# Patient Record
Sex: FEMALE | Race: WHITE | NOT HISPANIC OR LATINO | URBAN - METROPOLITAN AREA
[De-identification: names, ages, dates, MRNs, and addresses within clinical notes are randomized per-mention and may not be internally consistent; named-entity substitution may affect disease eponyms.]

---

## 2018-09-14 ENCOUNTER — INPATIENT (INPATIENT)
Facility: HOSPITAL | Age: 83
LOS: 3 days | Discharge: NURSING, REHAB OR LONG-TERM CARE | DRG: 948 | End: 2018-09-18
Attending: INTERNAL MEDICINE | Admitting: INTERNAL MEDICINE
Payer: MEDICARE

## 2018-09-14 VITALS
TEMPERATURE: 98 F | WEIGHT: 169.98 LBS | HEIGHT: 67 IN | DIASTOLIC BLOOD PRESSURE: 81 MMHG | RESPIRATION RATE: 16 BRPM | OXYGEN SATURATION: 99 % | SYSTOLIC BLOOD PRESSURE: 174 MMHG | HEART RATE: 94 BPM

## 2018-09-14 DIAGNOSIS — R60.0 LOCALIZED EDEMA: ICD-10-CM

## 2018-09-14 DIAGNOSIS — Z29.9 ENCOUNTER FOR PROPHYLACTIC MEASURES, UNSPECIFIED: ICD-10-CM

## 2018-09-14 DIAGNOSIS — F39 UNSPECIFIED MOOD [AFFECTIVE] DISORDER: ICD-10-CM

## 2018-09-14 DIAGNOSIS — E78.5 HYPERLIPIDEMIA, UNSPECIFIED: ICD-10-CM

## 2018-09-14 DIAGNOSIS — E07.9 DISORDER OF THYROID, UNSPECIFIED: Chronic | ICD-10-CM

## 2018-09-14 PROBLEM — Z00.00 ENCOUNTER FOR PREVENTIVE HEALTH EXAMINATION: Status: ACTIVE | Noted: 2018-09-14

## 2018-09-14 LAB
ALBUMIN SERPL ELPH-MCNC: 3.5 G/DL — SIGNIFICANT CHANGE UP (ref 3.5–5)
ALP SERPL-CCNC: 73 U/L — SIGNIFICANT CHANGE UP (ref 40–120)
ALT FLD-CCNC: 30 U/L DA — SIGNIFICANT CHANGE UP (ref 10–60)
ANION GAP SERPL CALC-SCNC: 8 MMOL/L — SIGNIFICANT CHANGE UP (ref 5–17)
AST SERPL-CCNC: 18 U/L — SIGNIFICANT CHANGE UP (ref 10–40)
BASOPHILS # BLD AUTO: 0.1 K/UL — SIGNIFICANT CHANGE UP (ref 0–0.2)
BASOPHILS NFR BLD AUTO: 1.2 % — SIGNIFICANT CHANGE UP (ref 0–2)
BILIRUB SERPL-MCNC: 0.4 MG/DL — SIGNIFICANT CHANGE UP (ref 0.2–1.2)
BUN SERPL-MCNC: 13 MG/DL — SIGNIFICANT CHANGE UP (ref 7–18)
CALCIUM SERPL-MCNC: 9.9 MG/DL — SIGNIFICANT CHANGE UP (ref 8.4–10.5)
CHLORIDE SERPL-SCNC: 100 MMOL/L — SIGNIFICANT CHANGE UP (ref 96–108)
CO2 SERPL-SCNC: 26 MMOL/L — SIGNIFICANT CHANGE UP (ref 22–31)
CREAT SERPL-MCNC: 0.47 MG/DL — LOW (ref 0.5–1.3)
EOSINOPHIL # BLD AUTO: 0.2 K/UL — SIGNIFICANT CHANGE UP (ref 0–0.5)
EOSINOPHIL NFR BLD AUTO: 1.6 % — SIGNIFICANT CHANGE UP (ref 0–6)
GLUCOSE SERPL-MCNC: 106 MG/DL — HIGH (ref 70–99)
HCT VFR BLD CALC: 34 % — LOW (ref 34.5–45)
HGB BLD-MCNC: 11.4 G/DL — LOW (ref 11.5–15.5)
LYMPHOCYTES # BLD AUTO: 3.6 K/UL — HIGH (ref 1–3.3)
LYMPHOCYTES # BLD AUTO: 37.7 % — SIGNIFICANT CHANGE UP (ref 13–44)
MCHC RBC-ENTMCNC: 29.4 PG — SIGNIFICANT CHANGE UP (ref 27–34)
MCHC RBC-ENTMCNC: 33.4 GM/DL — SIGNIFICANT CHANGE UP (ref 32–36)
MCV RBC AUTO: 87.9 FL — SIGNIFICANT CHANGE UP (ref 80–100)
MONOCYTES # BLD AUTO: 0.5 K/UL — SIGNIFICANT CHANGE UP (ref 0–0.9)
MONOCYTES NFR BLD AUTO: 5.5 % — SIGNIFICANT CHANGE UP (ref 2–14)
NEUTROPHILS # BLD AUTO: 5.2 K/UL — SIGNIFICANT CHANGE UP (ref 1.8–7.4)
NEUTROPHILS NFR BLD AUTO: 54 % — SIGNIFICANT CHANGE UP (ref 43–77)
NT-PROBNP SERPL-SCNC: 225 PG/ML — SIGNIFICANT CHANGE UP (ref 0–450)
PLATELET # BLD AUTO: 232 K/UL — SIGNIFICANT CHANGE UP (ref 150–400)
POTASSIUM SERPL-MCNC: 4.4 MMOL/L — SIGNIFICANT CHANGE UP (ref 3.5–5.3)
POTASSIUM SERPL-SCNC: 4.4 MMOL/L — SIGNIFICANT CHANGE UP (ref 3.5–5.3)
PROT SERPL-MCNC: 6.8 G/DL — SIGNIFICANT CHANGE UP (ref 6–8.3)
RBC # BLD: 3.86 M/UL — SIGNIFICANT CHANGE UP (ref 3.8–5.2)
RBC # FLD: 12.8 % — SIGNIFICANT CHANGE UP (ref 10.3–14.5)
SODIUM SERPL-SCNC: 134 MMOL/L — LOW (ref 135–145)
TROPONIN I SERPL-MCNC: <0.015 NG/ML — SIGNIFICANT CHANGE UP (ref 0–0.04)
WBC # BLD: 9.6 K/UL — SIGNIFICANT CHANGE UP (ref 3.8–10.5)
WBC # FLD AUTO: 9.6 K/UL — SIGNIFICANT CHANGE UP (ref 3.8–10.5)

## 2018-09-14 PROCEDURE — 71045 X-RAY EXAM CHEST 1 VIEW: CPT | Mod: 26

## 2018-09-14 PROCEDURE — 93970 EXTREMITY STUDY: CPT | Mod: 26

## 2018-09-14 PROCEDURE — 93010 ELECTROCARDIOGRAM REPORT: CPT

## 2018-09-14 PROCEDURE — 99285 EMERGENCY DEPT VISIT HI MDM: CPT

## 2018-09-14 RX ORDER — FUROSEMIDE 40 MG
20 TABLET ORAL
Qty: 0 | Refills: 0 | Status: DISCONTINUED | OUTPATIENT
Start: 2018-09-14 | End: 2018-09-17

## 2018-09-14 RX ORDER — METOPROLOL TARTRATE 50 MG
25 TABLET ORAL
Qty: 0 | Refills: 0 | Status: DISCONTINUED | OUTPATIENT
Start: 2018-09-14 | End: 2018-09-17

## 2018-09-14 RX ORDER — ATORVASTATIN CALCIUM 80 MG/1
40 TABLET, FILM COATED ORAL AT BEDTIME
Qty: 0 | Refills: 0 | Status: DISCONTINUED | OUTPATIENT
Start: 2018-09-14 | End: 2018-09-18

## 2018-09-14 RX ORDER — SERTRALINE 25 MG/1
50 TABLET, FILM COATED ORAL DAILY
Qty: 0 | Refills: 0 | Status: DISCONTINUED | OUTPATIENT
Start: 2018-09-14 | End: 2018-09-18

## 2018-09-14 RX ORDER — RISPERIDONE 4 MG/1
0.5 TABLET ORAL DAILY
Qty: 0 | Refills: 0 | Status: DISCONTINUED | OUTPATIENT
Start: 2018-09-14 | End: 2018-09-18

## 2018-09-14 RX ORDER — ENOXAPARIN SODIUM 100 MG/ML
40 INJECTION SUBCUTANEOUS DAILY
Qty: 0 | Refills: 0 | Status: DISCONTINUED | OUTPATIENT
Start: 2018-09-14 | End: 2018-09-18

## 2018-09-14 RX ORDER — ASPIRIN/CALCIUM CARB/MAGNESIUM 324 MG
81 TABLET ORAL DAILY
Qty: 0 | Refills: 0 | Status: DISCONTINUED | OUTPATIENT
Start: 2018-09-14 | End: 2018-09-18

## 2018-09-14 RX ADMIN — ATORVASTATIN CALCIUM 40 MILLIGRAM(S): 80 TABLET, FILM COATED ORAL at 21:51

## 2018-09-14 NOTE — H&P ADULT - NSHPPHYSICALEXAM_GEN_ALL_CORE
T(C): 36.8 (14 Sep 2018 12:59), Max: 36.8 (14 Sep 2018 12:59)  T(F): 98.2 (14 Sep 2018 12:59), Max: 98.2 (14 Sep 2018 12:59)  HR: 94 (14 Sep 2018 12:59) (94 - 94)  BP: 174/81 (14 Sep 2018 12:59) (174/81 - 174/81)  RR: 16 (14 Sep 2018 12:59) (16 - 16)  SpO2: 99% (14 Sep 2018 12:59) (99% - 99%)

## 2018-09-14 NOTE — ED ADULT NURSE NOTE - OBJECTIVE STATEMENT
bilateral lower leg swelling with discoloration on right lefg and elbow, s/p fall 1 week ago, denies any LOC or dizziness when the fall happened.

## 2018-09-14 NOTE — H&P ADULT - PROBLEM SELECTOR PLAN 1
P/w new acute worsening pitting of LEs complicated w/ inability to ambualte and recent fall  - R/o new onset CHF 2/2 ischemia; C/w remote telemetry, ASA, Statin, and beta blocker  Cardiology TBD  ***F/u serial cardiac enzymes, A1c, lipid panel, TTE, LE duplex (r/o clot), and PT consult P/w new acute worsening pitting of LEs complicated w/ inability to ambulate and recent fall  - Negative LE duplex for DVT  - R/o new onset CHF 2/2 ischemia; C/w remote telemetry, ASA, Statin, and beta blocker  - C/w IV Lasix 20 IV BID  ***F/u serial cardiac enzymes, A1c, lipid panel, TTE, and PT consult P/w new acute worsening pitting of LEs complicated w/ inability to ambulate and recent fall  - Negative LE duplex for DVT  - EKG NSR w/ TWI III and flat T wave at aVF  - R/o new onset CHF 2/2 ischemia; C/w remote telemetry, ASA, Statin, and beta blocker  - C/w IV Lasix 20 IV BID  ***F/u serial cardiac enzymes, A1c, lipid panel, TTE, and PT consult

## 2018-09-14 NOTE — H&P ADULT - HISTORY OF PRESENT ILLNESS
83 year old female w/ PMH Mood Disorder, Thyroid Mass resection, and HLD p/w worsening lower extremity swelling x 7 days s/p fall of unknown origin - patient states she was walking to close her AC and simply felt herself coming down, agrees w/ weakness, denies any prodromal symptoms, LOC, mechanical trip/slip, any Hx of falls, or any other complaints. Pt Lives alone, independently ambulatory prior to the LE swelling associated w/ pain. Pt fell slowly and bruised her feet, R more than L, w/ R tricep region bruising. Pt states recent cardiac w/u w/ PCP including TTE. Pt states Hx of ?TIA - episode of poorly moving - w/u negative, told she had a TIA.

## 2018-09-14 NOTE — ED ADULT NURSE NOTE - NSIMPLEMENTINTERV_GEN_ALL_ED
Implemented All Fall Risk Interventions:  [place] to call system. Call bell, personal items and telephone within reach. Instruct patient to call for assistance. Room bathroom lighting operational. Non-slip footwear when patient is off stretcher. Physically safe environment: no spills, clutter or unnecessary equipment. Stretcher in lowest position, wheels locked, appropriate side rails in place. Provide visual cue, wrist band, yellow gown, etc. Monitor gait and stability. Monitor for mental status changes and reorient to person, place, and time. Review medications for side effects contributing to fall risk. Reinforce activity limits and safety measures with patient and family.

## 2018-09-14 NOTE — H&P ADULT - NSHPLABSRESULTS_GEN_ALL_CORE
CBC Full  -  ( 14 Sep 2018 15:15 )  WBC Count : 9.6 K/uL  Hemoglobin : 11.4 g/dL  Hematocrit : 34.0 %  Platelet Count - Automated : 232 K/uL  Mean Cell Volume : 87.9 fl  Mean Cell Hemoglobin : 29.4 pg  Mean Cell Hemoglobin Concentration : 33.4 gm/dL  Auto Neutrophil # : 5.2 K/uL  Auto Lymphocyte # : 3.6 K/uL  Auto Monocyte # : 0.5 K/uL  Auto Eosinophil # : 0.2 K/uL  Auto Basophil # : 0.1 K/uL  Auto Neutrophil % : 54.0 %  Auto Lymphocyte % : 37.7 %  Auto Monocyte % : 5.5 %  Auto Eosinophil % : 1.6 %  Auto Basophil % : 1.2 %    09-14    134<L>  |  100  |  13  ----------------------------<  106<H>  4.4   |  26  |  0.47<L>    Ca    9.9      14 Sep 2018 15:15    TPro  6.8  /  Alb  3.5  /  TBili  0.4  /  DBili  x   /  AST  18  /  ALT  30  /  AlkPhos  73  09-14    CARDIAC MARKERS ( 14 Sep 2018 15:15 )  <0.015 ng/mL / x     / x     / x     / x        Imaging  - EKG TBD, not completed. CBC Full  -  ( 14 Sep 2018 15:15 )  WBC Count : 9.6 K/uL  Hemoglobin : 11.4 g/dL  Hematocrit : 34.0 %  Platelet Count - Automated : 232 K/uL  Mean Cell Volume : 87.9 fl  Mean Cell Hemoglobin : 29.4 pg  Mean Cell Hemoglobin Concentration : 33.4 gm/dL  Auto Neutrophil # : 5.2 K/uL  Auto Lymphocyte # : 3.6 K/uL  Auto Monocyte # : 0.5 K/uL  Auto Eosinophil # : 0.2 K/uL  Auto Basophil # : 0.1 K/uL  Auto Neutrophil % : 54.0 %  Auto Lymphocyte % : 37.7 %  Auto Monocyte % : 5.5 %  Auto Eosinophil % : 1.6 %  Auto Basophil % : 1.2 %    09-14    134<L>  |  100  |  13  ----------------------------<  106<H>  4.4   |  26  |  0.47<L>    Ca    9.9      14 Sep 2018 15:15    TPro  6.8  /  Alb  3.5  /  TBili  0.4  /  DBili  x   /  AST  18  /  ALT  30  /  AlkPhos  73  09-14    CARDIAC MARKERS ( 14 Sep 2018 15:15 )  <0.015 ng/mL / x     / x     / x     / x        Imaging  - EKG NSR w/ TWI III and flat T wave at aVF

## 2018-09-14 NOTE — ED PROVIDER NOTE - MEDICAL DECISION MAKING DETAILS
Pt with BLE edema x 7 days. Will get DVT study, basic labs. Pt comes with a note from Dr. Quiroga requesting admission to Dr. Goyal.

## 2018-09-14 NOTE — H&P ADULT - ASSESSMENT
83 year old female w/ PMH Mood Disorder, Thyroid Mass resection, and HLD p/w worsening lower extremity swelling x 7 days s/p fall of unknown origin - admitting for further evaluation

## 2018-09-14 NOTE — ED PROVIDER NOTE - OBJECTIVE STATEMENT
82 y/o female with PMHx of HLD, TIA, carotid stenosis presents to the ED c/o BLE edema x 7 days. Pt notes she sustained a fall x 7 days due to an unclear cause but pt denies head strike or LOC. Pt notes ever since her fall, she has been having gradual onset of BLE edema. Sx have worsened and her legs have gotten so swollen to the point where they are too heavy for her to walk on, pt is now unable to walk. Pt denies fever, chest pain, shortness of breath, or any other complaints. Pt also denies any pleuritic Sx. NKDA.

## 2018-09-15 ENCOUNTER — TRANSCRIPTION ENCOUNTER (OUTPATIENT)
Age: 83
End: 2018-09-15

## 2018-09-15 DIAGNOSIS — J39.8 OTHER SPECIFIED DISEASES OF UPPER RESPIRATORY TRACT: ICD-10-CM

## 2018-09-15 DIAGNOSIS — I10 ESSENTIAL (PRIMARY) HYPERTENSION: ICD-10-CM

## 2018-09-15 LAB
ANION GAP SERPL CALC-SCNC: 8 MMOL/L — SIGNIFICANT CHANGE UP (ref 5–17)
BASOPHILS # BLD AUTO: 0.1 K/UL — SIGNIFICANT CHANGE UP (ref 0–0.2)
BASOPHILS NFR BLD AUTO: 0.9 % — SIGNIFICANT CHANGE UP (ref 0–2)
BUN SERPL-MCNC: 10 MG/DL — SIGNIFICANT CHANGE UP (ref 7–18)
CALCIUM SERPL-MCNC: 10.2 MG/DL — SIGNIFICANT CHANGE UP (ref 8.4–10.5)
CHLORIDE SERPL-SCNC: 101 MMOL/L — SIGNIFICANT CHANGE UP (ref 96–108)
CHOLEST SERPL-MCNC: 171 MG/DL — SIGNIFICANT CHANGE UP (ref 10–199)
CK MB BLD-MCNC: 2.1 % — SIGNIFICANT CHANGE UP (ref 0–3.5)
CK MB BLD-MCNC: 2.5 % — SIGNIFICANT CHANGE UP (ref 0–3.5)
CK MB CFR SERPL CALC: 2.7 NG/ML — SIGNIFICANT CHANGE UP (ref 0–3.6)
CK MB CFR SERPL CALC: 3 NG/ML — SIGNIFICANT CHANGE UP (ref 0–3.6)
CK SERPL-CCNC: 118 U/L — SIGNIFICANT CHANGE UP (ref 21–215)
CK SERPL-CCNC: 128 U/L — SIGNIFICANT CHANGE UP (ref 21–215)
CO2 SERPL-SCNC: 27 MMOL/L — SIGNIFICANT CHANGE UP (ref 22–31)
CREAT SERPL-MCNC: 0.54 MG/DL — SIGNIFICANT CHANGE UP (ref 0.5–1.3)
EOSINOPHIL # BLD AUTO: 0.2 K/UL — SIGNIFICANT CHANGE UP (ref 0–0.5)
EOSINOPHIL NFR BLD AUTO: 2 % — SIGNIFICANT CHANGE UP (ref 0–6)
FOLATE SERPL-MCNC: 8.6 NG/ML — SIGNIFICANT CHANGE UP
GLUCOSE SERPL-MCNC: 107 MG/DL — HIGH (ref 70–99)
HBA1C BLD-MCNC: 5.6 % — SIGNIFICANT CHANGE UP (ref 4–5.6)
HCT VFR BLD CALC: 35.1 % — SIGNIFICANT CHANGE UP (ref 34.5–45)
HDLC SERPL-MCNC: 34 MG/DL — LOW
HGB BLD-MCNC: 11.7 G/DL — SIGNIFICANT CHANGE UP (ref 11.5–15.5)
LIPID PNL WITH DIRECT LDL SERPL: 114 MG/DL — SIGNIFICANT CHANGE UP
LYMPHOCYTES # BLD AUTO: 3.7 K/UL — HIGH (ref 1–3.3)
LYMPHOCYTES # BLD AUTO: 37.5 % — SIGNIFICANT CHANGE UP (ref 13–44)
MAGNESIUM SERPL-MCNC: 2.1 MG/DL — SIGNIFICANT CHANGE UP (ref 1.6–2.6)
MCHC RBC-ENTMCNC: 29.2 PG — SIGNIFICANT CHANGE UP (ref 27–34)
MCHC RBC-ENTMCNC: 33.3 GM/DL — SIGNIFICANT CHANGE UP (ref 32–36)
MCV RBC AUTO: 87.7 FL — SIGNIFICANT CHANGE UP (ref 80–100)
MONOCYTES # BLD AUTO: 0.5 K/UL — SIGNIFICANT CHANGE UP (ref 0–0.9)
MONOCYTES NFR BLD AUTO: 5.2 % — SIGNIFICANT CHANGE UP (ref 2–14)
NEUTROPHILS # BLD AUTO: 5.4 K/UL — SIGNIFICANT CHANGE UP (ref 1.8–7.4)
NEUTROPHILS NFR BLD AUTO: 54.5 % — SIGNIFICANT CHANGE UP (ref 43–77)
PHOSPHATE SERPL-MCNC: 2.9 MG/DL — SIGNIFICANT CHANGE UP (ref 2.5–4.5)
PLATELET # BLD AUTO: 235 K/UL — SIGNIFICANT CHANGE UP (ref 150–400)
POTASSIUM SERPL-MCNC: 4 MMOL/L — SIGNIFICANT CHANGE UP (ref 3.5–5.3)
POTASSIUM SERPL-SCNC: 4 MMOL/L — SIGNIFICANT CHANGE UP (ref 3.5–5.3)
RBC # BLD: 4 M/UL — SIGNIFICANT CHANGE UP (ref 3.8–5.2)
RBC # FLD: 12.5 % — SIGNIFICANT CHANGE UP (ref 10.3–14.5)
SODIUM SERPL-SCNC: 136 MMOL/L — SIGNIFICANT CHANGE UP (ref 135–145)
TOTAL CHOLESTEROL/HDL RATIO MEASUREMENT: 5 RATIO — SIGNIFICANT CHANGE UP (ref 3.3–7.1)
TRIGL SERPL-MCNC: 113 MG/DL — SIGNIFICANT CHANGE UP (ref 10–149)
TROPONIN I SERPL-MCNC: <0.015 NG/ML — SIGNIFICANT CHANGE UP (ref 0–0.04)
TROPONIN I SERPL-MCNC: <0.015 NG/ML — SIGNIFICANT CHANGE UP (ref 0–0.04)
TSH SERPL-MCNC: 1.56 UU/ML — SIGNIFICANT CHANGE UP (ref 0.34–4.82)
VIT B12 SERPL-MCNC: 224 PG/ML — LOW (ref 232–1245)
WBC # BLD: 10 K/UL — SIGNIFICANT CHANGE UP (ref 3.8–10.5)
WBC # FLD AUTO: 10 K/UL — SIGNIFICANT CHANGE UP (ref 3.8–10.5)

## 2018-09-15 PROCEDURE — 93306 TTE W/DOPPLER COMPLETE: CPT | Mod: 26

## 2018-09-15 PROCEDURE — 99222 1ST HOSP IP/OBS MODERATE 55: CPT

## 2018-09-15 RX ORDER — PREGABALIN 225 MG/1
1000 CAPSULE ORAL DAILY
Qty: 0 | Refills: 0 | Status: DISCONTINUED | OUTPATIENT
Start: 2018-09-15 | End: 2018-09-18

## 2018-09-15 RX ORDER — SODIUM,POTASSIUM PHOSPHATES 278-250MG
1 POWDER IN PACKET (EA) ORAL ONCE
Qty: 0 | Refills: 0 | Status: COMPLETED | OUTPATIENT
Start: 2018-09-15 | End: 2018-09-15

## 2018-09-15 RX ORDER — INFLUENZA VIRUS VACCINE 15; 15; 15; 15 UG/.5ML; UG/.5ML; UG/.5ML; UG/.5ML
0.5 SUSPENSION INTRAMUSCULAR ONCE
Qty: 0 | Refills: 0 | Status: COMPLETED | OUTPATIENT
Start: 2018-09-15 | End: 2018-09-18

## 2018-09-15 RX ADMIN — ENOXAPARIN SODIUM 40 MILLIGRAM(S): 100 INJECTION SUBCUTANEOUS at 12:01

## 2018-09-15 RX ADMIN — RISPERIDONE 0.5 MILLIGRAM(S): 4 TABLET ORAL at 12:00

## 2018-09-15 RX ADMIN — Medication 25 MILLIGRAM(S): at 06:18

## 2018-09-15 RX ADMIN — ATORVASTATIN CALCIUM 40 MILLIGRAM(S): 80 TABLET, FILM COATED ORAL at 22:03

## 2018-09-15 RX ADMIN — Medication 1 TABLET(S): at 12:00

## 2018-09-15 RX ADMIN — Medication 20 MILLIGRAM(S): at 17:49

## 2018-09-15 RX ADMIN — Medication 20 MILLIGRAM(S): at 06:18

## 2018-09-15 RX ADMIN — Medication 25 MILLIGRAM(S): at 17:49

## 2018-09-15 RX ADMIN — SERTRALINE 50 MILLIGRAM(S): 25 TABLET, FILM COATED ORAL at 14:10

## 2018-09-15 NOTE — PHYSICAL THERAPY INITIAL EVALUATION ADULT - PERTINENT HX OF CURRENT PROBLEM, REHAB EVAL
Pt. admitted from home due to worsening LE swelling; pt. also states had fallen walking back to her bed from turning off her AC.

## 2018-09-15 NOTE — PHYSICAL THERAPY INITIAL EVALUATION ADULT - GENERAL OBSERVATIONS, REHAB EVAL
Pt. received sitting up in bed; IV line and monitor in place; Pt. c/o pain on both lower legs and right thigh.

## 2018-09-15 NOTE — DISCHARGE NOTE ADULT - MEDICATION SUMMARY - MEDICATIONS TO TAKE
I will START or STAY ON the medications listed below when I get home from the hospital:    sertraline 50 mg oral tablet  -- 1 tab(s) by mouth once a day  -- Indication: For Mood disorder    pravastatin 40 mg oral tablet  -- 1 tab(s) by mouth once a day  -- Indication: For HLD (hyperlipidemia)    risperiDONE 0.5 mg oral tablet  -- 1 tab(s) by mouth once a day  -- Indication: For Mood disorder

## 2018-09-15 NOTE — CONSULT NOTE ADULT - SUBJECTIVE AND OBJECTIVE BOX
CHIEF COMPLAINT: Leg swelling    HPI: 82 yo F with HLD and thyroid mass s/p resection who presented with LE edema. Patient reports     PAST MEDICAL & SURGICAL HISTORY:  As above, also Carotid stenosis, TIA (transient ischemic attack)    Allergies    No Known Allergies      MEDICATIONS  (STANDING):  aspirin  chewable 81 milliGRAM(s) Oral daily  atorvastatin 40 milliGRAM(s) Oral at bedtime  enoxaparin Injectable 40 milliGRAM(s) SubCutaneous daily  furosemide   Injectable 20 milliGRAM(s) IV Push two times a day  influenza   Vaccine 0.5 milliLiter(s) IntraMuscular once  metoprolol tartrate 25 milliGRAM(s) Oral two times a day  risperiDONE   Tablet 0.5 milliGRAM(s) Oral daily  sertraline 50 milliGRAM(s) Oral daily    MEDICATIONS  (PRN):      FAMILY HISTORY:  Family history of lung cancer (Father)    No family history of premature coronary artery disease or sudden cardiac death    SOCIAL HISTORY:  Smoking-  Alcohol-  Illicit Drug use-    REVIEW OF SYSTEMS:  Constitutional: [ ] fever, [ ]weight loss,  [ ]fatigue  Eyes: [ ] visual changes  Respiratory: [ ]shortness of breath;  [ ] cough, [ ]wheezing, [ ]chills, [ ]hemoptysis  Cardiovascular: [ ] chest pain, [ ]palpitations, [ ]dizziness,  [ ]leg swelling [ ]syncope  Gastrointestinal: [ ] abdominal pain, [ ]nausea, [ ]vomiting,  [ ]diarrhea   Genitourinary: [ ] dysuria, [ ] hematuria  Neurologic: [ ] headaches [ ] tremors  [ ] weakness [ ] lightheadedness  Skin: [ ] itching, [ ]burning, [ ] rashes  Endocrine: [ ] heat or cold intolerance  Musculoskeletal: [ ] joint pain or swelling; [ ] muscle, back, or extremity pain  Psychiatric: [ ] depression, [ ]anxiety, [ ]mood swings, or [ ]difficulty sleeping  Hematologic: [ ] easy bruising, [ ] bleeding gums       [ x] All others negative	  [ ] Unable to obtain    Vital Signs Last 24 Hrs  T(C): 37.1 (15 Sep 2018 11:20), Max: 37.2 (15 Sep 2018 00:46)  T(F): 98.7 (15 Sep 2018 11:20), Max: 98.9 (15 Sep 2018 00:46)  HR: 96 (15 Sep 2018 11:28) (78 - 99)  BP: 141/53 (15 Sep 2018 11:28) (135/45 - 159/59)  BP(mean): --  RR: 19 (15 Sep 2018 11:20) (16 - 19)  SpO2: 97% (15 Sep 2018 11:28) (96% - 100%)  I&O's Summary    14 Sep 2018 07:01  -  15 Sep 2018 07:00  --------------------------------------------------------  IN: 0 mL / OUT: 1100 mL / NET: -1100 mL        PHYSICAL EXAM:  General: No acute distress  HEENT: EOMI, PERRL  Neck: Supple, No JVD  Lungs: Clear to auscultation bilaterally; No rales or wheezing  Heart: Regular rate and rhythm; No murmurs, rubs, or gallops  Abdomen: Nontender, bowel sounds present  Extremities: No clubbing, cyanosis, or edema  Nervous system:  Alert & Oriented X3, no focal deficits  Psychiatric: Normal affect  Skin: No rashes or lesions      LABS:  09-15    136  |  101  |  10  ----------------------------<  107<H>  4.0   |  27  |  0.54    Ca    10.2      15 Sep 2018 07:12  Phos  2.9     09-15  Mg     2.1     09-15    TPro  6.8  /  Alb  3.5  /  TBili  0.4  /  DBili  x   /  AST  18  /  ALT  30  /  AlkPhos  73  09-14    Creatinine Trend: 0.54<--, 0.47<--                        11.7   10.0  )-----------( 235      ( 15 Sep 2018 07:12 )             35.1     Lipid Panel: Cholesterol, Serum 171  Direct   HDL Cholesterol, Serum 34  Triglycerides, Serum 113    Cardiac Enzymes: CARDIAC MARKERS ( 15 Sep 2018 07:12 )  <0.015 ng/mL / x     / 128 U/L / x     / 2.7 ng/mL  CARDIAC MARKERS ( 15 Sep 2018 00:19 )  <0.015 ng/mL / x     / 118 U/L / x     / 3.0 ng/mL  CARDIAC MARKERS ( 14 Sep 2018 15:15 )  <0.015 ng/mL / x     / x     / x     / x          Serum Pro-Brain Natriuretic Peptide: 225 pg/mL (09-14-18 @ 15:15)    09-15 [id number] 5.6      RADIOLOGY: < from: Xray Chest 1 View AP/PA (09.14.18 @ 14:22) >  Impression: No radiographic evidence for acute cardiopulmonary disease.    Apparent right peritracheal density with left tracheal deviation may be   due to a substernal goiter. If clinically indicated, chest CT without IV   contrast may be pursued for further evaluation. This finding is   communicated with the emergency department via the PACS communication   system.    < end of copied text >      ECG [my interpretation]:    TELEMETRY:    ECHO:    STRESS TEST:    CATHETERIZATION: CHIEF COMPLAINT: Leg swelling    HPI: 82 yo F with HLD and thyroid mass s/p resection who presented with LE edema. Patient reports     PAST MEDICAL & SURGICAL HISTORY:  As above, also Carotid stenosis, TIA (transient ischemic attack)    Allergies    No Known Allergies      MEDICATIONS  (STANDING):  aspirin  chewable 81 milliGRAM(s) Oral daily  atorvastatin 40 milliGRAM(s) Oral at bedtime  enoxaparin Injectable 40 milliGRAM(s) SubCutaneous daily  furosemide   Injectable 20 milliGRAM(s) IV Push two times a day  influenza   Vaccine 0.5 milliLiter(s) IntraMuscular once  metoprolol tartrate 25 milliGRAM(s) Oral two times a day  risperiDONE   Tablet 0.5 milliGRAM(s) Oral daily  sertraline 50 milliGRAM(s) Oral daily    MEDICATIONS  (PRN):      FAMILY HISTORY:  Family history of lung cancer (Father)    No family history of premature coronary artery disease or sudden cardiac death    SOCIAL HISTORY:  Smoking-  Alcohol-  Illicit Drug use-    REVIEW OF SYSTEMS:  Constitutional: [ ] fever, [ ]weight loss,  [ ]fatigue  Eyes: [ ] visual changes  Respiratory: [ ]shortness of breath;  [ ] cough, [ ]wheezing, [ ]chills, [ ]hemoptysis  Cardiovascular: [ ] chest pain, [ ]palpitations, [ ]dizziness,  [ ]leg swelling [ ]syncope  Gastrointestinal: [ ] abdominal pain, [ ]nausea, [ ]vomiting,  [ ]diarrhea   Genitourinary: [ ] dysuria, [ ] hematuria  Neurologic: [ ] headaches [ ] tremors  [ ] weakness [ ] lightheadedness  Skin: [ ] itching, [ ]burning, [ ] rashes  Endocrine: [ ] heat or cold intolerance  Musculoskeletal: [ ] joint pain or swelling; [ ] muscle, back, or extremity pain  Psychiatric: [ ] depression, [ ]anxiety, [ ]mood swings, or [ ]difficulty sleeping  Hematologic: [ ] easy bruising, [ ] bleeding gums       [ x] All others negative	  [ ] Unable to obtain    Vital Signs Last 24 Hrs  T(C): 37.1 (15 Sep 2018 11:20), Max: 37.2 (15 Sep 2018 00:46)  T(F): 98.7 (15 Sep 2018 11:20), Max: 98.9 (15 Sep 2018 00:46)  HR: 96 (15 Sep 2018 11:28) (78 - 99)  BP: 141/53 (15 Sep 2018 11:28) (135/45 - 159/59)  BP(mean): --  RR: 19 (15 Sep 2018 11:20) (16 - 19)  SpO2: 97% (15 Sep 2018 11:28) (96% - 100%)  I&O's Summary    14 Sep 2018 07:01  -  15 Sep 2018 07:00  --------------------------------------------------------  IN: 0 mL / OUT: 1100 mL / NET: -1100 mL        PHYSICAL EXAM:  General: No acute distress  HEENT: EOMI, PERRL  Neck: Supple, No JVD  Lungs: Clear to auscultation bilaterally; No rales or wheezing  Heart: Regular rate and rhythm; No murmurs, rubs, or gallops  Abdomen: Nontender, bowel sounds present  Extremities: No clubbing, cyanosis, or edema  Nervous system:  Alert & Oriented X3, no focal deficits  Psychiatric: Normal affect  Skin: No rashes or lesions      LABS:  09-15    136  |  101  |  10  ----------------------------<  107<H>  4.0   |  27  |  0.54    Ca    10.2      15 Sep 2018 07:12  Phos  2.9     09-15  Mg     2.1     09-15    TPro  6.8  /  Alb  3.5  /  TBili  0.4  /  DBili  x   /  AST  18  /  ALT  30  /  AlkPhos  73  09-14    Creatinine Trend: 0.54<--, 0.47<--                        11.7   10.0  )-----------( 235      ( 15 Sep 2018 07:12 )             35.1     Lipid Panel: Cholesterol, Serum 171  Direct   HDL Cholesterol, Serum 34  Triglycerides, Serum 113    Cardiac Enzymes: CARDIAC MARKERS ( 15 Sep 2018 07:12 )  <0.015 ng/mL / x     / 128 U/L / x     / 2.7 ng/mL  CARDIAC MARKERS ( 15 Sep 2018 00:19 )  <0.015 ng/mL / x     / 118 U/L / x     / 3.0 ng/mL  CARDIAC MARKERS ( 14 Sep 2018 15:15 )  <0.015 ng/mL / x     / x     / x     / x          Serum Pro-Brain Natriuretic Peptide: 225 pg/mL (09-14-18 @ 15:15)    09-15 [id number] 5.6    RADIOLOGY: < from: Xray Chest 1 View AP/PA (09.14.18 @ 14:22) >  Impression: No radiographic evidence for acute cardiopulmonary disease.    Apparent right peritracheal density with left tracheal deviation may be   due to a substernal goiter. If clinically indicated, chest CT without IV   contrast may be pursued for further evaluation. This finding is   communicated with the emergency department via the PACS communication   system.    < end of copied text >    < from: US Duplex Venous Lower Ext Complete, Bilateral (09.14.18 @ 14:20) >  IMPRESSION:  No evidence of DVT.     Right Baker's cyst    < end of copied text >    ECG [my interpretation]:    TELEMETRY:     ECHO: Pending CHIEF COMPLAINT: Leg swelling    HPI: 84 yo F with HLD and thyroid mass s/p resection who presented with LE edema. Patient reports symptoms were noticeable about 5 days ago; denied any associated chest pain, palpitations, LH, syncope, orthopnea, or PND. Patient also had a fall 1 week ago; she reports she slipped and fel on the floorl. She rememberers falling, denies any syncope. No preceding or following symptoms such as chest pain, dyspnea, palpitations, or LH. Currently feels at baseline. Had only 1 prior fall, also mechanical in nature, about 1 year ago.     PAST MEDICAL & SURGICAL HISTORY:  As above, also Carotid stenosis, TIA (transient ischemic attack)    Allergies    No Known Allergies      MEDICATIONS  (STANDING):  aspirin  chewable 81 milliGRAM(s) Oral daily  atorvastatin 40 milliGRAM(s) Oral at bedtime  enoxaparin Injectable 40 milliGRAM(s) SubCutaneous daily  furosemide   Injectable 20 milliGRAM(s) IV Push two times a day  influenza   Vaccine 0.5 milliLiter(s) IntraMuscular once  metoprolol tartrate 25 milliGRAM(s) Oral two times a day  risperiDONE   Tablet 0.5 milliGRAM(s) Oral daily  sertraline 50 milliGRAM(s) Oral daily    MEDICATIONS  (PRN):      FAMILY HISTORY:  Family history of lung cancer (Father)    No family history of premature coronary artery disease or sudden cardiac death    SOCIAL HISTORY:  Smoking-Denies  Alcohol-Rare  Illicit Drug use-Denies    REVIEW OF SYSTEMS:  Constitutional: [ ] fever, [ ]weight loss,  [ ]fatigue  Eyes: [ ] visual changes  Respiratory: [ ]shortness of breath;  [ ] cough, [ ]wheezing, [ ]chills, [ ]hemoptysis  Cardiovascular: [ ] chest pain, [ ]palpitations, [ ]dizziness,  [x ]leg swelling [ ]syncope  Gastrointestinal: [ ] abdominal pain, [ ]nausea, [ ]vomiting,  [ ]diarrhea   Genitourinary: [ ] dysuria, [ ] hematuria  Neurologic: [ ] headaches [ ] tremors  [ ] weakness [ ] lightheadedness  Skin: [ ] itching, [ ]burning, [ ] rashes  Endocrine: [ ] heat or cold intolerance  Musculoskeletal: [ ] joint pain or swelling; [ ] muscle, back, or extremity pain  Psychiatric: [ ] depression, [ ]anxiety, [ ]mood swings, or [ ]difficulty sleeping  Hematologic: [ ] easy bruising, [ ] bleeding gums       [ x] All others negative	  [ ] Unable to obtain    Vital Signs Last 24 Hrs  T(C): 37.1 (15 Sep 2018 11:20), Max: 37.2 (15 Sep 2018 00:46)  T(F): 98.7 (15 Sep 2018 11:20), Max: 98.9 (15 Sep 2018 00:46)  HR: 96 (15 Sep 2018 11:28) (78 - 99)  BP: 141/53 (15 Sep 2018 11:28) (135/45 - 159/59)  BP(mean): --  RR: 19 (15 Sep 2018 11:20) (16 - 19)  SpO2: 97% (15 Sep 2018 11:28) (96% - 100%)  I&O's Summary    14 Sep 2018 07:01  -  15 Sep 2018 07:00  --------------------------------------------------------  IN: 0 mL / OUT: 1100 mL / NET: -1100 mL    PHYSICAL EXAM:  General: No acute distress  HEENT: EOMI, PERRL  Neck: Supple, No JVD  Lungs: Clear to auscultation bilaterally; No rales or wheezing  Heart: Regular rate and rhythm; No murmurs, rubs, or gallops  Abdomen: Nontender, bowel sounds present  Extremities: No clubbing, cyanosis; trace LE pitting edema up to ankles  Nervous system:  Alert & Oriented X3, no focal deficits  Psychiatric: Normal affect  Skin: No rashes or lesions      LABS:  09-15    136  |  101  |  10  ----------------------------<  107<H>  4.0   |  27  |  0.54    Ca    10.2      15 Sep 2018 07:12  Phos  2.9     09-15  Mg     2.1     09-15    TPro  6.8  /  Alb  3.5  /  TBili  0.4  /  DBili  x   /  AST  18  /  ALT  30  /  AlkPhos  73  09-14    Creatinine Trend: 0.54<--, 0.47<--                        11.7   10.0  )-----------( 235      ( 15 Sep 2018 07:12 )             35.1     Lipid Panel: Cholesterol, Serum 171  Direct   HDL Cholesterol, Serum 34  Triglycerides, Serum 113    Cardiac Enzymes: CARDIAC MARKERS ( 15 Sep 2018 07:12 )  <0.015 ng/mL / x     / 128 U/L / x     / 2.7 ng/mL  CARDIAC MARKERS ( 15 Sep 2018 00:19 )  <0.015 ng/mL / x     / 118 U/L / x     / 3.0 ng/mL  CARDIAC MARKERS ( 14 Sep 2018 15:15 )  <0.015 ng/mL / x     / x     / x     / x          Serum Pro-Brain Natriuretic Peptide: 225 pg/mL (09-14-18 @ 15:15)    09-15 [id number] 5.6    RADIOLOGY: < from: Xray Chest 1 View AP/PA (09.14.18 @ 14:22) >  Impression: No radiographic evidence for acute cardiopulmonary disease.    Apparent right peritracheal density with left tracheal deviation may be   due to a substernal goiter. If clinically indicated, chest CT without IV   contrast may be pursued for further evaluation. This finding is   communicated with the emergency department via the PACS communication   system.    < end of copied text >    < from: US Duplex Venous Lower Ext Complete, Bilateral (09.14.18 @ 14:20) >  IMPRESSION:  No evidence of DVT.     Right Baker's cyst    < end of copied text >    ECG [my interpretation]: 9/14/2018 @ 13:29: sinus rhythm nonspecific T-wave abnormality    TELEMETRY: Sinus rhythm occasional PVCs    ECHO: Pending

## 2018-09-15 NOTE — DISCHARGE NOTE ADULT - PLAN OF CARE
P/w new acute worsening pitting of LEs complicated w/ inability to ambulate and recent fall  - Negative LE duplex for DVT  - EKG NSR w/ TWI III and flat T wave at aVF  - R/o new onset CHF 2/2 ischemia; C/w remote telemetry, ASA, Statin, and beta blocker  - C/w IV Lasix 20 IV BID  ***F/u serial cardiac enzymes, A1c, lipid panel, TTE, and PT consult. Resumed SSRI and Risperidone PO. C/w moderate intensity Statin; resolution - You presented with new acute worsening pitting of lower extremities complicated w/ inability to ambulate and recent fall  - Negative Lower extremeties duplex for Deep vein thrombosis  - You need to continue on ASA, Statin, and beta blocker  - Recommended Leg elevation and compression stocking  - - ECHO showed Ejection Fraction 64 % , moderate Mitral Regurge ,   - You need to continue on Lasix 40 mg orally daily and Vit B12. - You have a history of mood disorder   - You need to continue on SSRI and Risperidone PO. - You need to continue on moderate intensity Statin Blood Pressure Control , Please continue current medication regimen, and follow up with your PCP - You have a history of Hypertension.   - Your Blood Pressure was adequately controlled with amlodipine and lasix  - You should continue on the current antihypertensive regimen regularly.  - You blood pressure should be within 140-120/80-90.  - You should follow-up with your PCP within 1 week of your discharge.   - You should maintain healthy lifestyle by eating healthy low salt diet, avoid fatty food, weight loss, exercise regularly as tolerated 30 mins X 3 time per week. 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

## 2018-09-15 NOTE — DISCHARGE NOTE ADULT - CARE PROVIDER_API CALL
Juan Miguel Herman), Otolaryngology  [address] [place] [address]  Phone: [phone]  Fax: [phone]

## 2018-09-15 NOTE — CONSULT NOTE ADULT - ASSESSMENT
82 yo F with noted PMH including HTN and s/p thyroid mass resection who presented with LE swelling, possibly in setting of diastolic dysfunction.     1. HFpEF: Echocardiogram is pending  -pro-BNP is within normal limit when taking into account patient's age; CXR also is not suggestive of fluid overload  -Patient is on furosemide 20mg IV Q12H    2. HTN:  Patient is on metoprolol 25mg BID; this was started here in the hospital.   -If EF is WNL, would discontinue metoprolol and change to lisinopril 10mg po daily; if needed additional BP control after uptitrating the ACEi would next initiate amlodipine    3. HLD: On atorvastatin     ***Note that this is a preliminary note and any recommendations should NOT be carried out until this note is finalized. *** 84 yo F with noted PMH including HTN and s/p thyroid mass resection who presented with LE swelling, possibly in setting of diastolic dysfunction and also venous insufficiency.     1. HFpEF: Echocardiogram is pending  -pro-BNP is within normal limit when taking into account patient's age; CXR also is not suggestive of fluid overload; she is euvolemic on exam  -Patient is on furosemide 20mg IV Q12H; would convert to 40mg PO daily as maintenance for edema; doubt HF is a significant contributor. Recommend elevating feet at night, consider compressive stockings    2. HTN:  Patient is on metoprolol 25mg BID; this was started here in the hospital.   -EF is reportedly preserved, would discontinue metoprolol and change to lisinopril 10mg PO daily; if needed additional BP control after uptitrating the ACEi would next initiate amlodipine    3. HLD: On atorvastatin     4. MR: Reportedly moderate on echo; no specific treatment is indicated (though afterload reduction with ACEi as per above may be useful)  -Patient should have regular follow-up with cardiologist; if symptoms worsen in the future, may need repeat echo to check for MR progression

## 2018-09-15 NOTE — PROGRESS NOTE ADULT - PROBLEM SELECTOR PLAN 1
P/w new acute worsening pitting of LEs complicated w/ inability to ambulate and recent fall  - Negative LE duplex for DVT  - EKG NSR w/ TWI III and flat T wave at aVF  - R/o new onset CHF 2/2 ischemia; C/w remote telemetry, ASA, Statin, and beta blocker  - C/w IV Lasix 20 IV BID  ***F/u serial cardiac enzymes, A1c, lipid panel, TTE, and PT consult P/w new acute worsening pitting of LEs complicated w/ inability to ambulate and recent fall  - Negative LE duplex for DVT  - EKG NSR w/ TWI III and flat T wave at aVF  - R/o new onset CHF 2/2 ischemia;   - remote telemetry  - c/w ASA, Statin, and beta blocker  - C/w IV Lasix 20 IV BID  - cardiac enzymes negative   - f/u B12  - TTE Pending   - PT consult

## 2018-09-15 NOTE — PROGRESS NOTE ADULT - PROBLEM SELECTOR PLAN 3
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

## 2018-09-15 NOTE — DISCHARGE NOTE ADULT - CARE PLAN
Principal Discharge DX:	Leg edema  Assessment and plan of treatment:	P/w new acute worsening pitting of LEs complicated w/ inability to ambulate and recent fall  - Negative LE duplex for DVT  - EKG NSR w/ TWI III and flat T wave at aVF  - R/o new onset CHF 2/2 ischemia; C/w remote telemetry, ASA, Statin, and beta blocker  - C/w IV Lasix 20 IV BID  ***F/u serial cardiac enzymes, A1c, lipid panel, TTE, and PT consult.  Secondary Diagnosis:	Mood disorder  Assessment and plan of treatment:	Resumed SSRI and Risperidone PO.  Secondary Diagnosis:	HLD (hyperlipidemia)  Assessment and plan of treatment:	C/w moderate intensity Statin; Principal Discharge DX:	Leg edema  Goal:	resolution  Assessment and plan of treatment:	- You presented with new acute worsening pitting of lower extremities complicated w/ inability to ambulate and recent fall  - Negative Lower extremeties duplex for Deep vein thrombosis  - You need to continue on ASA, Statin, and beta blocker  - Recommended Leg elevation and compression stocking  - - ECHO showed Ejection Fraction 64 % , moderate Mitral Regurge ,   - You need to continue on Lasix 40 mg orally daily and Vit B12.  Secondary Diagnosis:	Mood disorder  Assessment and plan of treatment:	- You have a history of mood disorder   - You need to continue on SSRI and Risperidone PO.  Secondary Diagnosis:	HLD (hyperlipidemia)  Assessment and plan of treatment:	- You need to continue on moderate intensity Statin  Secondary Diagnosis:	HTN (hypertension)  Goal:	Blood Pressure Control , Please continue current medication regimen, and follow up with your PCP  Assessment and plan of treatment:	- You have a history of Hypertension.   - Your Blood Pressure was adequately controlled with amlodipine and lasix  - You should continue on the current antihypertensive regimen regularly.  - You blood pressure should be within 140-120/80-90.  - You should follow-up with your PCP within 1 week of your discharge.   - You should maintain healthy lifestyle by eating healthy low salt diet, avoid fatty food, weight loss, exercise regularly as tolerated 30 mins X 3 time per week.  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  Secondary Diagnosis:	Mood disorder  Assessment and plan of treatment:	- You have a history of mood disorder   - You need to continue on SSRI and Risperidone PO.  Secondary Diagnosis:	HLD (hyperlipidemia)  Assessment and plan of treatment:	- You need to continue on moderate intensity Statin  Secondary Diagnosis:	HTN (hypertension)  Goal:	Blood Pressure Control , Please continue current medication regimen, and follow up with your PCP  Assessment and plan of treatment:	- You have a history of Hypertension.   - Your Blood Pressure was adequately controlled with amlodipine and lasix  - You should continue on the current antihypertensive regimen regularly.  - You blood pressure should be within 140-120/80-90.  - You should follow-up with your PCP within 1 week of your discharge.   - You should maintain healthy lifestyle by eating healthy low salt diet, avoid fatty food, weight loss, exercise regularly as tolerated 30 mins X 3 time per week.  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

## 2018-09-15 NOTE — PROGRESS NOTE ADULT - PROBLEM SELECTOR PLAN 4
IMPROVE VTE Individual Risk Assessment    RISK                                                          Points  [] Previous VTE                                           3  [] Thrombophilia                                        2  [] Lower limb paralysis                              2   [] Current Cancer                                       2   [x] Immobilization > 24 hrs                        1  [] ICU/CCU stay > 24 hours                       1  [x] Age > 60                                                   1    IMPROVE VTE Score: 2, DVT PPx w/ Lovenox - c/w SSRI and Risperidone PO

## 2018-09-15 NOTE — DISCHARGE NOTE ADULT - HOSPITAL COURSE
83 year old female w/ PMH Mood Disorder, Thyroid Mass resection, and HLD p/w worsening lower extremity swelling x 7 days s/p fall of unknown origin - patient states she was walking to close her AC and simply felt herself coming down, agrees w/ weakness, denies any prodromal symptoms, LOC, mechanical trip/slip, any Hx of falls, or any other complaints. Pt Lives alone, independently ambulatory prior to the LE swelling associated w/ pain. Pt fell slowly and bruised her feet, R more than L, w/ R tricep region bruising. Pt states recent cardiac w/u w/ PCP including TTE. Pt states Hx of ?TIA - episode of poorly moving - w/u negative, told she had a TIA.   83 year old female w/ PMH Mood Disorder, Thyroid Mass resection, and HLD p/w worsening lower extremity swelling x 7 days s/p fall of unknown origin - admitting for further evaluation       ·  Problem: Leg edema.  Plan: P/w new acute worsening pitting of LEs complicated w/ inability to ambulate and recent fall  - Negative LE duplex for DVT  - EKG NSR w/ TWI III and flat T wave at aVF  - R/o new onset CHF 2/2 ischemia; C/w remote telemetry, ASA, Statin, and beta blocker  - C/w IV Lasix 20 IV BID  ***F/u serial cardiac enzymes, A1c, lipid panel, TTE, and PT consult.    Given patient's improved clinical status and current hemodynamic stability, decision was made to discharge the patient.  Patient is stable for discharge per attending and is advised to follow up with PCP as outpatient  Please refer to patient's complete medical chart with documents for a full hospital course, for this is only a brief summary.

## 2018-09-15 NOTE — DISCHARGE NOTE ADULT - PATIENT PORTAL LINK FT
You can access the [facility] Patient Portal, offered by [facility], by registering with the following website: [email or web address]

## 2018-09-15 NOTE — PROGRESS NOTE ADULT - SUBJECTIVE AND OBJECTIVE BOX
PGY1 Note discussed with Supervising Resident and Primary Attending.    Patient is a 83y old  Female who presents with a chief complaint of lower extremity swelling (14 Sep 2018 18:06)      INTERVAL HPI/OVERNIGHT EVENTS :    MEDICATIONS  (STANDING):  aspirin  chewable 81 milliGRAM(s) Oral daily  atorvastatin 40 milliGRAM(s) Oral at bedtime  enoxaparin Injectable 40 milliGRAM(s) SubCutaneous daily  furosemide   Injectable 20 milliGRAM(s) IV Push two times a day  metoprolol tartrate 25 milliGRAM(s) Oral two times a day  risperiDONE   Tablet 0.5 milliGRAM(s) Oral daily  sertraline 50 milliGRAM(s) Oral daily    MEDICATIONS  (PRN):      Allergies    No Known Allergies    Intolerances        REVIEW OF SYSTEMS :  * CONSTITUTIONAL      : No Fever, Weight loss, or Fatigue  * EYES                             : No eye pain , Visual disturbances or Discharge  * RESPIRATORY             : No Cough, Wheezing, Chills or Hemoptysis; No shortness of breath  * CARDIOVASCULAR     : No Chest pain, Palpitations, Dizziness, or Leg swelling  * GASTROINTESTINAL  : No Abdominal or Epigastric pain. No Nausea, Vomiting or Hematemesis; No Diarrhea or Constipation. No Melena or Hematochezia.  * GENITOURINARY        : No Dysuria , Frequency , Haematuria   * NEUROLOGICAL          : No Headaches, Memory loss, Loss of trength, Numbness, or Tremors  * MUSCULOSKELETAL   : No Joint pain  * PSYCHIATRY                 : No Depression or Anxiety   * HEME/LYMPH              : No Easy Bruising or Bleeding gums  * SKIN                               : No Itching, Burning, Rashes, or Lesions     Vital Signs Last 24 Hrs  T(C): 36.9 (15 Sep 2018 04:33), Max: 37.2 (15 Sep 2018 00:46)  T(F): 98.5 (15 Sep 2018 04:33), Max: 98.9 (15 Sep 2018 00:46)  HR: 91 (15 Sep 2018 04:33) (91 - 99)  BP: 139/48 (15 Sep 2018 04:33) (139/48 - 174/81)  BP(mean): --  RR: 16 (15 Sep 2018 04:33) (16 - 17)  SpO2: 97% (15 Sep 2018 04:33) (96% - 100%)    PHYSICAL EXAM :  * GENERAL                 : NAD, Well-groomed, Well-developed  * HEAD                       :  Atraumatic, Normocephalic  * EYES                         : EOMI, PERRLA, Conjunctiva and Sclera clear  * ENT                           : Moist Mucous Membranes  * NECK                         : Supple, No JVD, Normal Thyroid  * CHEST/LUNG           : Clear to Auscultation bilaterally; No Rales, Rhonchi, Wheezing or Rubs  * HEART                       : Regular Rate and Rhythm; No murmurs, Rubs or gallops  * ABDOMEN                : Soft, Non-tender, Non-distended; Bowel Sounds present  * NERVOUS SYSTEM  :  Alert & Oriented X3, Good Concentration; Motor Strength 5/5 B/L UL LL ; DTRs 2+ Intact and Symmetric  * EXTREMITIES            :  2+ Peripheral Pulses, No clubbing, cyanosis, or edema  * SKIN                           : No Rashes or Lesions    LABS:                          11.4   9.6   )-----------( 232      ( 14 Sep 2018 15:15 )             34.0     09-14    134<L>  |  100  |  13  ----------------------------<  106<H>  4.4   |  26  |  0.47<L>    Ca    9.9      14 Sep 2018 15:15    TPro  6.8  /  Alb  3.5  /  TBili  0.4  /  DBili  x   /  AST  18  /  ALT  30  /  AlkPhos  73  09-14        CAPILLARY BLOOD GLUCOSE      POCT Blood Glucose.: 115 mg/dL (14 Sep 2018 13:08)      RADIOLOGY & ADDITIONAL TESTS:   No radiological imaging was required    Imaging Personally Reviewed   :  [ ] YES  [ ] NO    Consultant(s) Notes Reviewed :  [ ] YES  [ ] NO PGY1 Note discussed with Supervising Resident and Primary Attending.    Patient is a 83y old  Female who presents with a chief complaint of lower extremity swelling (14 Sep 2018 18:06)      INTERVAL HPI/OVERNIGHT EVENTS : No acute events reported overnight.    Pt is seen at the bedside. Pt is found resting comfortably in bed in no acute distress, reports feeling well and denies any new complaints today. Patient denies nausea, vomiting, diarrhea, chest pain, SOB, headache, dizziness. Legs swelling resolved    MEDICATIONS  (STANDING):  aspirin  chewable 81 milliGRAM(s) Oral daily  atorvastatin 40 milliGRAM(s) Oral at bedtime  enoxaparin Injectable 40 milliGRAM(s) SubCutaneous daily  furosemide   Injectable 20 milliGRAM(s) IV Push two times a day  metoprolol tartrate 25 milliGRAM(s) Oral two times a day  risperiDONE   Tablet 0.5 milliGRAM(s) Oral daily  sertraline 50 milliGRAM(s) Oral daily    MEDICATIONS  (PRN):      Allergies    No Known Allergies    Intolerances        REVIEW OF SYSTEMS :  * CONSTITUTIONAL      : No Fever, Weight loss, or Fatigue  * EYES                             : No eye pain , Visual disturbances or Discharge  * RESPIRATORY             : No Cough, Wheezing, Chills or Hemoptysis; No shortness of breath  * CARDIOVASCULAR     : No Chest pain, Palpitations, Dizziness, or Leg swelling  * GASTROINTESTINAL  : No Abdominal or Epigastric pain. No Nausea, Vomiting or Hematemesis; No Diarrhea or Constipation. No Melena or Hematochezia.  * GENITOURINARY        : No Dysuria , Frequency , Haematuria   * NEUROLOGICAL          : No Headaches, Memory loss, Loss of trength, Numbness, or Tremors  * MUSCULOSKELETAL   : No Joint pain  * PSYCHIATRY                 : No Depression or Anxiety   * HEME/LYMPH              : No Easy Bruising or Bleeding gums  * SKIN                               : No Itching, Burning, Rashes, or Lesions     Vital Signs Last 24 Hrs  T(C): 36.9 (15 Sep 2018 04:33), Max: 37.2 (15 Sep 2018 00:46)  T(F): 98.5 (15 Sep 2018 04:33), Max: 98.9 (15 Sep 2018 00:46)  HR: 91 (15 Sep 2018 04:33) (91 - 99)  BP: 139/48 (15 Sep 2018 04:33) (139/48 - 174/81)  BP(mean): --  RR: 16 (15 Sep 2018 04:33) (16 - 17)  SpO2: 97% (15 Sep 2018 04:33) (96% - 100%)    PHYSICAL EXAM :  * GENERAL                 : NAD, Well-groomed, Well-developed  * HEAD                       :  Atraumatic, Normocephalic  * EYES                         : EOMI, PERRLA, Conjunctiva and Sclera clear  * ENT                           : Moist Mucous Membranes  * NECK                         : Supple, No JVD, Normal Thyroid  * CHEST/LUNG           : Clear to Auscultation bilaterally; No Rales, Rhonchi, Wheezing or Rubs  * HEART                       : Regular Rate and Rhythm; No murmurs, Rubs or gallops  * ABDOMEN                : Soft, Non-tender, Non-distended; Bowel Sounds present  * NERVOUS SYSTEM  :  Alert & Oriented X3, Good Concentration; Motor Strength 5/5 B/L UL LL ; DTRs 2+ Intact and Symmetric  * EXTREMITIES            :  2+ Peripheral Pulses, No clubbing, cyanosis, or edema  * SKIN                           : No Rashes or Lesions    LABS:                          11.4   9.6   )-----------( 232      ( 14 Sep 2018 15:15 )             34.0     09-14    134<L>  |  100  |  13  ----------------------------<  106<H>  4.4   |  26  |  0.47<L>    Ca    9.9      14 Sep 2018 15:15    TPro  6.8  /  Alb  3.5  /  TBili  0.4  /  DBili  x   /  AST  18  /  ALT  30  /  AlkPhos  73  09-14        CAPILLARY BLOOD GLUCOSE      POCT Blood Glucose.: 115 mg/dL (14 Sep 2018 13:08)      RADIOLOGY & ADDITIONAL TESTS:   No radiological imaging was required    Imaging Personally Reviewed   :  [ ] YES  [ ] NO    Consultant(s) Notes Reviewed :  [ ] YES  [ ] NO

## 2018-09-16 LAB
ANION GAP SERPL CALC-SCNC: 8 MMOL/L — SIGNIFICANT CHANGE UP (ref 5–17)
BASOPHILS # BLD AUTO: 0.1 K/UL — SIGNIFICANT CHANGE UP (ref 0–0.2)
BASOPHILS NFR BLD AUTO: 1 % — SIGNIFICANT CHANGE UP (ref 0–2)
BUN SERPL-MCNC: 14 MG/DL — SIGNIFICANT CHANGE UP (ref 7–18)
CALCIUM SERPL-MCNC: 9.7 MG/DL — SIGNIFICANT CHANGE UP (ref 8.4–10.5)
CHLORIDE SERPL-SCNC: 99 MMOL/L — SIGNIFICANT CHANGE UP (ref 96–108)
CO2 SERPL-SCNC: 27 MMOL/L — SIGNIFICANT CHANGE UP (ref 22–31)
CREAT SERPL-MCNC: 0.61 MG/DL — SIGNIFICANT CHANGE UP (ref 0.5–1.3)
EOSINOPHIL # BLD AUTO: 0.2 K/UL — SIGNIFICANT CHANGE UP (ref 0–0.5)
EOSINOPHIL NFR BLD AUTO: 2.3 % — SIGNIFICANT CHANGE UP (ref 0–6)
GLUCOSE SERPL-MCNC: 103 MG/DL — HIGH (ref 70–99)
HCT VFR BLD CALC: 36.6 % — SIGNIFICANT CHANGE UP (ref 34.5–45)
HGB BLD-MCNC: 12 G/DL — SIGNIFICANT CHANGE UP (ref 11.5–15.5)
LYMPHOCYTES # BLD AUTO: 3.7 K/UL — HIGH (ref 1–3.3)
LYMPHOCYTES # BLD AUTO: 35.6 % — SIGNIFICANT CHANGE UP (ref 13–44)
MAGNESIUM SERPL-MCNC: 2.1 MG/DL — SIGNIFICANT CHANGE UP (ref 1.6–2.6)
MCHC RBC-ENTMCNC: 28.7 PG — SIGNIFICANT CHANGE UP (ref 27–34)
MCHC RBC-ENTMCNC: 32.7 GM/DL — SIGNIFICANT CHANGE UP (ref 32–36)
MCV RBC AUTO: 87.9 FL — SIGNIFICANT CHANGE UP (ref 80–100)
MONOCYTES # BLD AUTO: 0.6 K/UL — SIGNIFICANT CHANGE UP (ref 0–0.9)
MONOCYTES NFR BLD AUTO: 5.5 % — SIGNIFICANT CHANGE UP (ref 2–14)
NEUTROPHILS # BLD AUTO: 5.7 K/UL — SIGNIFICANT CHANGE UP (ref 1.8–7.4)
NEUTROPHILS NFR BLD AUTO: 55.6 % — SIGNIFICANT CHANGE UP (ref 43–77)
PHOSPHATE SERPL-MCNC: 3.3 MG/DL — SIGNIFICANT CHANGE UP (ref 2.5–4.5)
PLATELET # BLD AUTO: 283 K/UL — SIGNIFICANT CHANGE UP (ref 150–400)
POTASSIUM SERPL-MCNC: 4 MMOL/L — SIGNIFICANT CHANGE UP (ref 3.5–5.3)
POTASSIUM SERPL-SCNC: 4 MMOL/L — SIGNIFICANT CHANGE UP (ref 3.5–5.3)
RBC # BLD: 4.16 M/UL — SIGNIFICANT CHANGE UP (ref 3.8–5.2)
RBC # FLD: 12.8 % — SIGNIFICANT CHANGE UP (ref 10.3–14.5)
SODIUM SERPL-SCNC: 134 MMOL/L — LOW (ref 135–145)
VIT B12 SERPL-MCNC: 211 PG/ML — LOW (ref 232–1245)
WBC # BLD: 10.3 K/UL — SIGNIFICANT CHANGE UP (ref 3.8–10.5)
WBC # FLD AUTO: 10.3 K/UL — SIGNIFICANT CHANGE UP (ref 3.8–10.5)

## 2018-09-16 RX ADMIN — ATORVASTATIN CALCIUM 40 MILLIGRAM(S): 80 TABLET, FILM COATED ORAL at 21:43

## 2018-09-16 RX ADMIN — Medication 20 MILLIGRAM(S): at 17:40

## 2018-09-16 RX ADMIN — RISPERIDONE 0.5 MILLIGRAM(S): 4 TABLET ORAL at 11:59

## 2018-09-16 RX ADMIN — Medication 25 MILLIGRAM(S): at 06:09

## 2018-09-16 RX ADMIN — Medication 25 MILLIGRAM(S): at 17:40

## 2018-09-16 RX ADMIN — Medication 81 MILLIGRAM(S): at 12:00

## 2018-09-16 RX ADMIN — ENOXAPARIN SODIUM 40 MILLIGRAM(S): 100 INJECTION SUBCUTANEOUS at 12:00

## 2018-09-16 RX ADMIN — SERTRALINE 50 MILLIGRAM(S): 25 TABLET, FILM COATED ORAL at 12:00

## 2018-09-16 RX ADMIN — Medication 20 MILLIGRAM(S): at 06:04

## 2018-09-16 RX ADMIN — PREGABALIN 1000 MICROGRAM(S): 225 CAPSULE ORAL at 12:00

## 2018-09-16 NOTE — PROGRESS NOTE ADULT - SUBJECTIVE AND OBJECTIVE BOX
Primary Attending follow up.    Patient is a 83y old  Female who presents with a chief complaint of lower extremity swelling (14 Sep 2018 18:06)      INTERVAL HPI/OVERNIGHT EVENTS : No acute events reported overnight, no more leg edema  Pt is seen at the bedside. Pt is found resting comfortably in bed in no acute distress, reports feeling well and denies any new complaints today. Patient denies nausea, vomiting, diarrhea, chest pain, SOB, headache, dizziness. Legs swelling resolved    MEDICATIONS  (STANDING):  aspirin  chewable 81 milliGRAM(s) Oral daily  atorvastatin 40 milliGRAM(s) Oral at bedtime  enoxaparin Injectable 40 milliGRAM(s) SubCutaneous daily  furosemide   Injectable 20 milliGRAM(s) IV Push two times a day  metoprolol tartrate 25 milliGRAM(s) Oral two times a day  risperiDONE   Tablet 0.5 milliGRAM(s) Oral daily  sertraline 50 milliGRAM(s) Oral daily    MEDICATIONS  (PRN):      Allergies    No Known Allergies    Intolerances        REVIEW OF SYSTEMS :  * CONSTITUTIONAL      : No Fever, Weight loss, or Fatigue  * EYES                             : No eye pain , Visual disturbances or Discharge  * RESPIRATORY             : No Cough, Wheezing, Chills or Hemoptysis; No shortness of breath  * CARDIOVASCULAR     : No Chest pain, Palpitations, Dizziness, or Leg swelling  * GASTROINTESTINAL  : No Abdominal or Epigastric pain. No Nausea, Vomiting or Hematemesis; No Diarrhea or Constipation. No Melena or Hematochezia.  * GENITOURINARY        : No Dysuria , Frequency , Haematuria   * NEUROLOGICAL          : No Headaches, Memory loss, Loss of trength, Numbness, or Tremors  * MUSCULOSKELETAL   : No Joint pain  * PSYCHIATRY                 : No Depression or Anxiety   * HEME/LYMPH              : No Easy Bruising or Bleeding gums  * SKIN                               : No Itching, Burning, Rashes, or Lesions     Vital Signs Last 24 Hrs  T(C): 36.1 (16 Sep 2018 08:43), Max: 37.2 (15 Sep 2018 16:12)  T(F): 97 (16 Sep 2018 08:43), Max: 99 (15 Sep 2018 16:12)  HR: 85 (16 Sep 2018 08:43) (85 - 100)  BP: 122/64 (16 Sep 2018 08:43) (122/64 - 147/67)  BP(mean): --  RR: 16 (16 Sep 2018 08:43) (16 - 19)  SpO2: 97% (16 Sep 2018 08:43) (95% - 100%)    PHYSICAL EXAM :  * GENERAL                 : NAD, Well-groomed, Well-developed  * HEAD                       :  Atraumatic, Normocephalic  * EYES                         : EOMI, PERRLA, Conjunctiva and Sclera clear  * ENT                           : Moist Mucous Membranes  * NECK                         : Supple, No JVD, Normal Thyroid  * CHEST/LUNG           : Clear to Auscultation bilaterally; No Rales, Rhonchi, Wheezing or Rubs  * HEART                       : Regular Rate and Rhythm; No murmurs, Rubs or gallops  * ABDOMEN                : Soft, Non-tender, Non-distended; Bowel Sounds present  * NERVOUS SYSTEM  :  Alert & Oriented X3, Good Concentration; Motor Strength 5/5 B/L UL LL ; DTRs 2+ Intact and Symmetric  * EXTREMITIES            :  2+ Peripheral Pulses, No clubbing, cyanosis, or edema  * SKIN                           : No Rashes or Lesions    LABS:                                     12.0   10.3  )-----------( 283      ( 16 Sep 2018 08:09 )             36.6   09-16    134<L>  |  99  |  14  ----------------------------<  103<H>  4.0   |  27  |  0.61    Ca    9.7      16 Sep 2018 08:09  Phos  3.3     09-16  Mg     2.1     09-16    TPro  6.8  /  Alb  3.5  /  TBili  0.4  /  DBili  x   /  AST  18  /  ALT  30  /  AlkPhos  73  09-14          CAPILLARY BLOOD GLUCOSE      POCT Blood Glucose.: 115 mg/dL (14 Sep 2018 13:08)      RADIOLOGY & ADDITIONAL TESTS:   No radiological imaging was required    Imaging Personally Reviewed   :  [ ] YES  [ ] NO    Consultant(s) Notes Reviewed :  [ ] YES  [ ] NO

## 2018-09-16 NOTE — PROGRESS NOTE ADULT - PROBLEM SELECTOR PLAN 1
P/w new acute worsening pitting of LEs complicated w/ inability to ambulate and recent fall  - Negative LE duplex for DVT  - EKG NSR w/ TWI III and flat T wave at aVF  - R/o new onset CHF 2/2 ischemia;   - remote telemetry  - c/w ASA, Statin, and beta blocker  - C/w IV Lasix 20 IV BID  - cardiac enzymes negative   - f/u B12  - TTE Pending   - PT consult

## 2018-09-17 LAB
ANION GAP SERPL CALC-SCNC: 8 MMOL/L — SIGNIFICANT CHANGE UP (ref 5–17)
BASOPHILS # BLD AUTO: 0.1 K/UL — SIGNIFICANT CHANGE UP (ref 0–0.2)
BASOPHILS NFR BLD AUTO: 1.3 % — SIGNIFICANT CHANGE UP (ref 0–2)
BUN SERPL-MCNC: 17 MG/DL — SIGNIFICANT CHANGE UP (ref 7–18)
CALCIUM SERPL-MCNC: 9.7 MG/DL — SIGNIFICANT CHANGE UP (ref 8.4–10.5)
CHLORIDE SERPL-SCNC: 100 MMOL/L — SIGNIFICANT CHANGE UP (ref 96–108)
CO2 SERPL-SCNC: 27 MMOL/L — SIGNIFICANT CHANGE UP (ref 22–31)
CREAT SERPL-MCNC: 0.59 MG/DL — SIGNIFICANT CHANGE UP (ref 0.5–1.3)
EOSINOPHIL # BLD AUTO: 0.2 K/UL — SIGNIFICANT CHANGE UP (ref 0–0.5)
EOSINOPHIL NFR BLD AUTO: 2.3 % — SIGNIFICANT CHANGE UP (ref 0–6)
GLUCOSE SERPL-MCNC: 120 MG/DL — HIGH (ref 70–99)
HCT VFR BLD CALC: 35.3 % — SIGNIFICANT CHANGE UP (ref 34.5–45)
HGB BLD-MCNC: 11.7 G/DL — SIGNIFICANT CHANGE UP (ref 11.5–15.5)
LYMPHOCYTES # BLD AUTO: 3.7 K/UL — HIGH (ref 1–3.3)
LYMPHOCYTES # BLD AUTO: 37.4 % — SIGNIFICANT CHANGE UP (ref 13–44)
MAGNESIUM SERPL-MCNC: 2.1 MG/DL — SIGNIFICANT CHANGE UP (ref 1.6–2.6)
MCHC RBC-ENTMCNC: 29.2 PG — SIGNIFICANT CHANGE UP (ref 27–34)
MCHC RBC-ENTMCNC: 33.2 GM/DL — SIGNIFICANT CHANGE UP (ref 32–36)
MCV RBC AUTO: 87.9 FL — SIGNIFICANT CHANGE UP (ref 80–100)
MONOCYTES # BLD AUTO: 0.6 K/UL — SIGNIFICANT CHANGE UP (ref 0–0.9)
MONOCYTES NFR BLD AUTO: 6.4 % — SIGNIFICANT CHANGE UP (ref 2–14)
NEUTROPHILS # BLD AUTO: 5.2 K/UL — SIGNIFICANT CHANGE UP (ref 1.8–7.4)
NEUTROPHILS NFR BLD AUTO: 52.6 % — SIGNIFICANT CHANGE UP (ref 43–77)
PHOSPHATE SERPL-MCNC: 3.2 MG/DL — SIGNIFICANT CHANGE UP (ref 2.5–4.5)
PLATELET # BLD AUTO: 243 K/UL — SIGNIFICANT CHANGE UP (ref 150–400)
POTASSIUM SERPL-MCNC: 3.7 MMOL/L — SIGNIFICANT CHANGE UP (ref 3.5–5.3)
POTASSIUM SERPL-SCNC: 3.7 MMOL/L — SIGNIFICANT CHANGE UP (ref 3.5–5.3)
RBC # BLD: 4.01 M/UL — SIGNIFICANT CHANGE UP (ref 3.8–5.2)
RBC # FLD: 12.6 % — SIGNIFICANT CHANGE UP (ref 10.3–14.5)
SODIUM SERPL-SCNC: 135 MMOL/L — SIGNIFICANT CHANGE UP (ref 135–145)
VIT B12 SERPL-MCNC: 276 PG/ML — SIGNIFICANT CHANGE UP (ref 232–1245)
WBC # BLD: 9.8 K/UL — SIGNIFICANT CHANGE UP (ref 3.8–10.5)
WBC # FLD AUTO: 9.8 K/UL — SIGNIFICANT CHANGE UP (ref 3.8–10.5)

## 2018-09-17 PROCEDURE — 70490 CT SOFT TISSUE NECK W/O DYE: CPT | Mod: 26

## 2018-09-17 PROCEDURE — 71250 CT THORAX DX C-: CPT | Mod: 26

## 2018-09-17 RX ORDER — LISINOPRIL 2.5 MG/1
5 TABLET ORAL DAILY
Qty: 0 | Refills: 0 | Status: DISCONTINUED | OUTPATIENT
Start: 2018-09-17 | End: 2018-09-18

## 2018-09-17 RX ORDER — FUROSEMIDE 40 MG
40 TABLET ORAL DAILY
Qty: 0 | Refills: 0 | Status: DISCONTINUED | OUTPATIENT
Start: 2018-09-17 | End: 2018-09-18

## 2018-09-17 RX ORDER — ASPIRIN/CALCIUM CARB/MAGNESIUM 324 MG
1 TABLET ORAL
Qty: 30 | Refills: 0
Start: 2018-09-17 | End: 2018-10-16

## 2018-09-17 RX ORDER — SODIUM,POTASSIUM PHOSPHATES 278-250MG
1 POWDER IN PACKET (EA) ORAL ONCE
Qty: 0 | Refills: 0 | Status: COMPLETED | OUTPATIENT
Start: 2018-09-17 | End: 2018-09-17

## 2018-09-17 RX ADMIN — ATORVASTATIN CALCIUM 40 MILLIGRAM(S): 80 TABLET, FILM COATED ORAL at 21:10

## 2018-09-17 RX ADMIN — SERTRALINE 50 MILLIGRAM(S): 25 TABLET, FILM COATED ORAL at 12:32

## 2018-09-17 RX ADMIN — ENOXAPARIN SODIUM 40 MILLIGRAM(S): 100 INJECTION SUBCUTANEOUS at 12:32

## 2018-09-17 RX ADMIN — Medication 20 MILLIGRAM(S): at 05:46

## 2018-09-17 RX ADMIN — RISPERIDONE 0.5 MILLIGRAM(S): 4 TABLET ORAL at 12:32

## 2018-09-17 RX ADMIN — PREGABALIN 1000 MICROGRAM(S): 225 CAPSULE ORAL at 12:33

## 2018-09-17 RX ADMIN — Medication 25 MILLIGRAM(S): at 05:46

## 2018-09-17 RX ADMIN — Medication 1 TABLET(S): at 10:58

## 2018-09-17 RX ADMIN — LISINOPRIL 5 MILLIGRAM(S): 2.5 TABLET ORAL at 10:57

## 2018-09-17 NOTE — PROGRESS NOTE ADULT - NSHPATTENDINGPLANDISCUSS_GEN_ALL_CORE
detail d/w patient and community MD, for rehab, possibly [facility] for continuity of care.
detail d/w patient
PGY 1

## 2018-09-17 NOTE — PROGRESS NOTE ADULT - SUBJECTIVE AND OBJECTIVE BOX
PGY1 Note discussed with Supervising Resident and Primary Attending.    Patient is a 83y old  Female who presents with a chief complaint of lower extremity swelling (16 Sep 2018 11:39)      INTERVAL HPI/OVERNIGHT EVENTS :    MEDICATIONS  (STANDING):  aspirin  chewable 81 milliGRAM(s) Oral daily  atorvastatin 40 milliGRAM(s) Oral at bedtime  cyanocobalamin 1000 MICROGram(s) Oral daily  enoxaparin Injectable 40 milliGRAM(s) SubCutaneous daily  furosemide   Injectable 20 milliGRAM(s) IV Push two times a day  influenza   Vaccine 0.5 milliLiter(s) IntraMuscular once  metoprolol tartrate 25 milliGRAM(s) Oral two times a day  risperiDONE   Tablet 0.5 milliGRAM(s) Oral daily  sertraline 50 milliGRAM(s) Oral daily    MEDICATIONS  (PRN):      Allergies    No Known Allergies    Intolerances        REVIEW OF SYSTEMS :  * CONSTITUTIONAL      : No Fever, Weight loss, or Fatigue  * EYES                             : No eye pain , Visual disturbances or Discharge  * RESPIRATORY             : No Cough, Wheezing, Chills or Hemoptysis; No shortness of breath  * CARDIOVASCULAR     : No Chest pain, Palpitations, Dizziness, or Leg swelling  * GASTROINTESTINAL  : No Abdominal or Epigastric pain. No Nausea, Vomiting or Hematemesis; No Diarrhea or Constipation. No Melena or Hematochezia.  * GENITOURINARY        : No Dysuria , Frequency , Haematuria   * NEUROLOGICAL          : No Headaches, Memory loss, Loss of trength, Numbness, or Tremors  * MUSCULOSKELETAL   : No Joint pain  * PSYCHIATRY                 : No Depression or Anxiety   * HEME/LYMPH              : No Easy Bruising or Bleeding gums  * SKIN                               : No Itching, Burning, Rashes, or Lesions     Vital Signs Last 24 Hrs  T(C): 37.2 (16 Sep 2018 23:36), Max: 37.2 (16 Sep 2018 23:36)  T(F): 99 (16 Sep 2018 23:36), Max: 99 (16 Sep 2018 23:36)  HR: 99 (16 Sep 2018 23:36) (81 - 99)  BP: 152/55 (16 Sep 2018 23:36) (122/64 - 152/55)  BP(mean): --  RR: 18 (16 Sep 2018 23:36) (16 - 18)  SpO2: 98% (16 Sep 2018 23:36) (97% - 98%)    PHYSICAL EXAM :  * GENERAL                 : NAD, Well-groomed, Well-developed  * HEAD                       :  Atraumatic, Normocephalic  * EYES                         : EOMI, PERRLA, Conjunctiva and Sclera clear  * ENT                           : Moist Mucous Membranes  * NECK                         : Supple, No JVD, Normal Thyroid  * CHEST/LUNG           : Clear to Auscultation bilaterally; No Rales, Rhonchi, Wheezing or Rubs  * HEART                       : Regular Rate and Rhythm; No murmurs, Rubs or gallops  * ABDOMEN                : Soft, Non-tender, Non-distended; Bowel Sounds present  * NERVOUS SYSTEM  :  Alert & Oriented X3, Good Concentration; Motor Strength 5/5 B/L UL LL ; DTRs 2+ Intact and Symmetric  * EXTREMITIES            :  2+ Peripheral Pulses, No clubbing, cyanosis, or edema  * SKIN                           : No Rashes or Lesions    LABS:                          12.0   10.3  )-----------( 283      ( 16 Sep 2018 08:09 )             36.6     09-16    134<L>  |  99  |  14  ----------------------------<  103<H>  4.0   |  27  |  0.61    Ca    9.7      16 Sep 2018 08:09  Phos  3.3     09-16  Mg     2.1     09-16          CAPILLARY BLOOD GLUCOSE          RADIOLOGY & ADDITIONAL TESTS:   No radiological imaging was required    Imaging Personally Reviewed   :  [ ] YES  [ ] NO    Consultant(s) Notes Reviewed :  [ ] YES  [ ] NO PGY1 Note discussed with Supervising Resident and Primary Attending.    Patient is a 83y old  Female who presents with a chief complaint of lower extremity swelling (16 Sep 2018 11:39)      INTERVAL HPI/OVERNIGHT EVENTS : No acute events reported overnight.    Pt is seen at the bedside. Pt is found resting comfortably in bed in no acute distress, reports feeling well and denies any new complaints today. Patient denies nausea, vomiting, diarrhea, chest pain, SOB, headache, dizziness.     MEDICATIONS  (STANDING):  aspirin  chewable 81 milliGRAM(s) Oral daily  atorvastatin 40 milliGRAM(s) Oral at bedtime  cyanocobalamin 1000 MICROGram(s) Oral daily  enoxaparin Injectable 40 milliGRAM(s) SubCutaneous daily  furosemide   Injectable 20 milliGRAM(s) IV Push two times a day  influenza   Vaccine 0.5 milliLiter(s) IntraMuscular once  metoprolol tartrate 25 milliGRAM(s) Oral two times a day  risperiDONE   Tablet 0.5 milliGRAM(s) Oral daily  sertraline 50 milliGRAM(s) Oral daily    MEDICATIONS  (PRN):      Allergies    No Known Allergies    Intolerances        REVIEW OF SYSTEMS :  * CONSTITUTIONAL      : No Fever, Weight loss, or Fatigue  * EYES                             : No eye pain , Visual disturbances or Discharge  * RESPIRATORY             : No Cough, Wheezing, Chills or Hemoptysis; No shortness of breath  * CARDIOVASCULAR     : No Chest pain, Palpitations, Dizziness, or Leg swelling  * GASTROINTESTINAL  : No Abdominal or Epigastric pain. No Nausea, Vomiting or Hematemesis; No Diarrhea or Constipation. No Melena or Hematochezia.  * GENITOURINARY        : No Dysuria , Frequency , Haematuria   * NEUROLOGICAL          : No Headaches, Memory loss, Loss of trength, Numbness, or Tremors  * MUSCULOSKELETAL   : No Joint pain  * PSYCHIATRY                 : No Depression or Anxiety   * HEME/LYMPH              : No Easy Bruising or Bleeding gums  * SKIN                               : No Itching, Burning, Rashes, or Lesions     Vital Signs Last 24 Hrs  T(C): 37.2 (16 Sep 2018 23:36), Max: 37.2 (16 Sep 2018 23:36)  T(F): 99 (16 Sep 2018 23:36), Max: 99 (16 Sep 2018 23:36)  HR: 99 (16 Sep 2018 23:36) (81 - 99)  BP: 152/55 (16 Sep 2018 23:36) (122/64 - 152/55)  BP(mean): --  RR: 18 (16 Sep 2018 23:36) (16 - 18)  SpO2: 98% (16 Sep 2018 23:36) (97% - 98%)    PHYSICAL EXAM :  * GENERAL                 : NAD, Well-groomed, Well-developed  * HEAD                       :  Atraumatic, Normocephalic  * EYES                         : EOMI, PERRLA, Conjunctiva and Sclera clear  * ENT                           : Moist Mucous Membranes  * NECK                         : Supple, No JVD, Normal Thyroid  * CHEST/LUNG           : Clear to Auscultation bilaterally; No Rales, Rhonchi, Wheezing or Rubs  * HEART                       : Regular Rate and Rhythm; No murmurs, Rubs or gallops  * ABDOMEN                : Soft, Non-tender, Non-distended; Bowel Sounds present  * NERVOUS SYSTEM  :  Alert & Oriented X3, Good Concentration; Motor Strength 5/5 B/L UL LL ; DTRs 2+ Intact and Symmetric  * EXTREMITIES            :  2+ Peripheral Pulses, No clubbing, cyanosis, or edema  * SKIN                           : No Rashes or Lesions    LABS:                          12.0   10.3  )-----------( 283      ( 16 Sep 2018 08:09 )             36.6     09-16    134<L>  |  99  |  14  ----------------------------<  103<H>  4.0   |  27  |  0.61    Ca    9.7      16 Sep 2018 08:09  Phos  3.3     09-16  Mg     2.1     09-16          CAPILLARY BLOOD GLUCOSE          RADIOLOGY & ADDITIONAL TESTS:   No radiological imaging was required    Imaging Personally Reviewed   :  [ ] YES  [ ] NO    Consultant(s) Notes Reviewed :  [ ] YES  [ ] NO

## 2018-09-17 NOTE — PROGRESS NOTE ADULT - PROBLEM SELECTOR PLAN 1
P/w new acute worsening pitting of LEs complicated w/ inability to ambulate and recent fall  - Negative LE duplex for DVT  - EKG NSR w/ TWI III and flat T wave at aVF  - R/o new onset CHF 2/2 ischemia;   - remote telemetry  - c/w ASA, Statin, and beta blocker  - C/w IV Lasix 20 IV BID  - cardiac enzymes negative   - f/u B12  - TTE Pending   - PT consult P/w new acute worsening pitting of LEs complicated w/ inability to ambulate and recent fall  - Negative LE duplex for DVT  - EKG NSR w/ TWI III and flat T wave at aVF  - R/o new onset CHF 2/2 ischemia;   - remote telemetry  - c/w ASA, Statin, and beta blocker  - cardiac enzymes negative   - f/u B12  - ECHO showed Ejection Fraction 64 % , moderate Mitral Regurge   - PT [facility] [place]  - C/w Lasix 40 mg PO qd P/w new acute worsening pitting of LEs complicated w/ inability to ambulate and recent fall  - Negative LE duplex for DVT  - EKG NSR w/ TWI III and flat T wave at aVF  - R/o new onset CHF 2/2 ischemia;   - remote telemetry  - c/w ASA, Statin, and beta blocker  - cardiac enzymes negative   - f/u B12  - CXR : fluid overload  - Recommended Leg elevation and compression stocking  - ECHO showed Ejection Fraction 64 % , moderate Mitral Regurge   - PT [facility] [place]  - C/w Lasix 40 mg PO qd P/w new acute worsening pitting of LEs complicated w/ inability to ambulate and recent fall  - Negative LE duplex for DVT  - c/w ASA, Statin, and beta blocker  - Recommended Leg elevation and compression stocking  - EF 64 % , moderate MR   - PT [facility] [place]  - C/w Lasix 40 mg PO qd and Vit B12

## 2018-09-18 VITALS
TEMPERATURE: 97 F | OXYGEN SATURATION: 100 % | DIASTOLIC BLOOD PRESSURE: 88 MMHG | RESPIRATION RATE: 17 BRPM | SYSTOLIC BLOOD PRESSURE: 131 MMHG | HEART RATE: 101 BPM

## 2018-09-18 LAB
ANION GAP SERPL CALC-SCNC: 8 MMOL/L — SIGNIFICANT CHANGE UP (ref 5–17)
BASOPHILS # BLD AUTO: 0.1 K/UL — SIGNIFICANT CHANGE UP (ref 0–0.2)
BASOPHILS NFR BLD AUTO: 0.9 % — SIGNIFICANT CHANGE UP (ref 0–2)
BUN SERPL-MCNC: 19 MG/DL — HIGH (ref 7–18)
CALCIUM SERPL-MCNC: 9.6 MG/DL — SIGNIFICANT CHANGE UP (ref 8.4–10.5)
CHLORIDE SERPL-SCNC: 100 MMOL/L — SIGNIFICANT CHANGE UP (ref 96–108)
CO2 SERPL-SCNC: 28 MMOL/L — SIGNIFICANT CHANGE UP (ref 22–31)
CREAT SERPL-MCNC: 0.59 MG/DL — SIGNIFICANT CHANGE UP (ref 0.5–1.3)
EOSINOPHIL # BLD AUTO: 0.2 K/UL — SIGNIFICANT CHANGE UP (ref 0–0.5)
EOSINOPHIL NFR BLD AUTO: 2.2 % — SIGNIFICANT CHANGE UP (ref 0–6)
GLUCOSE SERPL-MCNC: 112 MG/DL — HIGH (ref 70–99)
HCT VFR BLD CALC: 35.3 % — SIGNIFICANT CHANGE UP (ref 34.5–45)
HGB BLD-MCNC: 11.5 G/DL — SIGNIFICANT CHANGE UP (ref 11.5–15.5)
LYMPHOCYTES # BLD AUTO: 3.7 K/UL — HIGH (ref 1–3.3)
LYMPHOCYTES # BLD AUTO: 39.8 % — SIGNIFICANT CHANGE UP (ref 13–44)
MAGNESIUM SERPL-MCNC: 2.1 MG/DL — SIGNIFICANT CHANGE UP (ref 1.6–2.6)
MCHC RBC-ENTMCNC: 28.9 PG — SIGNIFICANT CHANGE UP (ref 27–34)
MCHC RBC-ENTMCNC: 32.7 GM/DL — SIGNIFICANT CHANGE UP (ref 32–36)
MCV RBC AUTO: 88.4 FL — SIGNIFICANT CHANGE UP (ref 80–100)
MONOCYTES # BLD AUTO: 0.6 K/UL — SIGNIFICANT CHANGE UP (ref 0–0.9)
MONOCYTES NFR BLD AUTO: 6.2 % — SIGNIFICANT CHANGE UP (ref 2–14)
NEUTROPHILS # BLD AUTO: 4.7 K/UL — SIGNIFICANT CHANGE UP (ref 1.8–7.4)
NEUTROPHILS NFR BLD AUTO: 50.8 % — SIGNIFICANT CHANGE UP (ref 43–77)
PHOSPHATE SERPL-MCNC: 3.2 MG/DL — SIGNIFICANT CHANGE UP (ref 2.5–4.5)
PLATELET # BLD AUTO: 248 K/UL — SIGNIFICANT CHANGE UP (ref 150–400)
POTASSIUM SERPL-MCNC: 3.8 MMOL/L — SIGNIFICANT CHANGE UP (ref 3.5–5.3)
POTASSIUM SERPL-SCNC: 3.8 MMOL/L — SIGNIFICANT CHANGE UP (ref 3.5–5.3)
RBC # BLD: 3.99 M/UL — SIGNIFICANT CHANGE UP (ref 3.8–5.2)
RBC # FLD: 12.7 % — SIGNIFICANT CHANGE UP (ref 10.3–14.5)
SODIUM SERPL-SCNC: 136 MMOL/L — SIGNIFICANT CHANGE UP (ref 135–145)
VIT B12 SERPL-MCNC: 340 PG/ML — SIGNIFICANT CHANGE UP (ref 232–1245)
WBC # BLD: 9.3 K/UL — SIGNIFICANT CHANGE UP (ref 3.8–10.5)
WBC # FLD AUTO: 9.3 K/UL — SIGNIFICANT CHANGE UP (ref 3.8–10.5)

## 2018-09-18 PROCEDURE — 97530 THERAPEUTIC ACTIVITIES: CPT

## 2018-09-18 PROCEDURE — 82962 GLUCOSE BLOOD TEST: CPT

## 2018-09-18 PROCEDURE — 93970 EXTREMITY STUDY: CPT

## 2018-09-18 PROCEDURE — 93306 TTE W/DOPPLER COMPLETE: CPT

## 2018-09-18 PROCEDURE — 71250 CT THORAX DX C-: CPT

## 2018-09-18 PROCEDURE — 99285 EMERGENCY DEPT VISIT HI MDM: CPT | Mod: 25

## 2018-09-18 PROCEDURE — 80048 BASIC METABOLIC PNL TOTAL CA: CPT

## 2018-09-18 PROCEDURE — 97163 PT EVAL HIGH COMPLEX 45 MIN: CPT

## 2018-09-18 PROCEDURE — 83880 ASSAY OF NATRIURETIC PEPTIDE: CPT

## 2018-09-18 PROCEDURE — 97116 GAIT TRAINING THERAPY: CPT

## 2018-09-18 PROCEDURE — 90686 IIV4 VACC NO PRSV 0.5 ML IM: CPT

## 2018-09-18 PROCEDURE — 84443 ASSAY THYROID STIM HORMONE: CPT

## 2018-09-18 PROCEDURE — 80053 COMPREHEN METABOLIC PANEL: CPT

## 2018-09-18 PROCEDURE — 82746 ASSAY OF FOLIC ACID SERUM: CPT

## 2018-09-18 PROCEDURE — 71045 X-RAY EXAM CHEST 1 VIEW: CPT

## 2018-09-18 PROCEDURE — 82607 VITAMIN B-12: CPT

## 2018-09-18 PROCEDURE — 85027 COMPLETE CBC AUTOMATED: CPT

## 2018-09-18 PROCEDURE — 84484 ASSAY OF TROPONIN QUANT: CPT

## 2018-09-18 PROCEDURE — 82550 ASSAY OF CK (CPK): CPT

## 2018-09-18 PROCEDURE — 93005 ELECTROCARDIOGRAM TRACING: CPT

## 2018-09-18 PROCEDURE — 83036 HEMOGLOBIN GLYCOSYLATED A1C: CPT

## 2018-09-18 PROCEDURE — 97110 THERAPEUTIC EXERCISES: CPT

## 2018-09-18 PROCEDURE — 84100 ASSAY OF PHOSPHORUS: CPT

## 2018-09-18 PROCEDURE — 80061 LIPID PANEL: CPT

## 2018-09-18 PROCEDURE — 70490 CT SOFT TISSUE NECK W/O DYE: CPT

## 2018-09-18 PROCEDURE — 83735 ASSAY OF MAGNESIUM: CPT

## 2018-09-18 PROCEDURE — 82553 CREATINE MB FRACTION: CPT

## 2018-09-18 RX ORDER — PREGABALIN 225 MG/1
1 CAPSULE ORAL
Qty: 3 | Refills: 0
Start: 2018-09-18 | End: 2018-09-20

## 2018-09-18 RX ORDER — LISINOPRIL 2.5 MG/1
1 TABLET ORAL
Qty: 30 | Refills: 0
Start: 2018-09-18 | End: 2018-10-17

## 2018-09-18 RX ORDER — FUROSEMIDE 40 MG
1 TABLET ORAL
Qty: 30 | Refills: 0
Start: 2018-09-18 | End: 2018-10-17

## 2018-09-18 RX ADMIN — RISPERIDONE 0.5 MILLIGRAM(S): 4 TABLET ORAL at 12:39

## 2018-09-18 RX ADMIN — PREGABALIN 1000 MICROGRAM(S): 225 CAPSULE ORAL at 12:39

## 2018-09-18 RX ADMIN — LISINOPRIL 5 MILLIGRAM(S): 2.5 TABLET ORAL at 06:11

## 2018-09-18 RX ADMIN — Medication 40 MILLIGRAM(S): at 06:10

## 2018-09-18 RX ADMIN — SERTRALINE 50 MILLIGRAM(S): 25 TABLET, FILM COATED ORAL at 12:39

## 2018-09-18 RX ADMIN — ENOXAPARIN SODIUM 40 MILLIGRAM(S): 100 INJECTION SUBCUTANEOUS at 12:39

## 2018-09-18 RX ADMIN — INFLUENZA VIRUS VACCINE 0.5 MILLILITER(S): 15; 15; 15; 15 SUSPENSION INTRAMUSCULAR at 16:23

## 2018-09-18 NOTE — PROGRESS NOTE ADULT - PROBLEM SELECTOR PLAN 5
C/w moderate intensity Statin; f/u lipid panel

## 2018-09-18 NOTE — PROGRESS NOTE ADULT - PROBLEM SELECTOR PLAN 1
P/w new acute worsening pitting of LEs complicated w/ inability to ambulate and recent fall  - Negative LE duplex for DVT  - c/w ASA, Statin, and beta blocker  - Recommended Leg elevation and compression stocking  - EF 64 % , moderate MR   - PT [facility] [place]  - C/w Lasix 40 mg PO qd and Vit B12 - c/w ASA, Statin, and beta blocker  - Recommended Leg elevation and compression stocking  - PT [facility] [place]  - C/w Lasix 40 mg PO qd and Vit B12

## 2018-09-18 NOTE — CONSULT NOTE ADULT - SUBJECTIVE AND OBJECTIVE BOX
HPI:  83 year old female w/ PMH Mood Disorder, Thyroid Mass resection, and HLD p/w worsening lower extremity swelling x 7 days s/p fall of unknown origin - patient states she was walking to close her AC and simply felt herself coming down, agrees w/ weakness, denies any prodromal symptoms, LOC, mechanical trip/slip, any Hx of falls, or any other complaints. Pt Lives alone, independently ambulatory prior to the LE swelling associated w/ pain. Pt fell slowly and bruised her feet, R more than L, w/ R tricep region bruising. Pt states recent cardiac w/u w/ PCP including TTE. Pt states Hx of ?TIA - episode of poorly moving - w/u negative, told she had a TIA. (14 Sep 2018 18:06)  h/o mng  s/p left thy lobectomy  s/p fna rt thy lobe nodule    PAST MEDICAL & SURGICAL HISTORY:  Carotid stenosis  TIA (transient ischemic attack)  HLD (hyperlipidemia)  Thyroid mass      No Known Allergies      aspirin  chewable 81 milliGRAM(s) Oral daily  atorvastatin 40 milliGRAM(s) Oral at bedtime  cyanocobalamin 1000 MICROGram(s) Oral daily  enoxaparin Injectable 40 milliGRAM(s) SubCutaneous daily  furosemide    Tablet 40 milliGRAM(s) Oral daily  influenza   Vaccine 0.5 milliLiter(s) IntraMuscular once  lisinopril 5 milliGRAM(s) Oral daily  risperiDONE   Tablet 0.5 milliGRAM(s) Oral daily  sertraline 50 milliGRAM(s) Oral daily      Social Hx:    FAMILY HISTORY:  Family history of lung cancer (Father)      REVIEW OF SYSTEMS:  denies neck pain  no cough  no difficulty swallowing  no cp  CONSTITUTIONAL: No weakness, fevers or chills  EYES/ENT: No visual changes;  No vertigo or throat pain   NECK: No pain or stiffness  RESPIRATORY: No cough, wheezing, hemoptysis; No shortness of breath  CARDIOVASCULAR: No chest pain or palpitations  GASTROINTESTINAL: No abdominal or epigastric pain. No nausea, vomiting, or hematemesis; No diarrhea or constipation. No melena or hematochezia.  GENITOURINARY: No dysuria, frequency or hematuria  NEUROLOGICAL: No numbness or weakness  SKIN: No itching, burning, rashes, or lesions   All other review of systems is negative unless indicated above.  PHYSICAL EXAM:    Vital Signs Last 24 Hrs  T(C): 36.3 (18 Sep 2018 15:12), Max: 36.9 (17 Sep 2018 19:48)  T(F): 97.4 (18 Sep 2018 15:12), Max: 98.5 (17 Sep 2018 19:48)  HR: 101 (18 Sep 2018 15:12) (88 - 101)  BP: 131/88 (18 Sep 2018 15:12) (123/50 - 149/69)  BP(mean): --  RR: 17 (18 Sep 2018 15:12) (17 - 19)  SpO2: 100% (18 Sep 2018 15:12) (95% - 100%)    Constitutional:    HEENT:    Neck:  [  ] Supple  [  ]Lymphadenopathy  [  ] JVD   [  ]No JVD  [  ] Masses   [  ] WNL    CHEST/Respiratory:  [  ] Rales      [  ] Rhonchi      [  ] Wheezing       [  ] Chest Tenderness  [  ]Clear to auscultation    Cardiovascular:  [  ]S1 S2  [  ] Reg  [  ] Irreg   [  ] Murmurs  [  ]Systolic [  ]Diastolic  [  ]No Murmur    Abdomen:  [  ]  Bowel Sounds   [  ] Soft           [  ] ABD Distention  [  ] Tenderness  [  ] Organomegaly                        [  ] Guarding Rigidity  [  ] No Guarding Rigidity  [  ] Rebound Tenderness [  ] No Rebound Tenderness    Extremities: [  ] Edema  [  ] No edema  [  ] Clubbing   [  ] Cyanosis  [  ] Palpable peripheral pulses                        [  ] No Tender Calf muscles  [  ] Tender Calf muscles    Neurological:  [  ] Alert  [  ] Awake  [  ] Oriented  x                              [  ] Confused    Skin:  [  ] Thrombophlebitis  [  ] Rashes  [  ] Dry  [  ] Ulcers    Ortho:  [  ] Joint Swelling  [  ] Joint erythema [  ] DJD [  ] Increased temp. to touch      LABS/DIAGNOSTIC TESTS                          11.5   9.3   )-----------( 248      ( 18 Sep 2018 08:14 )             35.3     WBC Count: 9.3 K/uL (09-18 @ 08:14)  WBC Count: 9.8 K/uL (09-17 @ 07:37)  WBC Count: 10.3 K/uL (09-16 @ 08:09)      09-18    136  |  100  |  19<H>  ----------------------------<  112<H>  3.8   |  28  |  0.59    Ca    9.6      18 Sep 2018 08:14  Phos  3.2     09-18  Mg     2.1     09-18                    LACTATE:      CULTURES:   aspirin  chewable 81 milliGRAM(s) Oral daily  atorvastatin 40 milliGRAM(s) Oral at bedtime  cyanocobalamin 1000 MICROGram(s) Oral daily  enoxaparin Injectable 40 milliGRAM(s) SubCutaneous daily  furosemide    Tablet 40 milliGRAM(s) Oral daily  influenza   Vaccine 0.5 milliLiter(s) IntraMuscular once  lisinopril 5 milliGRAM(s) Oral daily  risperiDONE   Tablet 0.5 milliGRAM(s) Oral daily  sertraline 50 milliGRAM(s) Oral daily      RADIOLOGY    CXR:

## 2018-09-18 NOTE — PROGRESS NOTE ADULT - PROBLEM SELECTOR PLAN 3
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

## 2018-09-18 NOTE — PROGRESS NOTE ADULT - PROBLEM SELECTOR PLAN 6
IMPROVE VTE Individual Risk Assessment    RISK                                                          Points  [] Previous VTE                                           3  [] Thrombophilia                                        2  [] Lower limb paralysis                              2   [] Current Cancer                                       2   [x] Immobilization > 24 hrs                        1  [] ICU/CCU stay > 24 hours                       1  [x] Age > 60                                                   1    IMPROVE VTE Score: 2, DVT PPx w/ Lovenox

## 2018-09-18 NOTE — CONSULT NOTE ADULT - ASSESSMENT
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

## 2018-09-18 NOTE — PROGRESS NOTE ADULT - SUBJECTIVE AND OBJECTIVE BOX
PGY1 Note discussed with Supervising Resident and Primary Attending.    Patient is a 83y old  Female who presents with a chief complaint of lower extremity swelling (17 Sep 2018 05:17)      INTERVAL HPI/OVERNIGHT EVENTS :    MEDICATIONS  (STANDING):  aspirin  chewable 81 milliGRAM(s) Oral daily  atorvastatin 40 milliGRAM(s) Oral at bedtime  cyanocobalamin 1000 MICROGram(s) Oral daily  enoxaparin Injectable 40 milliGRAM(s) SubCutaneous daily  furosemide    Tablet 40 milliGRAM(s) Oral daily  influenza   Vaccine 0.5 milliLiter(s) IntraMuscular once  lisinopril 5 milliGRAM(s) Oral daily  risperiDONE   Tablet 0.5 milliGRAM(s) Oral daily  sertraline 50 milliGRAM(s) Oral daily    MEDICATIONS  (PRN):      Allergies    No Known Allergies    Intolerances        REVIEW OF SYSTEMS :  * CONSTITUTIONAL      : No Fever, Weight loss, or Fatigue  * EYES                             : No eye pain , Visual disturbances or Discharge  * RESPIRATORY             : No Cough, Wheezing, Chills or Hemoptysis; No shortness of breath  * CARDIOVASCULAR     : No Chest pain, Palpitations, Dizziness, or Leg swelling  * GASTROINTESTINAL  : No Abdominal or Epigastric pain. No Nausea, Vomiting or Hematemesis; No Diarrhea or Constipation. No Melena or Hematochezia.  * GENITOURINARY        : No Dysuria , Frequency , Haematuria   * NEUROLOGICAL          : No Headaches, Memory loss, Loss of trength, Numbness, or Tremors  * MUSCULOSKELETAL   : No Joint pain  * PSYCHIATRY                 : No Depression or Anxiety   * HEME/LYMPH              : No Easy Bruising or Bleeding gums  * SKIN                               : No Itching, Burning, Rashes, or Lesions     Vital Signs Last 24 Hrs  T(C): 36.9 (18 Sep 2018 00:53), Max: 36.9 (17 Sep 2018 08:11)  T(F): 98.4 (18 Sep 2018 00:53), Max: 98.5 (17 Sep 2018 19:48)  HR: 98 (18 Sep 2018 00:53) (83 - 98)  BP: 147/70 (18 Sep 2018 00:53) (96/45 - 168/62)  BP(mean): --  RR: 18 (18 Sep 2018 00:53) (17 - 18)  SpO2: 97% (18 Sep 2018 00:53) (95% - 98%)    PHYSICAL EXAM :  * GENERAL                 : NAD, Well-groomed, Well-developed  * HEAD                       :  Atraumatic, Normocephalic  * EYES                         : EOMI, PERRLA, Conjunctiva and Sclera clear  * ENT                           : Moist Mucous Membranes  * NECK                         : Supple, No JVD, Normal Thyroid  * CHEST/LUNG           : Clear to Auscultation bilaterally; No Rales, Rhonchi, Wheezing or Rubs  * HEART                       : Regular Rate and Rhythm; No murmurs, Rubs or gallops  * ABDOMEN                : Soft, Non-tender, Non-distended; Bowel Sounds present  * NERVOUS SYSTEM  :  Alert & Oriented X3, Good Concentration; Motor Strength 5/5 B/L UL LL ; DTRs 2+ Intact and Symmetric  * EXTREMITIES            :  2+ Peripheral Pulses, No clubbing, cyanosis, or edema  * SKIN                           : No Rashes or Lesions    LABS:                          11.7   9.8   )-----------( 243      ( 17 Sep 2018 07:37 )             35.3     09-17    135  |  100  |  17  ----------------------------<  120<H>  3.7   |  27  |  0.59    Ca    9.7      17 Sep 2018 07:37  Phos  3.2     09-17  Mg     2.1     09-17          CAPILLARY BLOOD GLUCOSE          RADIOLOGY & ADDITIONAL TESTS:   No radiological imaging was required    Imaging Personally Reviewed   :  [ ] YES  [ ] NO    Consultant(s) Notes Reviewed :  [ ] YES  [ ] NO PGY1 Note discussed with Supervising Resident and Primary Attending.    Patient is a 83y old  Female who presents with a chief complaint of lower extremity swelling (17 Sep 2018 05:17)      INTERVAL HPI/OVERNIGHT EVENTS : No acute events reported overnight.    Pt is seen at the bedside. Pt is found resting comfortably in bed in no acute distress, reports feeling well and denies any new complaints today. Patient denies nausea, vomiting, diarrhea, chest pain, SOB, headache, dizziness.     MEDICATIONS  (STANDING):  aspirin  chewable 81 milliGRAM(s) Oral daily  atorvastatin 40 milliGRAM(s) Oral at bedtime  cyanocobalamin 1000 MICROGram(s) Oral daily  enoxaparin Injectable 40 milliGRAM(s) SubCutaneous daily  furosemide    Tablet 40 milliGRAM(s) Oral daily  influenza   Vaccine 0.5 milliLiter(s) IntraMuscular once  lisinopril 5 milliGRAM(s) Oral daily  risperiDONE   Tablet 0.5 milliGRAM(s) Oral daily  sertraline 50 milliGRAM(s) Oral daily    MEDICATIONS  (PRN):      Allergies    No Known Allergies    Intolerances        REVIEW OF SYSTEMS :  * CONSTITUTIONAL      : No Fever, Weight loss, or Fatigue  * EYES                             : No eye pain , Visual disturbances or Discharge  * RESPIRATORY             : No Cough, Wheezing, Chills or Hemoptysis; No shortness of breath  * CARDIOVASCULAR     : No Chest pain, Palpitations, Dizziness, or Leg swelling  * GASTROINTESTINAL  : No Abdominal or Epigastric pain. No Nausea, Vomiting or Hematemesis; No Diarrhea or Constipation. No Melena or Hematochezia.  * GENITOURINARY        : No Dysuria , Frequency , Haematuria   * NEUROLOGICAL          : No Headaches, Memory loss, Loss of trength, Numbness, or Tremors  * MUSCULOSKELETAL   : No Joint pain  * PSYCHIATRY                 : No Depression or Anxiety   * HEME/LYMPH              : No Easy Bruising or Bleeding gums  * SKIN                               : No Itching, Burning, Rashes, or Lesions     Vital Signs Last 24 Hrs  T(C): 36.9 (18 Sep 2018 00:53), Max: 36.9 (17 Sep 2018 08:11)  T(F): 98.4 (18 Sep 2018 00:53), Max: 98.5 (17 Sep 2018 19:48)  HR: 98 (18 Sep 2018 00:53) (83 - 98)  BP: 147/70 (18 Sep 2018 00:53) (96/45 - 168/62)  BP(mean): --  RR: 18 (18 Sep 2018 00:53) (17 - 18)  SpO2: 97% (18 Sep 2018 00:53) (95% - 98%)    PHYSICAL EXAM :  * GENERAL                 : NAD, Well-groomed, Well-developed  * HEAD                       :  Atraumatic, Normocephalic  * EYES                         : EOMI, PERRLA, Conjunctiva and Sclera clear  * ENT                           : Moist Mucous Membranes  * NECK                         : Supple, No JVD, Normal Thyroid  * CHEST/LUNG           : Clear to Auscultation bilaterally; No Rales, Rhonchi, Wheezing or Rubs  * HEART                       : Regular Rate and Rhythm; No murmurs, Rubs or gallops  * ABDOMEN                : Soft, Non-tender, Non-distended; Bowel Sounds present  * NERVOUS SYSTEM  :  Alert & Oriented X3, Good Concentration; Motor Strength 5/5 B/L UL LL ; DTRs 2+ Intact and Symmetric  * EXTREMITIES            :  2+ Peripheral Pulses, No clubbing, cyanosis, or edema  * SKIN                           : No Rashes or Lesions    LABS:                          11.7   9.8   )-----------( 243      ( 17 Sep 2018 07:37 )             35.3     09-17    135  |  100  |  17  ----------------------------<  120<H>  3.7   |  27  |  0.59    Ca    9.7      17 Sep 2018 07:37  Phos  3.2     09-17  Mg     2.1     09-17          CAPILLARY BLOOD GLUCOSE          RADIOLOGY & ADDITIONAL TESTS:   No radiological imaging was required    Imaging Personally Reviewed   :  [ ] YES  [ ] NO    Consultant(s) Notes Reviewed :  [ ] YES  [ ] NO

## 2018-09-28 NOTE — ED ADULT NURSE NOTE - CCCP TRG CHIEF CMPLNT
Called to give report to haydee and the tech answered and let haydee melton know and haydee told her to tell me that she will have to call me back to get report.   swelling of lower extremities

## 2019-01-21 NOTE — DISCHARGE NOTE ADULT - NS MD DC FALL RISK RISK
Pt would like to speak with nurse regarding irregular bleeding. For information on Fall & Injury Prevention, visit [email or web address]

## 2019-12-26 NOTE — ED ADULT NURSE NOTE - NS ED NURSE REPORT GIVEN DT
Group Topic: BH Recovery Skills    Date: 12/26/2019  Start Time: 1345  End Time: 1430  Facilitators: Hanna Godwin LPC; Andria Eng LPC    Focus: codependency  Number in attendance: 7 plus 7 residents    Group members were educated on the definition of codependency and discussed codependent behaviors. Group members were encouraged to engage in a group discussion on the group topic.    Method: Group  Attendance: Present  Participation: Moderate  Patient Response: Attentive and Good eye contact  Mood: Irritable and Negative  Affect: Tense  Behavior/Socialization: Appropriate to group and Negativistic  Thought Process: Tracking  Task Performance: Follows directions     Hanna Godwin LPC-IT, SAC-IT      14-Sep-2018 17:39

## 2020-06-24 NOTE — ED ADULT NURSE NOTE - CAS EDN DISCHARGE ASSESSMENT
Encounter Summary
  Created on: 2020
 
 SantosVeda
 External Reference #: [id number]
 : 44
 Sex: Female
 
 Demographics
 
 
+-----------------------+----------------------+
| Address               | [address]     |
|                       | [place]  [address] |
+-----------------------+----------------------+
| Home Phone            | [phone]      |
+-----------------------+----------------------+
| Preferred Language    | Unknown              |
+-----------------------+----------------------+
| Marital Status        |               |
+-----------------------+----------------------+
| Mandaeism Affiliation | [id number]                 |
+-----------------------+----------------------+
| Race                  | Unknown              |
+-----------------------+----------------------+
| Ethnic Group          | Unknown              |
+-----------------------+----------------------+
 
 
 Author
 
 
+--------------+--------------------------------------------+
| Author       | [facility]  |
|              | and [facility]                                |
+--------------+--------------------------------------------+
| Organization | [facility]  |
|              | and [facility]                                |
+--------------+--------------------------------------------+
| Address      | Unknown                                    |
+--------------+--------------------------------------------+
| Phone        | Unavailable                                |
+--------------+--------------------------------------------+
 
 
 
 Support
 
 
+----------------+--------------+-----------------+-----------------+
| Name           | Relationship | Address         | Phone           |
+----------------+--------------+-----------------+-----------------+
| Johan Santos | ECON         | [address] | [phone] |
|                |              | [place]  [address] |                 |
+----------------+--------------+-----------------+-----------------+
| Ariel Santos   | ECON         | Unknown         | [phone] |
+----------------+--------------+-----------------+-----------------+
| Luis Santos   | ECON         | Unknown         | [phone] |
 
+----------------+--------------+-----------------+-----------------+
 
 
 
 Care Team Providers
 
 
+--------------------------+------+-----------------+
| Care Team Member Name    | Role | Phone           |
+--------------------------+------+-----------------+
| William Ferguson MD | PCP  | [phone] |
+--------------------------+------+-----------------+
 
 
 
 Encounter Details
 
 
+--------+----------+---------------------+----------------------+-------------+
| Date   | Type     | Department          | Care Team            | Description |
+--------+----------+---------------------+----------------------+-------------+
| / | Abstract |   [facility]         |   [place]       |             |
| 2017   |          | NEPHROLOGY  [address]   | [address] |             |
|        |          | [address]   |  [address] [place]    |             |
|        |          | [place], [place]     | [address]  [place], [place] |             |
|        |          | [address]          |  [address]  [phone] |             |
|        |          | [phone]        |   [phone] (Fax) |             |
+--------+----------+---------------------+----------------------+-------------+
 
 
 
 Social History
 
 
+---------------+------------+-----------+--------+------------------+
| Tobacco Use   | Types      | Packs/Day | Years  | Date             |
|               |            |           | Used   |                  |
+---------------+------------+-----------+--------+------------------+
| Former Smoker | Cigarettes | 0.25      | 5      | Quit: 1968 |
+---------------+------------+-----------+--------+------------------+
 
 
 
+---------------------+---+---+---+
| Smokeless Tobacco:  |   |   |   |
| Never Used          |   |   |   |
+---------------------+---+---+---+
 
 
 
+-------------+-------------+---------+--------------+
| Alcohol Use | Drinks/Week | oz/Week | Comments     |
+-------------+-------------+---------+--------------+
| Yes         |             |         | Twice a year |
+-------------+-------------+---------+--------------+
 
 
 
+------------------+---------------+
| Sex Assigned at  | Date Recorded |
 
| Birth            |               |
+------------------+---------------+
| Not on file      |               |
+------------------+---------------+
 documented as of this encounter
 
 Plan of Treatment
 
 
+--------+---------+------------+----------------------+-------------+
| Date   | Type    | Specialty  | Care Team            | Description |
+--------+---------+------------+----------------------+-------------+
| / | Office  | Nephrology |   Dante Escudero MD  |             |
| 2020   | Visit   |            |  [address]  |             |
|        |         |            | [address]  [facility] [place]   |             |
|        |         |            | [address]  [phone]  |             |
|        |         |            |  [phone] (Fax)  |             |
+--------+---------+------------+----------------------+-------------+
 documented as of this encounter
 
 Visit Diagnoses
 Not on filedocumented in this encounter Alert and oriented to person, place and time

## 2021-09-22 NOTE — H&P ADULT - BACK
ORDER PLACED:    Date: 09/16/21  Description: MRI LEFT SHOULDER W/O CONTRAST    Order Number: [id number]  Ordering Provider: LUCIAN RIVERA   Performing Provider: LUCIAN Acosta   CPT Codes: W7979102  ICD10 Codes: D81.751J, U010866 Full range of motion of upper and lower extremities, no joint tenderness/swelling. detailed exam

## 2022-07-18 ENCOUNTER — INPATIENT (INPATIENT)
Facility: HOSPITAL | Age: 87
LOS: 7 days | Discharge: NURSING, REHAB OR LONG-TERM CARE | DRG: 597 | End: 2022-07-26
Attending: INTERNAL MEDICINE | Admitting: INTERNAL MEDICINE
Payer: MEDICARE

## 2022-07-18 VITALS
OXYGEN SATURATION: 98 % | SYSTOLIC BLOOD PRESSURE: 121 MMHG | DIASTOLIC BLOOD PRESSURE: 50 MMHG | HEART RATE: 97 BPM | WEIGHT: 115.08 LBS | TEMPERATURE: 99 F | HEIGHT: 67 IN | RESPIRATION RATE: 16 BRPM

## 2022-07-18 DIAGNOSIS — F41.1 GENERALIZED ANXIETY DISORDER: ICD-10-CM

## 2022-07-18 DIAGNOSIS — Z29.9 ENCOUNTER FOR PROPHYLACTIC MEASURES, UNSPECIFIED: ICD-10-CM

## 2022-07-18 DIAGNOSIS — D64.9 ANEMIA, UNSPECIFIED: ICD-10-CM

## 2022-07-18 DIAGNOSIS — N64.89 OTHER SPECIFIED DISORDERS OF BREAST: ICD-10-CM

## 2022-07-18 DIAGNOSIS — E07.9 DISORDER OF THYROID, UNSPECIFIED: Chronic | ICD-10-CM

## 2022-07-18 DIAGNOSIS — N61.0 MASTITIS WITHOUT ABSCESS: ICD-10-CM

## 2022-07-18 DIAGNOSIS — E78.5 HYPERLIPIDEMIA, UNSPECIFIED: ICD-10-CM

## 2022-07-18 PROBLEM — I65.29 OCCLUSION AND STENOSIS OF UNSPECIFIED CAROTID ARTERY: Chronic | Status: ACTIVE | Noted: 2018-09-14

## 2022-07-18 PROBLEM — G45.9 TRANSIENT CEREBRAL ISCHEMIC ATTACK, UNSPECIFIED: Chronic | Status: ACTIVE | Noted: 2018-09-14

## 2022-07-18 LAB
ALBUMIN SERPL ELPH-MCNC: 2.8 G/DL — LOW (ref 3.5–5)
ALP SERPL-CCNC: 54 U/L — SIGNIFICANT CHANGE UP (ref 40–120)
ALT FLD-CCNC: 9 U/L DA — LOW (ref 10–60)
ANION GAP SERPL CALC-SCNC: 8 MMOL/L — SIGNIFICANT CHANGE UP (ref 5–17)
ANISOCYTOSIS BLD QL: SLIGHT — SIGNIFICANT CHANGE UP
APTT BLD: 31 SEC — SIGNIFICANT CHANGE UP (ref 27.5–35.5)
AST SERPL-CCNC: 6 U/L — LOW (ref 10–40)
BASOPHILS # BLD AUTO: 0.01 K/UL — SIGNIFICANT CHANGE UP (ref 0–0.2)
BASOPHILS NFR BLD AUTO: 0.1 % — SIGNIFICANT CHANGE UP (ref 0–2)
BILIRUB SERPL-MCNC: 0.4 MG/DL — SIGNIFICANT CHANGE UP (ref 0.2–1.2)
BLD GP AB SCN SERPL QL: SIGNIFICANT CHANGE UP
BUN SERPL-MCNC: 20 MG/DL — HIGH (ref 7–18)
CALCIUM SERPL-MCNC: 9.2 MG/DL — SIGNIFICANT CHANGE UP (ref 8.4–10.5)
CHLORIDE SERPL-SCNC: 109 MMOL/L — HIGH (ref 96–108)
CO2 SERPL-SCNC: 23 MMOL/L — SIGNIFICANT CHANGE UP (ref 22–31)
CREAT SERPL-MCNC: 0.46 MG/DL — LOW (ref 0.5–1.3)
DACRYOCYTES BLD QL SMEAR: SLIGHT — SIGNIFICANT CHANGE UP
EGFR: 93 ML/MIN/1.73M2 — SIGNIFICANT CHANGE UP
ELLIPTOCYTES BLD QL SMEAR: SLIGHT — SIGNIFICANT CHANGE UP
EOSINOPHIL # BLD AUTO: 0.01 K/UL — SIGNIFICANT CHANGE UP (ref 0–0.5)
EOSINOPHIL NFR BLD AUTO: 0.1 % — SIGNIFICANT CHANGE UP (ref 0–6)
GLUCOSE SERPL-MCNC: 109 MG/DL — HIGH (ref 70–99)
HCT VFR BLD CALC: 11.7 % — CRITICAL LOW (ref 34.5–45)
HGB BLD-MCNC: 3.2 G/DL — CRITICAL LOW (ref 11.5–15.5)
HYPOCHROMIA BLD QL: SIGNIFICANT CHANGE UP
IMM GRANULOCYTES NFR BLD AUTO: 0.5 % — SIGNIFICANT CHANGE UP (ref 0–1.5)
INR BLD: 1.12 RATIO — SIGNIFICANT CHANGE UP (ref 0.88–1.16)
LYMPHOCYTES # BLD AUTO: 1.88 K/UL — SIGNIFICANT CHANGE UP (ref 1–3.3)
LYMPHOCYTES # BLD AUTO: 23.2 % — SIGNIFICANT CHANGE UP (ref 13–44)
MAGNESIUM SERPL-MCNC: 2.3 MG/DL — SIGNIFICANT CHANGE UP (ref 1.6–2.6)
MANUAL SMEAR VERIFICATION: SIGNIFICANT CHANGE UP
MCHC RBC-ENTMCNC: 19.2 PG — LOW (ref 27–34)
MCHC RBC-ENTMCNC: 27.4 GM/DL — LOW (ref 32–36)
MCV RBC AUTO: 70.1 FL — LOW (ref 80–100)
MICROCYTES BLD QL: SLIGHT — SIGNIFICANT CHANGE UP
MONOCYTES # BLD AUTO: 0.31 K/UL — SIGNIFICANT CHANGE UP (ref 0–0.9)
MONOCYTES NFR BLD AUTO: 3.8 % — SIGNIFICANT CHANGE UP (ref 2–14)
NEUTROPHILS # BLD AUTO: 5.84 K/UL — SIGNIFICANT CHANGE UP (ref 1.8–7.4)
NEUTROPHILS NFR BLD AUTO: 72.3 % — SIGNIFICANT CHANGE UP (ref 43–77)
NRBC # BLD: 0 /100 WBCS — SIGNIFICANT CHANGE UP (ref 0–0)
PLAT MORPH BLD: NORMAL — SIGNIFICANT CHANGE UP
PLATELET # BLD AUTO: 185 K/UL — SIGNIFICANT CHANGE UP (ref 150–400)
POIKILOCYTOSIS BLD QL AUTO: SLIGHT — SIGNIFICANT CHANGE UP
POLYCHROMASIA BLD QL SMEAR: SLIGHT — SIGNIFICANT CHANGE UP
POTASSIUM SERPL-MCNC: 3.7 MMOL/L — SIGNIFICANT CHANGE UP (ref 3.5–5.3)
POTASSIUM SERPL-SCNC: 3.7 MMOL/L — SIGNIFICANT CHANGE UP (ref 3.5–5.3)
PROT SERPL-MCNC: 5.6 G/DL — LOW (ref 6–8.3)
PROTHROM AB SERPL-ACNC: 13.3 SEC — SIGNIFICANT CHANGE UP (ref 10.5–13.4)
RAPID RVP RESULT: SIGNIFICANT CHANGE UP
RBC # BLD: 1.67 M/UL — LOW (ref 3.8–5.2)
RBC # FLD: 17.6 % — HIGH (ref 10.3–14.5)
RBC BLD AUTO: ABNORMAL
SARS-COV-2 RNA SPEC QL NAA+PROBE: SIGNIFICANT CHANGE UP
SCHISTOCYTES BLD QL AUTO: SLIGHT — SIGNIFICANT CHANGE UP
SODIUM SERPL-SCNC: 140 MMOL/L — SIGNIFICANT CHANGE UP (ref 135–145)
WBC # BLD: 8.09 K/UL — SIGNIFICANT CHANGE UP (ref 3.8–10.5)
WBC # FLD AUTO: 8.09 K/UL — SIGNIFICANT CHANGE UP (ref 3.8–10.5)

## 2022-07-18 PROCEDURE — 71260 CT THORAX DX C+: CPT | Mod: 26

## 2022-07-18 PROCEDURE — 99285 EMERGENCY DEPT VISIT HI MDM: CPT

## 2022-07-18 RX ORDER — AMPICILLIN SODIUM AND SULBACTAM SODIUM 250; 125 MG/ML; MG/ML
INJECTION, POWDER, FOR SUSPENSION INTRAMUSCULAR; INTRAVENOUS
Refills: 0 | Status: DISCONTINUED | OUTPATIENT
Start: 2022-07-18 | End: 2022-07-25

## 2022-07-18 RX ORDER — AMPICILLIN SODIUM AND SULBACTAM SODIUM 250; 125 MG/ML; MG/ML
1.5 INJECTION, POWDER, FOR SUSPENSION INTRAMUSCULAR; INTRAVENOUS EVERY 6 HOURS
Refills: 0 | Status: DISCONTINUED | OUTPATIENT
Start: 2022-07-19 | End: 2022-07-25

## 2022-07-18 RX ORDER — HEPARIN SODIUM 5000 [USP'U]/ML
5000 INJECTION INTRAVENOUS; SUBCUTANEOUS EVERY 8 HOURS
Refills: 0 | Status: DISCONTINUED | OUTPATIENT
Start: 2022-07-18 | End: 2022-07-20

## 2022-07-18 RX ORDER — SERTRALINE 25 MG/1
1 TABLET, FILM COATED ORAL
Qty: 0 | Refills: 0 | DISCHARGE

## 2022-07-18 RX ORDER — RISPERIDONE 4 MG/1
0.5 TABLET ORAL DAILY
Refills: 0 | Status: DISCONTINUED | OUTPATIENT
Start: 2022-07-18 | End: 2022-07-26

## 2022-07-18 RX ORDER — SERTRALINE 25 MG/1
100 TABLET, FILM COATED ORAL DAILY
Refills: 0 | Status: DISCONTINUED | OUTPATIENT
Start: 2022-07-18 | End: 2022-07-26

## 2022-07-18 RX ORDER — AMPICILLIN SODIUM AND SULBACTAM SODIUM 250; 125 MG/ML; MG/ML
1.5 INJECTION, POWDER, FOR SUSPENSION INTRAMUSCULAR; INTRAVENOUS ONCE
Refills: 0 | Status: COMPLETED | OUTPATIENT
Start: 2022-07-18 | End: 2022-07-18

## 2022-07-18 RX ADMIN — AMPICILLIN SODIUM AND SULBACTAM SODIUM 100 GRAM(S): 250; 125 INJECTION, POWDER, FOR SUSPENSION INTRAMUSCULAR; INTRAVENOUS at 19:05

## 2022-07-18 NOTE — ED PROVIDER NOTE - TEMPLATE, MLM
Receive electronic refill request from [facility] for warfarin prescription. Patient is current with INR testing. Refills per standing orders Dr GABE Liriano.    General

## 2022-07-18 NOTE — ED PROVIDER NOTE - NSICDXPASTMEDICALHX_GEN_ALL_CORE_FT
PAST MEDICAL HISTORY:  Carotid stenosis     HLD (hyperlipidemia)     TIA (transient ischemic attack)

## 2022-07-18 NOTE — H&P ADULT - PROBLEM SELECTOR PLAN 5
patient is at high risk of clots  will give heparin SC for dvt prophy hx of carotid stenosis   no bruit heard  now has weakness  check lipid profile  ASCVD <7.5 % however age of 87  with generalized weakness  f/u CK levels resume home statin if norm;a

## 2022-07-18 NOTE — ED PROVIDER NOTE - NS ED MD DISPO ADMITTING SERVICE
Patient needs to be oxygen recertified.  Patient was tested at rest without oxygen - SP02% 87. She was then tested w/o oxygen with exertion - SPO2% 80 . Patient was placed on 2 LPM of oxygen and sats were 96% 2 minutes of rest.    MEDICINE

## 2022-07-18 NOTE — H&P ADULT - TIME BILLING
- Review of records, telemetry, vital signs and daily labs.   - General and cardiovascular physical examination.  - Generation of cardiovascular treatment plan.  - Coordination of care.      Patient was seen and examined by me on 7/18/22,interim events noted,labs and radiology studies reviewed.  Cayetano Landin MD,FACC.  [address] [place], [place] [id number].  [phone]

## 2022-07-18 NOTE — ED PROVIDER NOTE - OBJECTIVE STATEMENT
[Doing Well] : is doing well 87F, [facility] of anemia presenting with right breast infection and reported low hemoglobin. patient reports she has had a breast infection for the past several weeks to months. no reported history of breast cancer. no chest pain, shortness of breath, abdominal pain, nausea or vomiting.

## 2022-07-18 NOTE — ED ADULT NURSE NOTE - OBJECTIVE STATEMENT
pt is here sating " I have a lot of leakage from the right breast ", pt has a wound with purulent discharge

## 2022-07-18 NOTE — CONSULT NOTE ADULT - ASSESSMENT
fungating right chest wall mass r/o breast ca with mets  anemia    pt currently being transfused for anemia  plan for tissue bx after pt stable from anemia/medical pov  oncology consult  will follow

## 2022-07-18 NOTE — ED PROVIDER NOTE - PHYSICAL EXAMINATION
General: well appearing female, no acute distress   HEENT: normocephalic, atraumatic   Respiratory: normal work of breathing  Breast: right breast wall abscess pus drainage    Abdomen: soft, non-tender, no guarding or rebound   MSK: no swelling or tenderness of lower extremities, moving all extremities spontaneously   Skin: warm, dry   Neuro: A&Ox3  Psych: appropriate affect

## 2022-07-18 NOTE — H&P ADULT - PROBLEM SELECTOR PLAN 3
-Right breast has lesion over the last several months  -patient has been dressing with paper towels only  - now showing large area with gland exposure as well as possible rib exposure  -patient has no fever, or chills or white count  -nevertheless the lesion is draining purulent discharge  -unaysn for now 7 days  -wound culttures   -Surgery have been consulted for debridement: prelim recs to stabilize hgb  -monitor vitals -Right breast has lesion over the last several months  -patient has been dressing with paper towels only  - now showing large area with gland exposure as well as possible rib exposure  -patient has no fever, or chills or white count  -nevertheless the lesion is draining purulent discharge  -unaysn for now 7 days  -wound culttures   -Surgery have been consulted for debridement: prelim recs to stabilize hgb  -monitor vitals  -f/u blood and wound cultures

## 2022-07-18 NOTE — H&P ADULT - ASSESSMENT
Patient is a 86 yo F, who lives at home alone, ambulatory at baseline, PMH of anemia, insomnia, long standing Hx of generalized weakness who comes in today, referred by her PCP during routine home visit, due to low hgb and for examination of breast infection, that she reports has been recently bleeding, found to be  a-febrile, HD stable     Exam: remarkable for a 6cm lymph node/ swelling in the right arm, as well as a 2cm deep, 4cm wide purulent induration on right breast area, with no nipple visible, protruding glandular tissue, with surrounding erythema and shallower induration that extends to the pectoral region and is associated with some possible rib exposure.    Labs: remarkable for hgb 3, no white count    now admitted for anemia and work up of breast lesion Patient is a 88 yo F, who lives at home alone, ambulatory at baseline, PMH of anemia, insomnia, long standing Hx of generalized weakness, VEENA, who comes in today, referred by her PCP during routine home visit, due to low hgb and for examination of breast infection, that she reports has been recently bleeding, found to be  a-febrile, HD stable     Exam: remarkable for a 6cm lymph node/ swelling in the right arm, as well as a 2cm deep, 4cm wide purulent induration on right breast area, with no nipple visible, protruding glandular tissue, with surrounding erythema and shallower induration that extends to the pectoral region and is associated with some possible rib exposure.    Labs: remarkable for hgb 3, no white count    now admitted for anemia and work up of breast lesion

## 2022-07-18 NOTE — H&P ADULT - NSHPSOCIALHISTORY_GEN_ALL_CORE
Lives at home, makes her own decisions  does not smoke, denies ever smoking  does not drink ETOH quit a year ago  and used marijuana 4 years ago

## 2022-07-18 NOTE — H&P ADULT - NSHPREVIEWOFSYSTEMS_GEN_ALL_CORE
REVIEW OF SYSTEMS:    CONSTITUTIONAL: + weakness, fevers or chills  EYES/ENT: No visual changes;  No vertigo or throat pain   NECK: No pain or stiffness  RESPIRATORY: No cough, wheezing, hemoptysis; No shortness of breath  CARDIOVASCULAR: + chest pain,  No  palpitations  GASTROINTESTINAL: No abdominal or epigastric pain. No nausea, vomiting, or hematemesis; No diarrhea or constipation. No melena or hematochezia.  GENITOURINARY: No dysuria, frequency or hematuria  NEUROLOGICAL: tingling in arm and armpit on Right   SKIN: No itching, rashes

## 2022-07-18 NOTE — ED ADULT NURSE NOTE - NSIMPLEMENTINTERV_GEN_ALL_ED
Implemented All Fall with Harm Risk Interventions:  [employer] to call system. Call bell, personal items and telephone within reach. Instruct patient to call for assistance. Room bathroom lighting operational. Non-slip footwear when patient is off stretcher. Physically safe environment: no spills, clutter or unnecessary equipment. Stretcher in lowest position, wheels locked, appropriate side rails in place. Provide visual cue, wrist band, yellow gown, etc. Monitor gait and stability. Monitor for mental status changes and reorient to person, place, and time. Review medications for side effects contributing to fall risk. Reinforce activity limits and safety measures with patient and family. Provide visual clues: red socks.

## 2022-07-18 NOTE — H&P ADULT - PROBLEM SELECTOR PLAN 1
Patient has long standing hx of anemia on iron supplements- stopped last week,  also  with generalized weakness  now with hgb of 3   Denies major bleeding from stool or urine  reports slight bleeding from the right breast  Exam shows no bleeding    will check iron studies  This might be REJI due to malignancy  will monitor for signs of bleeding  s/p 2 units PRBC  repeat CBC in the PM at 8:00PM  type and cross- transfuse to maintaine hgb >7

## 2022-07-18 NOTE — H&P ADULT - NSHPPHYSICALEXAM_GEN_ALL_CORE
T(C): 37.2 (07-18-22 @ 13:49), Max: 37.2 (07-18-22 @ 13:49)  T(F): 98.9 (07-18-22 @ 13:49), Max: 98.9 (07-18-22 @ 13:49)  HR: 97 (07-18-22 @ 13:49) (97 - 97)  BP: 117/61 (07-18-22 @ 13:49) (117/61 - 121/50)  RR: 18 (07-18-22 @ 13:49) (16 - 18)  SpO2: 98% (07-18-22 @ 13:49) (98% - 98%)    GENERAL: NAD, lying in bed comfortably  HEAD:  Atraumatic, Normocephalic  EYES: EOMI, PERRLA, conjunctiva and sclera clear  ENT: Moist mucous membranes  NECK: Supple, No JVD    CHEST/LUNG:   CTAB, no wheezes, rales    HEART: Regular rate and rhythm; No murmurs, rubs, or gallops  ABDOMEN: Bowel sounds present; Soft, Nontender, Nondistended. No hepatomegally  EXTREMITIES:  2+ Peripheral Pulses, brisk capillary refill. No clubbing, cyanosis, 6cm lymph, firm non tender lymphnode with edema in the right upper arm  NERVOUS SYSTEM:  Alert & Oriented X3, speech clear. No deficits   MSK: FROM all 4 extremities, full and equal strength    SKIN: 2cm deep, 4cm wide purulent induration on right breast area, with no nipple visible, protruding glandular tissue, with surrounding erythema and shallower induration that extends to the pectoral region and is associated with some possible rib exposure.

## 2022-07-18 NOTE — H&P ADULT - HISTORY OF PRESENT ILLNESS
Patient is a 88 yo F, who lives at home alone, ambulatory at baseline, PMH of anemia, insomnia, long standing Hx of generalized weakness complicated by a fall 4 years ago, treated with rehab, and >year long hx of insidious onset, nontender  right breast "infection" who comes in today, referred by her PCP during routine home visit, due to low hgb and for examination of breast infection, that she reports has been recently bleeding.    Patient herself endorses, weakness that is chronic, as well as right sided arm paresthesias and occasional stinging sensation on the side of the right chest, but denies any  chest pain, fevers, chills, shortness of breath, abdominal pain, nausea or vomiting.    In the ED: patient was  a-febrile, HD stable and on room air  Exam: remarkable for a 6cm lymph node/ swelling in the right arm, as well as a 2cm deep, 4cm wide purulent induration on right breast area, with no nipple visible, protruding glandular tissue, with surrounding erythema and shallower induration that extends to the pectoral region and is associated with some possible rib exposure.    Labs: remarkable for hgb 3, no white count    Patient received 2 units of blood and was admitted for symptomatic anemia and right breast malignancy concern   Patient is a 88 yo F, who lives at home alone, ambulatory at baseline, PMH of anemia, insomnia, VEENA, long standing Hx of generalized weakness complicated by a fall 4 years ago, treated with rehab, and >year long hx of insidious onset, nontender  right breast "infection" who comes in today, referred by her PCP during routine home visit, due to low hgb and for examination of breast infection, that she reports has been recently bleeding.    Patient herself endorses, weakness that is chronic, as well as right sided arm paresthesias and occasional stinging sensation on the side of the right chest, but denies any  chest pain, fevers, chills, shortness of breath, abdominal pain, nausea or vomiting.    In the ED: patient was  a-febrile, HD stable and on room air  Exam: remarkable for a 6cm lymph node/ swelling in the right arm, as well as a 2cm deep, 4cm wide purulent induration on right breast area, with no nipple visible, protruding glandular tissue, with surrounding erythema and shallower induration that extends to the pectoral region and is associated with some possible rib exposure.    Labs: remarkable for hgb 3, no white count    Patient received 2 units of blood and was admitted for symptomatic anemia and right breast malignancy concern

## 2022-07-18 NOTE — H&P ADULT - PROBLEM SELECTOR PLAN 4
hx of carotid stenosis   no bruit heard  now has weakness  check lipid profile  ASCVD <7.5 % can hold of on statin for now continue home meds

## 2022-07-18 NOTE — H&P ADULT - NSHPLABSRESULTS_GEN_ALL_CORE
3.2    8.09  )-----------( 185      ( 18 Jul 2022 13:58 )             11.7     07-18    140  |  109<H>  |  20<H>  ----------------------------<  109<H>  3.7   |  23  |  0.46<L>    Ca    9.2      18 Jul 2022 12:00  Mg     2.3     07-18    TPro  5.6<L>  /  Alb  2.8<L>  /  TBili  0.4  /  DBili  x   /  AST  6<L>  /  ALT  9<L>  /  AlkPhos  54  07-18        LIVER FUNCTIONS - ( 18 Jul 2022 12:00 )  Alb: 2.8 g/dL / Pro: 5.6 g/dL / ALK PHOS: 54 U/L / ALT: 9 U/L DA / AST: 6 U/L / GGT: x           PT/INR - ( 18 Jul 2022 12:00 )   PT: 13.3 sec;   INR: 1.12 ratio         PTT - ( 18 Jul 2022 12:00 )  PTT:31.0 sec            EKG:     RADIOLOGY STUDIES:

## 2022-07-18 NOTE — H&P ADULT - PROBLEM SELECTOR PLAN 2
Patient with generalized weakness and anemia   now with suspicious mass on the right breast over the last several months  reports with infection and drainage, that is still non tender  however now with tingling in the right axilla and right upper arm    Exam: remarkable for a 6cm lymph node/ swelling in the right arm, as well as a 2cm deep, 4cm wide purulent induration on right breast area, with no nipple visible, protruding glandular tissue, with surrounding erythema and shallower induration that extends to the pectoral region and is associated with some possible rib exposure.    -Wound care consulted  -surgery consulted for debridement and bx, either breast or LN  -Patient would like to know what the lesion is before deciding GOC Patient with generalized weakness and anemia   now with suspicious mass on the right breast over the last several months  reports with infection and drainage, that is still non tender  however now with tingling in the right axilla and right upper arm    Exam: remarkable for a 6cm lymph node/ swelling in the right arm, as well as a 2cm deep, 4cm wide purulent induration on right breast area, with no nipple visible, protruding glandular tissue, with surrounding erythema and shallower induration that extends to the pectoral region and is associated with some possible rib exposure.    -Wound care consulted  -surgery consulted for debridement and bx, either breast or LN prelim recs are to stabilize hgb  -Patient would like to know what the lesion is before deciding GOC  -CT chest with IV contrast

## 2022-07-18 NOTE — CONSULT NOTE ADULT - SUBJECTIVE AND OBJECTIVE BOX
Pt admitted with severe anemia, receiving prbc in ER. CAlled for pt with fungating right breast mass. Pt offers hs of 1year long hx of insidious onset, nontender  right breast "infection" who comes in today, referred by her PCP during routine home visit, due to low hgb and for examination of breast infection, that she reports has been recently bleeding.   Pt states she never sought medical attention for the mass.  Patient herself endorses, weakness that is chronic, as well as right sided arm paresthesias and occasional stinging sensation on the side of the right chest, but denies any  chest pain, fevers, chills, shortness of breath, abdominal pain, nausea or vomiting.    In the ED: patient was  a-febrile, HD stable and on room air  ICU Vital Signs Last 24 Hrs  T(C): 37.2 (18 Jul 2022 16:07), Max: 37.2 (18 Jul 2022 13:49)  T(F): 98.9 (18 Jul 2022 16:07), Max: 98.9 (18 Jul 2022 13:49)  HR: 87 (18 Jul 2022 16:07) (87 - 97)  BP: 115/56 (18 Jul 2022 16:07) (115/56 - 121/50)  BP(mean): --  ABP: --  ABP(mean): --  RR: 18 (18 Jul 2022 16:07) (16 - 18)  SpO2: 100% (18 Jul 2022 16:07) (98% - 100%)    O2 Parameters below as of 18 Jul 2022 11:06  Patient On (Oxygen Delivery Method): room air     6cm lymph node/ swelling in the right arm,   Right chest wall, 2cm deep, 4cm wide fungating right breast mass, with surrounding erythema and shallower induration that extends to the lateral chest wall anterior axillary line.  no active bleed, no fluctuant areas      Patient received 2 units of blood and was admitted for symptomatic anemia and right breast malignancy concern                          3.2    8.09  )-----------( 185      ( 18 Jul 2022 13:58 )             11.7   07-18    140  |  109<H>  |  20<H>  ----------------------------<  109<H>  3.7   |  23  |  0.46<L>    Ca    9.2      18 Jul 2022 12:00  Mg     2.3     07-18    TPro  5.6<L>  /  Alb  2.8<L>  /  TBili  0.4  /  DBili  x   /  AST  6<L>  /  ALT  9<L>  /  AlkPhos  54  07-18

## 2022-07-19 LAB
ALBUMIN SERPL ELPH-MCNC: 2.7 G/DL — LOW (ref 3.5–5)
ALP SERPL-CCNC: 64 U/L — SIGNIFICANT CHANGE UP (ref 40–120)
ALT FLD-CCNC: 9 U/L DA — LOW (ref 10–60)
ANION GAP SERPL CALC-SCNC: 7 MMOL/L — SIGNIFICANT CHANGE UP (ref 5–17)
AST SERPL-CCNC: 10 U/L — SIGNIFICANT CHANGE UP (ref 10–40)
BASOPHILS # BLD AUTO: 0.07 K/UL — SIGNIFICANT CHANGE UP (ref 0–0.2)
BASOPHILS NFR BLD AUTO: 0.7 % — SIGNIFICANT CHANGE UP (ref 0–2)
BILIRUB SERPL-MCNC: 0.8 MG/DL — SIGNIFICANT CHANGE UP (ref 0.2–1.2)
BUN SERPL-MCNC: 17 MG/DL — SIGNIFICANT CHANGE UP (ref 7–18)
CALCIUM SERPL-MCNC: 9.2 MG/DL — SIGNIFICANT CHANGE UP (ref 8.4–10.5)
CHLORIDE SERPL-SCNC: 110 MMOL/L — HIGH (ref 96–108)
CHOLEST SERPL-MCNC: 124 MG/DL — SIGNIFICANT CHANGE UP
CK SERPL-CCNC: 43 U/L — SIGNIFICANT CHANGE UP (ref 21–215)
CO2 SERPL-SCNC: 22 MMOL/L — SIGNIFICANT CHANGE UP (ref 22–31)
CREAT SERPL-MCNC: 0.51 MG/DL — SIGNIFICANT CHANGE UP (ref 0.5–1.3)
EGFR: 91 ML/MIN/1.73M2 — SIGNIFICANT CHANGE UP
EOSINOPHIL # BLD AUTO: 0.1 K/UL — SIGNIFICANT CHANGE UP (ref 0–0.5)
EOSINOPHIL NFR BLD AUTO: 0.9 % — SIGNIFICANT CHANGE UP (ref 0–6)
GLUCOSE SERPL-MCNC: 106 MG/DL — HIGH (ref 70–99)
HCT VFR BLD CALC: 22.4 % — LOW (ref 34.5–45)
HCT VFR BLD CALC: 23.3 % — LOW (ref 34.5–45)
HDLC SERPL-MCNC: 25 MG/DL — LOW
HGB BLD-MCNC: 7.1 G/DL — LOW (ref 11.5–15.5)
HGB BLD-MCNC: 7.5 G/DL — LOW (ref 11.5–15.5)
IMM GRANULOCYTES NFR BLD AUTO: 0.4 % — SIGNIFICANT CHANGE UP (ref 0–1.5)
LIPID PNL WITH DIRECT LDL SERPL: 77 MG/DL — SIGNIFICANT CHANGE UP
LYMPHOCYTES # BLD AUTO: 3.95 K/UL — HIGH (ref 1–3.3)
LYMPHOCYTES # BLD AUTO: 37.5 % — SIGNIFICANT CHANGE UP (ref 13–44)
MCHC RBC-ENTMCNC: 24.4 PG — LOW (ref 27–34)
MCHC RBC-ENTMCNC: 25.3 PG — LOW (ref 27–34)
MCHC RBC-ENTMCNC: 31.7 GM/DL — LOW (ref 32–36)
MCHC RBC-ENTMCNC: 32.2 GM/DL — SIGNIFICANT CHANGE UP (ref 32–36)
MCV RBC AUTO: 77 FL — LOW (ref 80–100)
MCV RBC AUTO: 78.7 FL — LOW (ref 80–100)
MONOCYTES # BLD AUTO: 0.57 K/UL — SIGNIFICANT CHANGE UP (ref 0–0.9)
MONOCYTES NFR BLD AUTO: 5.4 % — SIGNIFICANT CHANGE UP (ref 2–14)
NEUTROPHILS # BLD AUTO: 5.8 K/UL — SIGNIFICANT CHANGE UP (ref 1.8–7.4)
NEUTROPHILS NFR BLD AUTO: 55.1 % — SIGNIFICANT CHANGE UP (ref 43–77)
NON HDL CHOLESTEROL: 99 MG/DL — SIGNIFICANT CHANGE UP
NRBC # BLD: 0 /100 WBCS — SIGNIFICANT CHANGE UP (ref 0–0)
NRBC # BLD: 0 /100 WBCS — SIGNIFICANT CHANGE UP (ref 0–0)
PLATELET # BLD AUTO: 186 K/UL — SIGNIFICANT CHANGE UP (ref 150–400)
PLATELET # BLD AUTO: 186 K/UL — SIGNIFICANT CHANGE UP (ref 150–400)
POTASSIUM SERPL-MCNC: 3.4 MMOL/L — LOW (ref 3.5–5.3)
POTASSIUM SERPL-SCNC: 3.4 MMOL/L — LOW (ref 3.5–5.3)
PROT SERPL-MCNC: 5.1 G/DL — LOW (ref 6–8.3)
RBC # BLD: 2.91 M/UL — LOW (ref 3.8–5.2)
RBC # BLD: 2.96 M/UL — LOW (ref 3.8–5.2)
RBC # FLD: 18 % — HIGH (ref 10.3–14.5)
RBC # FLD: 18.5 % — HIGH (ref 10.3–14.5)
SODIUM SERPL-SCNC: 139 MMOL/L — SIGNIFICANT CHANGE UP (ref 135–145)
TRIGL SERPL-MCNC: 112 MG/DL — SIGNIFICANT CHANGE UP
WBC # BLD: 10.53 K/UL — HIGH (ref 3.8–10.5)
WBC # BLD: 9.67 K/UL — SIGNIFICANT CHANGE UP (ref 3.8–10.5)
WBC # FLD AUTO: 10.53 K/UL — HIGH (ref 3.8–10.5)
WBC # FLD AUTO: 9.67 K/UL — SIGNIFICANT CHANGE UP (ref 3.8–10.5)

## 2022-07-19 PROCEDURE — 99222 1ST HOSP IP/OBS MODERATE 55: CPT | Mod: FS,57

## 2022-07-19 RX ADMIN — AMPICILLIN SODIUM AND SULBACTAM SODIUM 100 GRAM(S): 250; 125 INJECTION, POWDER, FOR SUSPENSION INTRAMUSCULAR; INTRAVENOUS at 01:33

## 2022-07-19 RX ADMIN — SERTRALINE 100 MILLIGRAM(S): 25 TABLET, FILM COATED ORAL at 12:21

## 2022-07-19 RX ADMIN — HEPARIN SODIUM 5000 UNIT(S): 5000 INJECTION INTRAVENOUS; SUBCUTANEOUS at 17:24

## 2022-07-19 RX ADMIN — AMPICILLIN SODIUM AND SULBACTAM SODIUM 100 GRAM(S): 250; 125 INJECTION, POWDER, FOR SUSPENSION INTRAMUSCULAR; INTRAVENOUS at 06:37

## 2022-07-19 RX ADMIN — HEPARIN SODIUM 5000 UNIT(S): 5000 INJECTION INTRAVENOUS; SUBCUTANEOUS at 06:36

## 2022-07-19 RX ADMIN — HEPARIN SODIUM 5000 UNIT(S): 5000 INJECTION INTRAVENOUS; SUBCUTANEOUS at 22:09

## 2022-07-19 RX ADMIN — AMPICILLIN SODIUM AND SULBACTAM SODIUM 100 GRAM(S): 250; 125 INJECTION, POWDER, FOR SUSPENSION INTRAMUSCULAR; INTRAVENOUS at 12:21

## 2022-07-19 RX ADMIN — HEPARIN SODIUM 5000 UNIT(S): 5000 INJECTION INTRAVENOUS; SUBCUTANEOUS at 01:33

## 2022-07-19 RX ADMIN — RISPERIDONE 0.5 MILLIGRAM(S): 4 TABLET ORAL at 12:21

## 2022-07-19 RX ADMIN — AMPICILLIN SODIUM AND SULBACTAM SODIUM 100 GRAM(S): 250; 125 INJECTION, POWDER, FOR SUSPENSION INTRAMUSCULAR; INTRAVENOUS at 17:24

## 2022-07-19 NOTE — ADVANCED PRACTICE NURSE CONSULT - ASSESSMENT
This is a 87yr old female patient admitted for Anemia, presenting with the following:  -There is a R. Fungating Breast Mass wound to which the patient is being followed by Surgery for evaluation and treatment of this wound (potential debridement).  This is a 87yr old female patient admitted for Anemia, presenting with the following:  -There is a R. Fungating Breast Mass wound with granulation tissue, slough, drainage, and strong odor; to which the patient is being followed by Surgery for evaluation and treatment of this wound (potential debridement).   -There is a Stage 1 Pressure Injury to the Bilateral Heels, as evident by non-blanchable erythema  -There is evidence of Incontinence Associated Dermatitis to the Perianal and Gluteal Fold areas

## 2022-07-19 NOTE — PATIENT PROFILE ADULT - FALL HARM RISK - HARM RISK INTERVENTIONS
Assistance with ambulation/Assistance OOB with selected safe patient handling equipment/Communicate Risk of Fall with Harm to all staff/Reinforce activity limits and safety measures with patient and family/Tailored Fall Risk Interventions/Visual Cue: Yellow wristband and red socks/Bed in lowest position, wheels locked, appropriate side rails in place/Call bell, personal items and telephone in reach/Instruct patient to call for assistance before getting out of bed or chair/Non-slip footwear when patient is out of bed/Normal to call system/Physically safe environment - no spills, clutter or unnecessary equipment/Purposeful Proactive Rounding/Room/bathroom lighting operational, light cord in reach Assistance with ambulation/Assistance OOB with selected safe patient handling equipment/Communicate Risk of Fall with Harm to all staff/Discuss with provider need for PT consult/Monitor gait and stability/Provide patient with walking aids - walker, cane, crutches/Reinforce activity limits and safety measures with patient and family/Tailored Fall Risk Interventions/Visual Cue: Yellow wristband and red socks/Bed in lowest position, wheels locked, appropriate side rails in place/Call bell, personal items and telephone in reach/Instruct patient to call for assistance before getting out of bed or chair/Non-slip footwear when patient is out of bed/[place] to call system/Physically safe environment - no spills, clutter or unnecessary equipment/Purposeful Proactive Rounding/Room/bathroom lighting operational, light cord in reach

## 2022-07-19 NOTE — PROGRESS NOTE ADULT - PROBLEM SELECTOR PLAN 3
-Right breast has lesion over the last several months with purulent drainage  - c/w unaysn for now 7 days  - F/u wound cultures   -f/u Surgery for debridement  -f/u blood and wound cultures  - wound care per wound  nurse consult  - consult ID

## 2022-07-19 NOTE — PROGRESS NOTE ADULT - PROBLEM SELECTOR PLAN 2
Right breast has lesion over the last several months with purulent drainage  - c/w Unasyn for now 7 days  - F/u wound cultures   -f/u Surgery for debridement  -f/u blood and wound cultures  - wound care per wound  nurse consulted  - ID.  - f/u reccomendations

## 2022-07-19 NOTE — ADVANCED PRACTICE NURSE CONSULT - RECOMMEDATIONS
-Until Surgical evaluation, clean the R. Breat wound with normal saline and apply skin prep to the surrounding skin  -Order 500mg tab of Flagyl  -Crush the 500mg tab of Flagyl into fine dust  -Apply the Flagyl dust to the R. Breast Mass wound bed  -Then apply Xeroform gauze to the wound bed  -Cover with an ABD pad and secure with tape Daily PRN  -Elevate/float the patients heels using heel protectors and reposition the patient Q 2hrs using wedges or pillows -Clean all affected areas with normal saline and apply skin prep to the surrounding skin  -Until Surgical evaluation, order 500mg tab of Flagyl and crush into fine dust  -Apply the Flagyl dust to the R. Breast Mass wound bed, then apply Xeroform gauze to the wound bed, cover with an ABD pad and secure with tape Daily PRN  -Apply Zinc Oxide Moisture Barrier Cream to the Perianal and Gluteal Fold areas b.i.d. PRN  -Frequent toileting  -Elevate/float the patients heels using pillows and reposition the patient Q 2hrs using wedges or pillows

## 2022-07-19 NOTE — PROGRESS NOTE ADULT - ASSESSMENT
87F with right fungating breast mass  likely primary breast cancer with METs    -plan for bedside breast biopsy 7/20  -hold heparin in AM for procedure  -oncology consultation  -remainder of care per primary team

## 2022-07-19 NOTE — PROGRESS NOTE ADULT - PROBLEM SELECTOR PLAN 3
Patient has long standing hx of anemia on iron supplements- stopped last week,  p/w with hgb of 3   -f/u iron studies  -This might be REJI due to malignancy  -s/p 2 units PRBC  -cbc  -keep hg above 7.  - Cardio dr. Landin consulted

## 2022-07-19 NOTE — PROGRESS NOTE ADULT - SUBJECTIVE AND OBJECTIVE BOX
INTERVAL HPI/OVERNIGHT EVENTS:  87F presenting with fungating right breast mass. Pt states she has a mammogram was 5 years ago and she state the results were normal at the time. She admits to>20 lbs of weight loss over the last year. Patient offers no acute complaints. Denied fevers, chills or night sweats.    MEDICATIONS  (STANDING):  ampicillin/sulbactam  IVPB      ampicillin/sulbactam  IVPB 1.5 Gram(s) IV Intermittent every 6 hours  heparin   Injectable 5000 Unit(s) SubCutaneous every 8 hours  risperiDONE   Tablet 0.5 milliGRAM(s) Oral daily  sertraline 100 milliGRAM(s) Oral daily    MEDICATIONS  (PRN):      Vital Signs Last 24 Hrs  T(C): 37.1 (19 Jul 2022 15:08), Max: 37.3 (18 Jul 2022 22:35)  T(F): 98.7 (19 Jul 2022 15:08), Max: 99.2 (18 Jul 2022 22:35)  HR: 68 (19 Jul 2022 15:08) (68 - 89)  BP: 128/61 (19 Jul 2022 15:08) (128/61 - 145/65)  RR: 17 (19 Jul 2022 15:08) (16 - 20)  SpO2: 99% (19 Jul 2022 15:08) (98% - 100%)    Parameters below as of 19 Jul 2022 07:30  Patient On (Oxygen Delivery Method): room air        Physical:  General: A&Ox3. NAD.  Abdomen: Soft nondistended, nontender.  Right breast: Right chest wall, wide fungating mass, with surrounding erythema and shallower induration that extends to the lateral chest wall anterior axillary line. Foul smelling, overlying slough.   no active bleed, no fluctuant areas. nipple unable to be identified    LABS:                        7.5    10.53 )-----------( 186      ( 19 Jul 2022 01:58 )             23.3             07-18    140  |  109<H>  |  20<H>  ----------------------------<  109<H>  3.7   |  23  |  0.46<L>    Ca    9.2      18 Jul 2022 12:00  Mg     2.3     07-18    TPro  5.6<L>  /  Alb  2.8<L>  /  TBili  0.4  /  DBili  x   /  AST  6<L>  /  ALT  9<L>  /  AlkPhos  54  07-18

## 2022-07-19 NOTE — PROGRESS NOTE ADULT - SUBJECTIVE AND OBJECTIVE BOX
PATIENT SEEN AND EXAMINED ON :- 7/19/22  DATE OF SERVICE:      7/19/22       Interim events noted,Labs ,Radiological studies and Cardiology tests reviewed       HOSPITAL COURSE: HPI:  Patient is a 88 yo F, who lives at home alone, ambulatory at baseline, PMH of anemia, insomnia, VEENA, long standing Hx of generalized weakness complicated by a fall 4 years ago, treated with rehab, and >year long hx of insidious onset, nontender  right breast "infection" who comes in today, referred by her PCP during routine home visit, due to low hgb and for examination of breast infection, that she reports has been recently bleeding.    Patient herself endorses, weakness that is chronic, as well as right sided arm paresthesias and occasional stinging sensation on the side of the right chest, but denies any  chest pain, fevers, chills, shortness of breath, abdominal pain, nausea or vomiting.    In the ED: patient was  a-febrile, HD stable and on room air  Exam: remarkable for a 6cm lymph node/ swelling in the right arm, as well as a 2cm deep, 4cm wide purulent induration on right breast area, with no nipple visible, protruding glandular tissue, with surrounding erythema and shallower induration that extends to the pectoral region and is associated with some possible rib exposure.    Labs: remarkable for hgb 3, no white count    Patient received 2 units of blood and was admitted for symptomatic anemia and right breast malignancy concern   (18 Jul 2022 15:02)      INTERIM EVENTS:Patient seen at bedside ,interim events noted.      PMH -reviewed admission note, no change since admission  HEART FAILURE: Acute[ ]Chronic[ ] Systolic[ ] Diastolic[ ] Combined Systolic and Diastolic[ ]  CAD[ ] CABG[ ] PCI[ ]  DEVICES[ ] PPM[ ] ICD[ ] ILR[ ]  ATRIAL FIBRILLATION[ ] Paroxysmal[ ] Permanent[ ] CHADS2-[  ]  KAMRAN[ ] CKD1[ ] CKD2[ ] CKD3[ ] CKD4[ ] ESRD[ ]  COPD[ ] HTN[ ]   DM[ ] Type1[ ] Type 2[ ]   CVA[ ] Paresis[ ]    AMBULATION: Assisted[ ] Cane/walker[ ] Independent[ ]    MEDICATIONS  (STANDING):  ampicillin/sulbactam  IVPB      ampicillin/sulbactam  IVPB 1.5 Gram(s) IV Intermittent every 6 hours  heparin   Injectable 5000 Unit(s) SubCutaneous every 8 hours  risperiDONE   Tablet 0.5 milliGRAM(s) Oral daily  sertraline 100 milliGRAM(s) Oral daily    MEDICATIONS  (PRN):            REVIEW OF SYSTEMS:  Constitutional: [ ] fever, [ ]weight loss,  [ ]fatigue [ ]weight gain  Eyes: [ ] visual changes  Respiratory: [ ]shortness of breath;  [ ] cough, [ ]wheezing, [ ]chills, [ ]hemoptysis  Cardiovascular: [ ] chest pain, [ ]palpitations, [ ]dizziness,  [ ]leg swelling[ ]orthopnea[ ]PND  Gastrointestinal: [ ] abdominal pain, [ ]nausea, [ ]vomiting,  [ ]diarrhea [ ]Constipation [ ]Melena  Genitourinary: [ ] dysuria, [ ] hematuria [ ][place]  Neurologic: [ ] headaches [ ] tremors[ ]weakness [ ]Paralysis Right[ ] Left[ ]  Skin: [ ] itching, [ ]burning, [ ] rashes  Endocrine: [ ] heat or cold intolerance  Musculoskeletal: [ ] joint pain or swelling; [ ] muscle, back, or extremity pain  Psychiatric: [ ] depression, [ ]anxiety, [ ]mood swings, or [ ]difficulty sleeping  Hematologic: [ ] easy bruising, [ ] bleeding gums    [ ] All remaining systems negative except as per above.   [ ]Unable to obtain.  [x] No change in ROS since admission      Vital Signs Last 24 Hrs  T(C): 37.1 (19 Jul 2022 04:55), Max: 37.3 (18 Jul 2022 22:35)  T(F): 98.7 (19 Jul 2022 04:55), Max: 99.2 (18 Jul 2022 22:35)  HR: 70 (19 Jul 2022 04:55) (70 - 97)  BP: 138/66 (19 Jul 2022 04:55) (115/56 - 145/65)  BP(mean): --  RR: 16 (19 Jul 2022 04:55) (16 - 20)  SpO2: 100% (19 Jul 2022 04:55) (98% - 100%)    Parameters below as of 19 Jul 2022 04:55  Patient On (Oxygen Delivery Method): room air      I&O's Summary      PHYSICAL EXAM:  General: No acute distress, R breast mass  HEENT: EOMI, PERRL  Neck: Supple, [ ] JVD  Lungs: Equal air entry bilaterally; [ ] rales [ ] wheezing [ ] rhonchi  Heart: Regular rate and rhythm; [x ] murmur   2/6 [ x] systolic [ ] diastolic [ ] radiation[ ] rubs [ ]  gallops  Abdomen: Nontender, bowel sounds present  Extremities: No clubbing, cyanosis, [ ] edema [ ]Pulses  equal and intact  Nervous system:  Alert & Oriented X3, no focal deficits  Psychiatric: Normal affect  Skin: No rashes or lesions    LABS:  07-18    140  |  109<H>  |  20<H>  ----------------------------<  109<H>  3.7   |  23  |  0.46<L>    Ca    9.2      18 Jul 2022 12:00  Mg     2.3     07-18    TPro  5.6<L>  /  Alb  2.8<L>  /  TBili  0.4  /  DBili  x   /  AST  6<L>  /  ALT  9<L>  /  AlkPhos  54  07-18    Creatinine Trend: 0.46<--                        7.5    10.53 )-----------( 186      ( 19 Jul 2022 01:58 )             23.3     PT/INR - ( 18 Jul 2022 12:00 )   PT: 13.3 sec;   INR: 1.12 ratio         PTT - ( 18 Jul 2022 12:00 )  PTT:31.0 sec

## 2022-07-19 NOTE — PROGRESS NOTE ADULT - PROBLEM SELECTOR PLAN 2
CT scan:  Large mass is noted in the right breast as described above.   Primary consideration is breast carcinoma.  -surgery f/u - tissue bx/ debridement  - consult heme/onc

## 2022-07-19 NOTE — PROGRESS NOTE ADULT - ASSESSMENT
Patient is a 86 yo F, who lives at home alone, ambulatory at baseline, PMH of anemia, insomnia, long standing Hx of generalized weakness, who was , referred by her PCP during routine home visit, due to low hgb and for breast infection. Admitted for anemia and work up of breast lesion.

## 2022-07-19 NOTE — PROGRESS NOTE ADULT - SUBJECTIVE AND OBJECTIVE BOX
NP Note discussed with primary Attending.    Patient is a 87y old  Female who presents with a chief complaint of symptomatic anemia and right breast infection (19 Jul 2022 07:58)      INTERVAL HPI/OVERNIGHT EVENTS: no new complaints    MEDICATIONS  (STANDING):  ampicillin/sulbactam  IVPB      ampicillin/sulbactam  IVPB 1.5 Gram(s) IV Intermittent every 6 hours  heparin   Injectable 5000 Unit(s) SubCutaneous every 8 hours  risperiDONE   Tablet 0.5 milliGRAM(s) Oral daily  sertraline 100 milliGRAM(s) Oral daily    MEDICATIONS  (PRN):      __________________________________________________  REVIEW OF SYSTEMS:    CONSTITUTIONAL: No fever,   EYES: no acute visual disturbances  NECK: No pain or stiffness  RESPIRATORY: No cough; No shortness of breath  CARDIOVASCULAR: No chest pain, no palpitations  GASTROINTESTINAL: No pain. No nausea or vomiting; No diarrhea   NEUROLOGICAL: No headache or numbness, no tremors  MUSCULOSKELETAL: Generalized weakness, weight loss, No joint pain, no muscle pain  GENITOURINARY: no dysuria, no frequency, no hesitancy  PSYCHIATRY: no depression , no anxiety  ALL OTHER  ROS negative        Vital Signs Last 24 Hrs  T(C): 37.1 (19 Jul 2022 15:08), Max: 37.3 (18 Jul 2022 22:35)  T(F): 98.7 (19 Jul 2022 15:08), Max: 99.2 (18 Jul 2022 22:35)  HR: 68 (19 Jul 2022 15:08) (68 - 89)  BP: 128/61 (19 Jul 2022 15:08) (128/61 - 145/65)  BP(mean): --  RR: 17 (19 Jul 2022 15:08) (16 - 20)  SpO2: 99% (19 Jul 2022 15:08) (98% - 100%)    Parameters below as of 19 Jul 2022 07:30  Patient On (Oxygen Delivery Method): room air        ________________________________________________  PHYSICAL EXAM:  GENERAL: NAD  HEENT: Normocephalic;  conjunctivae and sclerae clear; moist mucous membranes;   NECK : supple  CHEST/LUNG: Clear to auscultation bilaterally with good air entry   HEART: S1 S2  regular; no murmurs, gallops or rubs  ABDOMEN: Soft, Nontender, Nondistended; Bowel sounds present  EXTREMITIES: no cyanosis; no edema; no calf tenderness  SKIN: Rt breast complex sclerotic lesion, warm and dry; no rash  NERVOUS SYSTEM:  Awake and alert; Oriented  to place, person and time ; no new deficits    _________________________________________________  LABS:                        7.5    10.53 )-----------( 186      ( 19 Jul 2022 01:58 )             23.3     07-18    140  |  109<H>  |  20<H>  ----------------------------<  109<H>  3.7   |  23  |  0.46<L>    Ca    9.2      18 Jul 2022 12:00  Mg     2.3     07-18    TPro  5.6<L>  /  Alb  2.8<L>  /  TBili  0.4  /  DBili  x   /  AST  6<L>  /  ALT  9<L>  /  AlkPhos  54  07-18    PT/INR - ( 18 Jul 2022 12:00 )   PT: 13.3 sec;   INR: 1.12 ratio         PTT - ( 18 Jul 2022 12:00 )  PTT:31.0 sec    CAPILLARY BLOOD GLUCOSE            RADIOLOGY & ADDITIONAL TESTS:    ACC: [id number] EXAM:  CT CHEST IC                          PROCEDURE DATE:  07/18/2022          INTERPRETATION:  Clinical information: Evaluate for malignancy. Exam is   compared to previous study of 9/17/2018.    CT scan of the chest was obtained following administration of intravenous   contrast. Approximately 40 cc of Omnipaque 350 was administered and 60 cc   was discarded.    Low-attenuation lesions are noted within the right lobe of the thyroid   gland.    Large heterogeneous mass containing droplets of air is noted within the   right breast. The mass is extending and involving the skin of the   anterior chest wall as well as the anterior right fourth and fifth ribs   with resultant destruction.    Few small lymph nodes are present in the pretracheal space.    Heart is enlarged in size. Calcification of the aortic valve and the   coronary arteries is noted. No pericardial effusion is noted.    No endobronchial lesions are noted. Several nodules are noted within both   lungs. Largest nodule is noted in the left lower lobe and measures 1 cm.   Small right pleural effusion is noted.    Below the diaphragm, visualized portions of the abdomen demonstrate   partially visualized thick walled gallbladder.    Degenerative changes of thespine are noted. Partial destruction of the   anterior right fourth and fifth ribs from extension of right breast   lesion into the ribs.    IMPRESSION: Large mass is noted in the right breast as described above.   Primary consideration is breast carcinoma.    Findings suggestive of bilateral pulmonary metastasis.    Small right pleural effusion.    --- End of Report ---            DAO BRANDT MD; Attending Radiologist  This document has been electronically signed. Jul 18 2022  7:51PM      Imaging  Reviewed:  YES/NO    Consultant(s) Notes Reviewed:   YES/ No      Plan of care was discussed with patient and /or primary care giver; all questions and concerns were addressed

## 2022-07-19 NOTE — PROGRESS NOTE ADULT - ASSESSMENT
Patient is a 88 yo F, who lives at home alone, ambulatory at baseline, PMH of anemia, insomnia, VEENA, long standing Hx of generalized weakness complicated by a fall 4 years ago, treated with rehab, and >year long hx of insidious onset, nontender  right breast "infection" who comes in today, referred by her PCP during routine home visit, due to low hgb and for examination of breast infection, that she reports has been recently bleeding. Patient admitted to medicine for anemia and breast infection. Patient found to have hemoglobulin of 3.2 and was transfused 2U PRBC. CT chest show large mass is noted in the right breast with primary consideration is breast carcinoma.  Hem/onc and surgery consulted, plan for tissue biopsy after stable from anemia. Wound specialist consulted f/u recommendations. Cardiology consulted for symptomatic anemia, recommended Iron study and to transfuse if hemoglobulin drops below 7.

## 2022-07-20 ENCOUNTER — RESULT REVIEW (OUTPATIENT)
Age: 87
End: 2022-07-20

## 2022-07-20 DIAGNOSIS — Z02.9 ENCOUNTER FOR ADMINISTRATIVE EXAMINATIONS, UNSPECIFIED: ICD-10-CM

## 2022-07-20 LAB
ALBUMIN SERPL ELPH-MCNC: 2.4 G/DL — LOW (ref 3.5–5)
ALP SERPL-CCNC: 50 U/L — SIGNIFICANT CHANGE UP (ref 40–120)
ALT FLD-CCNC: 10 U/L DA — SIGNIFICANT CHANGE UP (ref 10–60)
ANION GAP SERPL CALC-SCNC: 10 MMOL/L — SIGNIFICANT CHANGE UP (ref 5–17)
AST SERPL-CCNC: 8 U/L — LOW (ref 10–40)
BILIRUB SERPL-MCNC: 0.7 MG/DL — SIGNIFICANT CHANGE UP (ref 0.2–1.2)
BUN SERPL-MCNC: 14 MG/DL — SIGNIFICANT CHANGE UP (ref 7–18)
CALCIUM SERPL-MCNC: 8.9 MG/DL — SIGNIFICANT CHANGE UP (ref 8.4–10.5)
CHLORIDE SERPL-SCNC: 111 MMOL/L — HIGH (ref 96–108)
CO2 SERPL-SCNC: 21 MMOL/L — LOW (ref 22–31)
CREAT SERPL-MCNC: 0.35 MG/DL — LOW (ref 0.5–1.3)
EGFR: 99 ML/MIN/1.73M2 — SIGNIFICANT CHANGE UP
GLUCOSE SERPL-MCNC: 93 MG/DL — SIGNIFICANT CHANGE UP (ref 70–99)
HCT VFR BLD CALC: 22 % — LOW (ref 34.5–45)
HGB BLD-MCNC: 6.7 G/DL — CRITICAL LOW (ref 11.5–15.5)
MCHC RBC-ENTMCNC: 23.8 PG — LOW (ref 27–34)
MCHC RBC-ENTMCNC: 30.5 GM/DL — LOW (ref 32–36)
MCV RBC AUTO: 78 FL — LOW (ref 80–100)
NRBC # BLD: 0 /100 WBCS — SIGNIFICANT CHANGE UP (ref 0–0)
PLATELET # BLD AUTO: 169 K/UL — SIGNIFICANT CHANGE UP (ref 150–400)
POTASSIUM SERPL-MCNC: 3.3 MMOL/L — LOW (ref 3.5–5.3)
POTASSIUM SERPL-SCNC: 3.3 MMOL/L — LOW (ref 3.5–5.3)
PROT SERPL-MCNC: 4.8 G/DL — LOW (ref 6–8.3)
RBC # BLD: 2.82 M/UL — LOW (ref 3.8–5.2)
RBC # FLD: 18.6 % — HIGH (ref 10.3–14.5)
SODIUM SERPL-SCNC: 142 MMOL/L — SIGNIFICANT CHANGE UP (ref 135–145)
WBC # BLD: 7.51 K/UL — SIGNIFICANT CHANGE UP (ref 3.8–10.5)
WBC # FLD AUTO: 7.51 K/UL — SIGNIFICANT CHANGE UP (ref 3.8–10.5)

## 2022-07-20 PROCEDURE — 19101 BIOPSY OF BREAST OPEN: CPT

## 2022-07-20 PROCEDURE — 88305 TISSUE EXAM BY PATHOLOGIST: CPT | Mod: 26

## 2022-07-20 RX ORDER — LIDOCAINE HYDROCHLORIDE AND EPINEPHRINE 10; 10 MG/ML; UG/ML
30 INJECTION, SOLUTION INFILTRATION; PERINEURAL ONCE
Refills: 0 | Status: DISCONTINUED | OUTPATIENT
Start: 2022-07-20 | End: 2022-07-22

## 2022-07-20 RX ORDER — LIDOCAINE HYDROCHLORIDE AND EPINEPHRINE 10; 10 MG/ML; UG/ML
30 INJECTION, SOLUTION INFILTRATION; PERINEURAL ONCE
Refills: 0 | Status: DISCONTINUED | OUTPATIENT
Start: 2022-07-20 | End: 2022-07-20

## 2022-07-20 RX ORDER — ATORVASTATIN CALCIUM 80 MG/1
10 TABLET, FILM COATED ORAL AT BEDTIME
Refills: 0 | Status: DISCONTINUED | OUTPATIENT
Start: 2022-07-20 | End: 2022-07-26

## 2022-07-20 RX ORDER — POTASSIUM CHLORIDE 20 MEQ
40 PACKET (EA) ORAL EVERY 4 HOURS
Refills: 0 | Status: COMPLETED | OUTPATIENT
Start: 2022-07-20 | End: 2022-07-20

## 2022-07-20 RX ADMIN — SERTRALINE 100 MILLIGRAM(S): 25 TABLET, FILM COATED ORAL at 13:12

## 2022-07-20 RX ADMIN — HEPARIN SODIUM 5000 UNIT(S): 5000 INJECTION INTRAVENOUS; SUBCUTANEOUS at 05:21

## 2022-07-20 RX ADMIN — Medication 1 APPLICATION(S): at 19:21

## 2022-07-20 RX ADMIN — AMPICILLIN SODIUM AND SULBACTAM SODIUM 100 GRAM(S): 250; 125 INJECTION, POWDER, FOR SUSPENSION INTRAMUSCULAR; INTRAVENOUS at 00:38

## 2022-07-20 RX ADMIN — Medication 40 MILLIEQUIVALENT(S): at 11:23

## 2022-07-20 RX ADMIN — AMPICILLIN SODIUM AND SULBACTAM SODIUM 100 GRAM(S): 250; 125 INJECTION, POWDER, FOR SUSPENSION INTRAMUSCULAR; INTRAVENOUS at 16:40

## 2022-07-20 RX ADMIN — ATORVASTATIN CALCIUM 10 MILLIGRAM(S): 80 TABLET, FILM COATED ORAL at 21:21

## 2022-07-20 RX ADMIN — Medication 40 MILLIEQUIVALENT(S): at 13:12

## 2022-07-20 RX ADMIN — RISPERIDONE 0.5 MILLIGRAM(S): 4 TABLET ORAL at 13:13

## 2022-07-20 RX ADMIN — AMPICILLIN SODIUM AND SULBACTAM SODIUM 100 GRAM(S): 250; 125 INJECTION, POWDER, FOR SUSPENSION INTRAMUSCULAR; INTRAVENOUS at 05:21

## 2022-07-20 NOTE — PROGRESS NOTE ADULT - PROBLEM SELECTOR PLAN 3
Patient has long standing hx of anemia on iron supplements- stopped last week, p/w with hgb of 3   -This might be REJI due to malignancy  -transfuse hgb <7  -f/u iron studies  -s/p 2 units PRBC in ED   -1 unit PRBC today, hgb 6.7  -f/u CBC and iron panel   - Cardio dr. Landin consulted

## 2022-07-20 NOTE — CONSULT NOTE ADULT - SUBJECTIVE AND OBJECTIVE BOX
CHIEF COMPLAINT  Anemia    HISTORY OF PRESENT ILLNESS  SHAVONNE MONTALVO is a 87y Female who presents with a chief complaint of anemia.    Patient was admitted on July 18th after presenting to the Emergency Department with severe anemia as well as suspected bleeding breast infection. She also noted occasional weakness. In the Emergency Department she was noted to have a large purulent induration of the right breast with surrounding erythema. She was admitted for further evaluation and management.     PAST MEDICAL AND SURGICAL HISTORY  Hyperlipidemia  Transient Ischemic Attack    FAMILY HISTORY  Lung Cancer    SOCIAL HISTORY  Denies tobacco use    REVIEW OF SYSTEMS  A complete review of systems was performed; negative except per HPI    PHYSICAL EXAM  T(C): 36.7 (07-20-22 @ 05:29), Max: 37.3 (07-19-22 @ 19:00)  HR: 79 (07-20-22 @ 05:29) (68 - 79)  BP: 148/69 (07-20-22 @ 05:29) (125/67 - 148/69)  RR: 17 (07-20-22 @ 05:29) (17 - 18)  SpO2: 100% (07-20-22 @ 05:29) (99% - 100%)  Constitutional: alert, awake, in no acute distress  Eyes: PERRL, EOMI  HEENT: normocephalic, atraumatic  Neck: supple, non-tender  Cardiovascular: normal perfusion, no peripheral edema  Respiratory: normal respiratory efforts; no increased use of accessory muscles  Gastrointestinal: soft, non-tender  Musculoskeletal: normal range of motion, no deformities noted  Neurological: alert, CN II to XI grossly intact  Skin: warm, dry    LABORATORY DATA                        6.7    7.51  )-----------( 169      ( 20 Jul 2022 06:44 )             22.0     07-20    142  |  111<H>  |  14  ----------------------------<  93  3.3<L>   |  21<L>  |  0.35<L>    Ca    8.9      20 Jul 2022 06:44  Mg     2.3     07-18    TPro  4.8<L>  /  Alb  2.4<L>  /  TBili  0.7  /  DBili  x   /  AST  8<L>  /  ALT  10  /  AlkPhos  50  07-20    RADIOLOGY REVIEW  IMPRESSION: Large mass is noted in the right breast as described above.   Primary consideration is breast carcinoma.    Findings suggestive of bilateral pulmonary metastasis.    Small right pleural effusion.     CHIEF COMPLAINT  Anemia    HISTORY OF PRESENT ILLNESS  SHAVONNE MONTALVO is a 87y Female who presents with a chief complaint of anemia.    Patient was admitted on July 18th after presenting to the Emergency Department with severe anemia as well as suspected bleeding breast infection. She also noted occasional weakness. In the Emergency Department she was noted to have a large purulent induration of the right breast with surrounding erythema. She was admitted for further evaluation and management.     Patient states that her breast lesion has been present for over a year, and that it has been bleeding more. Also endorsed weight loss of about 20 pounds.     PAST MEDICAL AND SURGICAL HISTORY  Hyperlipidemia  Transient Ischemic Attack    FAMILY HISTORY  Lung Cancer    SOCIAL HISTORY  Denies tobacco use    REVIEW OF SYSTEMS  A complete review of systems was performed; negative except per HPI    PHYSICAL EXAM  T(C): 36.7 (07-20-22 @ 05:29), Max: 37.3 (07-19-22 @ 19:00)  HR: 79 (07-20-22 @ 05:29) (68 - 79)  BP: 148/69 (07-20-22 @ 05:29) (125/67 - 148/69)  RR: 17 (07-20-22 @ 05:29) (17 - 18)  SpO2: 100% (07-20-22 @ 05:29) (99% - 100%)  Constitutional: alert, awake, in no acute distress  Eyes: PERRL, EOMI  HEENT: normocephalic, atraumatic  Neck: supple, non-tender  Cardiovascular: normal perfusion, no peripheral edema  Respiratory: normal respiratory efforts; no increased use of accessory muscles  Gastrointestinal: soft, non-tender  Musculoskeletal: normal range of motion, no deformities noted  Neurological: alert, CN II to XI grossly intact  Skin: warm, dry    LABORATORY DATA                        6.7    7.51  )-----------( 169      ( 20 Jul 2022 06:44 )             22.0     07-20    142  |  111<H>  |  14  ----------------------------<  93  3.3<L>   |  21<L>  |  0.35<L>    Ca    8.9      20 Jul 2022 06:44  Mg     2.3     07-18    TPro  4.8<L>  /  Alb  2.4<L>  /  TBili  0.7  /  DBili  x   /  AST  8<L>  /  ALT  10  /  AlkPhos  50  07-20    RADIOLOGY REVIEW  IMPRESSION: Large mass is noted in the right breast as described above.   Primary consideration is breast carcinoma.    Findings suggestive of bilateral pulmonary metastasis.    Small right pleural effusion.

## 2022-07-20 NOTE — CONSULT NOTE ADULT - ASSESSMENT
SHAVONNE MONTALVO is a 87y Female who presents with a chief complaint of anemia.    Anemia  - Patient presents with critical anemia with hemoglobin 3.2; now after several units of blood transfusion.  - Will obtain workup.  - Monitor CBC and transfuse to maintain hemoglobin above 7.    Breast Lesion  - Suspect malignancy given appearance as well as CT chest finding.  - Plan on biopsy with surgery today. Will follow-up on biopsy results.   - Wound care has been consulted.  - Recommend CT abdomen/pelvis with contrast if able.     Will continue to follow.    Rao Ward MD  Hematology/Oncology  C: [phone]  O: [phone] SHAVONNE MONTALVO is a 87y Female who presents with a chief complaint of anemia.    Anemia  - Patient presents with critical anemia with hemoglobin 3.2; now after several units of blood transfusion.  - Will obtain workup.  - Monitor CBC and transfuse to maintain hemoglobin above 7.    Breast Lesion  - Suspect malignancy given appearance as well as CT chest finding.  - Plan on biopsy with surgery today. Will follow-up on biopsy results.   - Wound care has been consulted.  - Recommend CT abdomen/pelvis with contrast if able.     Will continue to follow.    Rao Ward MD  Hematology/Oncology  O: [phone]

## 2022-07-20 NOTE — PROCEDURE NOTE - ADDITIONAL PROCEDURE DETAILS
Area closed with 1 suture, covered with xeroform, abdominal pad.   F/u pathology   Dr. Diaz discussed case with primary provider.   Discussed and agreed with Dr. Diaz Area closed with 1 suture, covered with xeroform, abdominal pad.   F/u pathology   Dr. Diaz discussed case with primary provider.   Discussed and agreed with Dr. Diaz    ADDENDUM    informed consent was obtained  incisional bx done from the right chest wall  a stitch was placed. No bleeding from the wound  But has a c/c blood oozing chest wall with cancer

## 2022-07-20 NOTE — PROGRESS NOTE ADULT - PROBLEM SELECTOR PLAN 2
Right breast lesion x several months with purulent drainage  -afrebile, no leukocytosis   - c/w Unasyn x7 days  - F/u wound cultures   -f/u Surgery for debridement  - wound care per wound nurse consulted

## 2022-07-20 NOTE — PROGRESS NOTE ADULT - ASSESSMENT
Patient is a 88 yo F, who lives at home alone, ambulatory at baseline, PMH of anemia, insomnia, long standing Hx of generalized weakness, who was , referred by her PCP during routine home visit, due to low hgb and for breast infection. Admitted for anemia and work up of breast lesion.

## 2022-07-20 NOTE — PROGRESS NOTE ADULT - PROBLEM SELECTOR PLAN 2
CT scan:  Large mass is noted in the right breast as described above.   Primary consideration is breast carcinoma.  -surgery f/u - tissue bx plan  - heme/onc Following

## 2022-07-20 NOTE — PROGRESS NOTE ADULT - SUBJECTIVE AND OBJECTIVE BOX
PATIENT SEEN AND EXAMINED ON :- 7/20/22  DATE OF SERVICE: 7/20/22            Interim events noted,Labs ,Radiological studies and Cardiology tests reviewed .     HOSPITAL COURSE: HPI:  Patient is a 86 yo F, who lives at home alone, ambulatory at baseline, PMH of anemia, insomnia, VEENA, long standing Hx of generalized weakness complicated by a fall 4 years ago, treated with rehab, and >year long hx of insidious onset, nontender  right breast "infection" who comes in today, referred by her PCP during routine home visit, due to low hgb and for examination of breast infection, that she reports has been recently bleeding.    Patient herself endorses, weakness that is chronic, as well as right sided arm paresthesias and occasional stinging sensation on the side of the right chest, but denies any  chest pain, fevers, chills, shortness of breath, abdominal pain, nausea or vomiting.    In the ED: patient was  a-febrile, HD stable and on room air  Exam: remarkable for a 6cm lymph node/ swelling in the right arm, as well as a 2cm deep, 4cm wide purulent induration on right breast area, with no nipple visible, protruding glandular tissue, with surrounding erythema and shallower induration that extends to the pectoral region and is associated with some possible rib exposure.    Labs: remarkable for hgb 3, no white count    Patient received 2 units of blood and was admitted for symptomatic anemia and right breast malignancy concern   (18 Jul 2022 15:02)      INTERIM EVENTS:Patient seen at bedside ,interim events noted.      PMH -reviewed admission note, no change since admission  HEART FAILURE: Acute[ ]Chronic[ ] Systolic[ ] Diastolic[ ] Combined Systolic and Diastolic[ ]  CAD[ ] CABG[ ] PCI[ ]  DEVICES[ ] PPM[ ] ICD[ ] ILR[ ]  ATRIAL FIBRILLATION[ ] Paroxysmal[ ] Permanent[ ] CHADS2-[  ]  KAMRAN[ ] CKD1[ ] CKD2[ ] CKD3[ ] CKD4[ ] ESRD[ ]  COPD[ ] HTN[ ]   DM[ ] Type1[ ] Type 2[ ]   CVA[ ] Paresis[ ]    AMBULATION: Assisted[ ] Cane/walker[ ] Independent[ ]    MEDICATIONS  (STANDING):  ampicillin/sulbactam  IVPB      ampicillin/sulbactam  IVPB 1.5 Gram(s) IV Intermittent every 6 hours  atorvastatin 10 milliGRAM(s) Oral at bedtime  lidocaine 1%/epinephrine 1:100,000 Inj 30 milliLiter(s) Local Injection once  risperiDONE   Tablet 0.5 milliGRAM(s) Oral daily  sertraline 100 milliGRAM(s) Oral daily    MEDICATIONS  (PRN):            REVIEW OF SYSTEMS:  Constitutional: [ ] fever, [ ]weight loss,  [ ]fatigue [ ]weight gain  Eyes: [ ] visual changes  Respiratory: [ ]shortness of breath;  [ ] cough, [ ]wheezing, [ ]chills, [ ]hemoptysis  Cardiovascular: [ ] chest pain, [ ]palpitations, [ ]dizziness,  [ ]leg swelling[ ]orthopnea[ ]PND  Gastrointestinal: [ ] abdominal pain, [ ]nausea, [ ]vomiting,  [ ]diarrhea [ ]Constipation [ ]Melena  Genitourinary: [ ] dysuria, [ ] hematuria [ ]Powell  Neurologic: [ ] headaches [ ] tremors[ ]weakness [ ]Paralysis Right[ ] Left[ ]  Skin: [ ] itching, [ ]burning, [ ] rashes  Endocrine: [ ] heat or cold intolerance  Musculoskeletal: [ ] joint pain or swelling; [ ] muscle, back, or extremity pain  Psychiatric: [ ] depression, [ ]anxiety, [ ]mood swings, or [ ]difficulty sleeping  Hematologic: [ ] easy bruising, [ ] bleeding gums    [ ] All remaining systems negative except as per above.   [ ]Unable to obtain.  [x] No change in ROS since admission      Vital Signs Last 24 Hrs  T(C): 37.7 (20 Jul 2022 15:30), Max: 37.7 (20 Jul 2022 15:30)  T(F): 99.9 (20 Jul 2022 15:30), Max: 99.9 (20 Jul 2022 15:30)  HR: 78 (20 Jul 2022 15:30) (78 - 79)  BP: 125/85 (20 Jul 2022 15:30) (125/85 - 148/69)  BP(mean): --  RR: 17 (20 Jul 2022 15:30) (17 - 17)  SpO2: 99% (20 Jul 2022 15:30) (99% - 100%)    Parameters below as of 20 Jul 2022 15:30  Patient On (Oxygen Delivery Method): room air      I&O's Summary      PHYSICAL EXAM:  General: No acute distress BMI-  HEENT: EOMI, PERRL  Neck: Supple, [ ] JVD  Lungs: Equal air entry bilaterally; [ ] rales [ ] wheezing [ ] rhonchi  Heart: Regular rate and rhythm; [x ] murmur   2/6 [ x] systolic [ ] diastolic [ ] radiation[ ] rubs [ ]  gallops  Abdomen: Nontender, bowel sounds present  Extremities: No clubbing, cyanosis, [ ] edema [ ]Pulses  equal and intact  Nervous system:  Alert & Oriented X3, no focal deficits  Psychiatric: Normal affect  Skin: No rashes or lesions    LABS:  07-20    142  |  111<H>  |  14  ----------------------------<  93  3.3<L>   |  21<L>  |  0.35<L>    Ca    8.9      20 Jul 2022 06:44    TPro  4.8<L>  /  Alb  2.4<L>  /  TBili  0.7  /  DBili  x   /  AST  8<L>  /  ALT  10  /  AlkPhos  50  07-20    Creatinine Trend: 0.35<--, 0.51<--, 0.46<--                        6.7    7.51  )-----------( 169      ( 20 Jul 2022 06:44 )             22.0

## 2022-07-20 NOTE — PROGRESS NOTE ADULT - ASSESSMENT
Patient is a 88 yo F, who lives at home alone, ambulatory at baseline, PMH of anemia, insomnia, General anxiety disorder, and >year long hx of insidious onset, nontender  right breast "infection" who comes in today, referred by her PCP during routine home visit, due to low hgb and for examination of breast infection, that she reports has been recently bleeding. Found to have a hemoglobin 3.2, transfused 2units PRBC  and admitted to medicine for anemia and breast infection.  CT chest show large mass in right breast with primary consideration breast carcinoma.  Hem/onc and surgery consulted, abd/pelvis ct pendng. surgery consulted, plan for tissue biopsy after stable from anemia. Wound specialist consulted f/u recommendations. Cardiology consulted for symptomatic anemia, recommended Iron study and to transfuse if hemoglobulin drops below 7. repeat hgb 6.7, 1 unit prbc ordered

## 2022-07-20 NOTE — PROGRESS NOTE ADULT - SUBJECTIVE AND OBJECTIVE BOX
NP Note discussed with  Primary Attending    Patient is a 87y old  Female who presents with a chief complaint of symptomatic anemia and right breast infection (20 Jul 2022 08:38)      INTERVAL HPI/OVERNIGHT EVENTS: no new complaints    MEDICATIONS  (STANDING):  ampicillin/sulbactam  IVPB      ampicillin/sulbactam  IVPB 1.5 Gram(s) IV Intermittent every 6 hours  risperiDONE   Tablet 0.5 milliGRAM(s) Oral daily  sertraline 100 milliGRAM(s) Oral daily    MEDICATIONS  (PRN):      __________________________________________________  REVIEW OF SYSTEMS:    CONSTITUTIONAL: No fever,   EYES: no acute visual disturbances  NECK: No pain or stiffness  RESPIRATORY: No cough; No shortness of breath  CARDIOVASCULAR: No chest pain, no palpitations  GASTROINTESTINAL: No pain. No nausea or vomiting; No diarrhea   NEUROLOGICAL: No headache or numbness, no tremors  MUSCULOSKELETAL: No joint pain, no muscle pain  GENITOURINARY: no dysuria, no frequency, no hesitancy  PSYCHIATRY: no depression , no anxiety  ALL OTHER  ROS negative        Vital Signs Last 24 Hrs  T(C): 36.7 (20 Jul 2022 05:29), Max: 37.3 (19 Jul 2022 19:00)  T(F): 98.1 (20 Jul 2022 05:29), Max: 99.1 (19 Jul 2022 19:00)  HR: 79 (20 Jul 2022 05:29) (68 - 79)  BP: 148/69 (20 Jul 2022 05:29) (125/67 - 148/69)  BP(mean): --  RR: 17 (20 Jul 2022 05:29) (17 - 18)  SpO2: 100% (20 Jul 2022 05:29) (99% - 100%)    Parameters below as of 20 Jul 2022 05:29  Patient On (Oxygen Delivery Method): room air        ________________________________________________  PHYSICAL EXAM:  GENERAL: NAD  HEENT: Normocephalic;  conjunctivae and sclerae clear; moist mucous membranes;   NECK : supple  CHEST/LUNG: Clear to auscultation bilaterally with good air entry, diminished at bases; bleeding to right breast noted, sanginous drainage to dressing at site.   HEART: S1 S2  regular; no murmurs, gallops or rubs  ABDOMEN: Soft, Nontender, Nondistended; Bowel sounds present  EXTREMITIES: no cyanosis; no edema; no calf tenderness  SKIN: warm and dry; no rash.   NERVOUS SYSTEM:  Awake and alert; Oriented  to place, person and time ; no new deficits    _________________________________________________  LABS:                        6.7    7.51  )-----------( 169      ( 20 Jul 2022 06:44 )             22.0     07-20    142  |  111<H>  |  14  ----------------------------<  93  3.3<L>   |  21<L>  |  0.35<L>    Ca    8.9      20 Jul 2022 06:44    TPro  4.8<L>  /  Alb  2.4<L>  /  TBili  0.7  /  DBili  x   /  AST  8<L>  /  ALT  10  /  AlkPhos  50  07-20        CAPILLARY BLOOD GLUCOSE      RADIOLOGY & ADDITIONAL TESTS:  < from: CT Chest w/ IV Cont (07.18.22 @ 18:03) >    ACC: [id number] EXAM:  CT CHEST IC                          PROCEDURE DATE:  07/18/2022          INTERPRETATION:  Clinical information: Evaluate for malignancy. Exam is   compared to previous study of 9/17/2018.    CT scan of the chest was obtained following administration of intravenous   contrast. Approximately 40 cc of Omnipaque 350 was administered and 60 cc   was discarded.    Low-attenuation lesions are noted within the right lobe of the thyroid   gland.    Large heterogeneous mass containing droplets of air is noted within the   right breast. The mass is extending and involving the skin of the   anterior chest wall as well as the anterior right fourth and fifth ribs   with resultant destruction.    Few small lymph nodes are present in the pretracheal space.    Heart is enlarged in size. Calcification of the aortic valve and the   coronary arteries is noted. No pericardial effusion is noted.    No endobronchial lesions are noted. Several nodules are noted within both   lungs. Largest nodule is noted in the left lower lobe and measures 1 cm.   Small right pleural effusion is noted.    Below the diaphragm, visualized portions of the abdomen demonstrate   partially visualized thick walled gallbladder.    Degenerative changes of thespine are noted. Partial destruction of the   anterior right fourth and fifth ribs from extension of right breast   lesion into the ribs.    IMPRESSION: Large mass is noted in the right breast as described above.   Primary consideration is breast carcinoma.    Findings suggestive of bilateral pulmonary metastasis.    Small right pleural effusion.    --- End of Report ---      DAO BRANDT MD; Attending Radiologist  This document has been electronically signed. Jul 18 2022  7:51PM    < end of copied text >    Imaging  Reviewed:  YES     Consultant(s) Notes Reviewed:   YES       Plan of care was discussed with patient; all questions and concerns were addressed

## 2022-07-21 LAB
ALBUMIN SERPL ELPH-MCNC: 2.5 G/DL — LOW (ref 3.5–5)
ALP SERPL-CCNC: 55 U/L — SIGNIFICANT CHANGE UP (ref 40–120)
ALT FLD-CCNC: 9 U/L DA — LOW (ref 10–60)
ANION GAP SERPL CALC-SCNC: 8 MMOL/L — SIGNIFICANT CHANGE UP (ref 5–17)
AST SERPL-CCNC: 8 U/L — LOW (ref 10–40)
BILIRUB SERPL-MCNC: 0.8 MG/DL — SIGNIFICANT CHANGE UP (ref 0.2–1.2)
BUN SERPL-MCNC: 14 MG/DL — SIGNIFICANT CHANGE UP (ref 7–18)
CALCIUM SERPL-MCNC: 9.5 MG/DL — SIGNIFICANT CHANGE UP (ref 8.4–10.5)
CHLORIDE SERPL-SCNC: 112 MMOL/L — HIGH (ref 96–108)
CO2 SERPL-SCNC: 20 MMOL/L — LOW (ref 22–31)
CREAT SERPL-MCNC: 0.39 MG/DL — LOW (ref 0.5–1.3)
EGFR: 97 ML/MIN/1.73M2 — SIGNIFICANT CHANGE UP
FERRITIN SERPL-MCNC: 37 NG/ML — SIGNIFICANT CHANGE UP (ref 15–150)
FOLATE SERPL-MCNC: 4.6 NG/ML — LOW
GLUCOSE SERPL-MCNC: 89 MG/DL — SIGNIFICANT CHANGE UP (ref 70–99)
HAPTOGLOB SERPL-MCNC: 51 MG/DL — SIGNIFICANT CHANGE UP (ref 34–200)
HCT VFR BLD CALC: 27.5 % — LOW (ref 34.5–45)
HGB BLD-MCNC: 8.5 G/DL — LOW (ref 11.5–15.5)
IGA FLD-MCNC: 85 MG/DL — SIGNIFICANT CHANGE UP (ref 84–499)
IGG FLD-MCNC: 371 MG/DL — LOW (ref 610–1660)
IGM SERPL-MCNC: 31 MG/DL — LOW (ref 35–242)
IRON SATN MFR SERPL: 21 UG/DL — LOW (ref 40–150)
IRON SATN MFR SERPL: 6 % — LOW (ref 15–50)
KAPPA LC SER QL IFE: 1.41 MG/DL — SIGNIFICANT CHANGE UP (ref 0.33–1.94)
KAPPA/LAMBDA FREE LIGHT CHAIN RATIO, SERUM: 1.76 RATIO — HIGH (ref 0.26–1.65)
LAMBDA LC SER QL IFE: 0.8 MG/DL — SIGNIFICANT CHANGE UP (ref 0.57–2.63)
LDH SERPL L TO P-CCNC: 185 U/L — SIGNIFICANT CHANGE UP (ref 120–225)
MCHC RBC-ENTMCNC: 24.9 PG — LOW (ref 27–34)
MCHC RBC-ENTMCNC: 30.9 GM/DL — LOW (ref 32–36)
MCV RBC AUTO: 80.4 FL — SIGNIFICANT CHANGE UP (ref 80–100)
NRBC # BLD: 0 /100 WBCS — SIGNIFICANT CHANGE UP (ref 0–0)
PLATELET # BLD AUTO: 166 K/UL — SIGNIFICANT CHANGE UP (ref 150–400)
POTASSIUM SERPL-MCNC: 4.5 MMOL/L — SIGNIFICANT CHANGE UP (ref 3.5–5.3)
POTASSIUM SERPL-SCNC: 4.5 MMOL/L — SIGNIFICANT CHANGE UP (ref 3.5–5.3)
PROT SERPL-MCNC: 5.1 G/DL — LOW (ref 6–8.3)
RBC # BLD: 3.42 M/UL — LOW (ref 3.8–5.2)
RBC # FLD: 19.1 % — HIGH (ref 10.3–14.5)
SODIUM SERPL-SCNC: 140 MMOL/L — SIGNIFICANT CHANGE UP (ref 135–145)
TIBC SERPL-MCNC: 343 UG/DL — SIGNIFICANT CHANGE UP (ref 250–450)
UIBC SERPL-MCNC: 322 UG/DL — SIGNIFICANT CHANGE UP (ref 110–370)
VIT B12 SERPL-MCNC: 780 PG/ML — SIGNIFICANT CHANGE UP (ref 232–1245)
WBC # BLD: 9.06 K/UL — SIGNIFICANT CHANGE UP (ref 3.8–10.5)
WBC # FLD AUTO: 9.06 K/UL — SIGNIFICANT CHANGE UP (ref 3.8–10.5)

## 2022-07-21 PROCEDURE — 74177 CT ABD & PELVIS W/CONTRAST: CPT | Mod: 26

## 2022-07-21 PROCEDURE — 99497 ADVNCD CARE PLAN 30 MIN: CPT

## 2022-07-21 RX ORDER — MULTIVIT-MIN/FERROUS GLUCONATE 9 MG/15 ML
1 LIQUID (ML) ORAL DAILY
Refills: 0 | Status: DISCONTINUED | OUTPATIENT
Start: 2022-07-21 | End: 2022-07-26

## 2022-07-21 RX ORDER — ASCORBIC ACID 60 MG
500 TABLET,CHEWABLE ORAL
Refills: 0 | Status: DISCONTINUED | OUTPATIENT
Start: 2022-07-21 | End: 2022-07-26

## 2022-07-21 RX ORDER — FOLIC ACID 0.8 MG
0.4 TABLET ORAL DAILY
Refills: 0 | Status: DISCONTINUED | OUTPATIENT
Start: 2022-07-21 | End: 2022-07-26

## 2022-07-21 RX ORDER — ACETAMINOPHEN 500 MG
650 TABLET ORAL EVERY 6 HOURS
Refills: 0 | Status: DISCONTINUED | OUTPATIENT
Start: 2022-07-21 | End: 2022-07-26

## 2022-07-21 RX ADMIN — ATORVASTATIN CALCIUM 10 MILLIGRAM(S): 80 TABLET, FILM COATED ORAL at 21:10

## 2022-07-21 RX ADMIN — Medication 650 MILLIGRAM(S): at 21:45

## 2022-07-21 RX ADMIN — AMPICILLIN SODIUM AND SULBACTAM SODIUM 100 GRAM(S): 250; 125 INJECTION, POWDER, FOR SUSPENSION INTRAMUSCULAR; INTRAVENOUS at 16:36

## 2022-07-21 RX ADMIN — AMPICILLIN SODIUM AND SULBACTAM SODIUM 100 GRAM(S): 250; 125 INJECTION, POWDER, FOR SUSPENSION INTRAMUSCULAR; INTRAVENOUS at 21:09

## 2022-07-21 RX ADMIN — Medication 1 TABLET(S): at 21:10

## 2022-07-21 RX ADMIN — SERTRALINE 100 MILLIGRAM(S): 25 TABLET, FILM COATED ORAL at 12:17

## 2022-07-21 RX ADMIN — Medication 650 MILLIGRAM(S): at 12:16

## 2022-07-21 RX ADMIN — AMPICILLIN SODIUM AND SULBACTAM SODIUM 100 GRAM(S): 250; 125 INJECTION, POWDER, FOR SUSPENSION INTRAMUSCULAR; INTRAVENOUS at 02:49

## 2022-07-21 RX ADMIN — RISPERIDONE 0.5 MILLIGRAM(S): 4 TABLET ORAL at 12:17

## 2022-07-21 RX ADMIN — Medication 650 MILLIGRAM(S): at 21:08

## 2022-07-21 RX ADMIN — Medication 650 MILLIGRAM(S): at 13:37

## 2022-07-21 RX ADMIN — AMPICILLIN SODIUM AND SULBACTAM SODIUM 100 GRAM(S): 250; 125 INJECTION, POWDER, FOR SUSPENSION INTRAMUSCULAR; INTRAVENOUS at 09:56

## 2022-07-21 NOTE — PROGRESS NOTE ADULT - ASSESSMENT
86 y/o Female s/p bedside biopsy of right breast mass 7/20    -F/u pathology   -Daily dressing changes  -Care as per medical team

## 2022-07-21 NOTE — PROGRESS NOTE ADULT - PROBLEM SELECTOR PLAN 6
-pt lives alone in private home, has nephew who lives nearby   -d/c pending clinical course  -PT reccs CHRIS upon discharge  -CM following

## 2022-07-21 NOTE — DIETITIAN NUTRITION RISK NOTIFICATION - TREATMENT: THE FOLLOWING DIET HAS BEEN RECOMMENDED
Diet, Regular (07-18-22 @ 15:54) [Active]    declined oral supplements , add multivitamin/minerals 1 tab/d and vitamin C 500mg bid to assist with healing

## 2022-07-21 NOTE — PROGRESS NOTE ADULT - SUBJECTIVE AND OBJECTIVE BOX
PATIENT SEEN AND EXAMINED ON :- 7/21/2022  DATE OF SERVICE:  7/21/2022           Interim events noted,Labs ,Radiological studies and Cardiology tests reviewed .       HOSPITAL COURSE: HPI:  Patient is a 86 yo F, who lives at home alone, ambulatory at baseline, PMH of anemia, insomnia, VEENA, long standing Hx of generalized weakness complicated by a fall 4 years ago, treated with rehab, and >year long hx of insidious onset, nontender  right breast "infection" who comes in today, referred by her PCP during routine home visit, due to low hgb and for examination of breast infection, that she reports has been recently bleeding.    Patient herself endorses, weakness that is chronic, as well as right sided arm paresthesias and occasional stinging sensation on the side of the right chest, but denies any  chest pain, fevers, chills, shortness of breath, abdominal pain, nausea or vomiting.    In the ED: patient was  a-febrile, HD stable and on room air  Exam: remarkable for a 6cm lymph node/ swelling in the right arm, as well as a 2cm deep, 4cm wide purulent induration on right breast area, with no nipple visible, protruding glandular tissue, with surrounding erythema and shallower induration that extends to the pectoral region and is associated with some possible rib exposure.    Labs: remarkable for hgb 3, no white count    Patient received 2 units of blood and was admitted for symptomatic anemia and right breast malignancy concern   (18 Jul 2022 15:02)      INTERIM EVENTS:Patient seen at bedside ,interim events noted.      PMH -reviewed admission note, no change since admission  HEART FAILURE: Acute[ ]Chronic[ ] Systolic[ ] Diastolic[ ] Combined Systolic and Diastolic[ ]  CAD[ ] CABG[ ] PCI[ ]  DEVICES[ ] PPM[ ] ICD[ ] ILR[ ]  ATRIAL FIBRILLATION[ ] Paroxysmal[ ] Permanent[ ] CHADS2-[  ]  KAMRAN[ ] CKD1[ ] CKD2[ ] CKD3[ ] CKD4[ ] ESRD[ ]  COPD[ ] HTN[ ]   DM[ ] Type1[ ] Type 2[ ]   CVA[ ] Paresis[ ]    AMBULATION: Assisted[ ] Cane/walker[ ] Independent[ ]    MEDICATIONS  (STANDING):  ampicillin/sulbactam  IVPB      ampicillin/sulbactam  IVPB 1.5 Gram(s) IV Intermittent every 6 hours  ascorbic acid 500 milliGRAM(s) Oral two times a day  atorvastatin 10 milliGRAM(s) Oral at bedtime  folic acid Injectable 0.4 milliGRAM(s) IV Push daily  lidocaine 1%/epinephrine 1:100,000 Inj 30 milliLiter(s) Local Injection once  multivitamin/minerals 1 Tablet(s) Oral daily  risperiDONE   Tablet 0.5 milliGRAM(s) Oral daily  sertraline 100 milliGRAM(s) Oral daily    MEDICATIONS  (PRN):  acetaminophen     Tablet .. 650 milliGRAM(s) Oral every 6 hours PRN Temp greater or equal to 38C (100.4F), Mild Pain (1 - 3), Moderate Pain (4 - 6)            REVIEW OF SYSTEMS:  Constitutional: [ ] fever, [ ]weight loss,  [ ]fatigue [ ]weight gain  Eyes: [ ] visual changes  Respiratory: [ ]shortness of breath;  [ ] cough, [ ]wheezing, [ ]chills, [ ]hemoptysis  Cardiovascular: [ ] chest pain, [ ]palpitations, [ ]dizziness,  [ ]leg swelling[ ]orthopnea[ ]PND  Gastrointestinal: [ ] abdominal pain, [ ]nausea, [ ]vomiting,  [ ]diarrhea [ ]Constipation [ ]Melena  Genitourinary: [ ] dysuria, [ ] hematuria [ ]Powell  Neurologic: [ ] headaches [ ] tremors[ ]weakness [ ]Paralysis Right[ ] Left[ ]  Skin: [ ] itching, [ ]burning, [ ] rashes  Endocrine: [ ] heat or cold intolerance  Musculoskeletal: [ ] joint pain or swelling; [ ] muscle, back, or extremity pain  Psychiatric: [ ] depression, [ ]anxiety, [ ]mood swings, or [ ]difficulty sleeping  Hematologic: [ ] easy bruising, [ ] bleeding gums    [ ] All remaining systems negative except as per above.   [ ]Unable to obtain.  [x] No change in ROS since admission      Vital Signs Last 24 Hrs  T(C): 37.5 (21 Jul 2022 14:10), Max: 37.5 (21 Jul 2022 14:10)  T(F): 99.5 (21 Jul 2022 14:10), Max: 99.5 (21 Jul 2022 14:10)  HR: 75 (21 Jul 2022 14:10) (75 - 98)  BP: 134/68 (21 Jul 2022 14:10) (91/55 - 150/70)  BP(mean): --  RR: 18 (21 Jul 2022 14:10) (17 - 18)  SpO2: 98% (21 Jul 2022 14:10) (97% - 100%)    Parameters below as of 21 Jul 2022 14:10  Patient On (Oxygen Delivery Method): room air      I&O's Summary      PHYSICAL EXAM:  General: No acute distress BMI-  HEENT: EOMI, PERRL  Neck: Supple, [ ] JVD  Lungs: Equal air entry bilaterally; [ ] rales [ ] wheezing [ ] rhonchi  Heart: Regular rate and rhythm; [x ] murmur   2/6 [ x] systolic [ ] diastolic [ ] radiation[ ] rubs [ ]  gallops  Abdomen: Nontender, bowel sounds present  Extremities: No clubbing, cyanosis, [ ] edema [ ]Pulses  equal and intact  Nervous system:  Alert & Oriented X3, no focal deficits  Psychiatric: Normal affect  Skin: No rashes or lesions    LABS:  07-21    140  |  112<H>  |  14  ----------------------------<  89  4.5   |  20<L>  |  0.39<L>    Ca    9.5      21 Jul 2022 06:50    TPro  5.1<L>  /  Alb  2.5<L>  /  TBili  0.8  /  DBili  x   /  AST  8<L>  /  ALT  9<L>  /  AlkPhos  55  07-21    Creatinine Trend: 0.39<--, 0.35<--, 0.51<--, 0.46<--                        8.5    9.06  )-----------( 166      ( 21 Jul 2022 06:50 )             27.5                Oriented - self; Oriented - place; Oriented - time

## 2022-07-21 NOTE — DIETITIAN INITIAL EVALUATION ADULT - NSFNSPHYEXAMSKINFT_GEN_A_CORE
Pressure Injury 1: Bilateral:,heel, Stage I  Pressure Injury 2: none, none  Pressure Injury 3: none, none  Pressure Injury 4: none, none  Pressure Injury 5: none, none  Pressure Injury 6: none, none  Pressure Injury 7: none, none  Pressure Injury 8: none, none  Pressure Injury 9: none, none  Pressure Injury 10: none, none  Pressure Injury 11: none, none

## 2022-07-21 NOTE — DIETITIAN INITIAL EVALUATION ADULT - PERTINENT MEDS FT
MEDICATIONS  (STANDING):  ampicillin/sulbactam  IVPB      ampicillin/sulbactam  IVPB 1.5 Gram(s) IV Intermittent every 6 hours  atorvastatin 10 milliGRAM(s) Oral at bedtime  lidocaine 1%/epinephrine 1:100,000 Inj 30 milliLiter(s) Local Injection once  risperiDONE   Tablet 0.5 milliGRAM(s) Oral daily  sertraline 100 milliGRAM(s) Oral daily    MEDICATIONS  (PRN):  acetaminophen     Tablet .. 650 milliGRAM(s) Oral every 6 hours PRN Temp greater or equal to 38C (100.4F), Mild Pain (1 - 3), Moderate Pain (4 - 6)

## 2022-07-21 NOTE — DIETITIAN INITIAL EVALUATION ADULT - PERTINENT LABORATORY DATA
07-21    140  |  112<H>  |  14  ----------------------------<  89  4.5   |  20<L>  |  0.39<L>    Ca    9.5      21 Jul 2022 06:50    TPro  5.1<L>  /  Alb  2.5<L>  /  TBili  0.8  /  DBili  x   /  AST  8<L>  /  ALT  9<L>  /  AlkPhos  55  07-21

## 2022-07-21 NOTE — DIETITIAN INITIAL EVALUATION ADULT - ADD RECOMMEND
provide Regular diet, declined oral supplements . patient will have No further wt loss , add multivitamin/minerals 1 tab/d and vitamin C 500mg bid to assist with healing

## 2022-07-21 NOTE — PROGRESS NOTE ADULT - ASSESSMENT
Patient is a 86 yo F, who lives at home alone, ambulatory at baseline, PMH of anemia, insomnia, General anxiety disorder, and >year long hx of insidious onset, nontender  right breast "infection" who comes in today, referred by her PCP during routine home visit, due to low hgb and for examination of breast infection, that she reports has been recently bleeding. Found to have a hemoglobin 3.2, transfused 2units PRBC  and admitted to medicine for anemia and breast infection.  CT chest show large mass in right breast with primary consideration breast carcinoma.  s/p bedside biopsy on 7/20 by surgery team, Hem/onc and surgery consulted, Wound specialist consulted f/u recommendations. Cardiology consulted for symptomatic anemia, recommended Iron study and to transfuse if hemoglobulin drops below 7. s/p 1 unit prbc tranfused on 7/20.  abd/pelvis ct pendng

## 2022-07-21 NOTE — PROGRESS NOTE ADULT - SUBJECTIVE AND OBJECTIVE BOX
INTERVAL HPI/OVERNIGHT EVENTS:    Pt seen and examined at bedside. Offers no acute complaints at this time.     Vital Signs Last 24 Hrs  T(C): 36.8 (21 Jul 2022 05:01), Max: 37.7 (20 Jul 2022 15:30)  T(F): 98.3 (21 Jul 2022 05:01), Max: 99.9 (20 Jul 2022 15:30)  HR: 98 (21 Jul 2022 09:33) (75 - 98)  BP: 91/55 (21 Jul 2022 09:33) (91/55 - 150/70)  BP(mean): --  RR: 17 (21 Jul 2022 05:01) (17 - 18)  SpO2: 100% (21 Jul 2022 09:33) (97% - 100%)    Parameters below as of 21 Jul 2022 09:33  Patient On (Oxygen Delivery Method): room air      I&O's Detail    ampicillin/sulbactam  IVPB      ampicillin/sulbactam  IVPB 1.5 Gram(s) IV Intermittent every 6 hours  lidocaine 1%/epinephrine 1:100,000 Inj 30 milliLiter(s) Local Injection once      Physical Exam  General: AAOx3, No acute distress  Right breast: Right chest wall, wide fungating mass, erythema, induration extending to the lateral chest wall anterior axillary line. Foul smelling, overlying slough, no active bleed, dressing changed     Labs:                        8.5    9.06  )-----------( 166      ( 21 Jul 2022 06:50 )             27.5     07-21    140  |  112<H>  |  14  ----------------------------<  89  4.5   |  20<L>  |  0.39<L>    Ca    9.5      21 Jul 2022 06:50    TPro  5.1<L>  /  Alb  2.5<L>  /  TBili  0.8  /  DBili  x   /  AST  8<L>  /  ALT  9<L>  /  AlkPhos  55  07-21

## 2022-07-21 NOTE — PROGRESS NOTE ADULT - PROBLEM SELECTOR PLAN 2
Right breast lesion x several months with purulent drainage  -afrebile, no leukocytosis   - c/w Unasyn x7 days (D3/7)  - F/u wound cultures   -f/u Surgery for debridement  - wound care per wound nurse Right breast lesion x several months with purulent drainage  -afrebile, no leukocytosis   - c/w Unasyn x7 days (D3/7)  -f/u Surgery for possible debridement  - wound care per wound nurse

## 2022-07-21 NOTE — PROGRESS NOTE ADULT - PROBLEM SELECTOR PLAN 3
Patient with hx of chronic anemia on iron supplements- stopped last week, p/w with hgb of 3   -likely REJI due to malignancy  -transfuse hgb <7  -iron studies noted  -s/p 2 units PRBC in ED   -s/p 1 unit PRBC 7/20, hgb 6.7  -f/u CBC and iron panel   -

## 2022-07-21 NOTE — PROGRESS NOTE ADULT - ASSESSMENT
SHAVONNE MONTALVO is a 87y Female who presents with a chief complaint of anemia.    Anemia  - Patient presents with critical anemia with hemoglobin 3.2;   -hgb better 8.5 today post transfusion  - Monitor CBC and transfuse to maintain hemoglobin above 7.  -would add folic acid 400 mcg daily    Breast Lesion  - Suspect malignancy given appearance as well as CT chest finding.  -f/u biopsy done yest  - Recommend CT abdomen/pelvis with contrast if able.     Will continue to follow.    León Landin MD  [facility]  Cell: [phone]

## 2022-07-21 NOTE — PROGRESS NOTE ADULT - SUBJECTIVE AND OBJECTIVE BOX
Pt seen, eating breakfast, comfortable    MEDICATIONS  (STANDING):  ampicillin/sulbactam  IVPB      ampicillin/sulbactam  IVPB 1.5 Gram(s) IV Intermittent every 6 hours  atorvastatin 10 milliGRAM(s) Oral at bedtime  lidocaine 1%/epinephrine 1:100,000 Inj 30 milliLiter(s) Local Injection once  risperiDONE   Tablet 0.5 milliGRAM(s) Oral daily  sertraline 100 milliGRAM(s) Oral daily    MEDICATIONS  (PRN):  acetaminophen     Tablet .. 650 milliGRAM(s) Oral every 6 hours PRN Temp greater or equal to 38C (100.4F), Mild Pain (1 - 3), Moderate Pain (4 - 6)      ROS  No fever, sweats, chills  No epistaxis, HA, sore throat  No CP, SOB, cough, sputum     Vital Signs Last 24 Hrs  T(C): 36.8 (21 Jul 2022 05:01), Max: 37.7 (20 Jul 2022 15:30)  T(F): 98.3 (21 Jul 2022 05:01), Max: 99.9 (20 Jul 2022 15:30)  HR: 98 (21 Jul 2022 09:33) (75 - 98)  BP: 91/55 (21 Jul 2022 09:33) (91/55 - 150/70)  BP(mean): --  RR: 17 (21 Jul 2022 05:01) (17 - 18)  SpO2: 100% (21 Jul 2022 09:33) (97% - 100%)    Parameters below as of 21 Jul 2022 09:33  Patient On (Oxygen Delivery Method): room air        PE  NAD     Abd soft, NT, ND  No c/c/e  No rash grossly  FROM                          8.5    9.06  )-----------( 166      ( 21 Jul 2022 06:50 )             27.5       07-21    140  |  112<H>  |  14  ----------------------------<  89  4.5   |  20<L>  |  0.39<L>    Ca    9.5      21 Jul 2022 06:50    TPro  5.1<L>  /  Alb  2.5<L>  /  TBili  0.8  /  DBili  x   /  AST  8<L>  /  ALT  9<L>  /  AlkPhos  55  07-21

## 2022-07-21 NOTE — PROGRESS NOTE ADULT - PROBLEM SELECTOR PLAN 6
-pt lives alone in private home  -d/c pending clinical course -pt lives alone in private home, has nephew who lives nearby   -d/c pending clinical course  -PT reccs CHRIS upon discharge  -CM following

## 2022-07-21 NOTE — DIETITIAN INITIAL EVALUATION ADULT - ETIOLOGY
inadequate protein-energy intake with increased protein- energy needs for large mass in R breast with consideration of breast carcinoma

## 2022-07-21 NOTE — PROGRESS NOTE ADULT - PROBLEM SELECTOR PLAN 3
Patient has long standing hx of anemia on iron supplements- stopped last week, p/w with hgb of 3   -This might be RJEI due to malignancy  -transfuse hgb <7  -f/u iron studies  -s/p 2 units PRBC in ED   -1 unit PRBC today, hgb 6.7  -f/u CBC and iron panel   - Cardio dr. Landin consulted Patient with hx of chronic anemia on iron supplements- stopped last week, p/w with hgb of 3   -likely REJI due to malignancy  -transfuse hgb <7  -iron studies noted  -s/p 2 units PRBC in ED   -s/p 1 unit PRBC 7/20, hgb 6.7  -f/u CBC and iron panel   - Cardio dr. Landin consulted

## 2022-07-21 NOTE — PROGRESS NOTE ADULT - PROBLEM SELECTOR PLAN 2
Right breast lesion x several months with purulent drainage  -afrebile, no leukocytosis   - c/w Unasyn x7 days (D3/7)  -f/u Surgery for possible debridement  - wound care per wound nurse

## 2022-07-21 NOTE — PROGRESS NOTE ADULT - SUBJECTIVE AND OBJECTIVE BOX
NP Note discussed with  Primary Attending    Patient is a 87y old  Female who presents with a chief complaint of symptomatic anemia and right breast infection (20 Jul 2022 19:43)      INTERVAL HPI/OVERNIGHT EVENTS: no new complaints    MEDICATIONS  (STANDING):  ampicillin/sulbactam  IVPB      ampicillin/sulbactam  IVPB 1.5 Gram(s) IV Intermittent every 6 hours  atorvastatin 10 milliGRAM(s) Oral at bedtime  lidocaine 1%/epinephrine 1:100,000 Inj 30 milliLiter(s) Local Injection once  risperiDONE   Tablet 0.5 milliGRAM(s) Oral daily  sertraline 100 milliGRAM(s) Oral daily    MEDICATIONS  (PRN):      __________________________________________________  REVIEW OF SYSTEMS:    CONSTITUTIONAL: No fever,   EYES: no acute visual disturbances  NECK: No pain or stiffness  RESPIRATORY: No cough; No shortness of breath  CARDIOVASCULAR: No chest pain, no palpitations  GASTROINTESTINAL: No pain. No nausea or vomiting; No diarrhea   NEUROLOGICAL: No headache or numbness, no tremors  MUSCULOSKELETAL: No joint pain, no muscle pain  GENITOURINARY: no dysuria, no frequency, no hesitancy  PSYCHIATRY: no depression , no anxiety  ALL OTHER  ROS negative        Vital Signs Last 24 Hrs  T(C): 36.8 (21 Jul 2022 05:01), Max: 37.7 (20 Jul 2022 15:30)  T(F): 98.3 (21 Jul 2022 05:01), Max: 99.9 (20 Jul 2022 15:30)  HR: 98 (21 Jul 2022 09:33) (75 - 98)  BP: 91/55 (21 Jul 2022 09:33) (91/55 - 150/70)  BP(mean): --  RR: 17 (21 Jul 2022 05:01) (17 - 18)  SpO2: 100% (21 Jul 2022 09:33) (97% - 100%)    Parameters below as of 21 Jul 2022 09:33  Patient On (Oxygen Delivery Method): room air        ________________________________________________  PHYSICAL EXAM:  GENERAL: NAD  HEENT: Normocephalic;  conjunctivae and sclerae clear; moist mucous membranes;   NECK : supple  CHEST/LUNG: Clear to auscultation bilaterally with good air entry   HEART: S1 S2  regular; no murmurs, gallops or rubs  ABDOMEN: Soft, Nontender, Nondistended; Bowel sounds present  EXTREMITIES: no cyanosis; no edema; no calf tenderness  SKIN: warm and dry; no rash  NERVOUS SYSTEM:  Awake and alert; Oriented  to place, person and time ; no new deficits    _________________________________________________  LABS:                        8.5    9.06  )-----------( 166      ( 21 Jul 2022 06:50 )             27.5     07-21    140  |  112<H>  |  14  ----------------------------<  89  4.5   |  20<L>  |  0.39<L>    Ca    9.5      21 Jul 2022 06:50    TPro  5.1<L>  /  Alb  2.5<L>  /  TBili  0.8  /  DBili  x   /  AST  8<L>  /  ALT  9<L>  /  AlkPhos  55  07-21        CAPILLARY BLOOD GLUCOSE            RADIOLOGY & ADDITIONAL TESTS:    Imaging  Reviewed:  YES/NO    Consultant(s) Notes Reviewed:   YES/ No      Plan of care was discussed with patient and /or primary care giver; all questions and concerns were addressed  NP Note discussed with  Primary Attending    Patient is a 87y old  Female who presents with a chief complaint of symptomatic anemia and right breast infection (20 Jul 2022 19:43)    INTERVAL HPI/OVERNIGHT EVENTS: no new complaints    MEDICATIONS  (STANDING):  ampicillin/sulbactam  IVPB      ampicillin/sulbactam  IVPB 1.5 Gram(s) IV Intermittent every 6 hours  atorvastatin 10 milliGRAM(s) Oral at bedtime  lidocaine 1%/epinephrine 1:100,000 Inj 30 milliLiter(s) Local Injection once  risperiDONE   Tablet 0.5 milliGRAM(s) Oral daily  sertraline 100 milliGRAM(s) Oral daily    MEDICATIONS  (PRN):    __________________________________________________  REVIEW OF SYSTEMS:    CONSTITUTIONAL: No fever,   EYES: no acute visual disturbances  NECK: No pain or stiffness  RESPIRATORY: No cough; No shortness of breath  CARDIOVASCULAR: No chest pain, no palpitations  GASTROINTESTINAL: No pain. No nausea or vomiting; No diarrhea   NEUROLOGICAL: No headache or numbness, no tremors  MUSCULOSKELETAL: No joint pain, no muscle pain  GENITOURINARY: no dysuria, no frequency, no hesitancy  PSYCHIATRY: no depression , no anxiety  ALL OTHER  ROS negative        Vital Signs Last 24 Hrs  T(C): 36.8 (21 Jul 2022 05:01), Max: 37.7 (20 Jul 2022 15:30)  T(F): 98.3 (21 Jul 2022 05:01), Max: 99.9 (20 Jul 2022 15:30)  HR: 98 (21 Jul 2022 09:33) (75 - 98)  BP: 91/55 (21 Jul 2022 09:33) (91/55 - 150/70)  BP(mean): --  RR: 17 (21 Jul 2022 05:01) (17 - 18)  SpO2: 100% (21 Jul 2022 09:33) (97% - 100%)    Parameters below as of 21 Jul 2022 09:33  Patient On (Oxygen Delivery Method): room air        ________________________________________________  PHYSICAL EXAM:  GENERAL: NAD  HEENT: Normocephalic;  conjunctivae and sclerae clear; moist mucous membranes;   NECK : supple  CHEST/LUNG: Clear to auscultation bilaterally with good air entry   HEART: S1 S2  regular; no murmurs, gallops or rubs  ABDOMEN: Soft, Nontender, Nondistended; Bowel sounds present  EXTREMITIES: no cyanosis; no edema; no calf tenderness  SKIN: warm and dry; no rash  NERVOUS SYSTEM:  Awake and alert; Oriented  to place, person and time ; no new deficits    _________________________________________________  LABS:                        8.5    9.06  )-----------( 166      ( 21 Jul 2022 06:50 )             27.5     07-21    140  |  112<H>  |  14  ----------------------------<  89  4.5   |  20<L>  |  0.39<L>    Ca    9.5      21 Jul 2022 06:50    TPro  5.1<L>  /  Alb  2.5<L>  /  TBili  0.8  /  DBili  x   /  AST  8<L>  /  ALT  9<L>  /  AlkPhos  55  07-21        CAPILLARY BLOOD GLUCOSE            RADIOLOGY & ADDITIONAL TESTS:    Imaging  Reviewed:  YES/NO    Consultant(s) Notes Reviewed:   YES/ No      Plan of care was discussed with patient and /or primary care giver; all questions and concerns were addressed  NP Note discussed with  Primary Attending    Patient is a 87y old  Female who presents with a chief complaint of symptomatic anemia and right breast infection (20 Jul 2022 19:43)    INTERVAL HPI/OVERNIGHT EVENTS: no new complaints    MEDICATIONS  (STANDING):  ampicillin/sulbactam  IVPB      ampicillin/sulbactam  IVPB 1.5 Gram(s) IV Intermittent every 6 hours  atorvastatin 10 milliGRAM(s) Oral at bedtime  lidocaine 1%/epinephrine 1:100,000 Inj 30 milliLiter(s) Local Injection once  risperiDONE   Tablet 0.5 milliGRAM(s) Oral daily  sertraline 100 milliGRAM(s) Oral daily    MEDICATIONS  (PRN):    __________________________________________________  REVIEW OF SYSTEMS:    CONSTITUTIONAL: No fever,   EYES: no acute visual disturbances  NECK: No pain or stiffness  RESPIRATORY: No cough; No shortness of breath  CARDIOVASCULAR: No chest pain, no palpitations  GASTROINTESTINAL: No pain. No nausea or vomiting; No diarrhea   NEUROLOGICAL: No headache or numbness, no tremors  MUSCULOSKELETAL: No joint pain, no muscle pain  GENITOURINARY: no dysuria, no frequency, no hesitancy  PSYCHIATRY: no depression , no anxiety  ALL OTHER  ROS negative        Vital Signs Last 24 Hrs  T(C): 36.8 (21 Jul 2022 05:01), Max: 37.7 (20 Jul 2022 15:30)  T(F): 98.3 (21 Jul 2022 05:01), Max: 99.9 (20 Jul 2022 15:30)  HR: 98 (21 Jul 2022 09:33) (75 - 98)  BP: 91/55 (21 Jul 2022 09:33) (91/55 - 150/70)  BP(mean): --  RR: 17 (21 Jul 2022 05:01) (17 - 18)  SpO2: 100% (21 Jul 2022 09:33) (97% - 100%)    Parameters below as of 21 Jul 2022 09:33  Patient On (Oxygen Delivery Method): room air        ________________________________________________  PHYSICAL EXAM:  GENERAL: NAD  HEENT: Normocephalic;  conjunctivae and sclerae clear; moist mucous membranes;   NECK : supple  CHEST/LUNG: Clear to auscultation bilaterally with good air entry, diminished at bases; oozing to right breast noted, serosanginous drainage to dressing at site.   HEART: S1 S2  regular; no murmurs, gallops or rubs  ABDOMEN: Soft, Nontender, Nondistended; Bowel sounds present  EXTREMITIES: no cyanosis; no edema; no calf tenderness  SKIN: warm and dry; no rash  NERVOUS SYSTEM:  Awake and alert; Oriented  to place, person and time ; no new deficits    _________________________________________________  LABS:                        8.5    9.06  )-----------( 166      ( 21 Jul 2022 06:50 )             27.5     07-21    140  |  112<H>  |  14  ----------------------------<  89  4.5   |  20<L>  |  0.39<L>    Ca    9.5      21 Jul 2022 06:50    TPro  5.1<L>  /  Alb  2.5<L>  /  TBili  0.8  /  DBili  x   /  AST  8<L>  /  ALT  9<L>  /  AlkPhos  55  07-21        CAPILLARY BLOOD GLUCOSE      RADIOLOGY & ADDITIONAL TESTS:  < from: CT Chest w/ IV Cont (07.18.22 @ 18:03) >    ACC: [id number] EXAM:  CT CHEST IC                          PROCEDURE DATE:  07/18/2022      INTERPRETATION:  Clinical information: Evaluate for malignancy. Exam is   compared to previous study of 9/17/2018.    CT scan of the chest was obtained following administration of intravenous   contrast. Approximately 40 cc of Omnipaque 350 was administered and 60 cc   was discarded.    Low-attenuation lesions are noted within the right lobe of the thyroid   gland.    Large heterogeneous mass containing droplets of air is noted within the   right breast. The mass is extending and involving the skin of the   anterior chest wall as well as the anterior right fourth and fifth ribs   with resultant destruction.    Few small lymph nodes are present in the pretracheal space.    Heart is enlarged in size. Calcification of the aortic valve and the   coronary arteries is noted. No pericardial effusion is noted.    No endobronchial lesions are noted. Several nodules are noted within both   lungs. Largest nodule is noted in the left lower lobe and measures 1 cm.   Small right pleural effusion is noted.    Below the diaphragm, visualized portions of the abdomen demonstrate   partially visualized thick walled gallbladder.    Degenerative changes of thespine are noted. Partial destruction of the   anterior right fourth and fifth ribs from extension of right breast   lesion into the ribs.    IMPRESSION: Large mass is noted in the right breast as described above.   Primary consideration is breast carcinoma.    Findings suggestive of bilateral pulmonary metastasis.    Small right pleural effusion.    --- End of Report ---      DAO BRANDT MD; Attending Radiologist  This document has been electronically signed. Jul 18 2022  7:51PM    < end of copied text >    Imaging  Reviewed:  YES    Consultant(s) Notes Reviewed:   YES    Plan of care was discussed with patient; all questions and concerns were addressed  NP Note discussed with  Primary Attending    Patient is a 87y old  Female who presents with a chief complaint of symptomatic anemia and right breast infection (20 Jul 2022 19:43)    INTERVAL HPI/OVERNIGHT EVENTS: no new complaints.     MEDICATIONS  (STANDING):  ampicillin/sulbactam  IVPB      ampicillin/sulbactam  IVPB 1.5 Gram(s) IV Intermittent every 6 hours  atorvastatin 10 milliGRAM(s) Oral at bedtime  lidocaine 1%/epinephrine 1:100,000 Inj 30 milliLiter(s) Local Injection once  risperiDONE   Tablet 0.5 milliGRAM(s) Oral daily  sertraline 100 milliGRAM(s) Oral daily    MEDICATIONS  (PRN):    __________________________________________________  REVIEW OF SYSTEMS:    CONSTITUTIONAL: No fever,   EYES: no acute visual disturbances  NECK: No pain or stiffness  RESPIRATORY: No cough; No shortness of breath  CARDIOVASCULAR: No chest pain, no palpitations  GASTROINTESTINAL: No pain. No nausea or vomiting; No diarrhea   NEUROLOGICAL: No headache or numbness, no tremors  MUSCULOSKELETAL: No joint pain, no muscle pain  GENITOURINARY: no dysuria, no frequency, no hesitancy  PSYCHIATRY: no depression , no anxiety  ALL OTHER  ROS negative        Vital Signs Last 24 Hrs  T(C): 36.8 (21 Jul 2022 05:01), Max: 37.7 (20 Jul 2022 15:30)  T(F): 98.3 (21 Jul 2022 05:01), Max: 99.9 (20 Jul 2022 15:30)  HR: 98 (21 Jul 2022 09:33) (75 - 98)  BP: 91/55 (21 Jul 2022 09:33) (91/55 - 150/70)  BP(mean): --  RR: 17 (21 Jul 2022 05:01) (17 - 18)  SpO2: 100% (21 Jul 2022 09:33) (97% - 100%)    Parameters below as of 21 Jul 2022 09:33  Patient On (Oxygen Delivery Method): room air        ________________________________________________  PHYSICAL EXAM:  GENERAL: NAD  HEENT: Normocephalic;  conjunctivae and sclerae clear; moist mucous membranes;   NECK : supple  CHEST/LUNG: Clear to auscultation bilaterally with good air entry, diminished at bases; oozing to right breast noted, serosanginous drainage to dressing at site.   HEART: S1 S2  regular; no murmurs, gallops or rubs  ABDOMEN: Soft, Nontender, Nondistended; Bowel sounds present  EXTREMITIES: no cyanosis; no edema; no calf tenderness  SKIN: warm and dry; no rash  NERVOUS SYSTEM:  Awake and alert; Oriented  to place, person and time ; no new deficits    _________________________________________________  LABS:                        8.5    9.06  )-----------( 166      ( 21 Jul 2022 06:50 )             27.5     07-21    140  |  112<H>  |  14  ----------------------------<  89  4.5   |  20<L>  |  0.39<L>    Ca    9.5      21 Jul 2022 06:50    TPro  5.1<L>  /  Alb  2.5<L>  /  TBili  0.8  /  DBili  x   /  AST  8<L>  /  ALT  9<L>  /  AlkPhos  55  07-21        CAPILLARY BLOOD GLUCOSE      RADIOLOGY & ADDITIONAL TESTS:  < from: CT Chest w/ IV Cont (07.18.22 @ 18:03) >    ACC: [id number] EXAM:  CT CHEST IC                          PROCEDURE DATE:  07/18/2022      INTERPRETATION:  Clinical information: Evaluate for malignancy. Exam is   compared to previous study of 9/17/2018.    CT scan of the chest was obtained following administration of intravenous   contrast. Approximately 40 cc of Omnipaque 350 was administered and 60 cc   was discarded.    Low-attenuation lesions are noted within the right lobe of the thyroid   gland.    Large heterogeneous mass containing droplets of air is noted within the   right breast. The mass is extending and involving the skin of the   anterior chest wall as well as the anterior right fourth and fifth ribs   with resultant destruction.    Few small lymph nodes are present in the pretracheal space.    Heart is enlarged in size. Calcification of the aortic valve and the   coronary arteries is noted. No pericardial effusion is noted.    No endobronchial lesions are noted. Several nodules are noted within both   lungs. Largest nodule is noted in the left lower lobe and measures 1 cm.   Small right pleural effusion is noted.    Below the diaphragm, visualized portions of the abdomen demonstrate   partially visualized thick walled gallbladder.    Degenerative changes of thespine are noted. Partial destruction of the   anterior right fourth and fifth ribs from extension of right breast   lesion into the ribs.    IMPRESSION: Large mass is noted in the right breast as described above.   Primary consideration is breast carcinoma.    Findings suggestive of bilateral pulmonary metastasis.    Small right pleural effusion.    --- End of Report ---      DAO BRANDT MD; Attending Radiologist  This document has been electronically signed. Jul 18 2022  7:51PM    < end of copied text >    Imaging  Reviewed:  YES    Consultant(s) Notes Reviewed:   YES    Plan of care was discussed with patient; all questions and concerns were addressed

## 2022-07-21 NOTE — GOALS OF CARE CONVERSATION - ADVANCED CARE PLANNING - CONVERSATION DETAILS
spoke with patient regarding plan of care. patient states she would like to continue to be a FULL CODE and If patient is unable to make her own decisions, she would like her nephew Bill Mejia and her niece, Teresa to be in charge of her care.

## 2022-07-21 NOTE — DIETITIAN INITIAL EVALUATION ADULT - OTHER INFO
reports losing weight involuntarily of 33% from usual in > 1 year (78 To 52 kg) .  admitted for anemia and breast infection. Has history of TIA , HLD, anxiety. ordered for a Regular diet. declined oral supplements . will diagnose patient as  *severe protein calorie malnutrition in chronic illness in view of likely low po intake PTA and > 20% wt loss in 1 year . Has No food allergies

## 2022-07-22 ENCOUNTER — TRANSCRIPTION ENCOUNTER (OUTPATIENT)
Age: 87
End: 2022-07-22

## 2022-07-22 DIAGNOSIS — D63.8 ANEMIA IN OTHER CHRONIC DISEASES CLASSIFIED ELSEWHERE: ICD-10-CM

## 2022-07-22 DIAGNOSIS — J90 PLEURAL EFFUSION, NOT ELSEWHERE CLASSIFIED: ICD-10-CM

## 2022-07-22 DIAGNOSIS — C50.919 MALIGNANT NEOPLASM OF UNSPECIFIED SITE OF UNSPECIFIED FEMALE BREAST: ICD-10-CM

## 2022-07-22 DIAGNOSIS — K62.9 DISEASE OF ANUS AND RECTUM, UNSPECIFIED: ICD-10-CM

## 2022-07-22 LAB
ALBUMIN SERPL ELPH-MCNC: 2.5 G/DL — LOW (ref 3.5–5)
ALP SERPL-CCNC: 58 U/L — SIGNIFICANT CHANGE UP (ref 40–120)
ALT FLD-CCNC: 10 U/L DA — SIGNIFICANT CHANGE UP (ref 10–60)
ANION GAP SERPL CALC-SCNC: 7 MMOL/L — SIGNIFICANT CHANGE UP (ref 5–17)
AST SERPL-CCNC: 7 U/L — LOW (ref 10–40)
BILIRUB SERPL-MCNC: 0.6 MG/DL — SIGNIFICANT CHANGE UP (ref 0.2–1.2)
BUN SERPL-MCNC: 15 MG/DL — SIGNIFICANT CHANGE UP (ref 7–18)
CALCIUM SERPL-MCNC: 9.3 MG/DL — SIGNIFICANT CHANGE UP (ref 8.4–10.5)
CEA SERPL-MCNC: 4.3 NG/ML — HIGH (ref 0–3.8)
CHLORIDE SERPL-SCNC: 109 MMOL/L — HIGH (ref 96–108)
CO2 SERPL-SCNC: 22 MMOL/L — SIGNIFICANT CHANGE UP (ref 22–31)
CREAT SERPL-MCNC: 0.39 MG/DL — LOW (ref 0.5–1.3)
EGFR: 97 ML/MIN/1.73M2 — SIGNIFICANT CHANGE UP
GLUCOSE SERPL-MCNC: 94 MG/DL — SIGNIFICANT CHANGE UP (ref 70–99)
HCT VFR BLD CALC: 27.3 % — LOW (ref 34.5–45)
HGB BLD-MCNC: 8.3 G/DL — LOW (ref 11.5–15.5)
MCHC RBC-ENTMCNC: 25.2 PG — LOW (ref 27–34)
MCHC RBC-ENTMCNC: 30.4 GM/DL — LOW (ref 32–36)
MCV RBC AUTO: 82.7 FL — SIGNIFICANT CHANGE UP (ref 80–100)
NRBC # BLD: 0 /100 WBCS — SIGNIFICANT CHANGE UP (ref 0–0)
PLATELET # BLD AUTO: 166 K/UL — SIGNIFICANT CHANGE UP (ref 150–400)
POTASSIUM SERPL-MCNC: 4.1 MMOL/L — SIGNIFICANT CHANGE UP (ref 3.5–5.3)
POTASSIUM SERPL-SCNC: 4.1 MMOL/L — SIGNIFICANT CHANGE UP (ref 3.5–5.3)
PROT SERPL-MCNC: 4.9 G/DL — LOW (ref 6–8.3)
RBC # BLD: 3.3 M/UL — LOW (ref 3.8–5.2)
RBC # FLD: 21 % — HIGH (ref 10.3–14.5)
SARS-COV-2 RNA SPEC QL NAA+PROBE: SIGNIFICANT CHANGE UP
SODIUM SERPL-SCNC: 138 MMOL/L — SIGNIFICANT CHANGE UP (ref 135–145)
WBC # BLD: 7.67 K/UL — SIGNIFICANT CHANGE UP (ref 3.8–10.5)
WBC # FLD AUTO: 7.67 K/UL — SIGNIFICANT CHANGE UP (ref 3.8–10.5)

## 2022-07-22 RX ORDER — IRON SUCROSE 20 MG/ML
200 INJECTION, SOLUTION INTRAVENOUS EVERY 24 HOURS
Refills: 0 | Status: COMPLETED | OUTPATIENT
Start: 2022-07-22 | End: 2022-07-24

## 2022-07-22 RX ADMIN — AMPICILLIN SODIUM AND SULBACTAM SODIUM 100 GRAM(S): 250; 125 INJECTION, POWDER, FOR SUSPENSION INTRAMUSCULAR; INTRAVENOUS at 00:01

## 2022-07-22 RX ADMIN — Medication 650 MILLIGRAM(S): at 05:41

## 2022-07-22 RX ADMIN — ATORVASTATIN CALCIUM 10 MILLIGRAM(S): 80 TABLET, FILM COATED ORAL at 21:37

## 2022-07-22 RX ADMIN — Medication 500 MILLIGRAM(S): at 18:00

## 2022-07-22 RX ADMIN — Medication 0.4 MILLIGRAM(S): at 13:58

## 2022-07-22 RX ADMIN — Medication 650 MILLIGRAM(S): at 21:26

## 2022-07-22 RX ADMIN — Medication 650 MILLIGRAM(S): at 07:33

## 2022-07-22 RX ADMIN — SERTRALINE 100 MILLIGRAM(S): 25 TABLET, FILM COATED ORAL at 12:52

## 2022-07-22 RX ADMIN — AMPICILLIN SODIUM AND SULBACTAM SODIUM 100 GRAM(S): 250; 125 INJECTION, POWDER, FOR SUSPENSION INTRAMUSCULAR; INTRAVENOUS at 05:40

## 2022-07-22 RX ADMIN — AMPICILLIN SODIUM AND SULBACTAM SODIUM 100 GRAM(S): 250; 125 INJECTION, POWDER, FOR SUSPENSION INTRAMUSCULAR; INTRAVENOUS at 18:00

## 2022-07-22 RX ADMIN — Medication 1 TABLET(S): at 12:51

## 2022-07-22 RX ADMIN — AMPICILLIN SODIUM AND SULBACTAM SODIUM 100 GRAM(S): 250; 125 INJECTION, POWDER, FOR SUSPENSION INTRAMUSCULAR; INTRAVENOUS at 12:51

## 2022-07-22 RX ADMIN — RISPERIDONE 0.5 MILLIGRAM(S): 4 TABLET ORAL at 12:52

## 2022-07-22 RX ADMIN — Medication 500 MILLIGRAM(S): at 05:42

## 2022-07-22 RX ADMIN — IRON SUCROSE 110 MILLIGRAM(S): 20 INJECTION, SOLUTION INTRAVENOUS at 16:38

## 2022-07-22 RX ADMIN — Medication 650 MILLIGRAM(S): at 22:00

## 2022-07-22 NOTE — PROGRESS NOTE ADULT - SUBJECTIVE AND OBJECTIVE BOX
PATIENT SEEN AND EXAMINED ON :- 7/22/22  DATE OF SERVICE:  7/22/22           Interim events noted,Labs ,Radiological studies and Cardiology tests reviewed.       HOSPITAL COURSE: HPI:  Patient is a 88 yo F, who lives at home alone, ambulatory at baseline, PMH of anemia, insomnia, VEENA, long standing Hx of generalized weakness complicated by a fall 4 years ago, treated with rehab, and >year long hx of insidious onset, nontender  right breast "infection" who comes in today, referred by her PCP during routine home visit, due to low hgb and for examination of breast infection, that she reports has been recently bleeding.    Patient herself endorses, weakness that is chronic, as well as right sided arm paresthesias and occasional stinging sensation on the side of the right chest, but denies any  chest pain, fevers, chills, shortness of breath, abdominal pain, nausea or vomiting.    In the ED: patient was  a-febrile, HD stable and on room air  Exam: remarkable for a 6cm lymph node/ swelling in the right arm, as well as a 2cm deep, 4cm wide purulent induration on right breast area, with no nipple visible, protruding glandular tissue, with surrounding erythema and shallower induration that extends to the pectoral region and is associated with some possible rib exposure.    Labs: remarkable for hgb 3, no white count    Patient received 2 units of blood and was admitted for symptomatic anemia and right breast malignancy concern   (18 Jul 2022 15:02)      INTERIM EVENTS:Patient seen at bedside ,interim events noted.      PMH -reviewed admission note, no change since admission  HEART FAILURE: Acute[ ]Chronic[ ] Systolic[ ] Diastolic[ ] Combined Systolic and Diastolic[ ]  CAD[ ] CABG[ ] PCI[ ]  DEVICES[ ] PPM[ ] ICD[ ] ILR[ ]  ATRIAL FIBRILLATION[ ] Paroxysmal[ ] Permanent[ ] CHADS2-[  ]  KAMRAN[ ] CKD1[ ] CKD2[ ] CKD3[ ] CKD4[ ] ESRD[ ]  COPD[ ] HTN[ ]   DM[ ] Type1[ ] Type 2[ ]   CVA[ ] Paresis[ ]    AMBULATION: Assisted[ ] Cane/walker[ ] Independent[ ]    MEDICATIONS  (STANDING):  ampicillin/sulbactam  IVPB      ampicillin/sulbactam  IVPB 1.5 Gram(s) IV Intermittent every 6 hours  ascorbic acid 500 milliGRAM(s) Oral two times a day  atorvastatin 10 milliGRAM(s) Oral at bedtime  folic acid Injectable 0.4 milliGRAM(s) IV Push daily  iron sucrose IVPB 200 milliGRAM(s) IV Intermittent every 24 hours  multivitamin/minerals 1 Tablet(s) Oral daily  risperiDONE   Tablet 0.5 milliGRAM(s) Oral daily  sertraline 100 milliGRAM(s) Oral daily    MEDICATIONS  (PRN):  acetaminophen     Tablet .. 650 milliGRAM(s) Oral every 6 hours PRN Temp greater or equal to 38C (100.4F), Mild Pain (1 - 3), Moderate Pain (4 - 6)      REVIEW OF SYSTEMS:  Constitutional: [ ] fever, [ ]weight loss,  [ ]fatigue [ ]weight gain  Eyes: [ ] visual changes  Respiratory: [ ]shortness of breath;  [ ] cough, [ ]wheezing, [ ]chills, [ ]hemoptysis  Cardiovascular: [ ] chest pain, [ ]palpitations, [ ]dizziness,  [ ]leg swelling[ ]orthopnea[ ]PND  Gastrointestinal: [ ] abdominal pain, [ ]nausea, [ ]vomiting,  [ ]diarrhea [ ]Constipation [ ]Melena  Genitourinary: [ ] dysuria, [ ] hematuria [ ]Powell  Neurologic: [ ] headaches [ ] tremors[ ]weakness [ ]Paralysis Right[ ] Left[ ]  Skin: [ ] itching, [ ]burning, [ ] rashes  Endocrine: [ ] heat or cold intolerance  Musculoskeletal: [ ] joint pain or swelling; [ ] muscle, back, or extremity pain  Psychiatric: [ ] depression, [ ]anxiety, [ ]mood swings, or [ ]difficulty sleeping  Hematologic: [ ] easy bruising, [ ] bleeding gums    [ ] All remaining systems negative except as per above.   [ ]Unable to obtain.  [x] No change in ROS since admission      Vital Signs Last 24 Hrs  T(C): 36.7 (22 Jul 2022 05:26), Max: 37.1 (21 Jul 2022 21:07)  T(F): 98.1 (22 Jul 2022 05:26), Max: 98.8 (21 Jul 2022 21:07)  HR: 68 (22 Jul 2022 05:26) (68 - 68)  BP: 147/66 (22 Jul 2022 05:26) (131/73 - 147/66)  BP(mean): --  RR: 17 (22 Jul 2022 05:26) (17 - 17)  SpO2: 98% (22 Jul 2022 05:26) (98% - 99%)    Parameters below as of 22 Jul 2022 05:26  Patient On (Oxygen Delivery Method): room air      I&O's Summary      PHYSICAL EXAM:  General: No acute distress BMI-  HEENT: EOMI, PERRL  Neck: Supple, [ ] JVD  Lungs: Equal air entry bilaterally; [ ] rales [ ] wheezing [ ] rhonchi  Heart: Regular rate and rhythm; [x ] murmur   2/6 [ x] systolic [ ] diastolic [ ] radiation[ ] rubs [ ]  gallops  Abdomen: Nontender, bowel sounds present  Extremities: No clubbing, cyanosis, [ ] edema [ ]Pulses  equal and intact  Nervous system:  Alert & Oriented X3, no focal deficits  Psychiatric: Normal affect  Skin: No rashes or lesions    LABS:  07-22    138  |  109<H>  |  15  ----------------------------<  94  4.1   |  22  |  0.39<L>    Ca    9.3      22 Jul 2022 07:03    TPro  4.9<L>  /  Alb  2.5<L>  /  TBili  0.6  /  DBili  x   /  AST  7<L>  /  ALT  10  /  AlkPhos  58  07-22    Creatinine Trend: 0.39<--, 0.39<--, 0.35<--, 0.51<--, 0.46<--                        8.3    7.67  )-----------( 166      ( 22 Jul 2022 07:03 )             27.3

## 2022-07-22 NOTE — DISCHARGE NOTE PROVIDER - DETAILS OF MALNUTRITION DIAGNOSIS/DIAGNOSES
This patient has been assessed with a concern for Malnutrition and was treated during this hospitalization for the following Nutrition diagnosis/diagnoses:     -  07/21/2022: Severe protein-calorie malnutrition   -  07/21/2022: Underweight (BMI < 19)

## 2022-07-22 NOTE — DISCHARGE NOTE PROVIDER - NSDCMRMEDTOKEN_GEN_ALL_CORE_FT
risperiDONE 0.5 mg oral tablet: 1 tab(s) orally once a day  rosuvastatin 10 mg oral tablet: 1 tab(s) orally once a day  sertraline 100 mg oral tablet: 1 tab(s) orally once a day   Daily Multiple Vitamins oral tablet: 1 tab(s) orally once a day   folic acid 0.4 mg oral tablet: 1 tab(s) orally once a day   risperiDONE 0.5 mg oral tablet: 1 tab(s) orally once a day  rosuvastatin 10 mg oral tablet: 1 tab(s) orally once a day  sertraline 100 mg oral tablet: 1 tab(s) orally once a day  Vitamin C 500 mg oral tablet: 1 tab(s) orally 2 times a day

## 2022-07-22 NOTE — DISCHARGE NOTE PROVIDER - CARE PROVIDER_API CALL
Patrice Holt  INTERNAL MEDICINE  [address] [place] [address]  Phone: [phone]  Fax: [phone]  Follow Up Time: Routine   Patrice Holt  INTERNAL MEDICINE  [address]  [place] [id number]  Phone: [phone]  Fax: [phone]  Established Patient  Follow Up Time: 1 week    Nuno Quiroga  [address] [place] [id number]  Tel: [phone]  Phone: (   )    -  Fax: (   )    -  Follow Up Time: 1 week

## 2022-07-22 NOTE — PROGRESS NOTE ADULT - SUBJECTIVE AND OBJECTIVE BOX
HPI:  Patient is a 88 yo F, who lives at home alone, ambulatory at baseline, PMH of anemia, insomnia, VEENA, long standing Hx of generalized weakness complicated by a fall 4 years ago, treated with rehab, and >year long hx of insidious onset, nontender  right breast "infection" who comes in today, referred by her PCP during routine home visit, due to low hgb and for examination of breast infection, that she reports has been recently bleeding.    Patient herself endorses, weakness that is chronic, as well as right sided arm paresthesias and occasional stinging sensation on the side of the right chest, but denies any  chest pain, fevers, chills, shortness of breath, abdominal pain, nausea or vomiting.    In the ED: patient was  a-febrile, HD stable and on room air  Exam: remarkable for a 6cm lymph node/ swelling in the right arm, as well as a 2cm deep, 4cm wide purulent induration on right breast area, with no nipple visible, protruding glandular tissue, with surrounding erythema and shallower induration that extends to the pectoral region and is associated with some possible rib exposure.    Labs: remarkable for hgb 3, no white count    Patient received 2 units of blood and was admitted for symptomatic anemia and right breast malignancy concern   (2022 15:02)     Pt is seen and examined  pt is awake and lying in bed/out of bed to chair  pt seems comfortable and denies any complaints at this time    ROS:  Negative except for:    MEDICATIONS  (STANDING):  ampicillin/sulbactam  IVPB      ampicillin/sulbactam  IVPB 1.5 Gram(s) IV Intermittent every 6 hours  ascorbic acid 500 milliGRAM(s) Oral two times a day  atorvastatin 10 milliGRAM(s) Oral at bedtime  folic acid Injectable 0.4 milliGRAM(s) IV Push daily  iron sucrose IVPB 200 milliGRAM(s) IV Intermittent every 24 hours  lidocaine 1%/epinephrine 1:100,000 Inj 30 milliLiter(s) Local Injection once  multivitamin/minerals 1 Tablet(s) Oral daily  risperiDONE   Tablet 0.5 milliGRAM(s) Oral daily  sertraline 100 milliGRAM(s) Oral daily    MEDICATIONS  (PRN):  acetaminophen     Tablet .. 650 milliGRAM(s) Oral every 6 hours PRN Temp greater or equal to 38C (100.4F), Mild Pain (1 - 3), Moderate Pain (4 - 6)      Allergies    No Known Allergies    Intolerances        Vital Signs Last 24 Hrs  T(C): 36.7 (2022 05:26), Max: 37.5 (2022 14:10)  T(F): 98.1 (2022 05:26), Max: 99.5 (2022 14:10)  HR: 68 (2022 05:26) (68 - 98)  BP: 147/66 (2022 05:26) (91/55 - 147/66)  BP(mean): --  RR: 17 (2022 05:26) (17 - 18)  SpO2: 98% (2022 05:26) (98% - 100%)    Parameters below as of 2022 05:26  Patient On (Oxygen Delivery Method): room air        PHYSICAL EXAM  General: adult in NAD  HEENT: clear oropharynx, anicteric sclera, pink conjunctiva  Neck: supple  CV: normal S1/S2 with no murmur rubs or gallops  Lungs: positive air movement b/l ant lungs,clear to auscultation, no wheezes, no rales  Abdomen: soft non-tender non-distended, no hepatosplenomegaly  Ext: no clubbing cyanosis or edema  Skin: no rashes and no petechiae  Neuro: alert and oriented X 4, no focal deficits  LABS:                          8.3    7.67  )-----------( 166      ( 2022 07:03 )             27.3         Mean Cell Volume : 82.7 fl  Mean Cell Hemoglobin : 25.2 pg  Mean Cell Hemoglobin Concentration : 30.4 gm/dL  Auto Neutrophil # : x  Auto Lymphocyte # : x  Auto Monocyte # : x  Auto Eosinophil # : x  Auto Basophil # : x  Auto Neutrophil % : x  Auto Lymphocyte % : x  Auto Monocyte % : x  Auto Eosinophil % : x  Auto Basophil % : x    Serial CBC  Hematocrit 27.3  Hemoglobin 8.3  Plat 166  RBC 3.30  WBC 7.67  Serial CBC  Hematocrit 27.5  Hemoglobin 8.5  Plat 166  RBC 3.42  WBC 9.06  Serial CBC  Hematocrit 22.0  Hemoglobin 6.7  Plat 169  RBC 2.82  WBC 7.51  Serial CBC  Hematocrit 22.4  Hemoglobin 7.1  Plat 186  RBC 2.91  WBC 9.67  Serial CBC  Hematocrit 23.3  Hemoglobin 7.5  Plat 186  RBC 2.96  WBC 10.53  Serial CBC  Hematocrit 11.7  Hemoglobin 3.2  Plat 185  RBC 1.67  WBC 8.09        138  |  109<H>  |  15  ----------------------------<  94  4.1   |  22  |  0.39<L>    Ca    9.3      2022 07:03    TPro  4.9<L>  /  Alb  2.5<L>  /  TBili  0.6  /  DBili  x   /  AST  7<L>  /  ALT  10  /  AlkPhos  58            Ferritin, Serum: 37 ng/mL ( @ 06:50)  Iron - Total Binding Capacity.: 343 ug/dL ( @ 06:50)  Vitamin B12, Serum: 780 pg/mL ( @ 06:50)  Folate, Serum: 4.6 ng/mL ( @ 06:50)      Quantitative Ig mg/dL ( @ 06:50)  Quantitative IgA: 85 mg/dL ( @ 06:50)  Quantitative IgM: 31 mg/dL ( @ 06:50)  SPENCER Kappa: 1.41 mg/dL ( @ 06:50)  SPENCER Lambda: 0.80 mg/dL ( @ 06:50)        BLOOD SMEAR INTERPRETATION:       RADIOLOGY & ADDITIONAL STUDIES:

## 2022-07-22 NOTE — PROGRESS NOTE ADULT - ASSESSMENT
88 y/o F, who lives at home alone, ambulatory at baseline, PMH of anemia, insomnia, General anxiety disorder, and >year long hx of insidious onset, nontender  right breast "infection" who comes in today, referred by her PCP during routine home visit, due to low hgb and for examination of breast infection, that she reports has been recently bleeding. Found to have a hemoglobin 3.2, transfused 2units PRBC  and admitted to medicine for anemia and breast infection.  CT chest show concern for right breast carcinoma s/p bedside biopsy on 7/20 by surgery team, Hem/onc and surgery following, Wound care also following. Cardiology consulted for symptomatic anemia, recommended Iron study and to transfuse if hemoglobulin drops below 7. s/p 1 unit prbc tranfused on 7/20. CT abd/pelv suspicious for metastatic disease due to neoplasm in LLL, rectal and bone lesions. No additional intervention at this time, pt is to follow up outpt GI and outpt Heme. Pt also found to have bilateral pleural effusions on CT, currently asymptomatic on room air. Pt is medically optimized for discharge, pending placement for [facility].

## 2022-07-22 NOTE — PROGRESS NOTE ADULT - SUBJECTIVE AND OBJECTIVE BOX
Patient is a 87y old  Female who presents with a chief complaint of symptomatic anemia and right breast infection (22 Jul 2022 08:38)    OVERNIGHT EVENTS: no acute changes    REVIEW OF SYSTEMS:  CONSTITUTIONAL: No fever, chills  ENMT:  No difficulty hearing, no change in vision  NECK: No pain or stiffness  RESPIRATORY: No cough, SOB  CARDIOVASCULAR: No chest pain, palpitations  GASTROINTESTINAL: No abdominal pain. No nausea, vomiting, or diarrhea  GENITOURINARY: No dysuria  NEUROLOGICAL: No HA  SKIN: No itching, burning, rashes, or lesions   LYMPH NODES: No enlarged glands  ENDOCRINE: No heat or cold intolerance; No hair loss  MUSCULOSKELETAL: No joint pain or swelling; No muscle, back, or extremity pain  PSYCHIATRIC: No depression, anxiety  HEME/LYMPH: No easy bruising, or bleeding gums    T(C): 36.7 (07-22-22 @ 05:26), Max: 37.1 (07-21-22 @ 21:07)  HR: 68 (07-22-22 @ 05:26) (68 - 68)  BP: 147/66 (07-22-22 @ 05:26) (131/73 - 147/66)  RR: 17 (07-22-22 @ 05:26) (17 - 17)  SpO2: 98% (07-22-22 @ 05:26) (98% - 99%)  Wt(kg): --Vital Signs Last 24 Hrs  T(C): 36.7 (22 Jul 2022 05:26), Max: 37.1 (21 Jul 2022 21:07)  T(F): 98.1 (22 Jul 2022 05:26), Max: 98.8 (21 Jul 2022 21:07)  HR: 68 (22 Jul 2022 05:26) (68 - 68)  BP: 147/66 (22 Jul 2022 05:26) (131/73 - 147/66)  BP(mean): --  RR: 17 (22 Jul 2022 05:26) (17 - 17)  SpO2: 98% (22 Jul 2022 05:26) (98% - 99%)    Parameters below as of 22 Jul 2022 05:26  Patient On (Oxygen Delivery Method): room air        MEDICATIONS  (STANDING):  ampicillin/sulbactam  IVPB      ampicillin/sulbactam  IVPB 1.5 Gram(s) IV Intermittent every 6 hours  ascorbic acid 500 milliGRAM(s) Oral two times a day  atorvastatin 10 milliGRAM(s) Oral at bedtime  folic acid Injectable 0.4 milliGRAM(s) IV Push daily  iron sucrose IVPB 200 milliGRAM(s) IV Intermittent every 24 hours  lidocaine 1%/epinephrine 1:100,000 Inj 30 milliLiter(s) Local Injection once  multivitamin/minerals 1 Tablet(s) Oral daily  risperiDONE   Tablet 0.5 milliGRAM(s) Oral daily  sertraline 100 milliGRAM(s) Oral daily    MEDICATIONS  (PRN):  acetaminophen     Tablet .. 650 milliGRAM(s) Oral every 6 hours PRN Temp greater or equal to 38C (100.4F), Mild Pain (1 - 3), Moderate Pain (4 - 6)      PHYSICAL EXAM:  GENERAL: thin appearing, NAD  EYES: clear conjunctiva  ENMT: Moist mucous membranes  NECK: Supple, No JVD, Normal thyroid  CHEST/LUNG: +right chest wound, dressing clean, dry, intact. Clear to auscultation bilaterally; No rales, rhonchi, wheezing, or rubs  HEART: S1, S2, Regular rate and rhythm  ABDOMEN: Soft, Nontender, Nondistended; Bowel sounds present  NEURO: Alert & Oriented X3  EXTREMITIES: No LE edema, no calf tenderness  LYMPH: No lymphadenopathy noted  SKIN: No rashes or lesions    Consultant(s) Notes Reviewed:  [x ] YES  [ ] NO  Care Discussed with Consultants/Other Providers [ x] YES  [ ] NO    LABS:                        8.3    7.67  )-----------( 166      ( 22 Jul 2022 07:03 )             27.3     07-22    138  |  109<H>  |  15  ----------------------------<  94  4.1   |  22  |  0.39<L>    Ca    9.3      22 Jul 2022 07:03    TPro  4.9<L>  /  Alb  2.5<L>  /  TBili  0.6  /  DBili  x   /  AST  7<L>  /  ALT  10  /  AlkPhos  58  07-22      CAPILLARY BLOOD GLUCOSE                RADIOLOGY & ADDITIONAL TESTS:  < from: CT Abdomen and Pelvis w/ IV Cont (07.21.22 @ 18:55) >    ACC: [id number] EXAM:  CT ABDOMEN AND PELVIS IC                          *** ADDENDUM***    Addendum:    Please add to the conclusion as stated in the body of the report,   splenomegaly. Please correlate clinically.    --- End of Report ---    *** END OF ADDENDUM***      PROCEDURE DATE:  07/21/2022          INTERPRETATION:  CLINICAL INFORMATION: Breast cancer. Evaluate for   metastases.    COMPARISON: None.    CONTRAST/COMPLICATIONS:  IV Contrast: Omnipaque 350  90 cc administered   10 cc discarded  Oral Contrast: NONE  Complications: None reported at time of study completion    PROCEDURE:  CT of the Abdomen and Pelvis was performed.  Sagittal and coronal reformats were performed.    FINDINGS:  LOWER CHEST: 1.3 x 1.3 cm slightly irregular nodule in the left lower   lobe measured on series 2 image 11. While this may represent a   metastasis, a primary lung neoplasm is also within the differential   diagnosis. Small bilateral pleural effusions, right greater than left,   with adjacent mild atelectasis. Postsurgical changes of the right breast   with skin thickening and partially imaged fluid collection which may be   within a prosthesis versus a collection within soft tissue.    LIVER: Within normal limits.  BILE DUCTS: Normal caliber.  GALLBLADDER: 3.1 cm gallstone without gross inflammation.  SPLEEN: Splenomegaly measuring up to 14.1 cm.  PANCREAS: Within normal limits.  ADRENALS: Within normal limits.  KIDNEYS/URETERS: Within normal limits.    BLADDER: Within normal limits.  REPRODUCTIVE ORGANS: Limited in evaluation. 2.4 cm right adnexal cyst.    BOWEL: No bowel obstruction. Large amount of stool in the colon. Appendix   is not visualized with certainty. No gross evidence for appendicitis.   Multiple colonic diverticuli predominantly in the sigmoid without gross   inflammation.1.6 x 1.6 cm density in the anterior rectum on series 2   image 122. This may represent very dense stool although is very dense and   in the nondependent wall and a large polyp or neoplasm cannot be excluded.  PERITONEUM: No ascites.  VESSELS: Vascular calcifications.  RETROPERITONEUM/LYMPH NODES: No lymphadenopathy.  ABDOMINAL WALL: Within normal limits.  BONES: Degenerative changes of bone. Very dense small sclerotic lesion in   the right iliac bone, S1 vertebral body, and the right seventh rib   laterally.. These are more dense than typically seen with metastases and   benign lesions are also within the differential diagnosis.    IMPRESSION:  1.3 x 1.3 cm slightly irregular pulmonary nodule in the left lower lobe   which may represent a metastatic lesion versus primary neoplasm.   Recommend CT scan of the chest was discussed for additional nodules.    1.6 x 1.6 cm lesion in the anterior rectum raising concern for a large   polyp or neoplasm. Dense stool would be less likely since it within the   nondependent portion of the rectum. Please correlate clinically    Few very dense small sclerotic bone lesions. This is more dense than   typically seen with metastatic disease and while these may represent   metastases, benign lesions are also possible. Nuclear medicine bone scan   may be of value although this is limited due to the small size.    Partially imaged right breast skin thickening and collection versus   implant.        --- End of Report ---    ***Please see the addendum at the top of this report. It may contain   additional important information or changes.****        PAT GAITAN MD; Attending Radiologist  This document has been electronically signed. Jul 22 2022 11:51AM  Addend:PAT GAITAN MD; Attending Radiologist  This addendum was electronically signed on: Jul 22 2022  3:50PM.    < end of copied text >      Imaging Personally Reviewed:  [ ] YES  [ ] NO

## 2022-07-22 NOTE — PROGRESS NOTE ADULT - PROBLEM SELECTOR PLAN 2
CT A/P shows 1.6 x 1.6 cm lesion in the anterior rectum concern for large polyp vs neoplasm  - no further intervention at this time  - follow up outpt GI for colonoscopy

## 2022-07-22 NOTE — PROGRESS NOTE ADULT - ASSESSMENT
· Assessment	  SHAVONNE MONTALVO is a 87y Female who presents with a chief complaint of anemia.    Anemia  - Patient presents with critical anemia with hemoglobin 3.2;   -hgb better 8.5 today post transfusion  - Monitor CBC and transfuse to maintain hemoglobin above 7.  -would add folic acid 400 mcg daily  iron def, will give venofer  she denies any abdominal pain or GIB  but she said blood gushed out from breast recently    Breast Lesion  - Suspect malignancy given appearance as well as CT chest finding.  -f/u biopsy done yest  - Recommend CT abdomen/pelvis with contrast if able.   needs f/u as outpt    Will continue to follow.

## 2022-07-22 NOTE — DISCHARGE NOTE PROVIDER - NSDCCPCAREPLAN_GEN_ALL_CORE_FT
PRINCIPAL DISCHARGE DIAGNOSIS  Diagnosis: Anemia of chronic disease  Assessment and Plan of Treatment: you were given 3 units of packed red blood cells during your hospitalization  you were given folic acid and iron infusions  upon discharge your hemoglobin was around 8  Anemia is a low number of red blood cells or a low amount of hemoglobin in your red blood cells. Hemoglobin is a protein that helps carry oxygen throughout your body. Red blood cells use iron to create hemoglobin. Anemia may develop if your body does not have enough iron. It may also develop if your body does not make enough red blood cells or they die faster than your body can make them.  How is anemia treated? Treatment depends on the type of anemia you have. You may need any of the following:  Iron or folic acid supplements help increase your red blood cell and hemoglobin levels.  Vitamin B12 injections may help boost your red blood cell count and decrease your symptoms.  A blood transfusion may be needed if your body cannot replace the blood you have lost.  Surgery may be needed to stop bleeding, or if your anemia is severe.  Eat healthy foods rich in iron and vitamin C. Nuts, meat, dark leafy green vegetables, and beans are high in iron and protein. Vitamin C helps your body absorb iron. Foods rich in vitamin C include oranges and other citrus fruits. Ask your healthcare provider for a list of other foods that are high in iron or vitamin C. Ask if you need to be on a special diet.  Call your local emergency number (911 in the [place]), or have someone call if:  You lose consciousness.  You have severe chest pain.  When should I seek immediate care?  You have dark or bloody bowel movements.        SECONDARY DISCHARGE DIAGNOSES  Diagnosis: Metastatic breast cancer  Assessment and Plan of Treatment: you had a biopsy of your right breast on 7/20 and was given a course of antibiotics.  continue daily dressing changes of your right breast wound  -Clean all affected areas with normal saline and apply skin prep to the surrounding skin  -continue, order 500mg tab of Flagyl and crush into fine dust  -Apply the Flagyl dust to the R. Breast Mass wound bed, then apply Xeroform gauze to the wound bed, cover with an ABD pad and secure with tape Daily PRN  -Apply Zinc Oxide Moisture Barrier Cream to the Perianal and Gluteal Fold areas b.i.d. PRN  -Frequent toileting  -Elevate/float the patients heels using pillows and reposition the patient Q 2hrs using wedges or pillows  follow up with oncologist Dr. Holt for biopsy results    Diagnosis: Rectal lesion  Assessment and Plan of Treatment: on your CT scan of your abdomen and pelvis, it showed that you have a 1.6 cm rectal lesion, and it is concerning for possible malignancy or a large polyp.  follow up with the gastroenterology clinic to have a colonoscopy scheduled.    Diagnosis: Bilateral pleural effusion  Assessment and Plan of Treatment: on your CT scan of your abdomen and pelvis, it showed that you have bilateral pleural effusions, however you are asymptomatic.   follow up to have a repeat chest xray in 1-2 weeks to re-evaluate for improvement of the bilateral pleural effusions.    Diagnosis: HLD (hyperlipidemia)  Assessment and Plan of Treatment: continue taking your home medication rosuvastatin 10 mg daily     PRINCIPAL DISCHARGE DIAGNOSIS  Diagnosis: Anemia of chronic disease  Assessment and Plan of Treatment: you were given 3 units of packed red blood cells during your hospitalization  you were given folic acid and iron infusions  upon discharge your hemoglobin was around 8  Anemia is a low number of red blood cells or a low amount of hemoglobin in your red blood cells. Hemoglobin is a protein that helps carry oxygen throughout your body. Red blood cells use iron to create hemoglobin. Anemia may develop if your body does not have enough iron. It may also develop if your body does not make enough red blood cells or they die faster than your body can make them.  How is anemia treated? Treatment depends on the type of anemia you have. You may need any of the following:  Iron or folic acid supplements help increase your red blood cell and hemoglobin levels.  Vitamin B12 injections may help boost your red blood cell count and decrease your symptoms.  A blood transfusion may be needed if your body cannot replace the blood you have lost.  Surgery may be needed to stop bleeding, or if your anemia is severe.  Eat healthy foods rich in iron and vitamin C. Nuts, meat, dark leafy green vegetables, and beans are high in iron and protein. Vitamin C helps your body absorb iron. Foods rich in vitamin C include oranges and other citrus fruits. Ask your healthcare provider for a list of other foods that are high in iron or vitamin C. Ask if you need to be on a special diet.  Call your local emergency number (911 in the [place]), or have someone call if:  You lose consciousness.  You have severe chest pain.  When should I seek immediate care?  You have dark or bloody bowel movements.        SECONDARY DISCHARGE DIAGNOSES  Diagnosis: Metastatic breast cancer  Assessment and Plan of Treatment: you had a biopsy of your right breast on 7/20 and was given a course of antibiotics.  continue daily dressing changes of your right breast wound  -Clean all affected areas with normal saline and apply skin prep to the surrounding skin  -continue, order 500mg tab of Flagyl and crush into fine dust  -Apply the Flagyl dust to the R. Breast Mass wound bed, then apply Xeroform gauze to the wound bed, cover with an ABD pad and secure with tape Daily PRN  -Apply Zinc Oxide Moisture Barrier Cream to the Perianal and Gluteal Fold areas b.i.d. PRN  -Frequent toileting  -Elevate/float the patients heels using pillows and reposition the patient Q 2hrs using wedges or pillows  No more intervention needed at this time, however  Follow up with oncologist Dr. Holt at out patient  for biopsy results    Diagnosis: Rectal lesion  Assessment and Plan of Treatment: on your CT scan of your abdomen and pelvis, it showed that you have a 1.6 cm rectal lesion, and it is concerning for possible malignancy or a large polyp.  follow up with the gastroenterology clinic to have a colonoscopy scheduled.    Diagnosis: Bilateral pleural effusion  Assessment and Plan of Treatment: on your CT scan of your abdomen and pelvis, it showed that you have bilateral pleural effusions, however you are asymptomatic.   follow up to have a repeat chest xray in 1-2 weeks to re-evaluate for improvement of the bilateral pleural effusions.    Diagnosis: HLD (hyperlipidemia)  Assessment and Plan of Treatment: continue taking your home medication rosuvastatin 10 mg daily     PRINCIPAL DISCHARGE DIAGNOSIS  Diagnosis: Anemia of chronic disease  Assessment and Plan of Treatment: you were given 3 units of packed red blood cells during your hospitalization  you were given folic acid and iron infusions  upon discharge your hemoglobin was around 8  Anemia is a low number of red blood cells or a low amount of hemoglobin in your red blood cells. Hemoglobin is a protein that helps carry oxygen throughout your body. Red blood cells use iron to create hemoglobin. Anemia may develop if your body does not have enough iron. It may also develop if your body does not make enough red blood cells or they die faster than your body can make them.  How is anemia treated? Treatment depends on the type of anemia you have. You may need any of the following:  Iron or folic acid supplements help increase your red blood cell and hemoglobin levels.  Vitamin B12 injections may help boost your red blood cell count and decrease your symptoms.  A blood transfusion may be needed if your body cannot replace the blood you have lost.  Surgery may be needed to stop bleeding, or if your anemia is severe.  Eat healthy foods rich in iron and vitamin C. Nuts, meat, dark leafy green vegetables, and beans are high in iron and protein. Vitamin C helps your body absorb iron. Foods rich in vitamin C include oranges and other citrus fruits. Ask your healthcare provider for a list of other foods that are high in iron or vitamin C. Ask if you need to be on a special diet.  Call your local emergency number (911 in the [place]), or have someone call if:  You lose consciousness.  You have severe chest pain.  When should I seek immediate care?  You have dark or bloody bowel movements.        SECONDARY DISCHARGE DIAGNOSES  Diagnosis: Metastatic breast cancer  Assessment and Plan of Treatment: you had a biopsy of your right breast on 7/20 and was given a course of antibiotics.  continue daily dressing changes of your right breast wound  -Clean all affected areas with normal saline and apply skin prep to the surrounding skin  -continue, order 500mg tab of Flagyl and crush into fine dust  -Apply the Flagyl dust to the R. Breast Mass wound bed, then apply Xeroform gauze to the wound bed, cover with an ABD pad and secure with tape Daily PRN  -Apply Zinc Oxide Moisture Barrier Cream to the Perianal and Gluteal Fold areas b.i.d. PRN  -Frequent toileting  -Elevate/float the patients heels using pillows and reposition the patient Q 2hrs using wedges or pillows  As per hem/onc recommendation, no chemotherapy while inpatient/rehabilitation. Will need follow-up outpatient  at [facility]Dr. Holt[facility] after discharge from       Diagnosis: Rectal lesion  Assessment and Plan of Treatment: on your CT scan of your abdomen and pelvis, it showed that you have a 1.6 cm rectal lesion, and it is concerning for possible malignancy or a large polyp.  follow up with the gastroenterology clinic to have a colonoscopy scheduled.    Diagnosis: Bilateral pleural effusion  Assessment and Plan of Treatment: on your CT scan of your abdomen and pelvis, it showed that you have bilateral pleural effusions, however you are asymptomatic.   follow up to have a repeat chest xray in 1-2 weeks to re-evaluate for improvement of the bilateral pleural effusions.    Diagnosis: HLD (hyperlipidemia)  Assessment and Plan of Treatment: continue taking your home medication rosuvastatin 10 mg daily

## 2022-07-22 NOTE — DISCHARGE NOTE PROVIDER - HOSPITAL COURSE
88 y/o F, who lives at home alone, ambulatory at baseline, PMH of anemia, insomnia, General anxiety disorder, and >year long hx of insidious onset, nontender  right breast "infection" who comes in today, referred by her PCP during routine home visit, due to low hgb and for examination of breast infection, that she reports has been recently bleeding. Found to have a hemoglobin 3.2, transfused 2units PRBC  and admitted to medicine for anemia and breast infection.  CT chest show concern for right breast carcinoma s/p bedside biopsy on 7/20 by surgery team, Hem/onc and surgery following, Wound care also following. Cardiology consulted for symptomatic anemia, recommended Iron study and to transfuse if hemoglobulin drops below 7. Pt was then given another unit prbc tranfused on 7/20. Pt was started on folic acid 400 mcg daily IV and given iron infusions. CT abd/pelv suspicious for metastatic disease due to neoplasm in LLL, rectal and bone lesions. No additional intervention at this time, pt is to follow up outpt GI and outpt Heme. Pt also found to have bilateral pleural effusions on CT, currently asymptomatic on room air. Pt is medically optimized for discharge, pending placement for [facility].       upon discharge pt will follow up with [facility] for biopsy results,  follow up outpt GI for colonoscopy to evaluate renal lesion.  follow up with repeat CXR in 2 weeks to re-evaluate bilateral pleural effusions     86 y/o F, who lives at home alone, ambulatory at baseline, PMH of anemia, insomnia, General anxiety disorder, and >year long hx of insidious onset, nontender  right breast "infection" who comes in today, referred by her PCP during routine home visit, due to low hgb and for examination of breast infection, that she reports has been recently bleeding. Found to have a hemoglobin 3.2, transfused 2units PRBC  and admitted to medicine for anemia and breast infection.  CT chest show concern for right breast carcinoma s/p bedside biopsy on 7/20 by surgery team, Hem/onc and surgery following, Wound care also following. Cardiology consulted for symptomatic anemia, recommended Iron study and to transfuse if hemoglobulin drops below 7. Pt was then given another unit prbc tranfused on 7/20. Pt was started on folic acid 400 mcg daily IV and given iron infusions. CT abd/pelv suspicious for metastatic disease due to neoplasm in LLL, rectal and bone lesions. No additional intervention at this time, pt is to follow up outpt GI and outpt Hem/onc. Pt also found to have bilateral pleural effusions on CT, currently asymptomatic on room air. Pt is medically optimized for discharge for [facility].   No medical  intervention needed at this time, however patient will follow up with [facility] for biopsy results, follow up outpt GI for colonoscopy to evaluate renal lesion.  Follow up with repeat CXR in 2 weeks to re-evaluate bilateral pleural effusions     86 y/o F, who lives at home alone, ambulatory at baseline, PMH of anemia, insomnia, General anxiety disorder, and >year long hx of insidious onset, nontender  right breast "infection" who comes in today, referred by her PCP during routine home visit, due to low hgb and for examination of breast infection, that she reports has been recently bleeding. Found to have a hemoglobin 3.2, transfused 2units PRBC  and admitted to medicine for anemia and breast infection.  CT chest show concern for right breast carcinoma s/p bedside biopsy on 7/20 by surgery team, Hem/onc and surgery following, Wound care also following. Cardiology consulted for symptomatic anemia, recommended Iron study and to transfuse if hemoglobulin drops below 7. Pt was then given another unit prbc tranfused on 7/20. Pt was started on folic acid 400 mcg daily IV and given iron infusions. CT abd/pelv suspicious for metastatic disease due to neoplasm in LLL, rectal and bone lesions. No additional intervention at this time, pt is to follow up outpt GI and outpt Hem/onc. Pt also found to have bilateral pleural effusions on CT, currently asymptomatic on room air. Pt is medically optimized for discharge for [facility].   As per hem/onc recommendation, no chemotherapy while inpatient/rehabilitation. Will need follow-up outpatient  at [facility] after discharge from [facility].   Patient to follow up outpt GI for colonoscopy to evaluate renal lesion.  Follow up with repeat CXR in 2 weeks to re-evaluate bilateral pleural effusions.  Given patient's improved clinical status and current hemodynamic stability, decision was made to discharge.  Please refer to patient's complete medical chart with documents for a full hospital course, for this is only a brief summary.

## 2022-07-22 NOTE — DISCHARGE NOTE PROVIDER - PROVIDER TOKENS
PROVIDER:[TOKEN:[[id number]],FOLLOWUP:[Routine]] PROVIDER:[TOKEN:[[id number]],FOLLOWUP:[1 week],ESTABLISHEDPATIENT:[T]],FREE:[LAST:[Tung],FIRST:[Nuno],PHONE:[(   )    -],FAX:[(   )    -],ADDRESS:[[id number] [address]  Tel: [phone]],FOLLOWUP:[1 week]]

## 2022-07-22 NOTE — PROGRESS NOTE ADULT - ASSESSMENT
88 y/o F, who lives at home alone, ambulatory at baseline, PMH of anemia, insomnia, General anxiety disorder, and >year long hx of insidious onset, nontender  right breast "infection" who comes in today, referred by her PCP during routine home visit, due to low hgb and for examination of breast infection, that she reports has been recently bleeding. Found to have a hemoglobin 3.2, transfused 2units PRBC  and admitted to medicine for anemia and breast infection.  CT chest show concern for right breast carcinoma s/p bedside biopsy on 7/20 by surgery team, Hem/onc and surgery following, Wound care also following. Cardiology consulted for symptomatic anemia, recommended Iron study and to transfuse if hemoglobulin drops below 7. s/p 1 unit prbc tranfused on 7/20. CT abd/pelv suspicious for metastatic disease due to neoplasm in LLL, rectal and bone lesions. No additional intervention at this time, pt is to follow up outpt GI and outpt Heme. Pt also found to have bilateral pleural effusions on CT, currently asymptomatic on room air. Pt is medically optimized for discharge, pending placement for [facility].

## 2022-07-23 LAB
HCT VFR BLD CALC: 26.7 % — LOW (ref 34.5–45)
HGB BLD-MCNC: 8.1 G/DL — LOW (ref 11.5–15.5)
MCHC RBC-ENTMCNC: 25.3 PG — LOW (ref 27–34)
MCHC RBC-ENTMCNC: 30.3 GM/DL — LOW (ref 32–36)
MCV RBC AUTO: 83.4 FL — SIGNIFICANT CHANGE UP (ref 80–100)
NRBC # BLD: 0 /100 WBCS — SIGNIFICANT CHANGE UP (ref 0–0)
PLATELET # BLD AUTO: 152 K/UL — SIGNIFICANT CHANGE UP (ref 150–400)
RBC # BLD: 3.2 M/UL — LOW (ref 3.8–5.2)
RBC # FLD: 23.1 % — HIGH (ref 10.3–14.5)
WBC # BLD: 7.27 K/UL — SIGNIFICANT CHANGE UP (ref 3.8–10.5)
WBC # FLD AUTO: 7.27 K/UL — SIGNIFICANT CHANGE UP (ref 3.8–10.5)

## 2022-07-23 RX ADMIN — AMPICILLIN SODIUM AND SULBACTAM SODIUM 100 GRAM(S): 250; 125 INJECTION, POWDER, FOR SUSPENSION INTRAMUSCULAR; INTRAVENOUS at 00:43

## 2022-07-23 RX ADMIN — Medication 500 MILLIGRAM(S): at 19:02

## 2022-07-23 RX ADMIN — RISPERIDONE 0.5 MILLIGRAM(S): 4 TABLET ORAL at 12:28

## 2022-07-23 RX ADMIN — Medication 650 MILLIGRAM(S): at 19:18

## 2022-07-23 RX ADMIN — AMPICILLIN SODIUM AND SULBACTAM SODIUM 100 GRAM(S): 250; 125 INJECTION, POWDER, FOR SUSPENSION INTRAMUSCULAR; INTRAVENOUS at 06:13

## 2022-07-23 RX ADMIN — ATORVASTATIN CALCIUM 10 MILLIGRAM(S): 80 TABLET, FILM COATED ORAL at 21:15

## 2022-07-23 RX ADMIN — Medication 0.4 MILLIGRAM(S): at 12:29

## 2022-07-23 RX ADMIN — Medication 650 MILLIGRAM(S): at 06:16

## 2022-07-23 RX ADMIN — Medication 1 TABLET(S): at 12:29

## 2022-07-23 RX ADMIN — SERTRALINE 100 MILLIGRAM(S): 25 TABLET, FILM COATED ORAL at 12:29

## 2022-07-23 RX ADMIN — AMPICILLIN SODIUM AND SULBACTAM SODIUM 100 GRAM(S): 250; 125 INJECTION, POWDER, FOR SUSPENSION INTRAMUSCULAR; INTRAVENOUS at 12:28

## 2022-07-23 RX ADMIN — Medication 650 MILLIGRAM(S): at 20:16

## 2022-07-23 RX ADMIN — AMPICILLIN SODIUM AND SULBACTAM SODIUM 100 GRAM(S): 250; 125 INJECTION, POWDER, FOR SUSPENSION INTRAMUSCULAR; INTRAVENOUS at 19:02

## 2022-07-23 RX ADMIN — Medication 500 MILLIGRAM(S): at 06:14

## 2022-07-23 RX ADMIN — IRON SUCROSE 110 MILLIGRAM(S): 20 INJECTION, SOLUTION INTRAVENOUS at 17:15

## 2022-07-23 RX ADMIN — AMPICILLIN SODIUM AND SULBACTAM SODIUM 100 GRAM(S): 250; 125 INJECTION, POWDER, FOR SUSPENSION INTRAMUSCULAR; INTRAVENOUS at 23:03

## 2022-07-23 NOTE — PROGRESS NOTE ADULT - SUBJECTIVE AND OBJECTIVE BOX
PATIENT SEEN AND EXAMINED ON :- 7/23/22  DATE OF SERVICE:   7/23/22          Interim events noted,Labs ,Radiological studies and Cardiology tests reviewed .       HOSPITAL COURSE: HPI:  Patient is a 86 yo F, who lives at home alone, ambulatory at baseline, PMH of anemia, insomnia, VEENA, long standing Hx of generalized weakness complicated by a fall 4 years ago, treated with rehab, and >year long hx of insidious onset, nontender  right breast "infection" who comes in today, referred by her PCP during routine home visit, due to low hgb and for examination of breast infection, that she reports has been recently bleeding.    Patient herself endorses, weakness that is chronic, as well as right sided arm paresthesias and occasional stinging sensation on the side of the right chest, but denies any  chest pain, fevers, chills, shortness of breath, abdominal pain, nausea or vomiting.    In the ED: patient was  a-febrile, HD stable and on room air  Exam: remarkable for a 6cm lymph node/ swelling in the right arm, as well as a 2cm deep, 4cm wide purulent induration on right breast area, with no nipple visible, protruding glandular tissue, with surrounding erythema and shallower induration that extends to the pectoral region and is associated with some possible rib exposure.    Labs: remarkable for hgb 3, no white count    Patient received 2 units of blood and was admitted for symptomatic anemia and right breast malignancy concern   (18 Jul 2022 15:02)      INTERIM EVENTS:Patient seen at bedside ,interim events noted.      PMH -reviewed admission note, no change since admission  HEART FAILURE: Acute[ ]Chronic[ ] Systolic[ ] Diastolic[ ] Combined Systolic and Diastolic[ ]  CAD[ ] CABG[ ] PCI[ ]  DEVICES[ ] PPM[ ] ICD[ ] ILR[ ]  ATRIAL FIBRILLATION[ ] Paroxysmal[ ] Permanent[ ] CHADS2-[  ]  KAMRAN[ ] CKD1[ ] CKD2[ ] CKD3[ ] CKD4[ ] ESRD[ ]  COPD[ ] HTN[ ]   DM[ ] Type1[ ] Type 2[ ]   CVA[ ] Paresis[ ]    AMBULATION: Assisted[ ] Cane/walker[ ] Independent[ ]    MEDICATIONS  (STANDING):  ampicillin/sulbactam  IVPB      ampicillin/sulbactam  IVPB 1.5 Gram(s) IV Intermittent every 6 hours  ascorbic acid 500 milliGRAM(s) Oral two times a day  atorvastatin 10 milliGRAM(s) Oral at bedtime  folic acid Injectable 0.4 milliGRAM(s) IV Push daily  iron sucrose IVPB 200 milliGRAM(s) IV Intermittent every 24 hours  multivitamin/minerals 1 Tablet(s) Oral daily  risperiDONE   Tablet 0.5 milliGRAM(s) Oral daily  sertraline 100 milliGRAM(s) Oral daily    MEDICATIONS  (PRN):  acetaminophen     Tablet .. 650 milliGRAM(s) Oral every 6 hours PRN Temp greater or equal to 38C (100.4F), Mild Pain (1 - 3), Moderate Pain (4 - 6)            REVIEW OF SYSTEMS:  Constitutional: [ ] fever, [ ]weight loss,  [ ]fatigue [ ]weight gain  Eyes: [ ] visual changes  Respiratory: [ ]shortness of breath;  [ ] cough, [ ]wheezing, [ ]chills, [ ]hemoptysis  Cardiovascular: [ ] chest pain, [ ]palpitations, [ ]dizziness,  [ ]leg swelling[ ]orthopnea[ ]PND  Gastrointestinal: [ ] abdominal pain, [ ]nausea, [ ]vomiting,  [ ]diarrhea [ ]Constipation [ ]Melena  Genitourinary: [ ] dysuria, [ ] hematuria [ ]Powell  Neurologic: [ ] headaches [ ] tremors[ ]weakness [ ]Paralysis Right[ ] Left[ ]  Skin: [ ] itching, [ ]burning, [ ] rashes  Endocrine: [ ] heat or cold intolerance  Musculoskeletal: [ ] joint pain or swelling; [ ] muscle, back, or extremity pain  Psychiatric: [ ] depression, [ ]anxiety, [ ]mood swings, or [ ]difficulty sleeping  Hematologic: [ ] easy bruising, [ ] bleeding gums    [ ] All remaining systems negative except as per above.   [ ]Unable to obtain.  [x] No change in ROS since admission      Vital Signs Last 24 Hrs  T(C): 36.9 (23 Jul 2022 05:01), Max: 36.9 (23 Jul 2022 05:01)  T(F): 98.4 (23 Jul 2022 05:01), Max: 98.4 (23 Jul 2022 05:01)  HR: 66 (23 Jul 2022 05:01) (66 - 77)  BP: 153/74 (23 Jul 2022 05:01) (115/62 - 153/74)  BP(mean): --  RR: 17 (23 Jul 2022 05:01) (11 - 17)  SpO2: 99% (23 Jul 2022 05:01) (99% - 99%)    Parameters below as of 23 Jul 2022 05:01  Patient On (Oxygen Delivery Method): room air      I&O's Summary      PHYSICAL EXAM:  General: No acute distress BMI-  HEENT: EOMI, PERRL  Neck: Supple, [ ] JVD  Lungs: Equal air entry bilaterally; [ ] rales [ ] wheezing [ ] rhonchi  Heart: Regular rate and rhythm; [x ] murmur   2/6 [ x] systolic [ ] diastolic [ ] radiation[ ] rubs [ ]  gallops  Abdomen: Nontender, bowel sounds present  Extremities: No clubbing, cyanosis, [ ] edema [ ]Pulses  equal and intact  Nervous system:  Alert & Oriented X3, no focal deficits  Psychiatric: Normal affect  Skin: No rashes or lesions    LABS:  07-22    138  |  109<H>  |  15  ----------------------------<  94  4.1   |  22  |  0.39<L>    Ca    9.3      22 Jul 2022 07:03    TPro  4.9<L>  /  Alb  2.5<L>  /  TBili  0.6  /  DBili  x   /  AST  7<L>  /  ALT  10  /  AlkPhos  58  07-22    Creatinine Trend: 0.39<--, 0.39<--, 0.35<--, 0.51<--, 0.46<--                        8.1    7.27  )-----------( 152      ( 23 Jul 2022 05:59 )             26.7

## 2022-07-23 NOTE — CHART NOTE - NSCHARTNOTEFT_GEN_A_CORE
EVENT: Called to assess pt for rt breast oozing    OBJECTIVE:  Vital Signs Last 24 Hrs  T(C): 36.9 (23 Jul 2022 21:05), Max: 36.9 (23 Jul 2022 05:01)  T(F): 98.5 (23 Jul 2022 21:05), Max: 98.5 (23 Jul 2022 21:05)  HR: 81 (23 Jul 2022 21:05) (66 - 81)  BP: 129/64 (23 Jul 2022 21:05) (94/50 - 153/74)  BP(mean): --  RR: 17 (23 Jul 2022 21:05) (16 - 17)  SpO2: 98% (23 Jul 2022 21:05) (98% - 99%)    Parameters below as of 23 Jul 2022 21:05  Patient On (Oxygen Delivery Method): room air        FOCUSED PHYSICAL EXAM:    LABS:                        8.1    7.27  )-----------( 152      ( 23 Jul 2022 05:59 )             26.7     07-22    138  |  109<H>  |  15  ----------------------------<  94  4.1   |  22  |  0.39<L>    Ca    9.3      22 Jul 2022 07:03    TPro  4.9<L>  /  Alb  2.5<L>  /  TBili  0.6  /  DBili  x   /  AST  7<L>  /  ALT  10  /  AlkPhos  58  07-22      EKG:   IMGAGING:    ASSESSMENT:  HPI:  Patient is a 86 yo F, who lives at home alone, ambulatory at baseline, PMH of anemia, insomnia, VEENA, long standing Hx of generalized weakness complicated by a fall 4 years ago, treated with rehab, and >year long hx of insidious onset, nontender  right breast "infection" who comes in today, referred by her PCP during routine home visit, due to low hgb and for examination of breast infection, that she reports has been recently bleeding.    Patient herself endorses, weakness that is chronic, as well as right sided arm paresthesias and occasional stinging sensation on the side of the right chest, but denies any  chest pain, fevers, chills, shortness of breath, abdominal pain, nausea or vomiting.    In the ED: patient was  a-febrile, HD stable and on room air  Exam: remarkable for a 6cm lymph node/ swelling in the right arm, as well as a 2cm deep, 4cm wide purulent induration on right breast area, with no nipple visible, protruding glandular tissue, with surrounding erythema and shallower induration that extends to the pectoral region and is associated with some possible rib exposure.    Labs: remarkable for hgb 3, no white count    Patient received 2 units of blood and was admitted for symptomatic anemia and right breast malignancy concern   (18 Jul 2022 15:02)      PLAN:     FOLLOW UP / RESULT: EVENT: Called to assess pt for rt breast oozing bloody  drainage    BRIEF HPI: 87y old  Female who presents with a chief complaint of symptomatic anemia and right breast infection. S/p bedside biopsy of right breast mass 7/20 with daily dressing change ordered.. Seen by pt rec rehabilitation facility; [facility].    OBJECTIVE:  Vital Signs Last 24 Hrs  T(C): 36.9 (23 Jul 2022 21:05), Max: 36.9 (23 Jul 2022 05:01)  T(F): 98.5 (23 Jul 2022 21:05), Max: 98.5 (23 Jul 2022 21:05)  HR: 81 (23 Jul 2022 21:05) (66 - 81)  BP: 129/64 (23 Jul 2022 21:05) (94/50 - 153/74)  BP(mean): --  RR: 17 (23 Jul 2022 21:05) (16 - 17)  SpO2: 98% (23 Jul 2022 21:05) (98% - 99%)    Parameters below as of 23 Jul 2022 21:05  Patient On (Oxygen Delivery Method): room air    FOCUSED PHYSICAL EXAM:  CV: Rt breast oozing bloody drainage  RESP: Even, unlabored  CV: S1 S2  NEURO: Alert, oriented X 3  LABS:                        8.1    7.27  )-----------( 152      ( 23 Jul 2022 05:59 )             26.7     07-22    138  |  109<H>  |  15  ----------------------------<  94  4.1   |  22  |  0.39<L>    Ca    9.3      22 Jul 2022 07:03    TPro  4.9<L>  /  Alb  2.5<L>  /  TBili  0.6  /  DBili  x   /  AST  7<L>  /  ALT  10  /  AlkPhos  58  07-22     IMAGING:  ACC: [id number] EXAM:  CT CHEST IC                        PROCEDURE DATE:  07/18/2022    INTERPRETATION:  Clinical information: Evaluate for malignancy. Exam is   compared to previous study of 9/17/2018.  CT scan of the chest was obtained following administration of intravenous   contrast. Approximately 40 cc of Omnipaque 350 was administered and 60 cc   was discarded.  Low-attenuation lesions are noted within the right lobe of the thyroid   gland.  Large heterogeneous mass containing droplets of air is noted within the   right breast. The mass is extending and involving the skin of the   anterior chest wall as well as the anterior right fourth and fifth ribs   with resultant destruction.  Few small lymph nodes are present in the pretracheal space.  Heart is enlarged in size. Calcification of the aortic valve and the   coronary arteries is noted. No pericardial effusion is noted.  No endobronchial lesions are noted. Several nodules are noted within both   lungs. Largest nodule is noted in the left lower lobe and measures 1 cm.   Small right pleural effusion is noted.  Below the diaphragm, visualized portions of the abdomen demonstrate   partially visualized thick walled gallbladder.  Degenerative changes of the spine are noted. Partial destruction of the   anterior right fourth and fifth ribs from extension of right breast   lesion into the ribs.  IMPRESSION: Large mass is noted in the right breast as described above.   Primary consideration is breast carcinoma.  Findings suggestive of bilateral pulmonary metastasis.  Small right pleural effusion.    PROBLEM: Rt chest oozing due to breast carcinoma.  PLAN:   1. Pressure dressing applied  2. GOC discussion    FOLLOW UP / RESULT: CBC AM EVENT: Called to assess pt for rt breast oozing bloody  drainage    BRIEF HPI: 87y old  Female who presents with a chief complaint of symptomatic anemia and right breast infection. S/p bedside biopsy of right breast mass 7/20 with daily dressing change ordered.. Seen by pt rec rehabilitation facility; [facility].    OBJECTIVE:  Vital Signs Last 24 Hrs  T(C): 36.9 (23 Jul 2022 21:05), Max: 36.9 (23 Jul 2022 05:01)  T(F): 98.5 (23 Jul 2022 21:05), Max: 98.5 (23 Jul 2022 21:05)  HR: 81 (23 Jul 2022 21:05) (66 - 81)  BP: 129/64 (23 Jul 2022 21:05) (94/50 - 153/74)  BP(mean): --  RR: 17 (23 Jul 2022 21:05) (16 - 17)  SpO2: 98% (23 Jul 2022 21:05) (98% - 99%)    Parameters below as of 23 Jul 2022 21:05  Patient On (Oxygen Delivery Method): room air    FOCUSED PHYSICAL EXAM:  CV: Rt breast oozing bloody drainage  RESP: Even, unlabored  CV: S1 S2  NEURO: Alert, oriented X 3  LABS:                        8.1    7.27  )-----------( 152      ( 23 Jul 2022 05:59 )             26.7     07-22    138  |  109<H>  |  15  ----------------------------<  94  4.1   |  22  |  0.39<L>    Ca    9.3      22 Jul 2022 07:03    TPro  4.9<L>  /  Alb  2.5<L>  /  TBili  0.6  /  DBili  x   /  AST  7<L>  /  ALT  10  /  AlkPhos  58  07-22     IMAGING:  ACC: [id number] EXAM:  CT CHEST IC                        PROCEDURE DATE:  07/18/2022    INTERPRETATION:  Clinical information: Evaluate for malignancy. Exam is   compared to previous study of 9/17/2018.  CT scan of the chest was obtained following administration of intravenous   contrast. Approximately 40 cc of Omnipaque 350 was administered and 60 cc   was discarded.  Low-attenuation lesions are noted within the right lobe of the thyroid   gland.  Large heterogeneous mass containing droplets of air is noted within the   right breast. The mass is extending and involving the skin of the   anterior chest wall as well as the anterior right fourth and fifth ribs   with resultant destruction.  Few small lymph nodes are present in the pretracheal space.  Heart is enlarged in size. Calcification of the aortic valve and the   coronary arteries is noted. No pericardial effusion is noted.  No endobronchial lesions are noted. Several nodules are noted within both   lungs. Largest nodule is noted in the left lower lobe and measures 1 cm.   Small right pleural effusion is noted.  Below the diaphragm, visualized portions of the abdomen demonstrate   partially visualized thick walled gallbladder.  Degenerative changes of the spine are noted. Partial destruction of the   anterior right fourth and fifth ribs from extension of right breast   lesion into the ribs.  IMPRESSION: Large mass is noted in the right breast as described above.   Primary consideration is breast carcinoma.  Findings suggestive of bilateral pulmonary metastasis.  Small right pleural effusion.    PROBLEM: Rt chest oozing due to breast carcinoma.  PLAN:   1. Pressure dressing applied  2. GOC - full code    FOLLOW UP / RESULT: CBC AM

## 2022-07-24 RX ADMIN — AMPICILLIN SODIUM AND SULBACTAM SODIUM 100 GRAM(S): 250; 125 INJECTION, POWDER, FOR SUSPENSION INTRAMUSCULAR; INTRAVENOUS at 23:33

## 2022-07-24 RX ADMIN — Medication 500 MILLIGRAM(S): at 06:34

## 2022-07-24 RX ADMIN — AMPICILLIN SODIUM AND SULBACTAM SODIUM 100 GRAM(S): 250; 125 INJECTION, POWDER, FOR SUSPENSION INTRAMUSCULAR; INTRAVENOUS at 06:34

## 2022-07-24 RX ADMIN — AMPICILLIN SODIUM AND SULBACTAM SODIUM 100 GRAM(S): 250; 125 INJECTION, POWDER, FOR SUSPENSION INTRAMUSCULAR; INTRAVENOUS at 18:10

## 2022-07-24 RX ADMIN — Medication 500 MILLIGRAM(S): at 18:10

## 2022-07-24 RX ADMIN — RISPERIDONE 0.5 MILLIGRAM(S): 4 TABLET ORAL at 11:51

## 2022-07-24 RX ADMIN — AMPICILLIN SODIUM AND SULBACTAM SODIUM 100 GRAM(S): 250; 125 INJECTION, POWDER, FOR SUSPENSION INTRAMUSCULAR; INTRAVENOUS at 11:51

## 2022-07-24 RX ADMIN — ATORVASTATIN CALCIUM 10 MILLIGRAM(S): 80 TABLET, FILM COATED ORAL at 22:18

## 2022-07-24 RX ADMIN — SERTRALINE 100 MILLIGRAM(S): 25 TABLET, FILM COATED ORAL at 11:51

## 2022-07-24 RX ADMIN — IRON SUCROSE 110 MILLIGRAM(S): 20 INJECTION, SOLUTION INTRAVENOUS at 15:51

## 2022-07-24 RX ADMIN — Medication 0.4 MILLIGRAM(S): at 11:52

## 2022-07-24 RX ADMIN — Medication 1 TABLET(S): at 11:51

## 2022-07-24 NOTE — PROGRESS NOTE ADULT - ASSESSMENT
SHAVONNE MONTALVO is a 87y Female who presents with a chief complaint of anemia.    Anemia  - Patient presents with critical anemia with hemoglobin 3.2;   -hgb better 8.5 today post transfusion  - Monitor CBC and transfuse to maintain hemoglobin above 7.  -would add folic acid 400 mcg daily  iron def, will give venofer  she denies any abdominal pain or GIB  but she said blood gushed out from breast recently    Breast Lesion  - Suspect malignancy given appearance as well as CT chest finding.  -f/u biopsy  results    Pulmonary Nodule  1.3 x 1.3 cm slightly irregular pulmonary nodule in the left lower lobe   which may represent a metastatic lesion versus primary neoplasm.   outpatient bx?     Rectal Lesion  1.6 x 1.6 cm lesion in the anterior rectum raising concern for a large   polyp or neoplasm.  Patient should have colonoscopy.  send cea      will follow    tony beasley md  hematology/oncology  [phone]

## 2022-07-24 NOTE — PROGRESS NOTE ADULT - SUBJECTIVE AND OBJECTIVE BOX
PATIENT SEEN AND EXAMINED ON :- 7/24/22  DATE OF SERVICE: 7/24/22            Interim events noted,Labs ,Radiological studies and Cardiology tests reviewed .    DATE OF SERVICE: 07-24-22 @ 13:54  Patient was seen and examined on    07-24-22 @ 13:54 .Interim events noted.Consultant notes ,Labs,Telemetry reviewed by me       HOSPITAL COURSE: HPI:  Patient is a 88 yo F, who lives at home alone, ambulatory at baseline, PMH of anemia, insomnia, VEENA, long standing Hx of generalized weakness complicated by a fall 4 years ago, treated with rehab, and >year long hx of insidious onset, nontender  right breast "infection" who comes in today, referred by her PCP during routine home visit, due to low hgb and for examination of breast infection, that she reports has been recently bleeding.    Patient herself endorses, weakness that is chronic, as well as right sided arm paresthesias and occasional stinging sensation on the side of the right chest, but denies any  chest pain, fevers, chills, shortness of breath, abdominal pain, nausea or vomiting.    In the ED: patient was  a-febrile, HD stable and on room air  Exam: remarkable for a 6cm lymph node/ swelling in the right arm, as well as a 2cm deep, 4cm wide purulent induration on right breast area, with no nipple visible, protruding glandular tissue, with surrounding erythema and shallower induration that extends to the pectoral region and is associated with some possible rib exposure.    Labs: remarkable for hgb 3, no white count    Patient received 2 units of blood and was admitted for symptomatic anemia and right breast malignancy concern   (18 Jul 2022 15:02)      INTERIM EVENTS:Patient seen at bedside ,interim events noted.      PMH -reviewed admission note, no change since admission  HEART FAILURE: Acute[ ]Chronic[ ] Systolic[ ] Diastolic[ ] Combined Systolic and Diastolic[ ]  CAD[ ] CABG[ ] PCI[ ]  DEVICES[ ] PPM[ ] ICD[ ] ILR[ ]  ATRIAL FIBRILLATION[ ] Paroxysmal[ ] Permanent[ ] CHADS2-[  ]  KAMRAN[ ] CKD1[ ] CKD2[ ] CKD3[ ] CKD4[ ] ESRD[ ]  COPD[ ] HTN[ ]   DM[ ] Type1[ ] Type 2[ ]   CVA[ ] Paresis[ ]    AMBULATION: Assisted[ ] Cane/walker[ ] Independent[ ]    MEDICATIONS  (STANDING):  ampicillin/sulbactam  IVPB      ampicillin/sulbactam  IVPB 1.5 Gram(s) IV Intermittent every 6 hours  ascorbic acid 500 milliGRAM(s) Oral two times a day  atorvastatin 10 milliGRAM(s) Oral at bedtime  folic acid Injectable 0.4 milliGRAM(s) IV Push daily  iron sucrose IVPB 200 milliGRAM(s) IV Intermittent every 24 hours  multivitamin/minerals 1 Tablet(s) Oral daily  risperiDONE   Tablet 0.5 milliGRAM(s) Oral daily  sertraline 100 milliGRAM(s) Oral daily    MEDICATIONS  (PRN):  acetaminophen     Tablet .. 650 milliGRAM(s) Oral every 6 hours PRN Temp greater or equal to 38C (100.4F), Mild Pain (1 - 3), Moderate Pain (4 - 6)            REVIEW OF SYSTEMS:  Constitutional: [ ] fever, [ ]weight loss,  [ ]fatigue [ ]weight gain  Eyes: [ ] visual changes  Respiratory: [ ]shortness of breath;  [ ] cough, [ ]wheezing, [ ]chills, [ ]hemoptysis  Cardiovascular: [ ] chest pain, [ ]palpitations, [ ]dizziness,  [ ]leg swelling[ ]orthopnea[ ]PND  Gastrointestinal: [ ] abdominal pain, [ ]nausea, [ ]vomiting,  [ ]diarrhea [ ]Constipation [ ]Melena  Genitourinary: [ ] dysuria, [ ] hematuria [ ]Powell  Neurologic: [ ] headaches [ ] tremors[ ]weakness [ ]Paralysis Right[ ] Left[ ]  Skin: [ ] itching, [ ]burning, [ ] rashes  Endocrine: [ ] heat or cold intolerance  Musculoskeletal: [ ] joint pain or swelling; [ ] muscle, back, or extremity pain  Psychiatric: [ ] depression, [ ]anxiety, [ ]mood swings, or [ ]difficulty sleeping  Hematologic: [ ] easy bruising, [ ] bleeding gums    [ ] All remaining systems negative except as per above.   [ ]Unable to obtain.  [x] No change in ROS since admission      Vital Signs Last 24 Hrs  T(C): 37.2 (24 Jul 2022 05:00), Max: 37.2 (24 Jul 2022 05:00)  T(F): 98.9 (24 Jul 2022 05:00), Max: 98.9 (24 Jul 2022 05:00)  HR: 71 (24 Jul 2022 05:00) (71 - 81)  BP: 151/73 (24 Jul 2022 05:00) (94/50 - 151/73)  BP(mean): --  RR: 18 (24 Jul 2022 05:00) (16 - 18)  SpO2: 98% (24 Jul 2022 05:00) (98% - 99%)    Parameters below as of 24 Jul 2022 05:00  Patient On (Oxygen Delivery Method): room air      I&O's Summary    23 Jul 2022 07:01  -  24 Jul 2022 07:00  --------------------------------------------------------  IN: 0 mL / OUT: 1 mL / NET: -1 mL        PHYSICAL EXAM:  General: No acute distress BMI-  HEENT: EOMI, PERRL  Neck: Supple, [ ] JVD  Lungs: Equal air entry bilaterally; [ ] rales [ ] wheezing [ ] rhonchi  Heart: Regular rate and rhythm; [x ] murmur   2/6 [ x] systolic [ ] diastolic [ ] radiation[ ] rubs [ ]  gallops  Abdomen: Nontender, bowel sounds present  Extremities: No clubbing, cyanosis, [ ] edema [ ]Pulses  equal and intact  Nervous system:  Alert & Oriented X3, no focal deficits  Psychiatric: Normal affect  Skin: No rashes or lesions    LABS:        Creatinine Trend: 0.39<--, 0.39<--, 0.35<--, 0.51<--, 0.46<--                        8.1    7.27  )-----------( 152      ( 23 Jul 2022 05:59 )             26.7

## 2022-07-24 NOTE — PROGRESS NOTE ADULT - ASSESSMENT
86 y/o F, who lives at home alone, ambulatory at baseline, PMH of anemia, insomnia, General anxiety disorder, and >year long hx of insidious onset, nontender  right breast "infection" who comes in today, referred by her PCP during routine home visit, due to low hgb and for examination of breast infection, that she reports has been recently bleeding. Found to have a hemoglobin 3.2, transfused 2units PRBC  and admitted to medicine for anemia and breast infection.  CT chest show concern for right breast carcinoma s/p bedside biopsy on 7/20 by surgery team, Hem/onc and surgery following, Wound care also following. Cardiology consulted for symptomatic anemia, recommended Iron study and to transfuse if hemoglobulin drops below 7. s/p 1 unit prbc tranfused on 7/20. CT abd/pelv suspicious for metastatic disease due to neoplasm in LLL, rectal and bone lesions. No additional intervention at this time, pt is to follow up outpt GI and outpt Heme. Pt also found to have bilateral pleural effusions on CT, currently asymptomatic on room air. Pt is medically optimized for discharge, pending placement for [facility].

## 2022-07-24 NOTE — PROGRESS NOTE ADULT - SUBJECTIVE AND OBJECTIVE BOX
Patient is a 87y old  Female who presents with a chief complaint of symptomatic anemia and right breast infection (24 Jul 2022 13:54)      MEDICATIONS  (STANDING):  ampicillin/sulbactam  IVPB      ampicillin/sulbactam  IVPB 1.5 Gram(s) IV Intermittent every 6 hours  ascorbic acid 500 milliGRAM(s) Oral two times a day  atorvastatin 10 milliGRAM(s) Oral at bedtime  folic acid Injectable 0.4 milliGRAM(s) IV Push daily  multivitamin/minerals 1 Tablet(s) Oral daily  risperiDONE   Tablet 0.5 milliGRAM(s) Oral daily  sertraline 100 milliGRAM(s) Oral daily    MEDICATIONS  (PRN):  acetaminophen     Tablet .. 650 milliGRAM(s) Oral every 6 hours PRN Temp greater or equal to 38C (100.4F), Mild Pain (1 - 3), Moderate Pain (4 - 6)      ROS  No fever, sweats, chills  No epistaxis, HA, sore throat  No CP, SOB, cough, sputum  No n/v/d, abd pain, melena, hematochezia  No edema  No rash  No anxiety  No back pain, joint pain  No bleeding, bruising  No dysuria, hematuria    Vital Signs Last 24 Hrs  T(C): 36.8 (24 Jul 2022 14:07), Max: 37.2 (24 Jul 2022 05:00)  T(F): 98.3 (24 Jul 2022 14:07), Max: 98.9 (24 Jul 2022 05:00)  HR: 75 (24 Jul 2022 14:07) (71 - 81)  BP: 117/57 (24 Jul 2022 14:07) (117/57 - 151/73)  BP(mean): --  RR: 16 (24 Jul 2022 14:07) (16 - 18)  SpO2: 98% (24 Jul 2022 14:07) (98% - 98%)    Parameters below as of 24 Jul 2022 14:07  Patient On (Oxygen Delivery Method): room air        PE  NAD  Awake, alert  Anicteric, MMM  RRR  CTAB  Abd soft, NT, ND  No c/c/e  No rash grossly  FROM                          8.1    7.27  )-----------( 152      ( 23 Jul 2022 05:59 )             26.7

## 2022-07-25 LAB
ANION GAP SERPL CALC-SCNC: 7 MMOL/L — SIGNIFICANT CHANGE UP (ref 5–17)
BUN SERPL-MCNC: 16 MG/DL — SIGNIFICANT CHANGE UP (ref 7–18)
CALCIUM SERPL-MCNC: 9.6 MG/DL — SIGNIFICANT CHANGE UP (ref 8.4–10.5)
CHLORIDE SERPL-SCNC: 109 MMOL/L — HIGH (ref 96–108)
CO2 SERPL-SCNC: 24 MMOL/L — SIGNIFICANT CHANGE UP (ref 22–31)
CREAT SERPL-MCNC: 0.39 MG/DL — LOW (ref 0.5–1.3)
EGFR: 97 ML/MIN/1.73M2 — SIGNIFICANT CHANGE UP
GLUCOSE SERPL-MCNC: 89 MG/DL — SIGNIFICANT CHANGE UP (ref 70–99)
HCT VFR BLD CALC: 27 % — LOW (ref 34.5–45)
HGB BLD-MCNC: 8.1 G/DL — LOW (ref 11.5–15.5)
MCHC RBC-ENTMCNC: 25.4 PG — LOW (ref 27–34)
MCHC RBC-ENTMCNC: 30 GM/DL — LOW (ref 32–36)
MCV RBC AUTO: 84.6 FL — SIGNIFICANT CHANGE UP (ref 80–100)
NRBC # BLD: 0 /100 WBCS — SIGNIFICANT CHANGE UP (ref 0–0)
PLATELET # BLD AUTO: 137 K/UL — LOW (ref 150–400)
POTASSIUM SERPL-MCNC: 4.1 MMOL/L — SIGNIFICANT CHANGE UP (ref 3.5–5.3)
POTASSIUM SERPL-SCNC: 4.1 MMOL/L — SIGNIFICANT CHANGE UP (ref 3.5–5.3)
RBC # BLD: 3.19 M/UL — LOW (ref 3.8–5.2)
RBC # FLD: 24.7 % — HIGH (ref 10.3–14.5)
SODIUM SERPL-SCNC: 140 MMOL/L — SIGNIFICANT CHANGE UP (ref 135–145)
WBC # BLD: 6.48 K/UL — SIGNIFICANT CHANGE UP (ref 3.8–10.5)
WBC # FLD AUTO: 6.48 K/UL — SIGNIFICANT CHANGE UP (ref 3.8–10.5)

## 2022-07-25 RX ORDER — FOLIC ACID 0.8 MG
1 TABLET ORAL
Qty: 30 | Refills: 0
Start: 2022-07-25 | End: 2022-08-23

## 2022-07-25 RX ORDER — ASCORBIC ACID 60 MG
1 TABLET,CHEWABLE ORAL
Qty: 60 | Refills: 0
Start: 2022-07-25 | End: 2022-08-23

## 2022-07-25 RX ADMIN — AMPICILLIN SODIUM AND SULBACTAM SODIUM 100 GRAM(S): 250; 125 INJECTION, POWDER, FOR SUSPENSION INTRAMUSCULAR; INTRAVENOUS at 06:09

## 2022-07-25 RX ADMIN — Medication 500 MILLIGRAM(S): at 18:09

## 2022-07-25 RX ADMIN — Medication 500 MILLIGRAM(S): at 06:08

## 2022-07-25 RX ADMIN — RISPERIDONE 0.5 MILLIGRAM(S): 4 TABLET ORAL at 12:43

## 2022-07-25 RX ADMIN — Medication 1 TABLET(S): at 12:43

## 2022-07-25 RX ADMIN — ATORVASTATIN CALCIUM 10 MILLIGRAM(S): 80 TABLET, FILM COATED ORAL at 22:12

## 2022-07-25 RX ADMIN — SERTRALINE 100 MILLIGRAM(S): 25 TABLET, FILM COATED ORAL at 12:43

## 2022-07-25 RX ADMIN — Medication 0.4 MILLIGRAM(S): at 14:15

## 2022-07-25 NOTE — PROGRESS NOTE ADULT - SUBJECTIVE AND OBJECTIVE BOX
NP Note discussed with  primary attending    Patient is a 87y old  Female who presents with a chief complaint of symptomatic anemia and right breast infection (24 Jul 2022 16:44)      INTERVAL HPI/OVERNIGHT EVENTS: no new complaints    MEDICATIONS  (STANDING):  ascorbic acid 500 milliGRAM(s) Oral two times a day  atorvastatin 10 milliGRAM(s) Oral at bedtime  folic acid Injectable 0.4 milliGRAM(s) IV Push daily  multivitamin/minerals 1 Tablet(s) Oral daily  risperiDONE   Tablet 0.5 milliGRAM(s) Oral daily  sertraline 100 milliGRAM(s) Oral daily    MEDICATIONS  (PRN):  acetaminophen     Tablet .. 650 milliGRAM(s) Oral every 6 hours PRN Temp greater or equal to 38C (100.4F), Mild Pain (1 - 3), Moderate Pain (4 - 6)      __________________________________________________  REVIEW OF SYSTEMS:    CONSTITUTIONAL: No fever,   EYES: no acute visual disturbances  NECK: No pain or stiffness  RESPIRATORY: No cough; No shortness of breath  CARDIOVASCULAR: No chest pain, no palpitations  GASTROINTESTINAL: No pain. No nausea or vomiting; No diarrhea   NEUROLOGICAL: No headache or numbness, no tremors  MUSCULOSKELETAL: No joint pain, no muscle pain  GENITOURINARY: no dysuria, no frequency, no hesitancy  PSYCHIATRY: no depression , no anxiety  ALL OTHER  ROS negative        Vital Signs Last 24 Hrs  T(C): 36.9 (25 Jul 2022 13:46), Max: 37.2 (24 Jul 2022 21:48)  T(F): 98.4 (25 Jul 2022 13:46), Max: 99 (24 Jul 2022 21:48)  HR: 87 (25 Jul 2022 13:46) (77 - 87)  BP: 117/58 (25 Jul 2022 13:46) (117/58 - 137/71)  BP(mean): --  RR: 18 (25 Jul 2022 13:46) (17 - 18)  SpO2: 99% (25 Jul 2022 13:46) (98% - 99%)    Parameters below as of 25 Jul 2022 13:46  Patient On (Oxygen Delivery Method): room air        ________________________________________________  PHYSICAL EXAM:  GENERAL: NAD  HEENT: Normocephalic;  conjunctivae and sclerae clear; moist mucous membranes;   NECK : supple  CHEST/LUNG: Clear to ausculitation bilaterally with good air entry   HEART:   right breat wound, S1 S2  regular; no murmurs, gallops or rubs  ABDOMEN: Soft, Nontender, Nondistended; Bowel sounds present  EXTREMITIES: ,no cyanosis; no edema; no calf tenderness  SKIN: warm and dry; no rash  NERVOUS SYSTEM:  Awake and alert; Oriented  to place, person and time ; no new deficits    _________________________________________________  LABS:                        8.1    6.48  )-----------( 137      ( 25 Jul 2022 06:06 )             27.0     07-25    140  |  109<H>  |  16  ----------------------------<  89  4.1   |  24  |  0.39<L>    Ca    9.6      25 Jul 2022 06:06          CAPILLARY BLOOD GLUCOSE            RADIOLOGY & ADDITIONAL TESTS:    Imaging Personally Reviewed:  YES/NO    Consultant(s) Notes Reviewed:   YES/ No    Care Discussed with Consultants :     Plan of care was discussed with patient and /or primary care giver; all questions and concerns were addressed and care was aligned with patient's wishes.

## 2022-07-25 NOTE — PROGRESS NOTE ADULT - ASSESSMENT
86 y/o F, who lives at home alone, ambulatory at baseline, PMH of anemia, insomnia, General anxiety disorder, and >year long hx of insidious onset, nontender  right breast "infection" who comes in today, referred by her PCP during routine home visit, due to low hgb and for examination of breast infection, that she reports has been recently bleeding. Found to have a hemoglobin 3.2, transfused 2units PRBC  and admitted to medicine for anemia and breast infection.  CT chest show concern for right breast carcinoma s/p bedside biopsy on 7/20 by surgery team, Hem/onc and surgery following, Wound care also following. Cardiology consulted for symptomatic anemia, recommended Iron study and to transfuse if hemoglobulin drops below 7. s/p 1 unit prbc tranfused on 7/20. CT abd/pelv suspicious for metastatic disease due to neoplasm in LLL, rectal and bone lesions. No additional intervention at this time, pt is to follow up outpt GI and outpt Heme. Pt also found to have bilateral pleural effusions on CT, currently asymptomatic on room air. Pt is medically optimized for discharge, pending placement for [facility].   [facility] facility is requiring note from oncologist stating no chemo or further intervention is needed before they can accept pt. CM following.

## 2022-07-25 NOTE — PROGRESS NOTE ADULT - SUBJECTIVE AND OBJECTIVE BOX
PATIENT SEEN AND EXAMINED ON :- 7/25/22  DATE OF SERVICE: 7/25/22            Interim events noted,Labs ,Radiological studies and Cardiology tests reviewed .       HOSPITAL COURSE: HPI:  Patient is a 88 yo F, who lives at home alone, ambulatory at baseline, PMH of anemia, insomnia, VEENA, long standing Hx of generalized weakness complicated by a fall 4 years ago, treated with rehab, and >year long hx of insidious onset, nontender  right breast "infection" who comes in today, referred by her PCP during routine home visit, due to low hgb and for examination of breast infection, that she reports has been recently bleeding.    Patient herself endorses, weakness that is chronic, as well as right sided arm paresthesias and occasional stinging sensation on the side of the right chest, but denies any  chest pain, fevers, chills, shortness of breath, abdominal pain, nausea or vomiting.    In the ED: patient was  a-febrile, HD stable and on room air  Exam: remarkable for a 6cm lymph node/ swelling in the right arm, as well as a 2cm deep, 4cm wide purulent induration on right breast area, with no nipple visible, protruding glandular tissue, with surrounding erythema and shallower induration that extends to the pectoral region and is associated with some possible rib exposure.    Labs: remarkable for hgb 3, no white count    Patient received 2 units of blood and was admitted for symptomatic anemia and right breast malignancy concern   (18 Jul 2022 15:02)      INTERIM EVENTS:Patient seen at bedside ,interim events noted.      PMH -reviewed admission note, no change since admission  HEART FAILURE: Acute[ ]Chronic[ ] Systolic[ ] Diastolic[ ] Combined Systolic and Diastolic[ ]  CAD[ ] CABG[ ] PCI[ ]  DEVICES[ ] PPM[ ] ICD[ ] ILR[ ]  ATRIAL FIBRILLATION[ ] Paroxysmal[ ] Permanent[ ] CHADS2-[  ]  KAMRAN[ ] CKD1[ ] CKD2[ ] CKD3[ ] CKD4[ ] ESRD[ ]  COPD[ ] HTN[ ]   DM[ ] Type1[ ] Type 2[ ]   CVA[ ] Paresis[ ]    AMBULATION: Assisted[ ] Cane/walker[ ] Independent[ ]    MEDICATIONS  (STANDING):  ascorbic acid 500 milliGRAM(s) Oral two times a day  atorvastatin 10 milliGRAM(s) Oral at bedtime  folic acid Injectable 0.4 milliGRAM(s) IV Push daily  multivitamin/minerals 1 Tablet(s) Oral daily  risperiDONE   Tablet 0.5 milliGRAM(s) Oral daily  sertraline 100 milliGRAM(s) Oral daily    MEDICATIONS  (PRN):  acetaminophen     Tablet .. 650 milliGRAM(s) Oral every 6 hours PRN Temp greater or equal to 38C (100.4F), Mild Pain (1 - 3), Moderate Pain (4 - 6)            REVIEW OF SYSTEMS:  Constitutional: [ ] fever, [ ]weight loss,  [ ]fatigue [ ]weight gain  Eyes: [ ] visual changes  Respiratory: [ ]shortness of breath;  [ ] cough, [ ]wheezing, [ ]chills, [ ]hemoptysis  Cardiovascular: [ ] chest pain, [ ]palpitations, [ ]dizziness,  [ ]leg swelling[ ]orthopnea[ ]PND  Gastrointestinal: [ ] abdominal pain, [ ]nausea, [ ]vomiting,  [ ]diarrhea [ ]Constipation [ ]Melena  Genitourinary: [ ] dysuria, [ ] hematuria [ ]Powell  Neurologic: [ ] headaches [ ] tremors[ ]weakness [ ]Paralysis Right[ ] Left[ ]  Skin: [ ] itching, [ ]burning, [ ] rashes  Endocrine: [ ] heat or cold intolerance  Musculoskeletal: [ ] joint pain or swelling; [ ] muscle, back, or extremity pain  Psychiatric: [ ] depression, [ ]anxiety, [ ]mood swings, or [ ]difficulty sleeping  Hematologic: [ ] easy bruising, [ ] bleeding gums    [ ] All remaining systems negative except as per above.   [ ]Unable to obtain.  [x] No change in ROS since admission      Vital Signs Last 24 Hrs  T(C): 36.9 (25 Jul 2022 13:46), Max: 37.2 (24 Jul 2022 21:48)  T(F): 98.4 (25 Jul 2022 13:46), Max: 99 (24 Jul 2022 21:48)  HR: 87 (25 Jul 2022 13:46) (77 - 87)  BP: 117/58 (25 Jul 2022 13:46) (117/58 - 137/71)  BP(mean): --  RR: 18 (25 Jul 2022 13:46) (17 - 18)  SpO2: 99% (25 Jul 2022 13:46) (98% - 99%)    Parameters below as of 25 Jul 2022 13:46  Patient On (Oxygen Delivery Method): room air      I&O's Summary      PHYSICAL EXAM:  General: No acute distress BMI-  HEENT: EOMI, PERRL  Neck: Supple, [ ] JVD  Lungs: Equal air entry bilaterally; [ ] rales [ ] wheezing [ ] rhonchi  Heart: Regular rate and rhythm; [x ] murmur   2/6 [ x] systolic [ ] diastolic [ ] radiation[ ] rubs [ ]  gallops  Abdomen: Nontender, bowel sounds present  Extremities: No clubbing, cyanosis, [ ] edema [ ]Pulses  equal and intact  Nervous system:  Alert & Oriented X3, no focal deficits  Psychiatric: Normal affect  Skin: No rashes or lesions    LABS:  07-25    140  |  109<H>  |  16  ----------------------------<  89  4.1   |  24  |  0.39<L>    Ca    9.6      25 Jul 2022 06:06      Creatinine Trend: 0.39<--, 0.39<--, 0.39<--, 0.35<--, 0.51<--, 0.46<--                        8.1    6.48  )-----------( 137      ( 25 Jul 2022 06:06 )             27.0

## 2022-07-25 NOTE — PROGRESS NOTE ADULT - ASSESSMENT
88 y/o F, who lives at home alone, ambulatory at baseline, PMH of anemia, insomnia, General anxiety disorder, and >year long hx of insidious onset, nontender  right breast "infection" who comes in today, referred by her PCP during routine home visit, due to low hgb and for examination of breast infection, that she reports has been recently bleeding. Found to have a hemoglobin 3.2, transfused 2units PRBC  and admitted to medicine for anemia and breast infection.  CT chest show concern for right breast carcinoma s/p bedside biopsy on 7/20 by surgery team, Hem/onc and surgery following, Wound care also following. Cardiology consulted for symptomatic anemia, recommended Iron study and to transfuse if hemoglobulin drops below 7. s/p 1 unit prbc tranfused on 7/20. CT abd/pelv suspicious for metastatic disease due to neoplasm in LLL, rectal and bone lesions. No additional intervention at this time, pt is to follow up outpt GI and outpt Heme. Pt also found to have bilateral pleural effusions on CT, currently asymptomatic on room air. Pt is medically optimized for discharge, pending placement for [facility].   [facility] facility is requiring note from oncologist stating no chemo or further intervention is needed before they can accept pt. CM following.

## 2022-07-25 NOTE — PROGRESS NOTE ADULT - PROBLEM SELECTOR PLAN 2
CT A/P shows 1.6 x 1.6 cm lesion in the anterior rectum concern for large polyp vs neoplasm  - no further intervention at this time  - follow up outpt GI for colonoscopy 0 = independent

## 2022-07-26 ENCOUNTER — TRANSCRIPTION ENCOUNTER (OUTPATIENT)
Age: 87
End: 2022-07-26

## 2022-07-26 VITALS
HEART RATE: 72 BPM | DIASTOLIC BLOOD PRESSURE: 58 MMHG | SYSTOLIC BLOOD PRESSURE: 111 MMHG | OXYGEN SATURATION: 100 % | TEMPERATURE: 99 F | RESPIRATION RATE: 18 BRPM

## 2022-07-26 LAB
ANION GAP SERPL CALC-SCNC: 7 MMOL/L — SIGNIFICANT CHANGE UP (ref 5–17)
BUN SERPL-MCNC: 19 MG/DL — HIGH (ref 7–18)
CALCIUM SERPL-MCNC: 9.6 MG/DL — SIGNIFICANT CHANGE UP (ref 8.4–10.5)
CANCER AG27-29 SERPL-ACNC: 78.5 U/ML — HIGH (ref 0–38.6)
CHLORIDE SERPL-SCNC: 109 MMOL/L — HIGH (ref 96–108)
CO2 SERPL-SCNC: 24 MMOL/L — SIGNIFICANT CHANGE UP (ref 22–31)
CREAT SERPL-MCNC: 0.44 MG/DL — LOW (ref 0.5–1.3)
EGFR: 94 ML/MIN/1.73M2 — SIGNIFICANT CHANGE UP
GLUCOSE SERPL-MCNC: 87 MG/DL — SIGNIFICANT CHANGE UP (ref 70–99)
HCT VFR BLD CALC: 29.5 % — LOW (ref 34.5–45)
HGB BLD-MCNC: 8.7 G/DL — LOW (ref 11.5–15.5)
MCHC RBC-ENTMCNC: 25.8 PG — LOW (ref 27–34)
MCHC RBC-ENTMCNC: 29.5 GM/DL — LOW (ref 32–36)
MCV RBC AUTO: 87.5 FL — SIGNIFICANT CHANGE UP (ref 80–100)
NRBC # BLD: 0 /100 WBCS — SIGNIFICANT CHANGE UP (ref 0–0)
PLATELET # BLD AUTO: 191 K/UL — SIGNIFICANT CHANGE UP (ref 150–400)
POTASSIUM SERPL-MCNC: 4.3 MMOL/L — SIGNIFICANT CHANGE UP (ref 3.5–5.3)
POTASSIUM SERPL-SCNC: 4.3 MMOL/L — SIGNIFICANT CHANGE UP (ref 3.5–5.3)
RBC # BLD: 3.37 M/UL — LOW (ref 3.8–5.2)
RBC # FLD: 25.3 % — HIGH (ref 10.3–14.5)
SARS-COV-2 RNA SPEC QL NAA+PROBE: SIGNIFICANT CHANGE UP
SODIUM SERPL-SCNC: 140 MMOL/L — SIGNIFICANT CHANGE UP (ref 135–145)
WBC # BLD: 8.81 K/UL — SIGNIFICANT CHANGE UP (ref 3.8–10.5)
WBC # FLD AUTO: 8.81 K/UL — SIGNIFICANT CHANGE UP (ref 3.8–10.5)

## 2022-07-26 PROCEDURE — 80061 LIPID PANEL: CPT

## 2022-07-26 PROCEDURE — 86900 BLOOD TYPING SEROLOGIC ABO: CPT

## 2022-07-26 PROCEDURE — 85027 COMPLETE CBC AUTOMATED: CPT

## 2022-07-26 PROCEDURE — 0225U NFCT DS DNA&RNA 21 SARSCOV2: CPT

## 2022-07-26 PROCEDURE — 86300 IMMUNOASSAY TUMOR CA 15-3: CPT

## 2022-07-26 PROCEDURE — 85610 PROTHROMBIN TIME: CPT

## 2022-07-26 PROCEDURE — 87635 SARS-COV-2 COVID-19 AMP PRB: CPT

## 2022-07-26 PROCEDURE — 82746 ASSAY OF FOLIC ACID SERUM: CPT

## 2022-07-26 PROCEDURE — 86850 RBC ANTIBODY SCREEN: CPT

## 2022-07-26 PROCEDURE — 83615 LACTATE (LD) (LDH) ENZYME: CPT

## 2022-07-26 PROCEDURE — 83010 ASSAY OF HAPTOGLOBIN QUANT: CPT

## 2022-07-26 PROCEDURE — 83550 IRON BINDING TEST: CPT

## 2022-07-26 PROCEDURE — 82607 VITAMIN B-12: CPT

## 2022-07-26 PROCEDURE — 85730 THROMBOPLASTIN TIME PARTIAL: CPT

## 2022-07-26 PROCEDURE — 82784 ASSAY IGA/IGD/IGG/IGM EACH: CPT

## 2022-07-26 PROCEDURE — 74177 CT ABD & PELVIS W/CONTRAST: CPT

## 2022-07-26 PROCEDURE — 86334 IMMUNOFIX E-PHORESIS SERUM: CPT

## 2022-07-26 PROCEDURE — 82378 CARCINOEMBRYONIC ANTIGEN: CPT

## 2022-07-26 PROCEDURE — 36415 COLL VENOUS BLD VENIPUNCTURE: CPT

## 2022-07-26 PROCEDURE — 99285 EMERGENCY DEPT VISIT HI MDM: CPT

## 2022-07-26 PROCEDURE — 83540 ASSAY OF IRON: CPT

## 2022-07-26 PROCEDURE — 82728 ASSAY OF FERRITIN: CPT

## 2022-07-26 PROCEDURE — 83735 ASSAY OF MAGNESIUM: CPT

## 2022-07-26 PROCEDURE — 88305 TISSUE EXAM BY PATHOLOGIST: CPT

## 2022-07-26 PROCEDURE — 80048 BASIC METABOLIC PNL TOTAL CA: CPT

## 2022-07-26 PROCEDURE — 80053 COMPREHEN METABOLIC PANEL: CPT

## 2022-07-26 PROCEDURE — 82550 ASSAY OF CK (CPK): CPT

## 2022-07-26 PROCEDURE — 86901 BLOOD TYPING SEROLOGIC RH(D): CPT

## 2022-07-26 PROCEDURE — 36430 TRANSFUSION BLD/BLD COMPNT: CPT

## 2022-07-26 PROCEDURE — P9040: CPT

## 2022-07-26 PROCEDURE — 85025 COMPLETE CBC W/AUTO DIFF WBC: CPT

## 2022-07-26 PROCEDURE — 86923 COMPATIBILITY TEST ELECTRIC: CPT

## 2022-07-26 PROCEDURE — 71260 CT THORAX DX C+: CPT | Mod: MD

## 2022-07-26 RX ORDER — FOLIC ACID 0.8 MG
1 TABLET ORAL DAILY
Refills: 0 | Status: DISCONTINUED | OUTPATIENT
Start: 2022-07-26 | End: 2022-07-26

## 2022-07-26 RX ADMIN — Medication 650 MILLIGRAM(S): at 09:26

## 2022-07-26 RX ADMIN — RISPERIDONE 0.5 MILLIGRAM(S): 4 TABLET ORAL at 11:32

## 2022-07-26 RX ADMIN — Medication 650 MILLIGRAM(S): at 11:26

## 2022-07-26 RX ADMIN — SERTRALINE 100 MILLIGRAM(S): 25 TABLET, FILM COATED ORAL at 11:32

## 2022-07-26 RX ADMIN — Medication 500 MILLIGRAM(S): at 05:09

## 2022-07-26 RX ADMIN — Medication 1 TABLET(S): at 14:20

## 2022-07-26 RX ADMIN — Medication 1 MILLIGRAM(S): at 11:33

## 2022-07-26 NOTE — PROGRESS NOTE ADULT - PROVIDER SPECIALTY LIST ADULT
Heme/Onc
Surgery
Internal Medicine
Heme/Onc
Heme/Onc
Internal Medicine
Surgery
Heme/Onc
Cardiology
Heme/Onc
Internal Medicine
Internal Medicine
Cardiology
Cardiology
Internal Medicine
Cardiology
Cardiology
Internal Medicine
Cardiology
Internal Medicine

## 2022-07-26 NOTE — PROGRESS NOTE ADULT - SUBJECTIVE AND OBJECTIVE BOX
path is not ready yet  but she can go to rehab  if ER+, she can have arimidex or letrozole in rehab  if ER- or Her-2 +, she will not have chemo in rehab  I will arrange chemo after she goes home

## 2022-07-26 NOTE — DISCHARGE NOTE NURSING/CASE MANAGEMENT/SOCIAL WORK - NSDCPEFALRISK_GEN_ALL_CORE
For information on Fall & Injury Prevention, visit: [email or web address] OR  [email or web address] OR  https://www.cdc.gov/steadi/patient.html

## 2022-07-26 NOTE — PROGRESS NOTE ADULT - PROBLEM SELECTOR PLAN 2
CT A/P shows 1.6 x 1.6 cm lesion in the anterior rectum concern for large polyp vs neoplasm  - no further intervention at this time  - follow up outpt GI for colonoscopy  - hem/onc following

## 2022-07-26 NOTE — PROGRESS NOTE ADULT - NUTRITIONAL ASSESSMENT
This patient has been assessed with a concern for Malnutrition and has been determined to have a diagnosis/diagnoses of Severe protein-calorie malnutrition and Underweight (BMI < 19).    This patient is being managed with:   Diet Regular-  Entered: Jul 18 2022  3:51PM    

## 2022-07-26 NOTE — PROGRESS NOTE ADULT - PROBLEM SELECTOR PROBLEM 6
Prophylactic measure
Discharge planning issues
Prophylactic measure

## 2022-07-26 NOTE — PROGRESS NOTE ADULT - SUBJECTIVE AND OBJECTIVE BOX
PATIENT SEEN AND EXAMINED ON :- 7/26/2022  DATE OF SERVICE:   7/26/2022          Interim events noted,Labs ,Radiological studies and Cardiology tests reviewed .         HOSPITAL COURSE: HPI:  Patient is a 86 yo F, who lives at home alone, ambulatory at baseline, PMH of anemia, insomnia, VEENA, long standing Hx of generalized weakness complicated by a fall 4 years ago, treated with rehab, and >year long hx of insidious onset, nontender  right breast "infection" who comes in today, referred by her PCP during routine home visit, due to low hgb and for examination of breast infection, that she reports has been recently bleeding.    Patient herself endorses, weakness that is chronic, as well as right sided arm paresthesias and occasional stinging sensation on the side of the right chest, but denies any  chest pain, fevers, chills, shortness of breath, abdominal pain, nausea or vomiting.    In the ED: patient was  a-febrile, HD stable and on room air  Exam: remarkable for a 6cm lymph node/ swelling in the right arm, as well as a 2cm deep, 4cm wide purulent induration on right breast area, with no nipple visible, protruding glandular tissue, with surrounding erythema and shallower induration that extends to the pectoral region and is associated with some possible rib exposure.    Labs: remarkable for hgb 3, no white count    Patient received 2 units of blood and was admitted for symptomatic anemia and right breast malignancy concern   (18 Jul 2022 15:02)      INTERIM EVENTS:Patient seen at bedside ,interim events noted.      PMH -reviewed admission note, no change since admission  HEART FAILURE: Acute[ ]Chronic[ ] Systolic[ ] Diastolic[ ] Combined Systolic and Diastolic[ ]  CAD[ ] CABG[ ] PCI[ ]  DEVICES[ ] PPM[ ] ICD[ ] ILR[ ]  ATRIAL FIBRILLATION[ ] Paroxysmal[ ] Permanent[ ] CHADS2-[  ]  KAMRAN[ ] CKD1[ ] CKD2[ ] CKD3[ ] CKD4[ ] ESRD[ ]  COPD[ ] HTN[ ]   DM[ ] Type1[ ] Type 2[ ]   CVA[ ] Paresis[ ]    AMBULATION: Assisted[ ] Cane/walker[ ] Independent[ ]    MEDICATIONS  (STANDING):  ascorbic acid 500 milliGRAM(s) Oral two times a day  atorvastatin 10 milliGRAM(s) Oral at bedtime  folic acid 1 milliGRAM(s) Oral daily  multivitamin/minerals 1 Tablet(s) Oral daily  risperiDONE   Tablet 0.5 milliGRAM(s) Oral daily  sertraline 100 milliGRAM(s) Oral daily    MEDICATIONS  (PRN):  acetaminophen     Tablet .. 650 milliGRAM(s) Oral every 6 hours PRN Temp greater or equal to 38C (100.4F), Mild Pain (1 - 3), Moderate Pain (4 - 6)            REVIEW OF SYSTEMS:  Constitutional: [ ] fever, [ ]weight loss,  [ ]fatigue [ ]weight gain  Eyes: [ ] visual changes  Respiratory: [ ]shortness of breath;  [ ] cough, [ ]wheezing, [ ]chills, [ ]hemoptysis  Cardiovascular: [ ] chest pain, [ ]palpitations, [ ]dizziness,  [ ]leg swelling[ ]orthopnea[ ]PND  Gastrointestinal: [ ] abdominal pain, [ ]nausea, [ ]vomiting,  [ ]diarrhea [ ]Constipation [ ]Melena  Genitourinary: [ ] dysuria, [ ] hematuria [ ]Powell  Neurologic: [ ] headaches [ ] tremors[ ]weakness [ ]Paralysis Right[ ] Left[ ]  Skin: [ ] itching, [ ]burning, [ ] rashes  Endocrine: [ ] heat or cold intolerance  Musculoskeletal: [ ] joint pain or swelling; [ ] muscle, back, or extremity pain  Psychiatric: [ ] depression, [ ]anxiety, [ ]mood swings, or [ ]difficulty sleeping  Hematologic: [ ] easy bruising, [ ] bleeding gums    [ ] All remaining systems negative except as per above.   [ ]Unable to obtain.  [x] No change in ROS since admission      Vital Signs Last 24 Hrs  T(C): 37.2 (26 Jul 2022 14:23), Max: 37.2 (26 Jul 2022 14:23)  T(F): 99 (26 Jul 2022 14:23), Max: 99 (26 Jul 2022 14:23)  HR: 72 (26 Jul 2022 14:23) (72 - 98)  BP: 111/58 (26 Jul 2022 14:23) (109/65 - 133/74)  BP(mean): --  RR: 18 (26 Jul 2022 14:23) (18 - 18)  SpO2: 100% (26 Jul 2022 14:23) (99% - 100%)    Parameters below as of 26 Jul 2022 14:23  Patient On (Oxygen Delivery Method): room air      I&O's Summary      PHYSICAL EXAM:  General: No acute distress BMI-  HEENT: EOMI, PERRL  Neck: Supple, [ ] JVD  Lungs: Equal air entry bilaterally; [ ] rales [ ] wheezing [ ] rhonchi  Heart: Regular rate and rhythm; [x ] murmur   2/6 [ x] systolic [ ] diastolic [ ] radiation[ ] rubs [ ]  gallops  Abdomen: Nontender, bowel sounds present  Extremities: No clubbing, cyanosis, [ ] edema [ ]Pulses  equal and intact  Nervous system:  Alert & Oriented X3, no focal deficits  Psychiatric: Normal affect  Skin: No rashes or lesions    LABS:  07-26    140  |  109<H>  |  19<H>  ----------------------------<  87  4.3   |  24  |  0.44<L>    Ca    9.6      26 Jul 2022 06:20      Creatinine Trend: 0.44<--, 0.39<--, 0.39<--, 0.39<--, 0.35<--, 0.51<--                        8.7    8.81  )-----------( 191      ( 26 Jul 2022 06:20 )             29.5

## 2022-07-26 NOTE — PROGRESS NOTE ADULT - PROBLEM SELECTOR PLAN 7
- Awaiting [facility] acceptance, last covid neg 7/22
- Awaiting [facility] acceptance, last covid neg 7/22
- Patient accepted at [facility], however they are requiring a note from oncology that no chemo or further intervention     is needed, CM following.  -  last covid neg 7/22  - Will repeat Covid 7/26 AM  - d/c planning
- Awaiting [facility] acceptance, last covid neg 7/22
- Awaiting [facility] acceptance, last covid neg 7/22
- Patient accepted at [facility], however they are requiring a note from oncology that no chemo or further intervention     is needed, CM following.  -  last covid neg 7/22  - Will repeat Covid 7/26 AM
- Patient accepted at [facility], however they are requiring a note from oncology that no chemo or further intervention     is needed, CM following.  -  last covid neg 7/22  - Will repeat Covid 7/26 AM  - d/c planning

## 2022-07-26 NOTE — DISCHARGE NOTE NURSING/CASE MANAGEMENT/SOCIAL WORK - PATIENT PORTAL LINK FT
You can access the FollowMyHealth Patient Portal offered by [facility] by registering at the following website: [email or web address]. By joining [employer]’s FollowMyHealth portal, you will also be able to view your health information using other applications (apps) compatible with our system.

## 2022-07-26 NOTE — PROGRESS NOTE ADULT - PROBLEM SELECTOR PLAN 4
-atorvastatin at HS
continue home meds Risperidone and zoloft
Patient with hx of chronic anemia on iron supplements- stopped last week, p/w with hgb of 3   - likely REJI due to malignancy  - s/p 3 units PRBC  - hgb stable ~8  - no signs of active bleeding  - continue folic acid IVP  - started iron infusion on 7/22  - Heme following
Patient with hx of chronic anemia on iron supplements- stopped last week, p/w with hgb of 3   - likely REJI due to malignancy  - s/p 3 units PRBC  - hgb stable ~8  - no signs of active bleeding  - continue folic acid IVP  - started iron infusion on 7/22  - Cardio Dr. Landin following  - Heme following
Patient with hx of chronic anemia on iron supplements- stopped last week, p/w with hgb of 3   - likely REJI due to malignancy  - s/p 3 units PRBC  - hgb stable ~8  - no signs of active bleeding  - continue folic acid IVP  - started iron infusion on 7/22  - Cardio Dr. Landin following  - Heme following
f/u lipid profile  ASCVD <7.5 % however age of 87  with generalized weakness  f/u CK levels resume home statin if normal.
-atorvastatin at HS
-atorvastatin at HS
Patient with hx of chronic anemia on iron supplements- stopped last week, p/w with hgb of 3   - likely REJI due to malignancy  - s/p 3 units PRBC  - hgb stable ~8  - no signs of active bleeding  - continue folic acid IVP  - started iron infusion on 7/22  - Heme following
continue home meds Risperidone and zoloft
Patient with hx of chronic anemia on iron supplements- stopped last week, p/w with hgb of 3   - likely REJI due to malignancy  - s/p 3 units PRBC  - hgb stable ~8  - no signs of active bleeding  - continue folic acid IVP  - started iron infusion on 7/22  - Cardio Dr. Landin following  - Heme following
Patient with hx of chronic anemia on iron supplements- stopped last week, p/w with hgb of 3   - likely REJI due to malignancy  - s/p 3 units PRBC  - hgb stable ~8  - no signs of active bleeding  - continue folic acid IVP  - started iron infusion on 7/22  - Heme following
Patient with hx of chronic anemia on iron supplements- stopped last week, p/w with hgb of 3   - likely REJI due to malignancy  - s/p 3 units PRBC  - hgb stable ~8  - no signs of active bleeding  - continue folic acid IVP  - started iron infusion on 7/22  - Cardio Dr. Landin following  - Heme following

## 2022-07-26 NOTE — PROGRESS NOTE ADULT - PROBLEM SELECTOR PROBLEM 2
Infection of breast, right
Rectal lesion
Complex sclerosing lesion of right breast
Complex sclerosing lesion of right breast
Rectal lesion
Rectal lesion
Infection of breast, right
Rectal lesion
Rectal lesion

## 2022-07-26 NOTE — PROGRESS NOTE ADULT - PROBLEM SELECTOR PLAN 5
- continue atorvastatin 10 mg daily
- continue atorvastatin 10 mg daily
Heparin 5000U sc q8 h for dvt ppx.
f/u lipid profile  ASCVD <7.5 % however age of 87  with generalized weakness  f/u CK levels resume home statin if normal
Heparin held 2/2 anemia with bleed  -SCD
Heparin held 2/2 anemia with bleed  -SCD
- continue atorvastatin 10 mg daily
f/u lipid profile  ASCVD <7.5 % however age of 87  with generalized weakness  f/u CK levels resume home statin if normal
- continue atorvastatin 10 mg daily
Heparin held 2/2 anemia with bleed  -SCD
- continue atorvastatin 10 mg daily

## 2022-07-26 NOTE — PROGRESS NOTE ADULT - PROBLEM SELECTOR PLAN 1
Patient admitted for symptomatic anemia and rt breast lesion  CT scan:  Large mass is noted in the right breast as described above.   Primary consideration is breast carcinoma.  -surgery f/u - tissue bx/ debridement  -heme/onc consulted [facility]  - f/u recc.  wound consulted f/u recc
pt p/w symptomatic anemia and right breast lesion with purulent drainage  -likely breast carcinoma   -CT chest  -CT abd/pelv suspicious for metastic disease due to nodule in lung and lesions in rectum and bone  -s/p right breast biopsy  -continue unasyn since 7/18  -continue dressing changes per wound care recs daily  -heme/onc [facility] following, f/u outpt for biopsy results  -surgery following
-CT abd/pelv suspicious for metastic disease due to nodule in lung and lesions in rectum and bone  -s/p right breast biopsy  - S/P unasyn   -continue dressing changes per wound care recs daily  -heme/onc [facility] following, f/u outpt for biopsy results  -surgery following
symptomatic anemia and rt breast lesion  CT scan: noted above   Primary consideration is breast carcinoma.  -surgery f/u - tissue bx/ debridement pending   -heme/onc consulted [facility]   wound consulted
Patient has long standing hx of anemia on iron supplements- stopped last week,  p/w with hgb of 3   -f/u iron studies  -This might be REJI due to malignancy  -s/p 2 units PRBC  -cbc  -keep hg above 7
pt p/w symptomatic anemia and right breast lesion with purulent drainage  -likely breast carcinoma   -CT chest  -CT abd/pelv suspicious for metastic disease due to nodule in lung and lesions in rectum and bone  -s/p right breast biopsy  - S/P unasyn   -continue dressing changes per wound care recs daily  -heme/onc [facility] following, f/u outpt for biopsy results  -surgery following
symptomatic anemia and rt breast lesion, likely primary dx breast carcinoma   -CT chest scan: noted above   -f/u ct abd/pelvis   -s/p tissue bx, f/u results   -heme/onc [facility] following
-CT abd/pelv suspicious for metastic disease due to nodule in lung and lesions in rectum and bone  -s/p right breast biopsy  - S/P unasyn   -continue dressing changes per wound care recs daily  -heme/onc [facility] following, f/u outpt for biopsy results  -surgery following
Patient has long standing hx of anemia on iron supplements- stopped last week,  p/w with hgb of 3   -f/u iron studies  -This might be REJI due to malignancy  -s/p 2 units PRBC  -cbc  -keep hg above 7
symptomatic anemia and rt breast lesion, likely primary dx breast carcinoma   -CT chest scan: noted above   -f/u ct abd/pelvis   -s/p tissue bx, f/u results   -heme/onc [facility] following
pt p/w symptomatic anemia and right breast lesion with purulent drainage  -likely breast carcinoma   -CT chest  -CT abd/pelv suspicious for metastic disease due to nodule in lung and lesions in rectum and bone  -s/p right breast biopsy  -continue unasyn since 7/18  -continue dressing changes per wound care recs daily  -heme/onc [facility] following, f/u outpt for biopsy results  -surgery following

## 2022-07-26 NOTE — PROGRESS NOTE ADULT - COVID-19 NEGATIVE LAB RESULT
COVID-19 ruled out
Detail Level: Simple
COVID-19 ruled out
Comment: History of
COVID-19 ruled out
Comment: Tried Oracea 40mg and Soolantra and did not seem to work.  Not bothersome to patient

## 2022-07-26 NOTE — PROGRESS NOTE ADULT - ASSESSMENT
SHAVONNE MONTALVO is a 87y Female who presents with a chief complaint of anemia.    Anemia  - Patient presents with critical anemia with hemoglobin 3.2;   - Continue folic acid supplementation  - IV iron sucrose 200 mg X 3 days if no contraindications.   - Monitor CBC and transfuse to maintain hemoglobin above 7.  - Noted continued bleeding from her breast lesion.     Breast Lesion  - Suspect malignancy given appearance as well as CT chest finding.  - Will follow-up on biopsy lesions.     Pulmonary Nodule  - Noted lesion in the left lower lobe.   - Likely will need evaluation for biopsy given its appearance and possibility of two concomitant malignancies.     Rectal Lesion  - Noted lesion in the anterior rectum.   - Will need evaluation by colonoscopy    Will continue to follow.    Rao Ward MD  Hematology/Oncology  O: [phone]     SHAVONNE MONTALVO is a 87y Female who presents with a chief complaint of anemia.    Anemia  - Patient presents with critical anemia with hemoglobin 3.2;   - Continue folic acid supplementation  - IV iron sucrose 200 mg X 3 days if no contraindications.   - Monitor CBC and transfuse to maintain hemoglobin above 7.  - Noted continued bleeding from her breast lesion.     Breast Lesion  - Suspect malignancy given appearance as well as CT chest finding.  - Will follow-up on biopsy lesions.   - No chemotherapy while inpatient/rehabilitation. Will need follow-up outpatient ASAP after discharge from [facility].     Pulmonary Nodule  - Noted lesion in the left lower lobe.   - Likely will need evaluation for biopsy given its appearance and possibility of two concomitant malignancies.     Rectal Lesion  - Noted lesion in the anterior rectum.   - Will need evaluation by colonoscopy    Will continue to follow.    Rao Ward MD  Hematology/Oncology  O: [phone]

## 2022-07-26 NOTE — PROGRESS NOTE ADULT - TIME BILLING
- Review of records, telemetry, vital signs and daily labs.   - General and cardiovascular physical examination.  - Generation of cardiovascular treatment plan.  - Coordination of care.      Patient was seen and examined by me on 7/24/22interim events noted,labs and radiology studies reviewed.  Cayetano Landin MD,FACC.  [address] [place], [place] [id number].  [phone]
- Review of records, telemetry, vital signs and daily labs.   - General and cardiovascular physical examination.  - Generation of cardiovascular treatment plan.  - Coordination of care.      Patient was seen and examined by me on 7/23/22interim events noted,labs and radiology studies reviewed.  Cayetano Landin MD,FACC.  [address] [place], [place] [id number].  [phone]
- Review of records, telemetry, vital signs and daily labs.   - General and cardiovascular physical examination.  - Generation of cardiovascular treatment plan.  - Coordination of care.      Patient was seen and examined by me on 7/20/22,interim events noted,labs and radiology studies reviewed.  Cayetano Landin MD,FACC.  [address] [place], [place] [id number].  [phone]
- Review of records, telemetry, vital signs and daily labs.   - General and cardiovascular physical examination.  - Generation of cardiovascular treatment plan.  - Coordination of care.      Patient was seen and examined by me on 7/25/22,interim events noted,labs and radiology studies reviewed.  Cayetano Landin MD,FACC.  [address] [place], [place] [id number].  [phone]
- Review of records, telemetry, vital signs and daily labs.   - General and cardiovascular physical examination.  - Generation of cardiovascular treatment plan.  - Coordination of care.      Patient was seen and examined by me on 7/22/22,interim events noted,labs and radiology studies reviewed.  Cayetano Landin MD,FACC.  [address] [place], [place] [id number].  [phone]
- Review of records, telemetry, vital signs and daily labs.   - General and cardiovascular physical examination.  - Generation of cardiovascular treatment plan.  - Coordination of care.      Patient was seen and examined by me on 7/19/22,interim events noted,labs and radiology studies reviewed.  Cayetano Landin MD,FACC.  [address] [place], [place] [id number].  [phone]
- Review of records, telemetry, vital signs and daily labs.   - General and cardiovascular physical examination.  - Generation of cardiovascular treatment plan.  - Coordination of care.      Patient was seen and examined by me on 7/26/2022,interim events noted,labs and radiology studies reviewed.  Cayetano Landin MD,FACC.  [address] [place], [place] [id number].  [phone]
- Review of records, telemetry, vital signs and daily labs.   - General and cardiovascular physical examination.  - Generation of cardiovascular treatment plan.  - Coordination of care.      Patient was seen and examined by me on 7/21/2022,interim events noted,labs and radiology studies reviewed.  Cayetano Landin MD,FACC.  [address] [place], [place] [id number].  [phone]

## 2022-07-26 NOTE — PROGRESS NOTE ADULT - SUBJECTIVE AND OBJECTIVE BOX
CHIEF COMPLAINT  Anemia    HISTORY OF PRESENT ILLNESS  SHAVONNE MONTALVO is a 87y Female who presents with a chief complaint of anemia.    No acute events. No complaints.    REVIEW OF SYSTEMS  A complete review of systems was performed; negative except per HPI    PHYSICAL EXAM  T(C): 37.1 (07-26-22 @ 04:54), Max: 37.1 (07-25-22 @ 21:01)  HR: 98 (07-26-22 @ 04:54) (82 - 98)  BP: 133/74 (07-26-22 @ 04:54) (113/54 - 133/74)  RR: 18 (07-26-22 @ 04:54) (18 - 18)  SpO2: 100% (07-26-22 @ 04:54) (99% - 100%)  Constitutional: alert, awake, in no acute distress  Eyes: PERRL, EOMI  HEENT: normocephalic, atraumatic  Neck: supple, non-tender  Cardiovascular: normal perfusion, no peripheral edema  Respiratory: normal respiratory efforts; no increased use of accessory muscles  Gastrointestinal: soft, non-tender  Musculoskeletal: normal range of motion, no deformities noted  Neurological: alert, CN II to XI grossly intact  Skin: warm, dry    LABORATORY DATA                        8.7    8.81  )-----------( 191      ( 26 Jul 2022 06:20 )             29.5     07-26    140  |  109<H>  |  19<H>  ----------------------------<  87  4.3   |  24  |  0.44<L>    Ca    9.6      26 Jul 2022 06:20     CHIEF COMPLAINT  Anemia    HISTORY OF PRESENT ILLNESS  SHAVONNE MONTALVO is a 87y Female who presents with a chief complaint of anemia.    No acute events. No complaints.    REVIEW OF SYSTEMS  A complete review of systems was performed; negative except per HPI    PHYSICAL EXAM  T(C): 37.1 (07-26-22 @ 04:54), Max: 37.1 (07-25-22 @ 21:01)  HR: 98 (07-26-22 @ 04:54) (82 - 98)  BP: 133/74 (07-26-22 @ 04:54) (113/54 - 133/74)  RR: 18 (07-26-22 @ 04:54) (18 - 18)  SpO2: 100% (07-26-22 @ 04:54) (99% - 100%)  Constitutional: alert, awake, in no acute distress  Eyes: PERRL, EOMI  HEENT: normocephalic, atraumatic  Neck: supple, non-tender  Cardiovascular: normal perfusion, no peripheral edema  Breast: right breast open wound with drainage/bleeding  Respiratory: normal respiratory efforts; no increased use of accessory muscles  Gastrointestinal: soft, non-tender  Musculoskeletal: normal range of motion, no deformities noted  Neurological: alert, CN II to XI grossly intact  Skin: warm, dry    LABORATORY DATA                        8.7    8.81  )-----------( 191      ( 26 Jul 2022 06:20 )             29.5     07-26    140  |  109<H>  |  19<H>  ----------------------------<  87  4.3   |  24  |  0.44<L>    Ca    9.6      26 Jul 2022 06:20

## 2022-07-26 NOTE — PROGRESS NOTE ADULT - PROBLEM SELECTOR PROBLEM 4
Anxiety, generalized
HLD (hyperlipidemia)
HLD (hyperlipidemia)
Anemia of chronic disease
Anemia of chronic disease
HLD (hyperlipidemia)
HLD (hyperlipidemia)
Anemia of chronic disease
Anemia of chronic disease
Anxiety, generalized
Anemia of chronic disease

## 2022-07-26 NOTE — PROGRESS NOTE ADULT - PROBLEM SELECTOR PROBLEM 5
HLD (hyperlipidemia)
Prophylactic measure
HLD (hyperlipidemia)
Prophylactic measure
HLD (hyperlipidemia)
Prophylactic measure
HLD (hyperlipidemia)
Prophylactic measure
HLD (hyperlipidemia)

## 2022-07-26 NOTE — PROGRESS NOTE ADULT - PROBLEM SELECTOR PROBLEM 1
Metastatic breast cancer
Metastatic breast cancer
Complex sclerosing lesion of right breast
Metastatic breast cancer
Metastatic breast cancer
Symptomatic anemia
Symptomatic anemia
Metastatic breast cancer

## 2022-07-26 NOTE — DISCHARGE NOTE NURSING/CASE MANAGEMENT/SOCIAL WORK - NSDCVIVACCINE_GEN_ALL_CORE_FT
influenza, injectable, quadrivalent, preservative free; 18-Sep-2018 16:23; Linh LindoRN); [employer]; [id number] (Exp. Date: 30-Jun-2019); IntraMuscular; Deltoid Right.; 0.5 milliLiter(s); VIS (VIS Published: 07-Aug-2015, VIS Presented: 18-Sep-2018);

## 2022-07-26 NOTE — PROGRESS NOTE ADULT - PROBLEM SELECTOR PROBLEM 3
Infection of breast, right
Infection of breast, right
Symptomatic anemia
Bilateral pleural effusion
Bilateral pleural effusion
Symptomatic anemia
Bilateral pleural effusion
Symptomatic anemia
Symptomatic anemia
Bilateral pleural effusion

## 2022-07-26 NOTE — PROGRESS NOTE ADULT - REASON FOR ADMISSION
symptomatic anemia and right breast infection

## 2022-07-27 LAB — INTERPRETATION SERPL IFE-IMP: SIGNIFICANT CHANGE UP

## 2022-07-29 LAB — SURGICAL PATHOLOGY STUDY: SIGNIFICANT CHANGE UP

## 2022-07-30 ENCOUNTER — EMERGENCY (EMERGENCY)
Facility: HOSPITAL | Age: 87
LOS: 1 days | Discharge: HOME | End: 2022-07-30
Attending: EMERGENCY MEDICINE
Payer: MEDICARE

## 2022-07-30 VITALS
TEMPERATURE: 98 F | SYSTOLIC BLOOD PRESSURE: 129 MMHG | DIASTOLIC BLOOD PRESSURE: 64 MMHG | HEART RATE: 93 BPM | OXYGEN SATURATION: 98 % | RESPIRATION RATE: 16 BRPM | HEIGHT: 67 IN | WEIGHT: 141.98 LBS

## 2022-07-30 VITALS
SYSTOLIC BLOOD PRESSURE: 137 MMHG | DIASTOLIC BLOOD PRESSURE: 66 MMHG | TEMPERATURE: 98 F | HEART RATE: 74 BPM | RESPIRATION RATE: 18 BRPM | OXYGEN SATURATION: 99 %

## 2022-07-30 DIAGNOSIS — E07.9 DISORDER OF THYROID, UNSPECIFIED: Chronic | ICD-10-CM

## 2022-07-30 PROCEDURE — 99283 EMERGENCY DEPT VISIT LOW MDM: CPT

## 2022-07-30 PROCEDURE — 99282 EMERGENCY DEPT VISIT SF MDM: CPT

## 2022-07-30 NOTE — ED PROVIDER NOTE - NSFOLLOWUPINSTRUCTIONS_ED_ALL_ED_FT
Michelle MaxwelllishSpanishTraditional Chinese                                                                                      Wound Care, Adult      Taking care of your wound properly can help to prevent pain, infection, and scarring. It can also help your wound heal more quickly. Follow instructions from your health care provider about how to care for your wound.      Supplies needed:    •Soap and water.      •Wound cleanser.      •Gauze.      •If needed, a clean bandage (dressing) or other type of wound dressing material to cover or place in the wound. Follow your health care provider's instructions about what dressing supplies to use.      •Cream or ointment to apply to the wound, if told by your health care provider.        How to care for your wound    Cleaning the wound     Ask your health care provider how to clean the wound. This may include:  •Using mild soap and water or a wound cleanser.      •Using a clean gauze to pat the wound dry after cleaning it. Do not rub or scrub the wound.      Dressing care    •Wash your hands with soap and water for at least 20 seconds before and after you change the dressing. If soap and water are not available, use hand .    •Change your dressing as told by your health care provider. This may include:  •Cleaning or rinsing out (irrigating) the wound.      •Placing a dressing over the wound or in the wound (packing).      •Covering the wound with an outer dressing.        •Leave any stitches (sutures), skin glue, or adhesive strips in place. These skin closures may need to stay in place for 2 weeks or longer. If adhesive strip edges start to loosen and curl up, you may trim the loose edges. Do not remove adhesive strips completely unless your health care provider tells you to do that.      •Ask your health care provider when you can leave the wound uncovered.        Checking for infection    Check your wound area every day for signs of infection. Check for:  •More redness, swelling, or pain.      •Fluid or blood.      •Warmth.      •Pus or a bad smell.        Follow these instructions at home    Medicines     •If you were prescribed an antibiotic medicine, cream, or ointment, take or apply it as told by your health care provider. Do not stop using the antibiotic even if your condition improves.      •If you were prescribed pain medicine, take it 30 minutes before you do any wound care or as told by your health care provider.      •Take over-the-counter and prescription medicines only as told by your health care provider.      Eating and drinking   •Eat a diet that includes protein, vitamin A, vitamin C, and other nutrient-rich foods to help the wound heal.  •Foods rich in protein include meat, fish, eggs, dairy, beans, and nuts.      •Foods rich in vitamin A include carrots and dark green, leafy vegetables.      •Foods rich in vitamin C include citrus fruits, tomatoes, broccoli, and peppers.        •Drink enough fluid to keep your urine pale yellow.      General instructions     • Do not take baths, swim, use a hot tub, or do anything that would put the wound underwater until your health care provider approves. Ask your health care provider if you may take showers. You may only be allowed to take sponge baths.      • Do not scratch or pick at the wound. Keep it covered as told by your health care provider.      •Return to your normal activities as told by your health care provider. Ask your health care provider what activities are safe for you.      •Protect your wound from the sun when you are outside for the first 6 months, or for as long as told by your health care provider. Cover up the scar area or apply sunscreen that has an SPF of at least 30.      • Do not use any products that contain nicotine or tobacco, such as cigarettes, e-cigarettes, and chewing tobacco. These may delay wound healing. If you need help quitting, ask your health care provider.      •Keep all follow-up visits as told by your health care provider. This is important.        Contact a health care provider if:    •You received a tetanus shot and you have swelling, severe pain, redness, or bleeding at the injection site.      •Your pain is not controlled with medicine.    •You have any of these signs of infection:  •More redness, swelling, or pain around the wound.      •Fluid or blood coming from the wound.      •Warmth coming from the wound.      •Pus or a bad smell coming from the wound.      •A fever or chills.        •You are nauseous or you vomit.      •You are dizzy.        Get help right away if:    •You have a red streak of skin near the area around your wound.      •Your wound has been closed with staples, sutures, skin glue, or adhesive strips and it begins to open up and separate.      •Your wound is bleeding, and the bleeding does not stop with gentle pressure.      •You have a rash.      •You faint.      •You have trouble breathing.      These symptoms may represent a serious problem that is an emergency. Do not wait to see if the symptoms will go away. Get medical help right away. Call your local emergency services (911 in the [place] Do not drive yourself to the hospital.       Summary    •Always wash your hands with soap and water for at least 20 seconds before and after changing your dressing.      •Change your dressing as told by your health care provider.      •To help with healing, eat foods that are rich in protein, vitamin A, vitamin C, and other nutrients.      •Check your wound every day for signs of infection. Contact your health care provider if you suspect that your wound is infected.      This information is not intended to replace advice given to you by your health care provider. Make sure you discuss any questions you have with your health care provider.      Document Revised: 10/02/2020 Document Reviewed: 10/02/2020    Elsevier Patient Education © 2022 [employer]

## 2022-07-30 NOTE — ED ADULT NURSE NOTE - OBJECTIVE STATEMENT
Pt BIBA from [facility] extended care with c/c of right breast bleeding, s/p biopsy last thursday

## 2022-07-30 NOTE — ED PROVIDER NOTE - OBJECTIVE STATEMENT
87 year old female coming in for bleeding from right breast site. states gets dressing changed q24h but tonight started to have bleeding so was sent to ED for eval. denies all other complaints at this time.

## 2022-07-30 NOTE — ED PROVIDER NOTE - PROGRESS NOTE DETAILS
s/p cautery with silver nitrate and bleeding completely resolved. surgicel placed. bandaged. will dc. f/u with PMD. return precautions discussed.

## 2022-07-30 NOTE — ED ADULT NURSE NOTE - CHIEF COMPLAINT QUOTE
biba from [facility] extended care with  c/o rt. breast bleeding/ s/p breast biopsy last thursday

## 2022-07-30 NOTE — ED PROVIDER NOTE - PATIENT PORTAL LINK FT
You can access the FollowMyHealth Patient Portal offered by [facility] by registering at the following website: [email or web address]. By joining [employer]’s FollowMyHealth portal, you will also be able to view your health information using other applications (apps) compatible with our system.

## 2022-07-30 NOTE — ED ADULT TRIAGE NOTE - CHIEF COMPLAINT QUOTE
biba from [facility] extended care with  c/o rt. breast bleeding/ s/p breast biopsy last thursday

## 2022-08-11 ENCOUNTER — EMERGENCY (EMERGENCY)
Facility: HOSPITAL | Age: 87
LOS: 1 days | Discharge: HOME | End: 2022-08-11
Attending: EMERGENCY MEDICINE
Payer: MEDICARE

## 2022-08-11 VITALS
RESPIRATION RATE: 17 BRPM | HEART RATE: 91 BPM | DIASTOLIC BLOOD PRESSURE: 58 MMHG | WEIGHT: 100.09 LBS | SYSTOLIC BLOOD PRESSURE: 111 MMHG | TEMPERATURE: 98 F | HEIGHT: 67 IN | OXYGEN SATURATION: 100 %

## 2022-08-11 VITALS
TEMPERATURE: 98 F | RESPIRATION RATE: 16 BRPM | OXYGEN SATURATION: 99 % | HEART RATE: 108 BPM | SYSTOLIC BLOOD PRESSURE: 109 MMHG | DIASTOLIC BLOOD PRESSURE: 59 MMHG

## 2022-08-11 DIAGNOSIS — E07.9 DISORDER OF THYROID, UNSPECIFIED: Chronic | ICD-10-CM

## 2022-08-11 LAB
ALBUMIN SERPL ELPH-MCNC: 2.5 G/DL — LOW (ref 3.5–5)
ALP SERPL-CCNC: 62 U/L — SIGNIFICANT CHANGE UP (ref 40–120)
ALT FLD-CCNC: 13 U/L DA — SIGNIFICANT CHANGE UP (ref 10–60)
ANION GAP SERPL CALC-SCNC: 7 MMOL/L — SIGNIFICANT CHANGE UP (ref 5–17)
AST SERPL-CCNC: 10 U/L — SIGNIFICANT CHANGE UP (ref 10–40)
BASOPHILS # BLD AUTO: 0.02 K/UL — SIGNIFICANT CHANGE UP (ref 0–0.2)
BASOPHILS NFR BLD AUTO: 0.3 % — SIGNIFICANT CHANGE UP (ref 0–2)
BILIRUB SERPL-MCNC: 0.2 MG/DL — SIGNIFICANT CHANGE UP (ref 0.2–1.2)
BUN SERPL-MCNC: 17 MG/DL — SIGNIFICANT CHANGE UP (ref 7–18)
CALCIUM SERPL-MCNC: 9.8 MG/DL — SIGNIFICANT CHANGE UP (ref 8.4–10.5)
CHLORIDE SERPL-SCNC: 107 MMOL/L — SIGNIFICANT CHANGE UP (ref 96–108)
CO2 SERPL-SCNC: 25 MMOL/L — SIGNIFICANT CHANGE UP (ref 22–31)
CREAT SERPL-MCNC: 0.46 MG/DL — LOW (ref 0.5–1.3)
EGFR: 93 ML/MIN/1.73M2 — SIGNIFICANT CHANGE UP
EOSINOPHIL # BLD AUTO: 0.05 K/UL — SIGNIFICANT CHANGE UP (ref 0–0.5)
EOSINOPHIL NFR BLD AUTO: 0.7 % — SIGNIFICANT CHANGE UP (ref 0–6)
GLUCOSE SERPL-MCNC: 105 MG/DL — HIGH (ref 70–99)
HCT VFR BLD CALC: 23.6 % — LOW (ref 34.5–45)
HGB BLD-MCNC: 6.9 G/DL — CRITICAL LOW (ref 11.5–15.5)
IMM GRANULOCYTES NFR BLD AUTO: 0.6 % — SIGNIFICANT CHANGE UP (ref 0–1.5)
LYMPHOCYTES # BLD AUTO: 1.11 K/UL — SIGNIFICANT CHANGE UP (ref 1–3.3)
LYMPHOCYTES # BLD AUTO: 15.5 % — SIGNIFICANT CHANGE UP (ref 13–44)
MCHC RBC-ENTMCNC: 27.6 PG — SIGNIFICANT CHANGE UP (ref 27–34)
MCHC RBC-ENTMCNC: 29.2 GM/DL — LOW (ref 32–36)
MCV RBC AUTO: 94.4 FL — SIGNIFICANT CHANGE UP (ref 80–100)
MONOCYTES # BLD AUTO: 0.5 K/UL — SIGNIFICANT CHANGE UP (ref 0–0.9)
MONOCYTES NFR BLD AUTO: 7 % — SIGNIFICANT CHANGE UP (ref 2–14)
NEUTROPHILS # BLD AUTO: 5.45 K/UL — SIGNIFICANT CHANGE UP (ref 1.8–7.4)
NEUTROPHILS NFR BLD AUTO: 75.9 % — SIGNIFICANT CHANGE UP (ref 43–77)
NRBC # BLD: 0 /100 WBCS — SIGNIFICANT CHANGE UP (ref 0–0)
PLATELET # BLD AUTO: 166 K/UL — SIGNIFICANT CHANGE UP (ref 150–400)
POTASSIUM SERPL-MCNC: 4 MMOL/L — SIGNIFICANT CHANGE UP (ref 3.5–5.3)
POTASSIUM SERPL-SCNC: 4 MMOL/L — SIGNIFICANT CHANGE UP (ref 3.5–5.3)
PROT SERPL-MCNC: 5.7 G/DL — LOW (ref 6–8.3)
RBC # BLD: 2.5 M/UL — LOW (ref 3.8–5.2)
RBC # FLD: 23.9 % — HIGH (ref 10.3–14.5)
SODIUM SERPL-SCNC: 139 MMOL/L — SIGNIFICANT CHANGE UP (ref 135–145)
WBC # BLD: 7.17 K/UL — SIGNIFICANT CHANGE UP (ref 3.8–10.5)
WBC # FLD AUTO: 7.17 K/UL — SIGNIFICANT CHANGE UP (ref 3.8–10.5)

## 2022-08-11 PROCEDURE — 93971 EXTREMITY STUDY: CPT | Mod: 26,RT

## 2022-08-11 PROCEDURE — 93971 EXTREMITY STUDY: CPT

## 2022-08-11 PROCEDURE — 86901 BLOOD TYPING SEROLOGIC RH(D): CPT

## 2022-08-11 PROCEDURE — 86900 BLOOD TYPING SEROLOGIC ABO: CPT

## 2022-08-11 PROCEDURE — 86923 COMPATIBILITY TEST ELECTRIC: CPT

## 2022-08-11 PROCEDURE — 99284 EMERGENCY DEPT VISIT MOD MDM: CPT

## 2022-08-11 PROCEDURE — 85025 COMPLETE CBC W/AUTO DIFF WBC: CPT

## 2022-08-11 PROCEDURE — 99284 EMERGENCY DEPT VISIT MOD MDM: CPT | Mod: 25

## 2022-08-11 PROCEDURE — 86850 RBC ANTIBODY SCREEN: CPT

## 2022-08-11 PROCEDURE — 80053 COMPREHEN METABOLIC PANEL: CPT

## 2022-08-11 PROCEDURE — P9040: CPT

## 2022-08-11 PROCEDURE — 36430 TRANSFUSION BLD/BLD COMPNT: CPT

## 2022-08-11 PROCEDURE — 36415 COLL VENOUS BLD VENIPUNCTURE: CPT

## 2022-08-11 NOTE — ED PROVIDER NOTE - PATIENT PORTAL LINK FT
You can access the FollowMyHealth Patient Portal offered by [facility] by registering at the following website: [email or web address]. By joining [employer]’s FollowMyHealth portal, you will also be able to view your health information using other applications (apps) compatible with our system.

## 2022-08-11 NOTE — ED PROVIDER NOTE - OBJECTIVE STATEMENT
86 yo F pmh of R breast wound w/ chronic bleeding (denies breast cancer and tells me primary malignancy is skin), HTN, HLD, anemia, comes to ED from rehab with low Hgb. Denies other acute complaints.

## 2022-08-11 NOTE — ED PROVIDER NOTE - PHYSICAL EXAMINATION
GENERAL: well appearing, no acute distress   HEAD: atraumatic   EYES: EOMI   ENT: moist oral mucosa   CARDIAC: regular rate  RESPIRATORY: no increased work of breathing   MUSCULOSKELETAL: swelling to soft tissue of middle portion of R arm with induration and w/o ttp or overlying skin changes   NEUROLOGICAL: alert, spontaneous movement of extremities   SKIN: dressing to chest wall covering R breast   PSYCHIATRIC: cooperative

## 2022-08-11 NOTE — ED PROVIDER NOTE - CLINICAL SUMMARY MEDICAL DECISION MAKING FREE TEXT BOX
Elderly F with worsening anemia in setting of chronic breast wound. Hgb 6.9. Pt consented for transfusion of PRBC  RUE swelling ?DVT vs lymphedema vs other - will get UE sono

## 2022-08-11 NOTE — ED ADULT NURSE NOTE - AGENT'S NAME
Patient returned call  Discussed results and recommendation  No questions or concerns at this time   Bill serrano

## 2022-08-11 NOTE — ED ADULT NURSE NOTE - OBJECTIVE STATEMENT
pt is here for abnormal lab result.  pt stated that sending by PCP for blood transfusion, denied chest pain or sob, denied N/V/D

## 2022-08-11 NOTE — ED PROVIDER NOTE - PROGRESS NOTE DETAILS
DVT US neg  labs - Hgb 6.9  Consented for transfusion which is finishing soon. Will dc if no transfusion reaction or issues.   Discussed indications for patient return to ED. Patient understood.

## 2022-08-18 ENCOUNTER — INPATIENT (INPATIENT)
Facility: HOSPITAL | Age: 87
LOS: 4 days | Discharge: OTHER INSTITUTION | DRG: 598 | End: 2022-08-23
Attending: INTERNAL MEDICINE | Admitting: INTERNAL MEDICINE
Payer: MEDICARE

## 2022-08-18 VITALS
TEMPERATURE: 99 F | DIASTOLIC BLOOD PRESSURE: 57 MMHG | HEIGHT: 67 IN | RESPIRATION RATE: 18 BRPM | OXYGEN SATURATION: 97 % | SYSTOLIC BLOOD PRESSURE: 102 MMHG | HEART RATE: 88 BPM | WEIGHT: 109.79 LBS

## 2022-08-18 DIAGNOSIS — D64.9 ANEMIA, UNSPECIFIED: ICD-10-CM

## 2022-08-18 DIAGNOSIS — C44.92 SQUAMOUS CELL CARCINOMA OF SKIN, UNSPECIFIED: ICD-10-CM

## 2022-08-18 DIAGNOSIS — E07.9 DISORDER OF THYROID, UNSPECIFIED: Chronic | ICD-10-CM

## 2022-08-18 DIAGNOSIS — C50.919 MALIGNANT NEOPLASM OF UNSPECIFIED SITE OF UNSPECIFIED FEMALE BREAST: ICD-10-CM

## 2022-08-18 DIAGNOSIS — E78.5 HYPERLIPIDEMIA, UNSPECIFIED: ICD-10-CM

## 2022-08-18 DIAGNOSIS — Z29.9 ENCOUNTER FOR PROPHYLACTIC MEASURES, UNSPECIFIED: ICD-10-CM

## 2022-08-18 LAB
ALBUMIN SERPL ELPH-MCNC: 2.5 G/DL — LOW (ref 3.5–5)
ALP SERPL-CCNC: 61 U/L — SIGNIFICANT CHANGE UP (ref 40–120)
ALT FLD-CCNC: 14 U/L DA — SIGNIFICANT CHANGE UP (ref 10–60)
ANION GAP SERPL CALC-SCNC: 6 MMOL/L — SIGNIFICANT CHANGE UP (ref 5–17)
AST SERPL-CCNC: 21 U/L — SIGNIFICANT CHANGE UP (ref 10–40)
BASOPHILS # BLD AUTO: 0.03 K/UL — SIGNIFICANT CHANGE UP (ref 0–0.2)
BASOPHILS NFR BLD AUTO: 0.3 % — SIGNIFICANT CHANGE UP (ref 0–2)
BILIRUB SERPL-MCNC: <0.1 MG/DL — LOW (ref 0.2–1.2)
BUN SERPL-MCNC: 22 MG/DL — HIGH (ref 7–18)
CALCIUM SERPL-MCNC: 9.6 MG/DL — SIGNIFICANT CHANGE UP (ref 8.4–10.5)
CHLORIDE SERPL-SCNC: 107 MMOL/L — SIGNIFICANT CHANGE UP (ref 96–108)
CO2 SERPL-SCNC: 26 MMOL/L — SIGNIFICANT CHANGE UP (ref 22–31)
CREAT SERPL-MCNC: 0.59 MG/DL — SIGNIFICANT CHANGE UP (ref 0.5–1.3)
EGFR: 88 ML/MIN/1.73M2 — SIGNIFICANT CHANGE UP
EOSINOPHIL # BLD AUTO: 0.12 K/UL — SIGNIFICANT CHANGE UP (ref 0–0.5)
EOSINOPHIL NFR BLD AUTO: 1.3 % — SIGNIFICANT CHANGE UP (ref 0–6)
FERRITIN SERPL-MCNC: 79 NG/ML — SIGNIFICANT CHANGE UP (ref 15–150)
GLUCOSE SERPL-MCNC: 95 MG/DL — SIGNIFICANT CHANGE UP (ref 70–99)
HCT VFR BLD CALC: 19.9 % — CRITICAL LOW (ref 34.5–45)
HGB BLD-MCNC: 6.1 G/DL — CRITICAL LOW (ref 11.5–15.5)
IMM GRANULOCYTES NFR BLD AUTO: 0.4 % — SIGNIFICANT CHANGE UP (ref 0–1.5)
IRON SATN MFR SERPL: 147 UG/DL — SIGNIFICANT CHANGE UP (ref 40–150)
IRON SATN MFR SERPL: 147 UG/DL — SIGNIFICANT CHANGE UP (ref 40–150)
IRON SATN MFR SERPL: 54 % — HIGH (ref 15–50)
LYMPHOCYTES # BLD AUTO: 1.45 K/UL — SIGNIFICANT CHANGE UP (ref 1–3.3)
LYMPHOCYTES # BLD AUTO: 15.3 % — SIGNIFICANT CHANGE UP (ref 13–44)
MCHC RBC-ENTMCNC: 29 PG — SIGNIFICANT CHANGE UP (ref 27–34)
MCHC RBC-ENTMCNC: 30.7 GM/DL — LOW (ref 32–36)
MCV RBC AUTO: 94.8 FL — SIGNIFICANT CHANGE UP (ref 80–100)
MONOCYTES # BLD AUTO: 0.57 K/UL — SIGNIFICANT CHANGE UP (ref 0–0.9)
MONOCYTES NFR BLD AUTO: 6 % — SIGNIFICANT CHANGE UP (ref 2–14)
NEUTROPHILS # BLD AUTO: 7.28 K/UL — SIGNIFICANT CHANGE UP (ref 1.8–7.4)
NEUTROPHILS NFR BLD AUTO: 76.7 % — SIGNIFICANT CHANGE UP (ref 43–77)
NRBC # BLD: 0 /100 WBCS — SIGNIFICANT CHANGE UP (ref 0–0)
PLATELET # BLD AUTO: 205 K/UL — SIGNIFICANT CHANGE UP (ref 150–400)
POTASSIUM SERPL-MCNC: 4.6 MMOL/L — SIGNIFICANT CHANGE UP (ref 3.5–5.3)
POTASSIUM SERPL-SCNC: 4.6 MMOL/L — SIGNIFICANT CHANGE UP (ref 3.5–5.3)
PROT SERPL-MCNC: 5.4 G/DL — LOW (ref 6–8.3)
RBC # BLD: 2.1 M/UL — LOW (ref 3.8–5.2)
RBC # FLD: 19.1 % — HIGH (ref 10.3–14.5)
SARS-COV-2 RNA SPEC QL NAA+PROBE: SIGNIFICANT CHANGE UP
SODIUM SERPL-SCNC: 139 MMOL/L — SIGNIFICANT CHANGE UP (ref 135–145)
TIBC SERPL-MCNC: 270 UG/DL — SIGNIFICANT CHANGE UP (ref 250–450)
UIBC SERPL-MCNC: 123 UG/DL — SIGNIFICANT CHANGE UP (ref 110–370)
WBC # BLD: 9.49 K/UL — SIGNIFICANT CHANGE UP (ref 3.8–10.5)
WBC # FLD AUTO: 9.49 K/UL — SIGNIFICANT CHANGE UP (ref 3.8–10.5)

## 2022-08-18 PROCEDURE — 99285 EMERGENCY DEPT VISIT HI MDM: CPT

## 2022-08-18 RX ORDER — RISPERIDONE 4 MG/1
0.5 TABLET ORAL DAILY
Refills: 0 | Status: DISCONTINUED | OUTPATIENT
Start: 2022-08-18 | End: 2022-08-23

## 2022-08-18 RX ORDER — SERTRALINE 25 MG/1
100 TABLET, FILM COATED ORAL DAILY
Refills: 0 | Status: DISCONTINUED | OUTPATIENT
Start: 2022-08-18 | End: 2022-08-23

## 2022-08-18 RX ORDER — ATORVASTATIN CALCIUM 80 MG/1
40 TABLET, FILM COATED ORAL AT BEDTIME
Refills: 0 | Status: DISCONTINUED | OUTPATIENT
Start: 2022-08-18 | End: 2022-08-23

## 2022-08-18 RX ORDER — ACETAMINOPHEN 500 MG
650 TABLET ORAL EVERY 6 HOURS
Refills: 0 | Status: DISCONTINUED | OUTPATIENT
Start: 2022-08-18 | End: 2022-08-23

## 2022-08-18 RX ADMIN — Medication 650 MILLIGRAM(S): at 20:46

## 2022-08-18 RX ADMIN — ATORVASTATIN CALCIUM 40 MILLIGRAM(S): 80 TABLET, FILM COATED ORAL at 21:56

## 2022-08-18 RX ADMIN — Medication 650 MILLIGRAM(S): at 21:56

## 2022-08-18 NOTE — H&P ADULT - ASSESSMENT
88 yo F, who lives at home alone, ambulatory at baseline, PMH of anemia, insomnia, long standing Hx of generalized weakness, VEENA, newly dx breast carcinoma with mets admitted for anemia 2/2 bleeding breast mass, Hb 6.1

## 2022-08-18 NOTE — H&P ADULT - PROBLEM SELECTOR PLAN 4
holding dvt ppx in view of anemia requiring transfusions, consider resuming ASAP in the setting of malignancy

## 2022-08-18 NOTE — H&P ADULT - PROBLEM SELECTOR PLAN 1
Hb 6.1 on admission  s/p 1 u prbc in ED    - monitor CBC closely  - Transfuse if hb < 7 Hb 6.1 on admission  s/p 1 u prbc in ED    - monitor CBC closely  - Transfuse if hb < 7  - FOBT, consider GI eval if positive  - surgery eval in AM to rule out further causes of bleeding from skin wound

## 2022-08-18 NOTE — H&P ADULT - NSHPSOCIALHISTORY_GEN_ALL_CORE
Lives at NH, denies toxic habits, ambulates independently. wants to move to assisted living eventually

## 2022-08-18 NOTE — H&P ADULT - HISTORY OF PRESENT ILLNESS
86 y/o F from NH, ambulatory at baseline, PMH of anemia, insomnia, General anxiety disorder, and breast mass (>year long hx of insidious onset, nontender right breast) presents with low H/H 6.0.     recent admission to [facility] 7/18-22 for:  low hgb 2/2 bleeding breast mass, hemoglobin 3.2, transfused 3 U PRBC. CT chest: right breast carcinoma s/p bedside biopsy on 7/20 by surgery team,  CT abd/pelv suspicious for metastatic disease due to neoplasm in LLL, rectal and bone lesions. Pt also found to have bilateral pleural effusions on CT, no intervention. Pt recommended to start chemotherapy @ [facility] after d/c from [facility].    ED Course  Vitals: /59 P 87 R 18 T 97.9F SpO2 99%   Meds: 1 u prbc 88 y/o F from NH, ambulatory at baseline, PMH of anemia, insomnia, General anxiety disorder, and breast mass (>year long hx of insidious onset, nontender right breast, squamous cell carcinoma per biopsy) presents with low H/H 6.0. Patient denies dizziness, weakness, fever, chills, nausea, vomiting, diarrhea, constipation, blood in stool or urine, other urinary changes. States she feels normal at this time.     recent admission to [facility] 7/18-22 for:  low hgb 2/2 bleeding breast mass, hemoglobin 3.2, transfused 3 U PRBC. CT chest: right breast carcinoma s/p bedside biopsy on 7/20 by surgery team,  CT abd/pelv suspicious for metastatic disease due to neoplasm in LLL, rectal and bone lesions. Pt also found to have bilateral pleural effusions on CT, no intervention. Pt recommended to start chemotherapy @ [facility] after d/c from [facility].    ED Course  Vitals: /59 P 87 R 18 T 97.9F SpO2 99%   Meds: 1 u prbc

## 2022-08-18 NOTE — ED PROVIDER NOTE - CPE EDP SKIN NORM
PATIENT:   Subjective   Patient ID: Kelechi is a 25 month old male.    CHIEF COMPLAINT:  Chief Complaint   Patient presents with   • Other       HPI:  Diarrhea   The current episode started 2 days ago. The onset was sudden. The diarrhea occurs 2 to 4 times per day. The problem has not changed since onset.The problem is mild. The diarrhea is malodorous (yellow). Nothing relieves the symptoms. Nothing aggravates the symptoms. Associated symptoms include diarrhea. Pertinent negatives include no orthopnea, no fever, no eye itching, no photophobia, no abdominal pain, no constipation, no nausea, no vomiting, no congestion, no ear discharge, no ear pain, no headaches, no hearing loss, no mouth sores, no rhinorrhea, no sore throat, no stridor, no swollen glands, no muscle aches, no neck pain, no neck stiffness, no cough, no URI, no wheezing, no rash, no eye discharge, no eye pain and no eye redness. He has been fussy and crying more. He has been eating less than usual (drinking normal). The infant is bottle fed. Urine output has been normal. The last void occurred less than 6 hours ago. There were no sick contacts.   Patient presents with Mom and brother (Vishnu).  Mom speaks limited English. Mom prefers to use older brother for translation today. No fevers. Reports smelly/stinky yellow  Diarrhea for last 2 days. 3 occurrence daily since yesterday.  No vomiting. Limited appetite for foods. Drinking Pedialyte well; drinking a full bottle at a setting.  No interested in food not even french fries.  No HH sick contacts. Plays with Cousins. No .  No travel. No trauma.  PCP in [facility]; some vaccine are noted on care everywhere.  Reports child is sad and not usual self.  Awake and interested in brother's phone and sucker in clinic.  No breathing difficulties.      ROS:  Review of Systems   Constitutional: Positive for appetite change, crying and irritability. Negative for activity change, chills, diaphoresis, fatigue,  fever and unexpected weight change.   HENT: Negative for congestion, dental problem, drooling, ear discharge, ear pain, facial swelling, hearing loss, mouth sores, nosebleeds, rhinorrhea, sneezing, sore throat, tinnitus, trouble swallowing and voice change.    Eyes: Negative for photophobia, pain, discharge, redness, itching and visual disturbance.   Respiratory: Negative for apnea, cough, choking, wheezing and stridor.    Cardiovascular: Negative for chest pain, palpitations, orthopnea, leg swelling and cyanosis.   Gastrointestinal: Positive for diarrhea. Negative for abdominal distention, abdominal pain, anal bleeding, blood in stool, constipation, nausea, rectal pain and vomiting.   Genitourinary: Positive for decreased urine volume. Negative for difficulty urinating and dysuria.   Musculoskeletal: Negative for arthralgias, back pain, gait problem, joint swelling, myalgias, neck pain and neck stiffness.   Skin: Negative.  Negative for rash.   Neurological: Negative for tremors, seizures, syncope, facial asymmetry, speech difficulty, weakness and headaches.   Hematological: Negative.    Psychiatric/Behavioral: Negative.        PROBLEM LIST:  There is no problem list on file for this patient.       MEDICAL HISTORY:  No past medical history on file.     SURGICAL HISTORY:   has no past surgical history on file.     FAMILY MEDICAL HISTORY:  family history includes Cancer in his maternal grandfather; Diabetes in his paternal grandfather.    SOCIAL HISTORY:  Social History     Tobacco Use   • Smoking status: Never Smoker   • Smokeless tobacco: Not on file   Substance Use Topics   • Alcohol use: Not on file   • Drug use: Not on file       MEDICATIONS:  Current Outpatient Medications   Medication Sig Dispense Refill   • saccharomyces boulardii (Florastor Baby) 250 MG packet for oral suspension Take 1 packet by mouth 2 times daily (with meals). 20 each 0   • cetirizine (ZyrTEC Childrens Allergy) 5 MG/5ML solution Take 2.5  mLs by mouth daily. 75 mL 0     No current facility-administered medications for this visit.       ALLERGIES:  has No Known Allergies.    LAB RESULTS  Please enter a base name.  Walk In on 07/28/2021   Component Date Value Ref Range Status   • GRP A STREP 07/28/2021 Negative  Negative Final   • Internal Procedural Controls Accep* 07/28/2021 Yes   Final       VITAL SIGNS THIS VISIT:  Visit Vitals  Pulse 129   Temp 98.1 °F (36.7 °C) (Temporal)   Resp 26   Wt (!) 15.9 kg (35 lb 0.9 oz)   SpO2 100%        PHYSICAL EXAM:  Objective    Physical Exam  Vitals and nursing note reviewed.   Constitutional:       General: He is active and crying. He is irritable. He is not in acute distress.He regards caregiver.      Appearance: He is well-developed. He is not diaphoretic.      Comments: Cries on exam. Settles and consoles with Mom and brother.  Likes his sucker and cocomelon on phone. Hydration status  appears intact.     HENT:      Head: Normocephalic and atraumatic. No signs of injury.      Right Ear: Tympanic membrane and external ear normal. There is no impacted cerumen. Tympanic membrane is not erythematous or bulging.      Left Ear: Tympanic membrane and external ear normal. There is no impacted cerumen. Tympanic membrane is not erythematous or bulging.      Nose: Mucosal edema present. No congestion or rhinorrhea.      Mouth/Throat:      Mouth: Mucous membranes are moist.      Pharynx: No pharyngeal vesicles, pharyngeal swelling, oropharyngeal exudate, posterior oropharyngeal erythema or pharyngeal petechiae.      Tonsils: No tonsillar exudate.   Eyes:      General:         Right eye: No discharge.         Left eye: No discharge.      Conjunctiva/sclera: Conjunctivae normal.      Pupils: Pupils are equal, round, and reactive to light.   Cardiovascular:      Rate and Rhythm: Normal rate and regular rhythm.      Heart sounds: No murmur heard.  Pulmonary:      Effort: Pulmonary effort is normal. No respiratory distress,  nasal flaring or retractions.      Breath sounds: Normal breath sounds. No stridor or decreased air movement. No wheezing, rhonchi or rales.      Comments: No cough on exam.   Abdominal:      General: Abdomen is flat. Bowel sounds are normal. There is no distension.      Palpations: Abdomen is soft. There is no mass.      Tenderness: There is no abdominal tenderness. There is no guarding or rebound.      Hernia: No hernia is present.      Comments: Large wet urine diaper in clinic.    Musculoskeletal:         General: No tenderness.      Cervical back: Normal range of motion and neck supple. No rigidity.   Lymphadenopathy:      Cervical: No cervical adenopathy.   Skin:     General: Skin is warm and moist.      Capillary Refill: Capillary refill takes less than 2 seconds.      Coloration: Skin is not cyanotic, jaundiced, mottled or pale.      Findings: No erythema, petechiae or rash. Rash is not purpuric.   Neurological:      Mental Status: He is alert.      Motor: No weakness or abnormal muscle tone.      Coordination: Coordination normal.      Gait: Gait normal.         IMMUNIZATIONS:  Immunization History   Administered Date(s) Administered   • Hep B, adolescent or pediatric 2020        ORDERS:  Orders Placed This Encounter   • Rotavirus Stool Antigen   • saccharomyces boulardii (Florastor Baby) 250 MG packet for oral suspension         ASSESSMENT:  Problem List Items Addressed This Visit     None      Visit Diagnoses     Diarrhea, unspecified type    -  Primary    Relevant Medications    saccharomyces boulardii (Florastor Baby) 250 MG packet for oral suspension    Other Relevant Orders    ROTAVIRUS STOOL ANTIGEN    Decreased appetite        Viral gastroenteritis        Relevant Medications    saccharomyces boulardii (Florastor Baby) 250 MG packet for oral suspension          PLAN:    -Rapid Strep> Undetected.  -PCR antigen Rotavirus stool ordered> await results.  -push Fluids.  -BRAT diet; Advance as  tolerated.  -Rx for Florastor Baby sent to pharmacy; use as direted.  -Needs at least 6-8 wet diapers with urine daily.      General care  If symptoms are severe, rest at home for the next 24 hours, or until you are feeling better.  Washing your hands with soap and water, or using alcohol-based hand  is the best way to stop the spread of infection. Wash your hands after touching anyone who is sick.  Wash your hands after using the toilet and before meals. Clean the toilet after each use.  Caffeine, tobacco, and alcohol can make the diarrhea, cramping, and pain worse. Remember, caffeine not only is in coffee, but also is in chocolate, some energy drinks, and teas.  Diet  Water and clear liquids are important so you don't get dehydrated. Drink a small amount at a time. Don't guzzle down the drinks. That may increase your nausea, make cramping worse, and cause the drinks to come back up.  Sports drinks may also help. Make sure they are not too sugary, because this can sometimes make things worse. Also, don't drink beverages that are too acidic, like orange juice and grape juice.  If you are very dehydrated, sports drinks aren't a good choice. They have too much sugar and not enough electrolytes. In this case, commercially available products called oral rehydration solutions are best.  Food  Don't force yourself to eat, especially if you have cramps, diarrhea, or vomiting. Eat just a little at a time, and then wait a few minutes before you try to eat more.  Don't eat fatty, greasy, spicy, or fried foods.  Don't eat dairy products if you have diarrhea. They can make it worse.  During the first 24 hours (the first full day), follow the diet below:  Beverages: Sports drinks, soft drinks without caffeine, mineral water, and decaffeinated tea and coffee  Soups: Clear broth, consommé, and bouillon  Desserts: Plain gelatin, popsicles, and fruit juice bars  During the next 24 hours (the second day), you may add the  following to the above if you are better. If not, continue what you did the first day:  Hot cereal, plain toast, bread, rolls, crackers  Plain noodles, rice, mashed potatoes, chicken noodle or rice soup  Unsweetened canned fruit (avoid pineapple), bananas  Limit fat intake to less than 15 grams per day by avoiding margarine, butter, oils, mayonnaise, sauces, gravies, fried foods, peanut butter, meat, poultry, and fish.  Limit fiber. Avoid raw or cooked vegetables, fresh fruits (except bananas) and bran cereals.  Limit caffeine and chocolate. No spices or seasonings except salt.  During the next 24 hours:  Gradually resume a normal diet, as you feel better and your symptoms improve.  If at any time your symptoms start getting worse again, go back to clear liquids until you feel better.  Food preparation  If you have diarrhea, you should not prepare food for others. When preparing foods, wash your hands before and after.  Wash your hands or use alcohol-based  after using cutting boards, countertops, and knives that have been in contact with raw food.  Keep uncooked meats away from cooked and ready-to-eat foods.    Follow up with your healthcare provider as directed. Call if you don't get better in the next 2  days.   If a stool (diarrhea) sample was taken, or cultures done, you will be called with results and further management if indicated based on results.     When to seek medical advice  Call your healthcare provider right away if any of these occur:  Bloody or black vomit or stools  Severe, steady abdominal pain or any abdominal pain that is getting worse  Severe headache or stiff neck  An inability to hold down even sips of liquids for more than 12 hours  Vomiting that lasts more than 24 hours  Diarrhea that lasts more than 24 hours  Fever of 100.4°F (38.0°C) or higher, or as directed by your healthcare provider  Yellowish color to your skin or the whites of your eyes  Signs of dehydration, such as dry  mouth, little urine (less than every 6 hours), or very dark urine    Call 911 if any of these occur:  Trouble breathing, Chest pain, Confusion, Severe drowsiness or trouble awakening, Fainting or loss of consciousness, Rapid heart rate, Seizure, Stiff neck, Severe weakness, dizziness, or lightheadedness.      WOUNDS

## 2022-08-18 NOTE — ED PROVIDER NOTE - OBJECTIVE STATEMENT
86 yo F PMHx of R breast wound w/ chronic bleeding, anemia, HTN, HLD presents from NH with low H/H. labs collected on 8/15/22 show H/H 6.0/19.7 thus sent to ED for blood transfusion. Patient denier rectal bleeding, dizziness or dyspnea.

## 2022-08-18 NOTE — PATIENT PROFILE ADULT - FALL HARM RISK - HARM RISK INTERVENTIONS

## 2022-08-18 NOTE — ED PROVIDER NOTE - CLINICAL SUMMARY MEDICAL DECISION MAKING FREE TEXT BOX
86 yo F PMHx of R breast wound from squamous cell cercinoma w/ chronic bleeding, anemia, HTN, HLD presents from NH with low H/H. Will obtain labs, prior to transfusion.  Discussed above with Dr. Landin who recommends admission for transfusion and further evaluation of skin wound.  patient signedout to Dr. Asif at 330pm.

## 2022-08-18 NOTE — H&P ADULT - NSHPPHYSICALEXAM_GEN_ALL_CORE
PHYSICAL EXAM:  GENERAL: NAD, lying in bed comfortably, elderly  HEAD:  Atraumatic, Normocephalic  EYES: EOMI, PERRLA, conjunctiva and sclera clear  ENT: Moist mucous membranes  NECK: Supple, No JVD  CHEST/LUNG: Clear to auscultation bilaterally; No rales, rhonchi, wheezing, or rubs.  HEART: Regular rate and rhythm; No murmurs, rubs, or gallops  ABDOMEN: Bowel sounds present; Soft, Nontender, Nondistended. No hepatomegally  EXTREMITIES:  2+ Peripheral Pulses, brisk capillary refill. No clubbing, cyanosis, or edema  NERVOUS SYSTEM:  Alert & Oriented X3, speech clear. No deficits   MSK: FROM all 4 extremities, full and equal strength  SKIN: Anterior wall breast mass with bleeding on right chest wall, also left chest wall anterior bleeding, controlled

## 2022-08-18 NOTE — H&P ADULT - NSICDXPASTMEDICALHX_GEN_ALL_CORE_FT
PAST MEDICAL HISTORY:  Carotid stenosis     HLD (hyperlipidemia)     Primary squamous cell carcinoma of skin     TIA (transient ischemic attack)

## 2022-08-18 NOTE — PATIENT PROFILE ADULT - WILL THE PATIENT ACCEPT THE PFIZER COVID-19 VACCINE IF ELIGIBLE AND IT IS AVAILABLE?
Date of Service: 02/18/2021    ePRESENTING COMPLAINT:  Right ankle pain.    PRESENT ILLNESS:  The patient returns for evaluation of her right ankle.  Please see my previously dictated notes from 02/28/2019.    The patient continues to struggle with balance issues.  She has been dealing with chemotherapy-induced neuropathy and has Raynaud's syndrome, and has had more focal pain along the posterolateral aspect of the right ankle behind the fibula.    The patient is unable to reach her feet for any tangible self-care.  She lives alone and is currently receiving management for fibromyalgia and her multiple musculoskeletal complaints.  The patient's primary care provider as Jaqueline Cooney MD.     PHYSICAL EXAMINATION:  RIGHT LOWER EXTREMITY:  The patient has hyperemic skin changes in the right ankle and foot.  There is pain along the peroneal tendons, which upon palpation mimics the chief complaint.  The patient is reluctant to lolita the right ankle due to pain.    Radiographs of the right ankle and foot reviewed.    ASSESSMENT:  Right peroneal tendinopathy.    PLAN:  1.  I had a long discussion with the patient regarding her right ankle and foot.  More than 30 minutes of clinic time was spent with the patient in the prechart review, chart review and documentation, as well as discussing the x-rays, the diagnosis of peroneal tendinopathy and then the challenges of management of a lower extremity condition in a minimally flexible person.  2.  The patient may be planning to see Dr. Cheung, who is podiatry, and I would encourage his input whether brace management or orthotics are tangible for peroneal tendinopathy.  3.  MRI of the right ankle if no improvement, but the patient is a poor surgical candidate and has very little social network, therefore brace management should be optimized.      Dictated By: Vidal Rodriguez MD  Signing Provider: Vidal Rodriguez MD    EM/julien ([id number])  DD: 02/18/2021 18:25:32 TD:  02/19/2021 08:51:36    Copy Sent To:      No

## 2022-08-18 NOTE — ED ADULT NURSE NOTE - OBJECTIVE STATEMENT
Assumed of care of 88 yo female who presents to the ED for abnormal lab value.  Pt denies any other medical complaint at this time.

## 2022-08-18 NOTE — H&P ADULT - PROBLEM SELECTOR PLAN 2
right breast carcinoma, path: Invasive keratinizing squamous cell carcinoma, moderately differentiated, originating from epidermis with squamous cell carcinoma in situ. Invasive squamous cell carcinoma involves the full thickness of the biopsy and extends to the deep inked margin of the biopsy specimen.  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    - follows with Dr. 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 Invasive squamous cell carcinoma involves the full thickness of the biopsy and extends to the deep inked margin of the biopsy specimen.  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    - follows with Dr. Holt, consulted  - surgery eval in AM to rule out further causes of bleeding from skin wound

## 2022-08-19 DIAGNOSIS — C79.9 SECONDARY MALIGNANT NEOPLASM OF UNSPECIFIED SITE: ICD-10-CM

## 2022-08-19 DIAGNOSIS — D62 ACUTE POSTHEMORRHAGIC ANEMIA: ICD-10-CM

## 2022-08-19 DIAGNOSIS — Z02.9 ENCOUNTER FOR ADMINISTRATIVE EXAMINATIONS, UNSPECIFIED: ICD-10-CM

## 2022-08-19 LAB
ALBUMIN SERPL ELPH-MCNC: 2.6 G/DL — LOW (ref 3.5–5)
ALP SERPL-CCNC: 59 U/L — SIGNIFICANT CHANGE UP (ref 40–120)
ALT FLD-CCNC: 12 U/L DA — SIGNIFICANT CHANGE UP (ref 10–60)
ANION GAP SERPL CALC-SCNC: 7 MMOL/L — SIGNIFICANT CHANGE UP (ref 5–17)
AST SERPL-CCNC: 11 U/L — SIGNIFICANT CHANGE UP (ref 10–40)
BILIRUB SERPL-MCNC: 0.3 MG/DL — SIGNIFICANT CHANGE UP (ref 0.2–1.2)
BUN SERPL-MCNC: 15 MG/DL — SIGNIFICANT CHANGE UP (ref 7–18)
CALCIUM SERPL-MCNC: 9.7 MG/DL — SIGNIFICANT CHANGE UP (ref 8.4–10.5)
CHLORIDE SERPL-SCNC: 108 MMOL/L — SIGNIFICANT CHANGE UP (ref 96–108)
CO2 SERPL-SCNC: 24 MMOL/L — SIGNIFICANT CHANGE UP (ref 22–31)
CREAT SERPL-MCNC: 0.4 MG/DL — LOW (ref 0.5–1.3)
EGFR: 96 ML/MIN/1.73M2 — SIGNIFICANT CHANGE UP
GLUCOSE SERPL-MCNC: 99 MG/DL — SIGNIFICANT CHANGE UP (ref 70–99)
HCT VFR BLD CALC: 24.3 % — LOW (ref 34.5–45)
HGB BLD-MCNC: 7.6 G/DL — LOW (ref 11.5–15.5)
MCHC RBC-ENTMCNC: 28.7 PG — SIGNIFICANT CHANGE UP (ref 27–34)
MCHC RBC-ENTMCNC: 31.3 GM/DL — LOW (ref 32–36)
MCV RBC AUTO: 91.7 FL — SIGNIFICANT CHANGE UP (ref 80–100)
NRBC # BLD: 0 /100 WBCS — SIGNIFICANT CHANGE UP (ref 0–0)
PLATELET # BLD AUTO: 189 K/UL — SIGNIFICANT CHANGE UP (ref 150–400)
POTASSIUM SERPL-MCNC: 4.3 MMOL/L — SIGNIFICANT CHANGE UP (ref 3.5–5.3)
POTASSIUM SERPL-SCNC: 4.3 MMOL/L — SIGNIFICANT CHANGE UP (ref 3.5–5.3)
PROT SERPL-MCNC: 5.4 G/DL — LOW (ref 6–8.3)
RBC # BLD: 2.65 M/UL — LOW (ref 3.8–5.2)
RBC # FLD: 18.9 % — HIGH (ref 10.3–14.5)
SODIUM SERPL-SCNC: 139 MMOL/L — SIGNIFICANT CHANGE UP (ref 135–145)
WBC # BLD: 9.36 K/UL — SIGNIFICANT CHANGE UP (ref 3.8–10.5)
WBC # FLD AUTO: 9.36 K/UL — SIGNIFICANT CHANGE UP (ref 3.8–10.5)

## 2022-08-19 PROCEDURE — 99222 1ST HOSP IP/OBS MODERATE 55: CPT | Mod: FS

## 2022-08-19 RX ADMIN — Medication 650 MILLIGRAM(S): at 13:58

## 2022-08-19 RX ADMIN — Medication 650 MILLIGRAM(S): at 14:58

## 2022-08-19 RX ADMIN — Medication 650 MILLIGRAM(S): at 22:33

## 2022-08-19 RX ADMIN — Medication 650 MILLIGRAM(S): at 23:25

## 2022-08-19 RX ADMIN — RISPERIDONE 0.5 MILLIGRAM(S): 4 TABLET ORAL at 11:42

## 2022-08-19 RX ADMIN — ATORVASTATIN CALCIUM 40 MILLIGRAM(S): 80 TABLET, FILM COATED ORAL at 22:32

## 2022-08-19 RX ADMIN — SERTRALINE 100 MILLIGRAM(S): 25 TABLET, FILM COATED ORAL at 11:42

## 2022-08-19 NOTE — PROGRESS NOTE ADULT - SUBJECTIVE AND OBJECTIVE BOX
PATIENT SEEN AND EXAMINED ON :- 8/19/22  DATE OF SERVICE:    8/18/22         Interim events noted,Labs ,Radiological studies and Cardiology tests reviewed .       HOSPITAL COURSE: HPI:  86 y/o F from NH, ambulatory at baseline, PMH of anemia, insomnia, General anxiety disorder, and breast mass (>year long hx of insidious onset, nontender right breast, squamous cell carcinoma per biopsy) presents with low H/H 6.0. Patient denies dizziness, weakness, fever, chills, nausea, vomiting, diarrhea, constipation, blood in stool or urine, other urinary changes. States she feels normal at this time.     recent admission to [facility] 7/18-22 for:  low hgb 2/2 bleeding breast mass, hemoglobin 3.2, transfused 3 U PRBC. CT chest: right breast carcinoma s/p bedside biopsy on 7/20 by surgery team,  CT abd/pelv suspicious for metastatic disease due to neoplasm in LLL, rectal and bone lesions. Pt also found to have bilateral pleural effusions on CT, no intervention. Pt recommended to start chemotherapy @ [facility] after d/c from [facility].    ED Course  Vitals: /59 P 87 R 18 T 97.9F SpO2 99%   Meds: 1 u prbc (18 Aug 2022 17:59)      INTERIM EVENTS:Patient seen at bedside ,interim events noted.      PMH -reviewed admission note, no change since admission  HEART FAILURE: Acute[ ]Chronic[ ] Systolic[ ] Diastolic[ ] Combined Systolic and Diastolic[ ]  CAD[ ] CABG[ ] PCI[ ]  DEVICES[ ] PPM[ ] ICD[ ] ILR[ ]  ATRIAL FIBRILLATION[ ] Paroxysmal[ ] Permanent[ ] CHADS2-[  ]  KAMRAN[ ] CKD1[ ] CKD2[ ] CKD3[ ] CKD4[ ] ESRD[ ]  COPD[ ] HTN[ ]   DM[ ] Type1[ ] Type 2[ ]   CVA[ ] Paresis[ ]    AMBULATION: Assisted[ ] Cane/walker[ ] Independent[ ]    MEDICATIONS  (STANDING):  atorvastatin 40 milliGRAM(s) Oral at bedtime  risperiDONE   Tablet 0.5 milliGRAM(s) Oral daily  sertraline 100 milliGRAM(s) Oral daily    MEDICATIONS  (PRN):  acetaminophen     Tablet .. 650 milliGRAM(s) Oral every 6 hours PRN Temp greater or equal to 38C (100.4F), Mild Pain (1 - 3), Moderate Pain (4 - 6)            REVIEW OF SYSTEMS:  Constitutional: [ ] fever, [ ]weight loss,  [ ]fatigue [ ]weight gain  Eyes: [ ] visual changes  Respiratory: [ ]shortness of breath;  [ ] cough, [ ]wheezing, [ ]chills, [ ]hemoptysis  Cardiovascular: [ ] chest pain, [ ]palpitations, [ ]dizziness,  [ ]leg swelling[ ]orthopnea[ ]PND  Gastrointestinal: [ ] abdominal pain, [ ]nausea, [ ]vomiting,  [ ]diarrhea [ ]Constipation [ ]Melena  Genitourinary: [ ] dysuria, [ ] hematuria [ ]Powell  Neurologic: [ ] headaches [ ] tremors[ ]weakness [ ]Paralysis Right[ ] Left[ ]  Skin: [ ] itching, [ ]burning, [ ] rashes  Endocrine: [ ] heat or cold intolerance  Musculoskeletal: [ ] joint pain or swelling; [ ] muscle, back, or extremity pain  Psychiatric: [ ] depression, [ ]anxiety, [ ]mood swings, or [ ]difficulty sleeping  Hematologic: [ ] easy bruising, [ ] bleeding gums    [ ] All remaining systems negative except as per above.   [ ]Unable to obtain.  [x] No change in ROS since admission      Vital Signs Last 24 Hrs  T(C): 37.1 (19 Aug 2022 14:15), Max: 37.1 (18 Aug 2022 23:40)  T(F): 98.7 (19 Aug 2022 14:15), Max: 98.7 (18 Aug 2022 23:40)  HR: 84 (19 Aug 2022 14:15) (80 - 89)  BP: 110/48 (19 Aug 2022 14:15) (110/48 - 143/51)  BP(mean): 63 (19 Aug 2022 14:15) (63 - 63)  RR: 17 (19 Aug 2022 14:15) (17 - 18)  SpO2: 100% (19 Aug 2022 14:15) (100% - 100%)    Parameters below as of 19 Aug 2022 14:15  Patient On (Oxygen Delivery Method): room air      I&O's Summary      PHYSICAL EXAM:  General: No acute distress BMI-  HEENT: EOMI, PERRL  Neck: Supple, [ ] JVD  Lungs: Equal air entry bilaterally; [ ] rales [ ] wheezing [ ] rhonchi  Heart: Regular rate and rhythm; [x ] murmur   2/6 [ x] systolic [ ] diastolic [ ] radiation[ ] rubs [ ]  gallops  Abdomen: Nontender, bowel sounds present  Extremities: No clubbing, cyanosis, [ ] edema [ ]Pulses  equal and intact  Nervous system:  Alert & Oriented X3, no focal deficits  Psychiatric: Normal affect  Skin: No rashes or lesions    LABS:  08-19    139  |  108  |  15  ----------------------------<  99  4.3   |  24  |  0.40<L>    Ca    9.7      19 Aug 2022 06:30    TPro  5.4<L>  /  Alb  2.6<L>  /  TBili  0.3  /  DBili  x   /  AST  11  /  ALT  12  /  AlkPhos  59  08-19    Creatinine Trend: 0.40<--, 0.59<--, 0.46<--, 0.44<--, 0.39<--, 0.39<--                        7.6    9.36  )-----------( 189      ( 19 Aug 2022 06:30 )             24.3

## 2022-08-19 NOTE — PROGRESS NOTE ADULT - PROBLEM SELECTOR PLAN 1
2/2 to right breast wound   Hb 6.1 on admission  s/p 1 u prbc   with improvement  transfuse if hb < 7  surgery eval

## 2022-08-19 NOTE — PROGRESS NOTE ADULT - PROBLEM SELECTOR PLAN 2
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 Invasive squamous cell carcinoma involves the full thickness of the biopsy and extends to the deep inked margin of the biopsy specimen.  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  Heme Onc Dr. Holt, consulted  surgery eval

## 2022-08-19 NOTE — PROGRESS NOTE ADULT - PROBLEM SELECTOR PLAN 1
Hb 6.1 on admission  s/p 1 u prbc in ED    - monitor CBC closely  - Transfuse if hb < 7  - FOBT, consider GI eval if positive  - surgery eval in AM to rule out further causes of bleeding from skin wound 2/2 to right breast wound   Hb 6.1 on admission  s/p 1 u prbc   with improvement  transfuse if hb < 7  surgery eval

## 2022-08-19 NOTE — CONSULT NOTE ADULT - ASSESSMENT
87 year old lady with a metastatic squamous cell ca of the skin  from right breast.  The mass has necrosis and caused significant bleeding.    Squamous cell ca of the skin  treatment options are either local radiation to stop the bleeding or PD-L1 immunotherapy  both treatment are quite gentle  her condition should be able to tolerate either treatment

## 2022-08-19 NOTE — PROGRESS NOTE ADULT - NSPROGADDITIONALINFOA_GEN_ALL_CORE
Had seen the pt yesterday  She is still in denial about the severity of the problem  Had a long d/w the pt  Asked about whether I could talk with the family  Had discussed the case with radiation oncologist outside/ Dr. Pham. He said he will be able to give palliative treatment with response to the treatment  That maybe the way to go   She also has arm edema too  Discussed the case also with Dr. Landin

## 2022-08-19 NOTE — PROGRESS NOTE ADULT - SUBJECTIVE AND OBJECTIVE BOX
86 y/o F from NH, ambulatory at baseline, PMH of anemia, insomnia, General anxiety disorder, and breast mass (>year long hx of insidious onset, nontender right breast, squamous cell carcinoma per biopsy) presents with low H/H 6.0. Patient denies dizziness, weakness, fever, chills, nausea, vomiting, diarrhea, constipation, blood in stool or urine, other urinary changes. States she feels normal at this time.     recent admission to [facility] 7/18-22 for:  low hgb 2/2 bleeding breast mass, hemoglobin 3.2, transfused 3 U PRBC. CT chest: right breast carcinoma s/p bedside biopsy on 7/20 by surgery team,  CT abd/pelv suspicious for metastatic disease due to neoplasm in LLL, rectal and bone lesions. Pt also found to have bilateral pleural effusions on CT, no intervention. Pt recommended to start chemotherapy @ [facility] after d/c from [facility].    ED Course  Vitals: /59 P 87 R 18 T 97.9F SpO2 99%   Meds: 1 u prbc (18 Aug 2022 17:59)Patient is a 87y old  Female who presents with a chief complaint of breast mass (19 Aug 2022 10:00)    SURGERY CONSULTATION:  Pt seen and examined at bedside for surgical consultation. Pt with invasive squamous cell carcinoma     PAST MEDICAL & SURGICAL HISTORY:  HLD (hyperlipidemia)      TIA (transient ischemic attack)      Carotid stenosis      Primary squamous cell carcinoma of skin      Thyroid mass          MEDICATIONS  (STANDING):  atorvastatin 40 milliGRAM(s) Oral at bedtime  risperiDONE   Tablet 0.5 milliGRAM(s) Oral daily  sertraline 100 milliGRAM(s) Oral daily    MEDICATIONS  (PRN):  acetaminophen     Tablet .. 650 milliGRAM(s) Oral every 6 hours PRN Temp greater or equal to 38C (100.4F), Mild Pain (1 - 3), Moderate Pain (4 - 6)      Allergies    No Known Allergies    Intolerances        Vital Signs Last 24 Hrs  T(C): 36.6 (19 Aug 2022 05:20), Max: 37.4 (18 Aug 2022 20:08)  T(F): 97.9 (19 Aug 2022 05:20), Max: 99.4 (18 Aug 2022 20:08)  HR: 89 (19 Aug 2022 05:20) (77 - 89)  BP: 143/51 (19 Aug 2022 05:20) (99/41 - 143/51)  BP(mean): 54 (18 Aug 2022 20:23) (54 - 57)  RR: 18 (19 Aug 2022 05:20) (18 - 18)  SpO2: 100% (19 Aug 2022 05:20) (99% - 100%)    Parameters below as of 19 Aug 2022 05:20  Patient On (Oxygen Delivery Method): room air        Physical:  Gen: A&Ox3. NAD  Abd: Soft ND, NT        I&O's Detail      LABS:                        7.6    9.36  )-----------( 189      ( 19 Aug 2022 06:30 )             24.3              08-19    139  |  108  |  15  ----------------------------<  99  4.3   |  24  |  0.40<L>    Ca    9.7      19 Aug 2022 06:30    TPro  5.4<L>  /  Alb  2.6<L>  /  TBili  0.3  /  DBili  x   /  AST  11  /  ALT  12  /  AlkPhos  59  08-19                  RADIOLOGY & ADDITIONAL STUDIES:     86 y/o F from NH, ambulatory at baseline, PMH of anemia, insomnia, General anxiety disorder, and breast mass (>year long hx of insidious onset, nontender right breast, squamous cell carcinoma per biopsy) presents with low H/H 6.0. Patient denies dizziness, weakness, fever, chills, nausea, vomiting, diarrhea, constipation, blood in stool or urine, other urinary changes. States she feels normal at this time.     recent admission to [facility] 7/18-22 for:  low hgb 2/2 bleeding breast mass, hemoglobin 3.2, transfused 3 U PRBC. CT chest: right breast carcinoma s/p bedside biopsy on 7/20 by surgery team,  CT abd/pelv suspicious for metastatic disease due to neoplasm in LLL, rectal and bone lesions. Pt also found to have bilateral pleural effusions on CT, no intervention. Pt recommended to start chemotherapy @ [facility] after d/c from [facility].    ED Course  Vitals: /59 P 87 R 18 T 97.9F SpO2 99%   Meds: 1 u prbc (18 Aug 2022 17:59)Patient is a 87y old  Female who presents with a chief complaint of breast mass (19 Aug 2022 10:00)    SURGERY CONSULTATION:  Pt seen and examined at bedside for surgical consultation. S/p bedside breast biopsy 7/20, pathology consistent with invasive squamous cell carcinoma. Pt presenting with anemia, requiring 1 units of PRBC. Patient offers no acute complaints. States breast dressing gets changed daily due to saturation. Denies chest pain, shortness of breathe, or dizziness    PAST MEDICAL & SURGICAL HISTORY:  HLD (hyperlipidemia)      TIA (transient ischemic attack)      Carotid stenosis      Primary squamous cell carcinoma of skin      Thyroid mass    MEDICATIONS  (STANDING):  atorvastatin 40 milliGRAM(s) Oral at bedtime  risperiDONE   Tablet 0.5 milliGRAM(s) Oral daily  sertraline 100 milliGRAM(s) Oral daily    MEDICATIONS  (PRN):  acetaminophen     Tablet .. 650 milliGRAM(s) Oral every 6 hours PRN Temp greater or equal to 38C (100.4F), Mild Pain (1 - 3), Moderate Pain (4 - 6)      Allergies    No Known Allergies    Intolerances    Vital Signs Last 24 Hrs  T(C): 36.6 (19 Aug 2022 05:20), Max: 37.4 (18 Aug 2022 20:08)  T(F): 97.9 (19 Aug 2022 05:20), Max: 99.4 (18 Aug 2022 20:08)  HR: 89 (19 Aug 2022 05:20) (77 - 89)  BP: 143/51 (19 Aug 2022 05:20) (99/41 - 143/51)  BP(mean): 54 (18 Aug 2022 20:23) (54 - 57)  RR: 18 (19 Aug 2022 05:20) (18 - 18)  SpO2: 100% (19 Aug 2022 05:20) (99% - 100%)    Parameters below as of 19 Aug 2022 05:20  Patient On (Oxygen Delivery Method): room air      Physical:  General: A&Ox3. NAD.  Abdomen: Soft nondistended, nontender.  Right breast: Right chest wall, wide fungating mass, with surrounding erythema and shallower induration that extends to the lateral chest wall anterior axillary line. Foul smelling, overlying slough. venous bleed noted on inferior aspect of right chest wall. hemostasis achieved with pressor and surgicel. nipple unable to be identified. Pressure dressing applied.       I&O's Detail      LABS:                        7.6    9.36  )-----------( 189      ( 19 Aug 2022 06:30 )             24.3              08-19    139  |  108  |  15  ----------------------------<  99  4.3   |  24  |  0.40<L>    Ca    9.7      19 Aug 2022 06:30    TPro  5.4<L>  /  Alb  2.6<L>  /  TBili  0.3  /  DBili  x   /  AST  11  /  ALT  12  /  AlkPhos  59  08-19              Surgical Pathology Report:   ACCESSION No:  [id number]  Patient:   SHAVONNE MONTALVO      Accession:                             [id number]    Collected Date/Time:                   7/20/2022 08:44 EDT  Received Date/Time:                    7/21/2022 08:44 EDT    Surgical Pathology Report - Auth (Verified)    Specimen(s) Submitted  1  R breast    Final Diagnosis  Right breast, biopsy:  -Invasive keratinizing squamous cell carcinoma, moderately  differentiated, originating from epidermis with squamous cell carcinoma  in situ. Invasive squamous cell carcinoma involves the full thickness of    the biopsy and extends to the deep inked margin of the biopsy specimen.  Verified by: Blane Sylvester M.D., M.D.  (Electronic Signature)  Reported on: 07/29/22 13:09 EDT, [facility], [address] [place] [address]. [place] [address]  Phone: [phone]   Fax: [phone]  _________________________________________________________________      Comment  The biopsy is a histologically typical invasive cutaneous squamous cell  carcinoma, without any features of breast ductal carcinoma or metaplastic  breast carcinoma.    Case reported to Tumor Registry. 7/29/2022, 1:06 pm  Case FAXED to Jesse Diaz MD, 7/29/2022, 1:06 pm    Clinical Information  Right breast mass    Gross Description  Received:  In formalin labeled  "right breast" , consisting of an  irregular  excision-like portion of gray-white skin measuring 2.2 x 0.9 cm taken to  a depth of 0.5 cm. The epidermal surface is nodular to irregular . The  nodules extend to the resection margin. The resection margin is inked  black and the specimen is sectioned to reveal a gray-tan, cystic cut  surface  Submitted:  Entirely in one cassette: 1A    DHIRAJ Sandoval 07/21/2022 05:31 PM.      Disclaimer  In addition to other data that may appear on the specimen containers, all  labels have been inspected to confirm the presence of the patient's name  and date of birth.    [place]ological [place] [address]. (07.20.22 @ 08:44)

## 2022-08-19 NOTE — PROGRESS NOTE ADULT - ASSESSMENT
88 yo F from [facility] ambulatory at baseline without assistance. 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 Metastatic disease due to neoplasm in LLL, rectum, and bone lesions. BIBA for anemia, 2/2 bleeding breast mass, Hb 6.1 s/p 1 unit PRBC with improvement.   Pending wound care/ surgical consults. Will trend CBC and likely return back to facility once optimized

## 2022-08-19 NOTE — PROGRESS NOTE ADULT - PROBLEM SELECTOR PLAN 4
holding dvt ppx in view of anemia requiring transfusions, consider resuming ASAP in the setting of malignancy holding dvt ppx in view of bleeding wound and anemia   will resume once cleared by heme onc   SCD

## 2022-08-19 NOTE — PROGRESS NOTE ADULT - PROBLEM SELECTOR PLAN 2
right breast carcinoma, path: Invasive keratinizing squamous cell carcinoma, moderately differentiated, originating from epidermis with squamous cell carcinoma in situ. Invasive squamous cell carcinoma involves the full thickness of the biopsy and extends to the deep inked margin of the biopsy specimen.  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    - follows with Dr. Holt, consulted  - surgery eval in AM to rule out further causes of bleeding from skin wound pathology confirms right breast carcinoma, Invasive keratinizing squamous cell carcinoma, moderately differentiated, originating from epidermis with squamous cell carcinoma in situ. Invasive squamous cell carcinoma involves the full thickness of the biopsy and extends to the deep inked margin of the biopsy specimen.  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  Heme Onc Dr. Holt, consulted  - surgery eval in AM to rule out further causes of bleeding from skin wound primary likely carcinoma of the skin   pathology confirms right breast carcinoma, Invasive keratinizing squamous cell carcinoma, moderately differentiated, originating from epidermis with squamous cell carcinoma in situ. Invasive squamous cell carcinoma involves the full thickness of the biopsy and extends to the deep inked margin of the biopsy specimen.  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  Heme Onc Dr. Holt, consulted  surgery eval

## 2022-08-19 NOTE — PROGRESS NOTE ADULT - PROBLEM SELECTOR PLAN 5
takes rosuvastatin 10 mg qhs    - c/w home meds patient from [facility]  pending heme onc and SX follow up  trending CBC   likely will return back to facility once optimzied   COVID - 8/18

## 2022-08-19 NOTE — PROGRESS NOTE ADULT - ASSESSMENT
87F with right fungating breast mass  path - invasive keratinizing squamous cell carcinoma  metastatic disease    -monitor h/h, transfuse PRN  -pressure dressing applied  -hold AC if medically safe  -heme/oncology consultation for possible palliative treatment  -no surgical intervention warranted at this time given extensive disease  -remainder of care per primary team

## 2022-08-19 NOTE — CONSULT NOTE ADULT - SUBJECTIVE AND OBJECTIVE BOX
87 year old lady with a large right breast mass biopsy showed sqamous cell ca of the skin.  It bled a lot and caused severe anemia.  she is admitted for Hb 3.2.  # u PRBC was given.  CT scan showed lung nodule, effusion, and a rectal polyp.  there are also bone mets.     she is awake alert, but very weak.  breast mass is under dressing    abdomen is soft and flat.

## 2022-08-19 NOTE — PROGRESS NOTE ADULT - ASSESSMENT
86 yo F, who lives at home alone, ambulatory at baseline, PMH of anemia, insomnia, long standing Hx of generalized weakness, VEENA, newly dx breast carcinoma with mets admitted for anemia 2/2 bleeding breast mass, Hb 6.1 88 yo F from [facility] ambulatory at baseline without assistance. 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 Metastatic disease due to neoplasm in LLL, rectum, and bone lesions. BIBA for anemia, 2/2 bleeding breast mass, Hb 6.1 s/p 1 unit PRBC with improvement.   Pending wound care/ surgical consults. Will trend CBC and likely return back to facility once optimized

## 2022-08-19 NOTE — PROGRESS NOTE ADULT - PROBLEM SELECTOR PLAN 5
patient from [facility]  pending heme onc and SX follow up  trending CBC   likely will return back to facility once optimzied   COVID - 8/18

## 2022-08-19 NOTE — PROGRESS NOTE ADULT - SUBJECTIVE AND OBJECTIVE BOX
-*-*-* INCOMPLETE  NP Note discussed with  primary attending    Patient is a 87y old  Female who presents with a chief complaint of breast mass (19 Aug 2022 10:00)      INTERVAL HPI/OVERNIGHT EVENTS: no acute medical complaints overnight     MEDICATIONS  (STANDING):  atorvastatin 40 milliGRAM(s) Oral at bedtime  risperiDONE   Tablet 0.5 milliGRAM(s) Oral daily  sertraline 100 milliGRAM(s) Oral daily    MEDICATIONS  (PRN):  acetaminophen     Tablet .. 650 milliGRAM(s) Oral every 6 hours PRN Temp greater or equal to 38C (100.4F), Mild Pain (1 - 3), Moderate Pain (4 - 6)      __________________________________________________  REVIEW OF SYSTEMS:    CONSTITUTIONAL: No fever,   EYES: no acute visual disturbances  NECK: No pain or stiffness  RESPIRATORY: No cough; No shortness of breath  CARDIOVASCULAR: No chest pain, no palpitations  GASTROINTESTINAL: No pain. No nausea or vomiting; No diarrhea   NEUROLOGICAL: No headache or numbness, no tremors  MUSCULOSKELETAL: No joint pain, no muscle pain  GENITOURINARY: no dysuria, no frequency, no hesitancy  PSYCHIATRY: no depression , no anxiety  ALL OTHER  ROS negative        Vital Signs Last 24 Hrs  T(C): 36.6 (19 Aug 2022 05:20), Max: 37.4 (18 Aug 2022 20:08)  T(F): 97.9 (19 Aug 2022 05:20), Max: 99.4 (18 Aug 2022 20:08)  HR: 89 (19 Aug 2022 05:20) (77 - 89)  BP: 143/51 (19 Aug 2022 05:20) (99/41 - 143/51)  BP(mean): 54 (18 Aug 2022 20:23) (54 - 57)  RR: 18 (19 Aug 2022 05:20) (18 - 18)  SpO2: 100% (19 Aug 2022 05:20) (97% - 100%)    Parameters below as of 19 Aug 2022 05:20  Patient On (Oxygen Delivery Method): room air        ________________________________________________  PHYSICAL EXAM:  GENERAL: NAD  HEENT: Normocephalic;  conjunctivae and sclerae clear;  NECK : supple  CHEST/LUNG: Clear to auscultation bilaterally with good air entry   +right breast wound with clean dry dressing  HEART: S1 S2  regular; no murmurs, gallops or rubs  ABDOMEN: Soft, Nontender, Nondistended; Bowel sounds present  EXTREMITIES: +2 pitting edema to the right arm   SKIN: warm and dry; no rash  NERVOUS SYSTEM:  Awake and alert; Oriented  to place, person and time     _________________________________________________  LABS:                        7.6    9.36  )-----------( 189      ( 19 Aug 2022 06:30 )             24.3     08-19    139  |  108  |  15  ----------------------------<  99  4.3   |  24  |  0.40<L>    Ca    9.7      19 Aug 2022 06:30    TPro  5.4<L>  /  Alb  2.6<L>  /  TBili  0.3  /  DBili  x   /  AST  11  /  ALT  12  /  AlkPhos  59  08-19        CAPILLARY BLOOD GLUCOSE            RADIOLOGY & ADDITIONAL TESTS:   < from: US Duplex Venous Upper Ext Ltd, Right (08.11.22 @ 14:15) >  IMPRESSION:  No evidence of right upper extremity deep venous thrombosis.        --- End of Report ---    < end of copied text >  < from: CT Abdomen and Pelvis w/ IV Cont (07.21.22 @ 18:55) >    IMPRESSION:  1.3 x 1.3 cm slightly irregular pulmonary nodule in the left lower lobe   which may represent a metastatic lesion versus primary neoplasm.   Recommend CT scan of the chest was discussed for additional nodules.    1.6 x 1.6 cm lesion in the anterior rectum raising concern for a large   polyp or neoplasm. Dense stool would be less likely since it within the   nondependent portion of the rectum. Please correlate clinically    Few very dense small sclerotic bone lesions. This is more dense than   typically seen with metastatic disease and while these may represent   metastases, benign lesions are also possible. Nuclear medicine bone scan   may be of value although this is limited due to the small size.    Partially imaged right breast skin thickening and collection versus   implant.        --- End of Report ---    ***Please see the addendum at the top of this report. It may contain   additional important information or changes.****        PAT GAITAN MD; Attending Radiologist  This document has been electronically signed. Jul 22 2022 11:51AM  Addend:PAT GAITAN MD; Attending Radiologist  This addendum was electronically signed on: Jul 22 2022  3:50PM.    < end of copied text >    Imaging Personally Reviewed:  YES    Consultant(s) Notes Reviewed:   YES    Plan of care was discussed with patient and /or primary care giver; all questions and concerns were addressed and care was aligned with patient's wishes.

## 2022-08-20 LAB
ANION GAP SERPL CALC-SCNC: 9 MMOL/L — SIGNIFICANT CHANGE UP (ref 5–17)
BUN SERPL-MCNC: 20 MG/DL — HIGH (ref 7–18)
CALCIUM SERPL-MCNC: 10 MG/DL — SIGNIFICANT CHANGE UP (ref 8.4–10.5)
CHLORIDE SERPL-SCNC: 106 MMOL/L — SIGNIFICANT CHANGE UP (ref 96–108)
CO2 SERPL-SCNC: 23 MMOL/L — SIGNIFICANT CHANGE UP (ref 22–31)
CREAT SERPL-MCNC: 0.46 MG/DL — LOW (ref 0.5–1.3)
EGFR: 93 ML/MIN/1.73M2 — SIGNIFICANT CHANGE UP
GLUCOSE SERPL-MCNC: 96 MG/DL — SIGNIFICANT CHANGE UP (ref 70–99)
HCT VFR BLD CALC: 27.7 % — LOW (ref 34.5–45)
HGB BLD-MCNC: 8.5 G/DL — LOW (ref 11.5–15.5)
MCHC RBC-ENTMCNC: 28.8 PG — SIGNIFICANT CHANGE UP (ref 27–34)
MCHC RBC-ENTMCNC: 30.7 GM/DL — LOW (ref 32–36)
MCV RBC AUTO: 93.9 FL — SIGNIFICANT CHANGE UP (ref 80–100)
NRBC # BLD: 0 /100 WBCS — SIGNIFICANT CHANGE UP (ref 0–0)
PLATELET # BLD AUTO: 238 K/UL — SIGNIFICANT CHANGE UP (ref 150–400)
POTASSIUM SERPL-MCNC: 4.4 MMOL/L — SIGNIFICANT CHANGE UP (ref 3.5–5.3)
POTASSIUM SERPL-SCNC: 4.4 MMOL/L — SIGNIFICANT CHANGE UP (ref 3.5–5.3)
RBC # BLD: 2.95 M/UL — LOW (ref 3.8–5.2)
RBC # FLD: 18.9 % — HIGH (ref 10.3–14.5)
SODIUM SERPL-SCNC: 138 MMOL/L — SIGNIFICANT CHANGE UP (ref 135–145)
WBC # BLD: 10.73 K/UL — HIGH (ref 3.8–10.5)
WBC # FLD AUTO: 10.73 K/UL — HIGH (ref 3.8–10.5)

## 2022-08-20 RX ADMIN — SERTRALINE 100 MILLIGRAM(S): 25 TABLET, FILM COATED ORAL at 14:39

## 2022-08-20 RX ADMIN — RISPERIDONE 0.5 MILLIGRAM(S): 4 TABLET ORAL at 14:39

## 2022-08-20 RX ADMIN — Medication 650 MILLIGRAM(S): at 14:42

## 2022-08-20 RX ADMIN — Medication 650 MILLIGRAM(S): at 07:13

## 2022-08-20 RX ADMIN — ATORVASTATIN CALCIUM 40 MILLIGRAM(S): 80 TABLET, FILM COATED ORAL at 21:31

## 2022-08-20 RX ADMIN — Medication 650 MILLIGRAM(S): at 08:19

## 2022-08-20 RX ADMIN — Medication 650 MILLIGRAM(S): at 13:40

## 2022-08-20 NOTE — PROGRESS NOTE ADULT - SUBJECTIVE AND OBJECTIVE BOX
Patient is a 87y old  Female who presents with a chief complaint of breast mass (19 Aug 2022 17:23)      MEDICATIONS  (STANDING):  atorvastatin 40 milliGRAM(s) Oral at bedtime  risperiDONE   Tablet 0.5 milliGRAM(s) Oral daily  sertraline 100 milliGRAM(s) Oral daily    MEDICATIONS  (PRN):  acetaminophen     Tablet .. 650 milliGRAM(s) Oral every 6 hours PRN Temp greater or equal to 38C (100.4F), Mild Pain (1 - 3), Moderate Pain (4 - 6)      ROS  No fever, sweats, chills  No epistaxis, HA, sore throat  No CP, SOB, cough, sputum  No n/v/d, abd pain, melena, hematochezia  No edema  No rash  No anxiety  No back pain, joint pain  No bleeding, bruising  No dysuria, hematuria    Vital Signs Last 24 Hrs  T(C): 36.8 (20 Aug 2022 12:31), Max: 37.1 (19 Aug 2022 20:27)  T(F): 98.3 (20 Aug 2022 12:31), Max: 98.8 (19 Aug 2022 20:27)  HR: 75 (20 Aug 2022 12:31) (75 - 83)  BP: 107/55 (20 Aug 2022 12:31) (107/55 - 133/64)  BP(mean): --  RR: 17 (20 Aug 2022 12:31) (17 - 17)  SpO2: 99% (20 Aug 2022 12:31) (97% - 99%)    Parameters below as of 20 Aug 2022 12:31  Patient On (Oxygen Delivery Method): room air        PE  NAD  Awake, alert  Anicteric, MMM  RRR  CTAB  Abd soft, NT, ND  No c/c/e  No rash grossly  FROM                          8.5    10.73 )-----------( 238      ( 20 Aug 2022 06:30 )             27.7       08-20    138  |  106  |  20<H>  ----------------------------<  96  4.4   |  23  |  0.46<L>    Ca    10.0      20 Aug 2022 06:30    TPro  5.4<L>  /  Alb  2.6<L>  /  TBili  0.3  /  DBili  x   /  AST  11  /  ALT  12  /  AlkPhos  59  08-19

## 2022-08-20 NOTE — PROGRESS NOTE ADULT - SUBJECTIVE AND OBJECTIVE BOX
PATIENT SEEN AND EXAMINED ON :- 8/20/22  DATE OF SERVICE:   8/20/22          Interim events noted,Labs ,Radiological studies and Cardiology tests reviewed .       HOSPITAL COURSE: HPI:  86 y/o F from NH, ambulatory at baseline, PMH of anemia, insomnia, General anxiety disorder, and breast mass (>year long hx of insidious onset, nontender right breast, squamous cell carcinoma per biopsy) presents with low H/H 6.0. Patient denies dizziness, weakness, fever, chills, nausea, vomiting, diarrhea, constipation, blood in stool or urine, other urinary changes. States she feels normal at this time.     [facility]  low hgb 2/2 bleeding breast mass, hemoglobin 3.2, transfused 3 U PRBC. CT chest: right breast carcinoma s/p bedside biopsy on 7/20 by surgery team,  CT abd/pelv suspicious for metastatic disease due to neoplasm in LLL, rectal and bone lesions. Pt also found to have bilateral pleural effusions on CT, no intervention. Pt recommended to start chemotherapy @ [facility] after d/c from [facility].    ED Course  Vitals: /59 P 87 R 18 T 97.9F SpO2 99%   Meds: 1 u prbc (18 Aug 2022 17:59)      INTERIM EVENTS:Patient seen at bedside ,interim events noted.      PMH -reviewed admission note, no change since admission  HEART FAILURE: Acute[ ]Chronic[ ] Systolic[ ] Diastolic[ ] Combined Systolic and Diastolic[ ]  CAD[ ] CABG[ ] PCI[ ]  DEVICES[ ] PPM[ ] ICD[ ] ILR[ ]  ATRIAL FIBRILLATION[ ] Paroxysmal[ ] Permanent[ ] CHADS2-[  ]  KAMRAN[ ] CKD1[ ] CKD2[ ] CKD3[ ] CKD4[ ] ESRD[ ]  COPD[ ] HTN[ ]   DM[ ] Type1[ ] Type 2[ ]   CVA[ ] Paresis[ ]    AMBULATION: Assisted[ ] Cane/walker[ ] Independent[ ]    MEDICATIONS  (STANDING):  atorvastatin 40 milliGRAM(s) Oral at bedtime  risperiDONE   Tablet 0.5 milliGRAM(s) Oral daily  sertraline 100 milliGRAM(s) Oral daily    MEDICATIONS  (PRN):  acetaminophen     Tablet .. 650 milliGRAM(s) Oral every 6 hours PRN Temp greater or equal to 38C (100.4F), Mild Pain (1 - 3), Moderate Pain (4 - 6)            REVIEW OF SYSTEMS:  Constitutional: [ ] fever, [ ]weight loss,  [ ]fatigue [ ]weight gain  Eyes: [ ] visual changes  Respiratory: [ ]shortness of breath;  [ ] cough, [ ]wheezing, [ ]chills, [ ]hemoptysis  Cardiovascular: [ ] chest pain, [ ]palpitations, [ ]dizziness,  [ ]leg swelling[ ]orthopnea[ ]PND  Gastrointestinal: [ ] abdominal pain, [ ]nausea, [ ]vomiting,  [ ]diarrhea [ ]Constipation [ ]Melena  Genitourinary: [ ] dysuria, [ ] hematuria [ ]Powell  Neurologic: [ ] headaches [ ] tremors[ ]weakness [ ]Paralysis Right[ ] Left[ ]  Skin: [ ] itching, [ ]burning, [ ] rashes  Endocrine: [ ] heat or cold intolerance  Musculoskeletal: [ ] joint pain or swelling; [ ] muscle, back, or extremity pain  Psychiatric: [ ] depression, [ ]anxiety, [ ]mood swings, or [ ]difficulty sleeping  Hematologic: [ ] easy bruising, [ ] bleeding gums    [ ] All remaining systems negative except as per above.   [ ]Unable to obtain.  [x] No change in ROS since admission      Vital Signs Last 24 Hrs  T(C): 36.6 (20 Aug 2022 20:14), Max: 36.8 (20 Aug 2022 12:31)  T(F): 97.8 (20 Aug 2022 20:14), Max: 98.3 (20 Aug 2022 12:31)  HR: 86 (20 Aug 2022 20:14) (75 - 86)  BP: 126/45 (20 Aug 2022 20:14) (107/55 - 133/64)  BP(mean): 64 (20 Aug 2022 20:14) (64 - 64)  RR: 17 (20 Aug 2022 20:14) (17 - 17)  SpO2: 100% (20 Aug 2022 20:14) (97% - 100%)    Parameters below as of 20 Aug 2022 20:14  Patient On (Oxygen Delivery Method): room air      I&O's Summary      PHYSICAL EXAM:  General: No acute distress BMI-  HEENT: EOMI, PERRL  Neck: Supple, [ ] JVD  Lungs: Equal air entry bilaterally; [ ] rales [ ] wheezing [ ] rhonchi  Heart: Regular rate and rhythm; [x ] murmur   2/6 [ x] systolic [ ] diastolic [ ] radiation[ ] rubs [ ]  gallops  Abdomen: Nontender, bowel sounds present  Extremities: No clubbing, cyanosis, [ ] edema [ ]Pulses  equal and intact  Nervous system:  Alert & Oriented X3, no focal deficits  Psychiatric: Normal affect  Skin: No rashes or lesions    LABS:  08-20    138  |  106  |  20<H>  ----------------------------<  96  4.4   |  23  |  0.46<L>    Ca    10.0      20 Aug 2022 06:30    TPro  5.4<L>  /  Alb  2.6<L>  /  TBili  0.3  /  DBili  x   /  AST  11  /  ALT  12  /  AlkPhos  59  08-19    Creatinine Trend: 0.46<--, 0.40<--, 0.59<--, 0.46<--, 0.44<--, 0.39<--                        8.5    10.73 )-----------( 238      ( 20 Aug 2022 06:30 )             27.7

## 2022-08-20 NOTE — PROGRESS NOTE ADULT - ASSESSMENT
88 yo F from [facility] ambulatory at baseline without assistance. 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 Metastatic disease due to neoplasm in LLL, rectum, and bone lesions. BIBA for anemia, 2/2 bleeding breast mass, Hb 6.1 s/p 1 unit PRBC with improvement.   Pending wound care/ surgical consults. Will trend CBC and likely return back to facility once optimized

## 2022-08-20 NOTE — ADVANCED PRACTICE NURSE CONSULT - ASSESSMENT
This is a 87yr old female patient admitted for Anemia, presenting with a R. Fungating Breast Mass, to which the patient is being followed by Surgery and Oncology

## 2022-08-20 NOTE — ADVANCED PRACTICE NURSE CONSULT - RECOMMEDATIONS
-Clean the R. Breast wound with normal saline and apply skin prep to the surrounding skin  -Please order Flagyl 500mg tab from Pharmacy  -Crush the Flagyl tablet into fine dust  -Apply the crushed Flagyl tablet to the entire wound bed  -Apply Xeroform gauze to the wound bed, cover with an ABD pad, and secure with tape b.i.d. PRN  -Elevate/float the patients heels using heel protectors and reposition the patient Q 2hrs using wedges or pillows

## 2022-08-20 NOTE — PROGRESS NOTE ADULT - ASSESSMENT
87 year old lady with a metastatic squamous cell ca of the skin  from right breast.  The mass has necrosis and caused significant bleeding.    Squamous cell ca of the skin  treatment options are either local radiation to stop the bleeding or   PD-L1 immunotherapy ( will be slower approach)      f/u wound care recommendatoins    tony beasley md  [phone]

## 2022-08-21 LAB
ANION GAP SERPL CALC-SCNC: 7 MMOL/L — SIGNIFICANT CHANGE UP (ref 5–17)
BUN SERPL-MCNC: 20 MG/DL — HIGH (ref 7–18)
CALCIUM SERPL-MCNC: 9.6 MG/DL — SIGNIFICANT CHANGE UP (ref 8.4–10.5)
CHLORIDE SERPL-SCNC: 108 MMOL/L — SIGNIFICANT CHANGE UP (ref 96–108)
CO2 SERPL-SCNC: 23 MMOL/L — SIGNIFICANT CHANGE UP (ref 22–31)
CREAT SERPL-MCNC: 0.45 MG/DL — LOW (ref 0.5–1.3)
EGFR: 94 ML/MIN/1.73M2 — SIGNIFICANT CHANGE UP
GLUCOSE SERPL-MCNC: 93 MG/DL — SIGNIFICANT CHANGE UP (ref 70–99)
HCT VFR BLD CALC: 25.3 % — LOW (ref 34.5–45)
HGB BLD-MCNC: 7.9 G/DL — LOW (ref 11.5–15.5)
MCHC RBC-ENTMCNC: 28.3 PG — SIGNIFICANT CHANGE UP (ref 27–34)
MCHC RBC-ENTMCNC: 31.2 GM/DL — LOW (ref 32–36)
MCV RBC AUTO: 90.7 FL — SIGNIFICANT CHANGE UP (ref 80–100)
NRBC # BLD: 0 /100 WBCS — SIGNIFICANT CHANGE UP (ref 0–0)
PLATELET # BLD AUTO: 212 K/UL — SIGNIFICANT CHANGE UP (ref 150–400)
POTASSIUM SERPL-MCNC: 4.1 MMOL/L — SIGNIFICANT CHANGE UP (ref 3.5–5.3)
POTASSIUM SERPL-SCNC: 4.1 MMOL/L — SIGNIFICANT CHANGE UP (ref 3.5–5.3)
RBC # BLD: 2.79 M/UL — LOW (ref 3.8–5.2)
RBC # FLD: 18.4 % — HIGH (ref 10.3–14.5)
SODIUM SERPL-SCNC: 138 MMOL/L — SIGNIFICANT CHANGE UP (ref 135–145)
WBC # BLD: 9.32 K/UL — SIGNIFICANT CHANGE UP (ref 3.8–10.5)
WBC # FLD AUTO: 9.32 K/UL — SIGNIFICANT CHANGE UP (ref 3.8–10.5)

## 2022-08-21 PROCEDURE — 99232 SBSQ HOSP IP/OBS MODERATE 35: CPT | Mod: FS

## 2022-08-21 RX ORDER — METRONIDAZOLE 500 MG
500 TABLET ORAL
Refills: 0 | Status: DISCONTINUED | OUTPATIENT
Start: 2022-08-21 | End: 2022-08-22

## 2022-08-21 RX ORDER — METRONIDAZOLE 500 MG
500 TABLET ORAL ONCE
Refills: 0 | Status: DISCONTINUED | OUTPATIENT
Start: 2022-08-21 | End: 2022-08-23

## 2022-08-21 RX ADMIN — Medication 650 MILLIGRAM(S): at 23:14

## 2022-08-21 RX ADMIN — Medication 650 MILLIGRAM(S): at 22:44

## 2022-08-21 RX ADMIN — SERTRALINE 100 MILLIGRAM(S): 25 TABLET, FILM COATED ORAL at 11:58

## 2022-08-21 RX ADMIN — Medication 650 MILLIGRAM(S): at 11:02

## 2022-08-21 RX ADMIN — Medication 650 MILLIGRAM(S): at 12:00

## 2022-08-21 RX ADMIN — RISPERIDONE 0.5 MILLIGRAM(S): 4 TABLET ORAL at 11:58

## 2022-08-21 RX ADMIN — ATORVASTATIN CALCIUM 40 MILLIGRAM(S): 80 TABLET, FILM COATED ORAL at 21:10

## 2022-08-21 NOTE — PROGRESS NOTE ADULT - SUBJECTIVE AND OBJECTIVE BOX
PATIENT SEEN AND EXAMINED ON :- 8/21/22  DATE OF SERVICE:     8/21/22        Interim events noted,Labs ,Radiological studies and Cardiology tests reviewed .       HOSPITAL COURSE: HPI:  86 y/o F from NH, ambulatory at baseline, PMH of anemia, insomnia, General anxiety disorder, and breast mass (>year long hx of insidious onset, nontender right breast, squamous cell carcinoma per biopsy) presents with low H/H 6.0. Patient denies dizziness, weakness, fever, chills, nausea, vomiting, diarrhea, constipation, blood in stool or urine, other urinary changes. States she feels normal at this time.     recent admission to [facility] 7/18-22 for:  low hgb 2/2 bleeding breast mass, hemoglobin 3.2, transfused 3 U PRBC. CT chest: right breast carcinoma s/p bedside biopsy on 7/20 by surgery team,  CT abd/pelv suspicious for metastatic disease due to neoplasm in LLL, rectal and bone lesions. Pt also found to have bilateral pleural effusions on CT, no intervention. Pt recommended to start chemotherapy @ [facility] after d/c from [facility].    ED Course  Vitals: /59 P 87 R 18 T 97.9F SpO2 99%   Meds: 1 u prbc (18 Aug 2022 17:59)      INTERIM EVENTS:Patient seen at bedside ,interim events noted.      PMH -reviewed admission note, no change since admission  HEART FAILURE: Acute[ ]Chronic[ ] Systolic[ ] Diastolic[ ] Combined Systolic and Diastolic[ ]  CAD[ ] CABG[ ] PCI[ ]  DEVICES[ ] PPM[ ] ICD[ ] ILR[ ]  ATRIAL FIBRILLATION[ ] Paroxysmal[ ] Permanent[ ] CHADS2-[  ]  KAMRAN[ ] CKD1[ ] CKD2[ ] CKD3[ ] CKD4[ ] ESRD[ ]  COPD[ ] HTN[ ]   DM[ ] Type1[ ] Type 2[ ]   CVA[ ] Paresis[ ]    AMBULATION: Assisted[ ] Cane/walker[ ] Independent[ ]    MEDICATIONS  (STANDING):  atorvastatin 40 milliGRAM(s) Oral at bedtime  metroNIDAZOLE    Tablet 500 milliGRAM(s) Oral two times a day  risperiDONE   Tablet 0.5 milliGRAM(s) Oral daily  sertraline 100 milliGRAM(s) Oral daily    MEDICATIONS  (PRN):  acetaminophen     Tablet .. 650 milliGRAM(s) Oral every 6 hours PRN Temp greater or equal to 38C (100.4F), Mild Pain (1 - 3), Moderate Pain (4 - 6)  metroNIDAZOLE    Tablet 500 milliGRAM(s) Oral once PRN woudn care            REVIEW OF SYSTEMS:  Constitutional: [ ] fever, [ ]weight loss,  [ ]fatigue [ ]weight gain  Eyes: [ ] visual changes  Respiratory: [ ]shortness of breath;  [ ] cough, [ ]wheezing, [ ]chills, [ ]hemoptysis  Cardiovascular: [ ] chest pain, [ ]palpitations, [ ]dizziness,  [ ]leg swelling[ ]orthopnea[ ]PND  Gastrointestinal: [ ] abdominal pain, [ ]nausea, [ ]vomiting,  [ ]diarrhea [ ]Constipation [ ]Melena  Genitourinary: [ ] dysuria, [ ] hematuria [ ]Powell  Neurologic: [ ] headaches [ ] tremors[ ]weakness [ ]Paralysis Right[ ] Left[ ]  Skin: [ ] itching, [ ]burning, [ ] rashes  Endocrine: [ ] heat or cold intolerance  Musculoskeletal: [ ] joint pain or swelling; [ ] muscle, back, or extremity pain  Psychiatric: [ ] depression, [ ]anxiety, [ ]mood swings, or [ ]difficulty sleeping  Hematologic: [ ] easy bruising, [ ] bleeding gums    [ ] All remaining systems negative except as per above.   [ ]Unable to obtain.  [x] No change in ROS since admission      Vital Signs Last 24 Hrs  T(C): 37.3 (21 Aug 2022 12:45), Max: 37.3 (21 Aug 2022 12:45)  T(F): 99.2 (21 Aug 2022 12:45), Max: 99.2 (21 Aug 2022 12:45)  HR: 73 (21 Aug 2022 12:45) (73 - 86)  BP: 113/57 (21 Aug 2022 12:45) (113/57 - 133/52)  BP(mean): 72 (21 Aug 2022 05:05) (64 - 72)  RR: 17 (21 Aug 2022 12:45) (16 - 17)  SpO2: 99% (21 Aug 2022 12:45) (99% - 100%)    Parameters below as of 21 Aug 2022 12:45  Patient On (Oxygen Delivery Method): room air      I&O's Summary      PHYSICAL EXAM:  General: No acute distress BMI-  HEENT: EOMI, PERRL  Neck: Supple, [ ] JVD  Lungs: Equal air entry bilaterally; [ ] rales [ ] wheezing [ ] rhonchi  Heart: Regular rate and rhythm; [x ] murmur   2/6 [ x] systolic [ ] diastolic [ ] radiation[ ] rubs [ ]  gallops  Abdomen: Nontender, bowel sounds present  Extremities: No clubbing, cyanosis, [ ] edema [ ]Pulses  equal and intact  Nervous system:  Alert & Oriented X3, no focal deficits  Psychiatric: Normal affect  Skin: No rashes or lesions    LABS:  08-21    138  |  108  |  20<H>  ----------------------------<  93  4.1   |  23  |  0.45<L>    Ca    9.6      21 Aug 2022 06:03      Creatinine Trend: 0.45<--, 0.46<--, 0.40<--, 0.59<--, 0.46<--, 0.44<--                        7.9    9.32  )-----------( 212      ( 21 Aug 2022 06:03 )             25.3

## 2022-08-21 NOTE — PROGRESS NOTE ADULT - ASSESSMENT
88 yo F from [facility] ambulatory at baseline without assistance. 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 Metastatic disease due to neoplasm in LLL, rectum, and bone lesions. BIBA for anemia, 2/2 bleeding breast mass, Hb 6.1 s/p 1 unit PRBC with improvement.   Pending wound care/ surgical consults. Will trend CBC and likely return back to facility once optimized

## 2022-08-21 NOTE — CHART NOTE - NSCHARTNOTEFT_GEN_A_CORE
Called by RN  Pt with saturated chest dressing    Dressing changed. No active bleeding noted.     R arm lymphoedema - recommend elevation. Will need compression sleeve Called by RN  Pt with saturated chest dressing    Dressing changed. No active bleeding noted.     R arm lymphoedema - recommend elevation. Will need compression sleeve..

## 2022-08-22 ENCOUNTER — TRANSCRIPTION ENCOUNTER (OUTPATIENT)
Age: 87
End: 2022-08-22

## 2022-08-22 DIAGNOSIS — N63.0 UNSPECIFIED LUMP IN UNSPECIFIED BREAST: ICD-10-CM

## 2022-08-22 LAB
ANION GAP SERPL CALC-SCNC: 7 MMOL/L — SIGNIFICANT CHANGE UP (ref 5–17)
BUN SERPL-MCNC: 17 MG/DL — SIGNIFICANT CHANGE UP (ref 7–18)
CALCIUM SERPL-MCNC: 10.1 MG/DL — SIGNIFICANT CHANGE UP (ref 8.4–10.5)
CHLORIDE SERPL-SCNC: 108 MMOL/L — SIGNIFICANT CHANGE UP (ref 96–108)
CO2 SERPL-SCNC: 24 MMOL/L — SIGNIFICANT CHANGE UP (ref 22–31)
CREAT SERPL-MCNC: 0.46 MG/DL — LOW (ref 0.5–1.3)
EGFR: 93 ML/MIN/1.73M2 — SIGNIFICANT CHANGE UP
GLUCOSE SERPL-MCNC: 86 MG/DL — SIGNIFICANT CHANGE UP (ref 70–99)
HCT VFR BLD CALC: 27 % — LOW (ref 34.5–45)
HGB BLD-MCNC: 8.1 G/DL — LOW (ref 11.5–15.5)
MCHC RBC-ENTMCNC: 28.3 PG — SIGNIFICANT CHANGE UP (ref 27–34)
MCHC RBC-ENTMCNC: 30 GM/DL — LOW (ref 32–36)
MCV RBC AUTO: 94.4 FL — SIGNIFICANT CHANGE UP (ref 80–100)
NRBC # BLD: 0 /100 WBCS — SIGNIFICANT CHANGE UP (ref 0–0)
PLATELET # BLD AUTO: 237 K/UL — SIGNIFICANT CHANGE UP (ref 150–400)
POTASSIUM SERPL-MCNC: 4.5 MMOL/L — SIGNIFICANT CHANGE UP (ref 3.5–5.3)
POTASSIUM SERPL-SCNC: 4.5 MMOL/L — SIGNIFICANT CHANGE UP (ref 3.5–5.3)
RBC # BLD: 2.86 M/UL — LOW (ref 3.8–5.2)
RBC # FLD: 18.1 % — HIGH (ref 10.3–14.5)
SARS-COV-2 RNA SPEC QL NAA+PROBE: SIGNIFICANT CHANGE UP
SODIUM SERPL-SCNC: 139 MMOL/L — SIGNIFICANT CHANGE UP (ref 135–145)
WBC # BLD: 9.83 K/UL — SIGNIFICANT CHANGE UP (ref 3.8–10.5)
WBC # FLD AUTO: 9.83 K/UL — SIGNIFICANT CHANGE UP (ref 3.8–10.5)

## 2022-08-22 PROCEDURE — 99232 SBSQ HOSP IP/OBS MODERATE 35: CPT

## 2022-08-22 RX ORDER — METRONIDAZOLE 500 MG
500 TABLET ORAL DAILY
Refills: 0 | Status: DISCONTINUED | OUTPATIENT
Start: 2022-08-22 | End: 2022-08-23

## 2022-08-22 RX ADMIN — ATORVASTATIN CALCIUM 40 MILLIGRAM(S): 80 TABLET, FILM COATED ORAL at 22:17

## 2022-08-22 RX ADMIN — Medication 650 MILLIGRAM(S): at 20:29

## 2022-08-22 RX ADMIN — SERTRALINE 100 MILLIGRAM(S): 25 TABLET, FILM COATED ORAL at 12:19

## 2022-08-22 RX ADMIN — Medication 650 MILLIGRAM(S): at 13:28

## 2022-08-22 RX ADMIN — RISPERIDONE 0.5 MILLIGRAM(S): 4 TABLET ORAL at 12:20

## 2022-08-22 RX ADMIN — Medication 650 MILLIGRAM(S): at 22:17

## 2022-08-22 RX ADMIN — Medication 500 MILLIGRAM(S): at 12:20

## 2022-08-22 RX ADMIN — Medication 650 MILLIGRAM(S): at 14:38

## 2022-08-22 NOTE — PROGRESS NOTE ADULT - TIME BILLING
- Review of records, telemetry, vital signs and daily labs.   - General and cardiovascular physical examination.  - Generation of cardiovascular treatment plan.  - Coordination of care.      Patient was seen and examined by me on 8/20/22,interim events noted,labs and radiology studies reviewed.  Cayetano Landin MD,FACC.  [address] [place], [place] [id number].  [phone]
- Review of records, telemetry, vital signs and daily labs.   - General and cardiovascular physical examination.  - Generation of cardiovascular treatment plan.  - Coordination of care.      Patient was seen and examined by me on 8/19/22,interim events noted,labs and radiology studies reviewed.  Cayetano Landin MD,FACC.  [address] [place], [place] [id number].  [phone]
- Review of records, telemetry, vital signs and daily labs.   - General and cardiovascular physical examination.  - Generation of cardiovascular treatment plan.  - Coordination of care.      Patient was seen and examined by me on 8/21/22,interim events noted,labs and radiology studies reviewed.  Cayetano Landin MD,FACC.  [address] [place], [place] [id number].  [phone]
- Review of records, telemetry, vital signs and daily labs.   - General and cardiovascular physical examination.  - Generation of cardiovascular treatment plan.  - Coordination of care.      Patient was seen and examined by me on 8/22/22,interim events noted,labs and radiology studies reviewed.  Cayetano Landin MD,FACC.  [address] [place], [place] [id number].  [phone]

## 2022-08-22 NOTE — PHYSICAL THERAPY INITIAL EVALUATION ADULT - PASSIVE RANGE OF MOTION EXAMINATION, REHAB EVAL
except Rt shoulder 0 - 85 deg flx/bilateral upper extremity Passive ROM was WFL (within functional limits)/bilateral lower extremity Passive ROM was WFL (within functional limits)

## 2022-08-22 NOTE — PROGRESS NOTE ADULT - SUBJECTIVE AND OBJECTIVE BOX
CHIEF COMPLAINT  Breast Mass    HISTORY OF PRESENT ILLNESS  SHAVONNE MONTALVO is a 87y Female who presents with a chief complaint of breast mass    No acute events. No complaints.    REVIEW OF SYSTEMS  A complete review of systems was performed; negative except per HPI    PHYSICAL EXAM  T(C): 36.3 (08-22-22 @ 05:05), Max: 37.3 (08-21-22 @ 12:45)  HR: 77 (08-22-22 @ 05:05) (73 - 87)  BP: 122/45 (08-22-22 @ 05:05) (113/57 - 122/45)  RR: 16 (08-22-22 @ 05:05) (16 - 17)  SpO2: 100% (08-22-22 @ 05:05) (99% - 100%)  Constitutional: alert, awake, in no acute distress  Eyes: PERRL, EOMI  HEENT: normocephalic, atraumatic  Neck: supple, non-tender  Cardiovascular: normal perfusion, no peripheral edema  Respiratory: normal respiratory efforts; no increased use of accessory muscles  Gastrointestinal: soft, non-tender  Musculoskeletal: normal range of motion, no deformities noted  Neurological: alert, CN II to XI grossly intact  Skin: warm, dry    LABORATORY DATA                        8.1    9.83  )-----------( 237      ( 22 Aug 2022 06:45 )             27.0     08-22    139  |  108  |  17  ----------------------------<  86  4.5   |  24  |  0.46<L>    Ca    10.1      22 Aug 2022 06:45     CHIEF COMPLAINT  Breast Mass    HISTORY OF PRESENT ILLNESS  SHAVONNE MONTALVO is a 87y Female who presents with a chief complaint of breast mass    No acute events. Continues to have breast skin wound drainage and bleeding.    REVIEW OF SYSTEMS  A complete review of systems was performed; negative except per HPI    PHYSICAL EXAM  T(C): 36.3 (08-22-22 @ 05:05), Max: 37.3 (08-21-22 @ 12:45)  HR: 77 (08-22-22 @ 05:05) (73 - 87)  BP: 122/45 (08-22-22 @ 05:05) (113/57 - 122/45)  RR: 16 (08-22-22 @ 05:05) (16 - 17)  SpO2: 100% (08-22-22 @ 05:05) (99% - 100%)  Constitutional: alert, awake, in no acute distress  Eyes: PERRL, EOMI  HEENT: normocephalic, atraumatic  Neck: supple, non-tender  Cardiovascular: normal perfusion, no peripheral edema  Respiratory: normal respiratory efforts; no increased use of accessory muscles  Gastrointestinal: soft, non-tender  Musculoskeletal: normal range of motion, no deformities noted  Neurological: alert, CN II to XI grossly intact  Skin: warm, dry    LABORATORY DATA                        8.1    9.83  )-----------( 237      ( 22 Aug 2022 06:45 )             27.0     08-22    139  |  108  |  17  ----------------------------<  86  4.5   |  24  |  0.46<L>    Ca    10.1      22 Aug 2022 06:45

## 2022-08-22 NOTE — DISCHARGE NOTE PROVIDER - CARE PROVIDER_API CALL
Patrice Holt  INTERNAL MEDICINE  [address]  [place] [id number]  Phone: [phone]  Fax: [phone]  Follow Up Time: 1 week    Cayetano Landin)  Cardiology  [address] [place] [address]  Phone: [phone]  Fax: [phone]  Follow Up Time: 1 week

## 2022-08-22 NOTE — PROGRESS NOTE ADULT - ASSESSMENT
88 yo F from [facility] ambulatory at baseline without assistance. 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 Metastatic disease due to neoplasm in LLL, rectum, and bone lesions. BIBA for anemia, 2/2 bleeding breast mass, Hb 6.1 s/p 1 unit PRBC with marked improvement. Sx and wound care followed,  Wound care reccs appreciated. Heme onc reccs outpatient follow up for final pathology for continued treatment plan (immunology VS radiation)/ PT reccs CHRIS, NCM to follow for placement

## 2022-08-22 NOTE — PROGRESS NOTE ADULT - ASSESSMENT
88 yo F from [facility] ambulatory at baseline without assistance. 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 Metastatic disease due to neoplasm in LLL, rectum, and bone lesions. BIBA for anemia, 2/2 bleeding breast mass, Hb 6.1 s/p 1 unit PRBC with marked improvement. Sx and wound care followed,  Wound care reccs appreciated. Heme onc reccs outpatient follow up for final pathology for continued treatment plan (immunology VS radiation)/ PT reccs CHRIS, NCM to follow for placement

## 2022-08-22 NOTE — PHYSICAL THERAPY INITIAL EVALUATION ADULT - ACTIVE RANGE OF MOTION EXAMINATION, REHAB EVAL
except Rt shoulder 0 - 45deg  flx/bilateral upper extremity Active ROM was WFL (within functional limits)/bilateral  lower extremity Active ROM was WFL (within functional limits)

## 2022-08-22 NOTE — PHYSICAL THERAPY INITIAL EVALUATION ADULT - DIAGNOSIS, PT EVAL
(ICF Model) Pt. present w/impairments in Body Structures/Function, incl: Strength, Balance, ROM, pain and edema leading to deficits in performing the below noted Activities (Limitations).

## 2022-08-22 NOTE — PHYSICAL THERAPY INITIAL EVALUATION ADULT - LIVES WITH, PROFILE
in 2nd floor walkup apt., at least1 flight to access with Rt rail ascending. Currently from rehab/alone

## 2022-08-22 NOTE — DISCHARGE NOTE PROVIDER - PROVIDER TOKENS
PROVIDER:[TOKEN:[[id number]],FOLLOWUP:[1 week]],PROVIDER:[TOKEN:[[id number]],FOLLOWUP:[1 week]]

## 2022-08-22 NOTE — DISCHARGE NOTE PROVIDER - NSDCCPCAREPLAN_GEN_ALL_CORE_FT
PRINCIPAL DISCHARGE DIAGNOSIS  Diagnosis: Anemia due to acute blood loss  Assessment and Plan of Treatment: You were found to have a low hemoglobin, this is likely secondary to your bleeding breast mass. You were treated with 1 unit of blood, and this has now improved. Symptoms to report, bleeding, palpitations, fatigue, pale skin, cold skin, dizziness. Take medications as ordered by PCP. Follow up with your primary care doctor to have your blood work rechecked.      SECONDARY DISCHARGE DIAGNOSES  Diagnosis: Breast mass  Assessment and Plan of Treatment: You were followed by the oncology team and wound care nurse.  Your wound care reccomendations as as follows:   Clean the R. Breast wound with normal saline and apply skin prep to the surrounding skin  -Please order Flagyl 500mg tab from Pharmacy  -Crush the Flagyl tablet into fine dust  -Apply the crushed Flagyl tablet to the entire wound bed  -Apply Xeroform gauze to the wound bed, cover with an ABD pad, and secure with tape b.i.d. PRN  -Elevate/float the patients heels using heel protectors and reposition the patient Q 2hrs using wedges or pillows    Diagnosis: Primary squamous cell carcinoma of skin  Assessment and Plan of Treatment: Follow up with Dr Holt in 1 week for the results of your pathology results.    Diagnosis: Anxiety  Assessment and Plan of Treatment: Continue your medications, and follow up with your psychiatrist

## 2022-08-22 NOTE — PHYSICAL THERAPY INITIAL EVALUATION ADULT - GENERAL OBSERVATIONS, REHAB EVAL
Consult received, chart reviewed. Patient received supine in bed, NAD. Kyphotic posture. Patient agreed to EVALUATION from Physical Therapist. Pt. is Rt hand dominant.

## 2022-08-22 NOTE — DISCHARGE NOTE PROVIDER - NSDCMRMEDTOKEN_GEN_ALL_CORE_FT
Daily Multiple Vitamins oral tablet: 1 tab(s) orally once a day   folic acid 0.4 mg oral tablet: 1 tab(s) orally once a day   risperiDONE 0.5 mg oral tablet: 1 tab(s) orally once a day  rosuvastatin 10 mg oral tablet: 1 tab(s) orally once a day  sertraline 100 mg oral tablet: 1 tab(s) orally once a day  Vitamin C 500 mg oral tablet: 1 tab(s) orally 2 times a day    acetaminophen 325 mg oral tablet: 2 tab(s) orally every 6 hours, As needed, Temp greater or equal to 38C (100.4F), Mild Pain (1 - 3), Moderate Pain (4 - 6)  aspirin 81 mg oral tablet, chewable: 1 tab(s) orally once a day  B-12 1000 mcg oral tablet: 1 tab(s) orally once a day  Daily Multiple Vitamins oral tablet: 1 tab(s) orally once a day   folic acid 0.4 mg oral tablet: 1 tab(s) orally once a day   metroNIDAZOLE 500 mg oral tablet: 1 tab(s) orally once, As needed, woudn care  risperiDONE 0.5 mg oral tablet: 1 tab(s) orally once a day  rosuvastatin 10 mg oral tablet: 1 tab(s) orally once a day  sertraline 100 mg oral tablet: 1 tab(s) orally once a day  Vitamin C 500 mg oral tablet: 1 tab(s) orally 2 times a day

## 2022-08-22 NOTE — PHYSICAL THERAPY INITIAL EVALUATION ADULT - PLANNED THERAPY INTERVENTIONS, PT EVAL
Patient was evaluated via telephone visit. Patient was evaluated via telephone.  Patient is stable, has some fatigue from dialysis.  Patient needs medication refilled.  Patient's information was accessed in the [place] Prescription Monitoring Program prior to prescribing controlled substance.    I discussed the patient's case with the resident. I agree with the resident's finding and plan, as documented in today's note.      balance training/gait training/ROM/strengthening/stretching/transfer training

## 2022-08-22 NOTE — PROGRESS NOTE ADULT - PROBLEM SELECTOR PLAN 1
2/2 to right breast wound   Hb 6.1 on admission  s/p 1 u prbc   with improvement  transfuse if hb < 7  pressure dressing

## 2022-08-22 NOTE — PROGRESS NOTE ADULT - PROBLEM SELECTOR PLAN 3
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 Invasive squamous cell carcinoma involves the full thickness of the biopsy and extends to the deep inked margin of the biopsy specimen.  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  Heme Onc Dr. Holt follows

## 2022-08-22 NOTE — PROGRESS NOTE ADULT - PROBLEM SELECTOR PLAN 6
patient from [facility]  now optimized for discharge  COVID - 8/22  NCM to follow for placement
patient from [facility]  now optimized for discharge  COVID - 8/22  NCM to follow for placement
holding dvt ppx in view of anemia requiring transfusions, consider resuming ASAP in the setting of malignancy

## 2022-08-22 NOTE — PROGRESS NOTE ADULT - ASSESSMENT
SHAVONNE MONTALVO is a 87y Female who presents with a chief complaint of breast mass    Squamous Cell Carcinoma  - Breast biopsy from prior hospitalization returned as squamous cell carcinoma.  - Unclear whether this is primary malignancy of the skin or from other sites.  - Pending PET/CT as outpatient. Pending tumor of unknown origin testing.  - If primary malignancy of the skin, can treat with local radiation and likely followed by immunotherapy.   - No treatment while inpatient.     Anemia  - Patient with hemoglobin 8.1 today.  - Likely due to loss from skin wounds.  - Continue folic acid supplementation.  - Monitor CBC and transfuse to maintain hemoglobin above 7.    Will continue to follow.    Rao Ward MD  Hematology/Oncology  O: [phone]

## 2022-08-22 NOTE — PROGRESS NOTE ADULT - PROBLEM SELECTOR PLAN 2
fungating malignant mass to right breast  wound care reccs appreciated   monitor site for bleeding QShift

## 2022-08-22 NOTE — DISCHARGE NOTE PROVIDER - HOSPITAL COURSE
86 yo F from [facility] ambulatory at baseline without assistance. 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 Metastatic disease due to neoplasm in LLL, rectum, and bone lesions. BIBA for anemia, 2/2 bleeding breast mass, Hb 6.1 s/p 1 unit PRBC with improvement. Wound care reccs: -Clean the R. Breast wound with normal saline and apply skin prep to the surrounding skin  -Please order Flagyl 500mg tab from Pharmacy. -Crush the Flagyl tablet into fine dust -Apply the crushed Flagyl tablet to the entire wound bed -Apply Xeroform gauze to the wound bed, cover with an ABD pad, and secure with tape b.i.d. PRN -Elevate/float the patients heels using heel protectors and reposition the patient Q 2hrs using wedges or pillows  Surgical consults and heme onc reccs appreciated, will need to follow up with Hemeonc for final pathology for treatment plan (immunology VS radiation)  Given clinical course decision made to return back to facility   Discharge discussed with attending   This is a brief summary of patient's hospital stay, for full course please see EMR

## 2022-08-22 NOTE — PROGRESS NOTE ADULT - SUBJECTIVE AND OBJECTIVE BOX
PATIENT SEEN AND EXAMINED ON :- 8/22/22  DATE OF SERVICE:    8/22/22         Interim events noted,Labs ,Radiological studies and Cardiology tests reviewed .       HOSPITAL COURSE: HPI:  88 y/o F from NH, ambulatory at baseline, PMH of anemia, insomnia, General anxiety disorder, and breast mass (>year long hx of insidious onset, nontender right breast, squamous cell carcinoma per biopsy) presents with low H/H 6.0. Patient denies dizziness, weakness, fever, chills, nausea, vomiting, diarrhea, constipation, blood in stool or urine, other urinary changes. States she feels normal at this time.     recent admission to [facility] 7/18-22 for:  low hgb 2/2 bleeding breast mass, hemoglobin 3.2, transfused 3 U PRBC. CT chest: right breast carcinoma s/p bedside biopsy on 7/20 by surgery team,  CT abd/pelv suspicious for metastatic disease due to neoplasm in LLL, rectal and bone lesions. Pt also found to have bilateral pleural effusions on CT, no intervention. Pt recommended to start chemotherapy @ [facility] after d/c from [facility].    ED Course  Vitals: /59 P 87 R 18 T 97.9F SpO2 99%   Meds: 1 u prbc (18 Aug 2022 17:59)      INTERIM EVENTS:Patient seen at bedside ,interim events noted.      PMH -reviewed admission note, no change since admission  HEART FAILURE: Acute[ ]Chronic[ ] Systolic[ ] Diastolic[ ] Combined Systolic and Diastolic[ ]  CAD[ ] CABG[ ] PCI[ ]  DEVICES[ ] PPM[ ] ICD[ ] ILR[ ]  ATRIAL FIBRILLATION[ ] Paroxysmal[ ] Permanent[ ] CHADS2-[  ]  KMARAN[ ] CKD1[ ] CKD2[ ] CKD3[ ] CKD4[ ] ESRD[ ]  COPD[ ] HTN[ ]   DM[ ] Type1[ ] Type 2[ ]   CVA[ ] Paresis[ ]    AMBULATION: Assisted[ ] Cane/walker[ ] Independent[ ]    MEDICATIONS  (STANDING):  atorvastatin 40 milliGRAM(s) Oral at bedtime  metroNIDAZOLE    Tablet 500 milliGRAM(s) Oral daily  risperiDONE   Tablet 0.5 milliGRAM(s) Oral daily  sertraline 100 milliGRAM(s) Oral daily    MEDICATIONS  (PRN):  acetaminophen     Tablet .. 650 milliGRAM(s) Oral every 6 hours PRN Temp greater or equal to 38C (100.4F), Mild Pain (1 - 3), Moderate Pain (4 - 6)  metroNIDAZOLE    Tablet 500 milliGRAM(s) Oral once PRN woudn care            REVIEW OF SYSTEMS:  Constitutional: [ ] fever, [ ]weight loss,  [ ]fatigue [ ]weight gain  Eyes: [ ] visual changes  Respiratory: [ ]shortness of breath;  [ ] cough, [ ]wheezing, [ ]chills, [ ]hemoptysis  Cardiovascular: [ ] chest pain, [ ]palpitations, [ ]dizziness,  [ ]leg swelling[ ]orthopnea[ ]PND  Gastrointestinal: [ ] abdominal pain, [ ]nausea, [ ]vomiting,  [ ]diarrhea [ ]Constipation [ ]Melena  Genitourinary: [ ] dysuria, [ ] hematuria [ ]Powell  Neurologic: [ ] headaches [ ] tremors[ ]weakness [ ]Paralysis Right[ ] Left[ ]  Skin: [ ] itching, [ ]burning, [ ] rashes  Endocrine: [ ] heat or cold intolerance  Musculoskeletal: [ ] joint pain or swelling; [ ] muscle, back, or extremity pain  Psychiatric: [ ] depression, [ ]anxiety, [ ]mood swings, or [ ]difficulty sleeping  Hematologic: [ ] easy bruising, [ ] bleeding gums    [ ] All remaining systems negative except as per above.   [ ]Unable to obtain.  [x] No change in ROS since admission      Vital Signs Last 24 Hrs  T(C): 36.3 (22 Aug 2022 20:31), Max: 36.9 (22 Aug 2022 13:30)  T(F): 97.3 (22 Aug 2022 20:31), Max: 98.5 (22 Aug 2022 13:30)  HR: 90 (22 Aug 2022 20:31) (77 - 100)  BP: 101/46 (22 Aug 2022 20:31) (101/46 - 127/45)  BP(mean): 58 (22 Aug 2022 20:31) (57 - 63)  RR: 18 (22 Aug 2022 20:31) (16 - 18)  SpO2: 100% (22 Aug 2022 20:31) (100% - 100%)    Parameters below as of 22 Aug 2022 20:31  Patient On (Oxygen Delivery Method): room air      I&O's Summary      PHYSICAL EXAM:  General: No acute distress BMI-  HEENT: EOMI, PERRL  Neck: Supple, [ ] JVD  Lungs: Equal air entry bilaterally; [ ] rales [ ] wheezing [ ] rhonchi  Heart: Regular rate and rhythm; [x ] murmur   2/6 [ x] systolic [ ] diastolic [ ] radiation[ ] rubs [ ]  gallops  Abdomen: Nontender, bowel sounds present  Extremities: No clubbing, cyanosis, [ ] edema [ ]Pulses  equal and intact  Nervous system:  Alert & Oriented X3, no focal deficits  Psychiatric: Normal affect  Skin: No rashes or lesions    LABS:  08-22    139  |  108  |  17  ----------------------------<  86  4.5   |  24  |  0.46<L>    Ca    10.1      22 Aug 2022 06:45      Creatinine Trend: 0.46<--, 0.45<--, 0.46<--, 0.40<--, 0.59<--, 0.46<--                        8.1    9.83  )-----------( 237      ( 22 Aug 2022 06:45 )             27.0

## 2022-08-22 NOTE — PROGRESS NOTE ADULT - SUBJECTIVE AND OBJECTIVE BOX
NP Note discussed with  primary attending    Patient is a 87y old  Female who presents with a chief complaint of breast mass (22 Aug 2022 11:42)      INTERVAL HPI/OVERNIGHT EVENTS: breast dressing saturated in 24 hours    MEDICATIONS  (STANDING):  atorvastatin 40 milliGRAM(s) Oral at bedtime  metroNIDAZOLE    Tablet 500 milliGRAM(s) Oral daily  risperiDONE   Tablet 0.5 milliGRAM(s) Oral daily  sertraline 100 milliGRAM(s) Oral daily    MEDICATIONS  (PRN):  acetaminophen     Tablet .. 650 milliGRAM(s) Oral every 6 hours PRN Temp greater or equal to 38C (100.4F), Mild Pain (1 - 3), Moderate Pain (4 - 6)  metroNIDAZOLE    Tablet 500 milliGRAM(s) Oral once PRN woudn care      __________________________________________________  REVIEW OF SYSTEMS:    CONSTITUTIONAL: No fever,   EYES: no acute visual disturbances  NECK: No pain or stiffness  RESPIRATORY: No cough; No shortness of breath  CARDIOVASCULAR: No chest pain, no palpitations  GASTROINTESTINAL: No pain. No nausea or vomiting; No diarrhea   NEUROLOGICAL: No headache or numbness, no tremors  MUSCULOSKELETAL: No joint pain, no muscle pain  GENITOURINARY: no dysuria, no frequency, no hesitancy  PSYCHIATRY: no depression , no anxiety  ALL OTHER  ROS negative        Vital Signs Last 24 Hrs  T(C): 36.9 (22 Aug 2022 13:30), Max: 36.9 (22 Aug 2022 13:30)  T(F): 98.5 (22 Aug 2022 13:30), Max: 98.5 (22 Aug 2022 13:30)  HR: 100 (22 Aug 2022 14:20) (77 - 100)  BP: 127/45 (22 Aug 2022 14:46) (102/45 - 127/45)  BP(mean): 57 (22 Aug 2022 13:30) (57 - 63)  RR: 17 (22 Aug 2022 13:30) (16 - 17)  SpO2: 100% (22 Aug 2022 14:46) (100% - 100%)    Parameters below as of 22 Aug 2022 14:46  Patient On (Oxygen Delivery Method): room air        ________________________________________________  PHYSICAL EXAM:  GENERAL: NAD  HEENT: Normocephalic;  conjunctivae and sclerae clear;  NECK : supple  CHEST/LUNG: Clear to auscultation bilaterally with good air entry   +right breast wound with clean dry dressing  HEART: S1 S2  regular; no murmurs, gallops or rubs  ABDOMEN: Soft, Nontender, Nondistended; Bowel sounds present  EXTREMITIES: +2 pitting edema to the right arm   SKIN: warm and dry; no rash  NERVOUS SYSTEM:  Awake and alert; Oriented  to place, person and time   _________________________________________________  LABS:                        8.1    9.83  )-----------( 237      ( 22 Aug 2022 06:45 )             27.0     08-22    139  |  108  |  17  ----------------------------<  86  4.5   |  24  |  0.46<L>    Ca    10.1      22 Aug 2022 06:45          CAPILLARY BLOOD GLUCOSE            RADIOLOGY & ADDITIONAL TESTS:   < from: US Duplex Venous Upper Ext Ltd, Right (08.11.22 @ 14:15) >  IMPRESSION:  No evidence of right upper extremity deep venous thrombosis.        --- End of Report ---    < end of copied text >  < from: CT Abdomen and Pelvis w/ IV Cont (07.21.22 @ 18:55) >  IMPRESSION:  1.3 x 1.3 cm slightly irregular pulmonary nodule in the left lower lobe   which may represent a metastatic lesion versus primary neoplasm.   Recommend CT scan of the chest was discussed for additional nodules.    1.6 x 1.6 cm lesion in the anterior rectum raising concern for a large   polyp or neoplasm. Dense stool would be less likely since it within the   nondependent portion of the rectum. Please correlate clinically    Few very dense small sclerotic bone lesions. This is more dense than   typically seen with metastatic disease and while these may represent   metastases, benign lesions are also possible. Nuclear medicine bone scan   may be of value although this is limited due to the small size.    Partially imaged right breast skin thickening and collection versus   implant.      < end of copied text >    Imaging Personally Reviewed:  YES/  Consultant(s) Notes Reviewed:   YES/    Care Discussed with Consultants : Sx     Plan of care was discussed with patient and /or primary care giver; all questions and concerns were addressed and care was aligned with patient's wishes.

## 2022-08-23 ENCOUNTER — TRANSCRIPTION ENCOUNTER (OUTPATIENT)
Age: 87
End: 2022-08-23

## 2022-08-23 VITALS
SYSTOLIC BLOOD PRESSURE: 114 MMHG | TEMPERATURE: 99 F | OXYGEN SATURATION: 100 % | DIASTOLIC BLOOD PRESSURE: 50 MMHG | RESPIRATION RATE: 18 BRPM | HEART RATE: 95 BPM

## 2022-08-23 LAB
HCT VFR BLD CALC: 26.5 % — LOW (ref 34.5–45)
HGB BLD-MCNC: 8.1 G/DL — LOW (ref 11.5–15.5)
MCHC RBC-ENTMCNC: 28.6 PG — SIGNIFICANT CHANGE UP (ref 27–34)
MCHC RBC-ENTMCNC: 30.6 GM/DL — LOW (ref 32–36)
MCV RBC AUTO: 93.6 FL — SIGNIFICANT CHANGE UP (ref 80–100)
NRBC # BLD: 0 /100 WBCS — SIGNIFICANT CHANGE UP (ref 0–0)
PLATELET # BLD AUTO: 230 K/UL — SIGNIFICANT CHANGE UP (ref 150–400)
RBC # BLD: 2.83 M/UL — LOW (ref 3.8–5.2)
RBC # FLD: 17.5 % — HIGH (ref 10.3–14.5)
WBC # BLD: 9.1 K/UL — SIGNIFICANT CHANGE UP (ref 3.8–10.5)
WBC # FLD AUTO: 9.1 K/UL — SIGNIFICANT CHANGE UP (ref 3.8–10.5)

## 2022-08-23 PROCEDURE — 83550 IRON BINDING TEST: CPT

## 2022-08-23 PROCEDURE — 86900 BLOOD TYPING SEROLOGIC ABO: CPT

## 2022-08-23 PROCEDURE — 86923 COMPATIBILITY TEST ELECTRIC: CPT

## 2022-08-23 PROCEDURE — 82728 ASSAY OF FERRITIN: CPT

## 2022-08-23 PROCEDURE — 99285 EMERGENCY DEPT VISIT HI MDM: CPT

## 2022-08-23 PROCEDURE — 83540 ASSAY OF IRON: CPT

## 2022-08-23 PROCEDURE — 97162 PT EVAL MOD COMPLEX 30 MIN: CPT

## 2022-08-23 PROCEDURE — 36415 COLL VENOUS BLD VENIPUNCTURE: CPT

## 2022-08-23 PROCEDURE — 86901 BLOOD TYPING SEROLOGIC RH(D): CPT

## 2022-08-23 PROCEDURE — 86850 RBC ANTIBODY SCREEN: CPT

## 2022-08-23 PROCEDURE — 85027 COMPLETE CBC AUTOMATED: CPT

## 2022-08-23 PROCEDURE — 36430 TRANSFUSION BLD/BLD COMPNT: CPT

## 2022-08-23 PROCEDURE — 87635 SARS-COV-2 COVID-19 AMP PRB: CPT

## 2022-08-23 PROCEDURE — 85025 COMPLETE CBC W/AUTO DIFF WBC: CPT

## 2022-08-23 PROCEDURE — 80048 BASIC METABOLIC PNL TOTAL CA: CPT

## 2022-08-23 PROCEDURE — P9040: CPT

## 2022-08-23 PROCEDURE — 80053 COMPREHEN METABOLIC PANEL: CPT

## 2022-08-23 RX ORDER — ACETAMINOPHEN 500 MG
2 TABLET ORAL
Qty: 0 | Refills: 0 | DISCHARGE
Start: 2022-08-23

## 2022-08-23 RX ORDER — ASPIRIN/CALCIUM CARB/MAGNESIUM 324 MG
1 TABLET ORAL
Qty: 30 | Refills: 0
Start: 2022-08-23 | End: 2022-09-21

## 2022-08-23 RX ORDER — METRONIDAZOLE 500 MG
1 TABLET ORAL
Qty: 0 | Refills: 0 | DISCHARGE
Start: 2022-08-23

## 2022-08-23 RX ORDER — PREGABALIN 225 MG/1
1 CAPSULE ORAL
Qty: 3 | Refills: 0
Start: 2022-08-23 | End: 2022-08-25

## 2022-08-23 RX ADMIN — RISPERIDONE 0.5 MILLIGRAM(S): 4 TABLET ORAL at 12:37

## 2022-08-23 RX ADMIN — Medication 650 MILLIGRAM(S): at 12:36

## 2022-08-23 RX ADMIN — Medication 500 MILLIGRAM(S): at 12:37

## 2022-08-23 RX ADMIN — SERTRALINE 100 MILLIGRAM(S): 25 TABLET, FILM COATED ORAL at 12:36

## 2022-08-23 NOTE — PROGRESS NOTE ADULT - REASON FOR ADMISSION
breast mass
CVA (cerebral vascular accident)

## 2022-08-23 NOTE — PROGRESS NOTE ADULT - SUBJECTIVE AND OBJECTIVE BOX
CHIEF COMPLAINT  Breast Mass    HISTORY OF PRESENT ILLNESS  SHAVONNE MONTALVO is a 87y Female who presents with a chief complaint of breast mass    No acute events. No complaints.    REVIEW OF SYSTEMS  A complete review of systems was performed; negative except per HPI    PHYSICAL EXAM  T(C): 36.5 (08-23-22 @ 05:25), Max: 36.9 (08-22-22 @ 13:30)  HR: 82 (08-23-22 @ 05:25) (82 - 100)  BP: 108/53 (08-23-22 @ 05:25) (101/46 - 127/45)  RR: 17 (08-23-22 @ 05:25) (17 - 18)  SpO2: 100% (08-23-22 @ 05:25) (100% - 100%)  Constitutional: alert, awake, in no acute distress  Eyes: PERRL, EOMI  HEENT: normocephalic, atraumatic  Neck: supple, non-tender  Cardiovascular: normal perfusion, no peripheral edema  Respiratory: normal respiratory efforts; no increased use of accessory muscles  Gastrointestinal: soft, non-tender  Musculoskeletal: normal range of motion, no deformities noted  Neurological: alert, CN II to XI grossly intact  Skin: warm, dry    LABORATORY DATA                        8.1    9.10  )-----------( 230      ( 23 Aug 2022 06:50 )             26.5     08-22    139  |  108  |  17  ----------------------------<  86  4.5   |  24  |  0.46<L>    Ca    10.1      22 Aug 2022 06:45

## 2022-08-23 NOTE — DISCHARGE NOTE NURSING/CASE MANAGEMENT/SOCIAL WORK - NSDCVIVACCINE_GEN_ALL_CORE_FT
influenza, injectable, quadrivalent, preservative free; 18-Sep-2018 16:23; Linh LindoRN); [employer]; [id number] (Exp. Date: 30-Jun-2019); IntraMuscular; Deltoid Right.; 0.5 milliLiter(s); VIS (VIS Published: 07-Aug-2015, VIS Presented: 18-Sep-2018);

## 2022-08-23 NOTE — PROGRESS NOTE ADULT - PROVIDER SPECIALTY LIST ADULT
Heme/Onc
Heme/Onc
Surgery
Heme/Onc
Cardiology
Internal Medicine
Internal Medicine
Cardiology
Internal Medicine
Cardiology

## 2022-08-23 NOTE — DISCHARGE NOTE NURSING/CASE MANAGEMENT/SOCIAL WORK - NSDCPEFALRISK_GEN_ALL_CORE
For information on Fall & Injury Prevention, visit: [email or web address] OR  [email or web address] OR  https://www.cdc.gov/steadi/patient.html

## 2022-08-23 NOTE — PROGRESS NOTE ADULT - ASSESSMENT
SHAVONNE MONTALVO is a 87y Female who presents with a chief complaint of breast mass    Squamous Cell Carcinoma  - Breast biopsy from prior hospitalization returned as squamous cell carcinoma.  - Unclear whether this is primary malignancy of the skin or from other sites, though pathology indicated likely cutaneous squamous cell carcinoma.   - Pending PET/CT as outpatient. Pending tumor of unknown origin testing.  - If primary malignancy of the skin, can treat with local radiation and likely followed by immunotherapy. Unclear whether it is possible for patient to attend daily radiation sessions. If logistically not possible or not a radiation candidate, patient can be treated with immunotherapy alone. Treatment is usually every 3 weeks in office with intravenous medications.   - Continue wound care.   - No treatment while inpatient.     Anemia  - Patient with hemoglobin 8.1 today.  - Likely due to loss from skin wounds.  - Continue folic acid supplementation.  - Monitor CBC and transfuse to maintain hemoglobin above 7.    Will continue to follow.    Rao Ward MD  Hematology/Oncology  O: [phone] SHAVONNE MONTALVO is a 87y Female who presents with a chief complaint of breast mass    Squamous Cell Carcinoma  - Breast biopsy from prior hospitalization returned as squamous cell carcinoma.  - Unclear whether this is primary malignancy of the skin or from other sites, though pathology indicated likely cutaneous squamous cell carcinoma.   - Pending PET/CT as outpatient. Pending tumor of unknown origin testing.  - If primary malignancy of the skin, can treat with local radiation and likely followed by immunotherapy. Unclear whether it is possible for patient to attend daily radiation sessions, or even if her malignancy is amenable for radiation given the extensive nature of the disease. If logistically not possible or not a radiation candidate, patient can be treated with immunotherapy alone. Treatment is usually every 3 weeks in office with intravenous medications.   - Continue wound care.   - No treatment while inpatient.     Anemia  - Patient with hemoglobin 8.1 today.  - Likely due to loss from skin wounds.  - Continue folic acid supplementation.  - Monitor CBC and transfuse to maintain hemoglobin above 7.    Will continue to follow.    Rao Ward MD  Hematology/Oncology  O: [phone]

## 2022-12-05 ENCOUNTER — EMERGENCY (EMERGENCY)
Facility: HOSPITAL | Age: 87
LOS: 1 days | Discharge: HOME | End: 2022-12-05
Attending: EMERGENCY MEDICINE | Admitting: EMERGENCY MEDICINE

## 2022-12-05 VITALS
DIASTOLIC BLOOD PRESSURE: 50 MMHG | SYSTOLIC BLOOD PRESSURE: 154 MMHG | TEMPERATURE: 98 F | OXYGEN SATURATION: 99 % | HEART RATE: 103 BPM | RESPIRATION RATE: 18 BRPM

## 2022-12-05 VITALS
HEART RATE: 104 BPM | TEMPERATURE: 99 F | RESPIRATION RATE: 16 BRPM | SYSTOLIC BLOOD PRESSURE: 120 MMHG | DIASTOLIC BLOOD PRESSURE: 54 MMHG | OXYGEN SATURATION: 100 %

## 2022-12-05 DIAGNOSIS — E07.9 DISORDER OF THYROID, UNSPECIFIED: Chronic | ICD-10-CM

## 2022-12-05 PROBLEM — C44.92 SQUAMOUS CELL CARCINOMA OF SKIN, UNSPECIFIED: Chronic | Status: ACTIVE | Noted: 2022-08-18

## 2022-12-05 LAB
ALBUMIN SERPL ELPH-MCNC: 2.8 G/DL — LOW (ref 3.3–5)
ALP SERPL-CCNC: 82 U/L — SIGNIFICANT CHANGE UP (ref 40–120)
ALT FLD-CCNC: 8 U/L — SIGNIFICANT CHANGE UP (ref 4–33)
ANION GAP SERPL CALC-SCNC: 12 MMOL/L — SIGNIFICANT CHANGE UP (ref 7–14)
APTT BLD: 26.9 SEC — LOW (ref 27–36.3)
AST SERPL-CCNC: 12 U/L — SIGNIFICANT CHANGE UP (ref 4–32)
BASOPHILS # BLD AUTO: 0.04 K/UL — SIGNIFICANT CHANGE UP (ref 0–0.2)
BASOPHILS NFR BLD AUTO: 0.4 % — SIGNIFICANT CHANGE UP (ref 0–2)
BILIRUB SERPL-MCNC: <0.2 MG/DL — SIGNIFICANT CHANGE UP (ref 0.2–1.2)
BUN SERPL-MCNC: 23 MG/DL — SIGNIFICANT CHANGE UP (ref 7–23)
CALCIUM SERPL-MCNC: 9.7 MG/DL — SIGNIFICANT CHANGE UP (ref 8.4–10.5)
CHLORIDE SERPL-SCNC: 101 MMOL/L — SIGNIFICANT CHANGE UP (ref 98–107)
CO2 SERPL-SCNC: 23 MMOL/L — SIGNIFICANT CHANGE UP (ref 22–31)
CREAT SERPL-MCNC: 0.51 MG/DL — SIGNIFICANT CHANGE UP (ref 0.5–1.3)
EGFR: 91 ML/MIN/1.73M2 — SIGNIFICANT CHANGE UP
EOSINOPHIL # BLD AUTO: 0.2 K/UL — SIGNIFICANT CHANGE UP (ref 0–0.5)
EOSINOPHIL NFR BLD AUTO: 2.1 % — SIGNIFICANT CHANGE UP (ref 0–6)
GLUCOSE SERPL-MCNC: 97 MG/DL — SIGNIFICANT CHANGE UP (ref 70–99)
HCT VFR BLD CALC: 22.7 % — LOW (ref 34.5–45)
HGB BLD-MCNC: 6.7 G/DL — CRITICAL LOW (ref 11.5–15.5)
IANC: 7.67 K/UL — HIGH (ref 1.8–7.4)
IMM GRANULOCYTES NFR BLD AUTO: 0.6 % — SIGNIFICANT CHANGE UP (ref 0–0.9)
INR BLD: 1.13 RATIO — SIGNIFICANT CHANGE UP (ref 0.88–1.16)
LYMPHOCYTES # BLD AUTO: 1.09 K/UL — SIGNIFICANT CHANGE UP (ref 1–3.3)
LYMPHOCYTES # BLD AUTO: 11.3 % — LOW (ref 13–44)
MCHC RBC-ENTMCNC: 25.9 PG — LOW (ref 27–34)
MCHC RBC-ENTMCNC: 29.5 GM/DL — LOW (ref 32–36)
MCV RBC AUTO: 87.6 FL — SIGNIFICANT CHANGE UP (ref 80–100)
MONOCYTES # BLD AUTO: 0.61 K/UL — SIGNIFICANT CHANGE UP (ref 0–0.9)
MONOCYTES NFR BLD AUTO: 6.3 % — SIGNIFICANT CHANGE UP (ref 2–14)
NEUTROPHILS # BLD AUTO: 7.67 K/UL — HIGH (ref 1.8–7.4)
NEUTROPHILS NFR BLD AUTO: 79.3 % — HIGH (ref 43–77)
NRBC # BLD: 0 /100 WBCS — SIGNIFICANT CHANGE UP (ref 0–0)
NRBC # FLD: 0 K/UL — SIGNIFICANT CHANGE UP (ref 0–0)
PLATELET # BLD AUTO: 286 K/UL — SIGNIFICANT CHANGE UP (ref 150–400)
POTASSIUM SERPL-MCNC: 4.7 MMOL/L — SIGNIFICANT CHANGE UP (ref 3.5–5.3)
POTASSIUM SERPL-SCNC: 4.7 MMOL/L — SIGNIFICANT CHANGE UP (ref 3.5–5.3)
PROT SERPL-MCNC: 5.6 G/DL — LOW (ref 6–8.3)
PROTHROM AB SERPL-ACNC: 13.1 SEC — SIGNIFICANT CHANGE UP (ref 10.5–13.4)
RBC # BLD: 2.59 M/UL — LOW (ref 3.8–5.2)
RBC # FLD: 16 % — HIGH (ref 10.3–14.5)
SODIUM SERPL-SCNC: 136 MMOL/L — SIGNIFICANT CHANGE UP (ref 135–145)
WBC # BLD: 9.67 K/UL — SIGNIFICANT CHANGE UP (ref 3.8–10.5)
WBC # FLD AUTO: 9.67 K/UL — SIGNIFICANT CHANGE UP (ref 3.8–10.5)

## 2022-12-05 PROCEDURE — 99284 EMERGENCY DEPT VISIT MOD MDM: CPT

## 2022-12-05 NOTE — ED ADULT NURSE REASSESSMENT NOTE - NS ED NURSE REASSESS COMMENT FT1
Pre-Transfusion vital signs as per flowsheet at this time Consent for transfusion in chart at this time. Pt educated on need for blood product at this time and verified understanding with teachback method. Pt educated on signs and symptoms related to transusion reaction. 2 RNs at bedside at this time. Pt offering no complaints and remains in no acute distress.

## 2022-12-05 NOTE — ED ADULT NURSE NOTE - NSIMPLEMENTINTERV_GEN_ALL_ED
Implemented All Fall Risk Interventions:  [place] to call system. Call bell, personal items and telephone within reach. Instruct patient to call for assistance. Room bathroom lighting operational. Non-slip footwear when patient is off stretcher. Physically safe environment: no spills, clutter or unnecessary equipment. Stretcher in lowest position, wheels locked, appropriate side rails in place. Provide visual cue, wrist band, yellow gown, etc. Monitor gait and stability. Monitor for mental status changes and reorient to person, place, and time. Review medications for side effects contributing to fall risk. Reinforce activity limits and safety measures with patient and family.

## 2022-12-05 NOTE — ED ADULT NURSE NOTE - COVID-19  TEST TYPE
Chief Complaint   Patient presents with   • Sinus Problem     started last night, runny nose, congestion   • Eye Problem     right eye had some clear mucous buildup this morning       HISTORY OF PRESENT ILLNESS:   The patient is a 4 month old male who presents with day runny nose, nasal congestion, right eye watering and mucus production.  Parents have not noticed much for redness of the eye.  Parents have noticed an occasional cough, but nothing persistent.  Parents have not noticed any fevers, wheezing or shortness of breath.  Patient's appetite has been mostly normal, and drinking plenty of fluids.  Parents have not given any over-the-counter medications so far.    PAST MEDICAL HISTORY, PAST SURGICAL HISTORY, SOCIAL HISTORY, MEDICATIONS, AND ALLERGIES:  Reviewed per electronic chart.    REVIEW OF SYSTEMS:   Constitutional:  Denies fever or fatigue  Eyes: Right eye watering and mucous. Denies eye redness or purulent discharge.  ENT: Runny nose, sinus congestion.  Respiratory: Occasional cough. Denies wheezing or shortness of breath   Gastrointestinal:  Denies vomiting, bloody stools or diarrhea     PHYSICAL EXAM:  Vitals:   Vitals:    11/25/21 0815   Pulse: 134   Resp: (!) 28   Temp: 98.6 °F (37 °C)   SpO2: 99%   Weight: 7.229 kg (15 lb 15 oz)      Constitutional:  No acute distress, nontoxic appearance.  HENT: Normocephalic, atraumatic. Bilateral external ears normal. Oropharynx moist. No oral exudates. Nasal mucosa appear boggy with copious amounts of clear discharge.  Bilateral tympanic membranes appear normal, no erythema or exudates.  Eyes:  PERRLA (pupils equal, round, and reactive to light and accommodation), EOMI (extraocular movements are intact). Conjunctivae normal.  Right eye watering and mucoid discharge, no purulence.  Neck:  Normal range of motion. No tenderness. Supple. No lymphadenopathy. No stridor.  Cardiovascular:  Normal heart rate. Normal rhythm. No murmurs. No rubs. No  gallops.  Pulmonary/Chest:  Clear to auscultation throughout. No respiratory distress. No wheezing.  Abdomen:  Bowel sounds normal. Soft. No tenderness. No masses. No pulsatile masses.  Skin:  Warm, dry. No erythema. No rash.  Neuro: Alert    Labs/Radiology:  Orders Placed This Encounter   • 2019 Novel Coronavirus (SARS-CoV-2)       ASSESSMENT:   1. Acquired obstruction of right nasolacrimal duct    2. Allergic rhinitis due to other allergic trigger, unspecified seasonality    3. Suspected COVID-19 virus infection        PLAN:  Sample collected for COVID-19 PCR testing, they will be notified of the results.  Discussed home isolation precautions, anticipatory guidance, home supportive treatment options.  Encouraged adequate hydration and relative rest.  Educational handout provided to parents for review.    Followup with primary if no improvement of symptoms or worsening. Patient's parents acknowledged understanding of the instructions and are agreeable to plan.    Collaborating physician is Dr. Armen Butts.   MOLECULAR PCR

## 2022-12-05 NOTE — ED PROVIDER NOTE - OBJECTIVE STATEMENT
hx HLD TIA SCC of skin presents from [facility] with Hb 7.2 on labwork. 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 denies fever chills cp purulent drainage from mass sob or dark stools. no vaginal bleeding or hematuria. never had a colonoscopy or EGD. she changes the mass dressing once a day and notes trickles of blood that worsen while the dressing is changed. no hemorrhage from the mass. no need for multiple daily dressing changes from saturated gauze.

## 2022-12-05 NOTE — ED PROVIDER NOTE - NS ED ROS FT
Constitutional: no fevers, chills +generalized fatigue  HEENT: no HA, vision changes, rhinorrhea, sore throat  Cardiac: no chest pain, palpitations  Respiratory: no SOB, RENE, cough or hemoptysis  GI: no n/v/d/c, abd pain, bloody or dark stools  : no dysuria, frequency, or hematuria  MSK: no joint pain, neck pain or back pain  Skin: no rashes, jaundice, pruritis  Neuro: no numbness/tingling, weakness, unsteady gait  ROS otherwise neg except per hpi

## 2022-12-05 NOTE — ED PROVIDER NOTE - PATIENT PORTAL LINK FT
You can access the FollowMyHealth Patient Portal offered by [facility] by registering at the following website: [email or web address]. By joining [employer]’s FollowMyHealth portal, you will also be able to view your health information using other applications (apps) compatible with our system.

## 2022-12-05 NOTE — ED PROVIDER NOTE - PROGRESS NOTE DETAILS
SARMAD Griffin PGY2 pt consented for blood. consent form in chart. Spoke to Ms. Thakkar Nursing Supervisor at [facility]. Informed of results, transfusion, and plan to send back to facility. Ms. Thakkar agrees with plan. Patient reports improvement. Spoke to patient about results. Plan to discharge patient back to [facility]. Patient given oncology follow up and return precautions. Patient agrees with plan.

## 2022-12-05 NOTE — ED PROVIDER NOTE - NSICDXPASTMEDICALHX_GEN_ALL_CORE_FT
done
PAST MEDICAL HISTORY:  Carotid stenosis     HLD (hyperlipidemia)     Primary squamous cell carcinoma of skin     TIA (transient ischemic attack)

## 2022-12-05 NOTE — ED PROVIDER NOTE - PHYSICAL EXAMINATION
General: non-toxic, NAD pale.  HEENT: NCAT, PERRL +conjunctival pallor   Cardiac: tachycardic no murmurs, 2+ peripheral pulses  Resp: CTAB  Abdomen: soft, non-distended, bowel sounds present, no ttp, no rebound or guarding. no organomegaly  Extremities: no peripheral edema, calf tenderness, or leg size discrepancies  Skin: pt refuses skin exam of the breast mass   Neuro: AAOx4, 5+motor, sensation grossly intact  Psych: mood and affect appropriate

## 2022-12-05 NOTE — ED PROVIDER NOTE - NSFOLLOWUPINSTRUCTIONS_ED_ALL_ED_FT
Anemia    Anemia is a condition in which the concentration of red blood cells or hemoglobin in the blood is below normal. Hemoglobin is a substance in red blood cells that carries oxygen to the tissues of the body. Anemia results in not enough oxygen reaching these tissues which can cause symptoms such as weakness, dizziness/lightheadedness, shortness of breath, chest pain, paleness, or nausea. The cause of your anemia may or may not be determined immediately. If your hemoglobin was dangerously low, you may have received a blood transfusion. Usually reactions to transfusions occur immediately but monitor yourself for any fevers, rash, or shortness of breath.    Follow up with your oncologist in 24-48 hours.    SEEK IMMEDIATE MEDICAL CARE IF YOU HAVE ANY OF THE FOLLOWING SYMPTOMS: extreme weakness/chest pain/shortness of breath, black or bloody stools, vomiting blood, fainting, fever, or any signs of dehydration.

## 2022-12-05 NOTE — ED ADULT NURSE NOTE - CHIEF COMPLAINT QUOTE
Detail Level: Detailed Pt sent in from [place] for anemia hemoglobin  7.2. pt denies sob or chest pain co feeling tired. Pt with hx of skin cancer over chest area states last radiation 2 months ago . Pt arrives with lymphopathy to right arm

## 2022-12-05 NOTE — ED ADULT NURSE NOTE - OBJECTIVE STATEMENT
Patient is an 86 yo female, h/x skin CA s/p radiation, TIA, carotid stenosis, HLD, presenting from [place] with hemoglobin 7.2. AAOx4, no signs of distress, patient reports she frequently has anemia and has received blood transfusions in the past. Chest wrapped with ACE wrap, patient reports some bleeding from skin cancer radiation site on chest. RUE with significant lymphedema, patient reports this is chronic. Denies bleeding in urine or stool, recent injury/falls/trauma. Denies chest pain, shortness of breath, dizziness, palpitations, fever, chills. Patient tachycardic in 100s, other VSS. Fall precautions maintained.

## 2022-12-05 NOTE — ED ADULT TRIAGE NOTE - CHIEF COMPLAINT QUOTE
Pt sent in from [place] for anemia hemoglobin  7.2. pt denies sob or chest pain co feeling tired. Pt with hx of skin cancer over chest area states last radiation 2 months ago . Pt arrives with lymphopathy to right arm

## 2022-12-05 NOTE — ED PROVIDER NOTE - CLINICAL SUMMARY MEDICAL DECISION MAKING FREE TEXT BOX
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 consent to transfuse and likely return to [facility]. 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 consent to transfuse and likely return to [facility].

## 2022-12-06 RX ORDER — ACETAMINOPHEN 500 MG
650 TABLET ORAL ONCE
Refills: 0 | Status: COMPLETED | OUTPATIENT
Start: 2022-12-06 | End: 2022-12-06

## 2022-12-06 RX ADMIN — Medication 650 MILLIGRAM(S): at 02:04

## 2022-12-22 ENCOUNTER — INPATIENT (INPATIENT)
Facility: HOSPITAL | Age: 87
LOS: 14 days | Discharge: HOME | End: 2023-01-06
Attending: INTERNAL MEDICINE | Admitting: INTERNAL MEDICINE
Payer: MEDICARE

## 2022-12-22 VITALS
HEART RATE: 98 BPM | OXYGEN SATURATION: 97 % | SYSTOLIC BLOOD PRESSURE: 103 MMHG | TEMPERATURE: 98 F | DIASTOLIC BLOOD PRESSURE: 59 MMHG | RESPIRATION RATE: 17 BRPM

## 2022-12-22 DIAGNOSIS — D64.9 ANEMIA, UNSPECIFIED: ICD-10-CM

## 2022-12-22 DIAGNOSIS — E07.9 DISORDER OF THYROID, UNSPECIFIED: Chronic | ICD-10-CM

## 2022-12-22 LAB
ALBUMIN SERPL ELPH-MCNC: 2.9 G/DL — LOW (ref 3.3–5)
ALP SERPL-CCNC: 79 U/L — SIGNIFICANT CHANGE UP (ref 40–120)
ALT FLD-CCNC: 5 U/L — SIGNIFICANT CHANGE UP (ref 4–33)
ANION GAP SERPL CALC-SCNC: 9 MMOL/L — SIGNIFICANT CHANGE UP (ref 7–14)
APTT BLD: 29.8 SEC — SIGNIFICANT CHANGE UP (ref 27–36.3)
AST SERPL-CCNC: 14 U/L — SIGNIFICANT CHANGE UP (ref 4–32)
BASOPHILS # BLD AUTO: 0.02 K/UL — SIGNIFICANT CHANGE UP (ref 0–0.2)
BASOPHILS NFR BLD AUTO: 0.2 % — SIGNIFICANT CHANGE UP (ref 0–2)
BILIRUB SERPL-MCNC: <0.2 MG/DL — SIGNIFICANT CHANGE UP (ref 0.2–1.2)
BUN SERPL-MCNC: 29 MG/DL — HIGH (ref 7–23)
CALCIUM SERPL-MCNC: 9.7 MG/DL — SIGNIFICANT CHANGE UP (ref 8.4–10.5)
CHLORIDE SERPL-SCNC: 102 MMOL/L — SIGNIFICANT CHANGE UP (ref 98–107)
CO2 SERPL-SCNC: 24 MMOL/L — SIGNIFICANT CHANGE UP (ref 22–31)
CREAT SERPL-MCNC: 0.47 MG/DL — LOW (ref 0.5–1.3)
EGFR: 93 ML/MIN/1.73M2 — SIGNIFICANT CHANGE UP
EOSINOPHIL # BLD AUTO: 0.19 K/UL — SIGNIFICANT CHANGE UP (ref 0–0.5)
EOSINOPHIL NFR BLD AUTO: 1.5 % — SIGNIFICANT CHANGE UP (ref 0–6)
FLUAV AG NPH QL: SIGNIFICANT CHANGE UP
FLUBV AG NPH QL: SIGNIFICANT CHANGE UP
GLUCOSE SERPL-MCNC: 114 MG/DL — HIGH (ref 70–99)
HCT VFR BLD CALC: 18.7 % — CRITICAL LOW (ref 34.5–45)
HGB BLD-MCNC: 5.5 G/DL — CRITICAL LOW (ref 11.5–15.5)
IANC: 10.84 K/UL — HIGH (ref 1.8–7.4)
IMM GRANULOCYTES NFR BLD AUTO: 1.4 % — HIGH (ref 0–0.9)
INR BLD: 1.11 RATIO — SIGNIFICANT CHANGE UP (ref 0.88–1.16)
LYMPHOCYTES # BLD AUTO: 1.02 K/UL — SIGNIFICANT CHANGE UP (ref 1–3.3)
LYMPHOCYTES # BLD AUTO: 7.9 % — LOW (ref 13–44)
MCHC RBC-ENTMCNC: 26.2 PG — LOW (ref 27–34)
MCHC RBC-ENTMCNC: 29.4 GM/DL — LOW (ref 32–36)
MCV RBC AUTO: 89 FL — SIGNIFICANT CHANGE UP (ref 80–100)
MONOCYTES # BLD AUTO: 0.68 K/UL — SIGNIFICANT CHANGE UP (ref 0–0.9)
MONOCYTES NFR BLD AUTO: 5.3 % — SIGNIFICANT CHANGE UP (ref 2–14)
NEUTROPHILS # BLD AUTO: 10.84 K/UL — HIGH (ref 1.8–7.4)
NEUTROPHILS NFR BLD AUTO: 83.7 % — HIGH (ref 43–77)
NRBC # BLD: 0 /100 WBCS — SIGNIFICANT CHANGE UP (ref 0–0)
NRBC # FLD: 0 K/UL — SIGNIFICANT CHANGE UP (ref 0–0)
PLATELET # BLD AUTO: 290 K/UL — SIGNIFICANT CHANGE UP (ref 150–400)
POTASSIUM SERPL-MCNC: 4.2 MMOL/L — SIGNIFICANT CHANGE UP (ref 3.5–5.3)
POTASSIUM SERPL-SCNC: 4.2 MMOL/L — SIGNIFICANT CHANGE UP (ref 3.5–5.3)
PROT SERPL-MCNC: 5.8 G/DL — LOW (ref 6–8.3)
PROTHROM AB SERPL-ACNC: 12.9 SEC — SIGNIFICANT CHANGE UP (ref 10.5–13.4)
RBC # BLD: 2.1 M/UL — LOW (ref 3.8–5.2)
RBC # FLD: 16.9 % — HIGH (ref 10.3–14.5)
RSV RNA NPH QL NAA+NON-PROBE: SIGNIFICANT CHANGE UP
SARS-COV-2 RNA SPEC QL NAA+PROBE: SIGNIFICANT CHANGE UP
SODIUM SERPL-SCNC: 135 MMOL/L — SIGNIFICANT CHANGE UP (ref 135–145)
WBC # BLD: 12.93 K/UL — HIGH (ref 3.8–10.5)
WBC # FLD AUTO: 12.93 K/UL — HIGH (ref 3.8–10.5)

## 2022-12-22 PROCEDURE — 99223 1ST HOSP IP/OBS HIGH 75: CPT

## 2022-12-22 PROCEDURE — 99285 EMERGENCY DEPT VISIT HI MDM: CPT

## 2022-12-22 RX ORDER — ACETAMINOPHEN 500 MG
650 TABLET ORAL EVERY 6 HOURS
Refills: 0 | Status: DISCONTINUED | OUTPATIENT
Start: 2022-12-22 | End: 2023-01-06

## 2022-12-22 RX ORDER — CEFAZOLIN SODIUM 1 G
VIAL (EA) INJECTION
Refills: 0 | Status: DISCONTINUED | OUTPATIENT
Start: 2022-12-23 | End: 2022-12-31

## 2022-12-22 RX ORDER — RISPERIDONE 4 MG/1
1 TABLET ORAL
Qty: 0 | Refills: 0 | DISCHARGE

## 2022-12-22 RX ORDER — CEFAZOLIN SODIUM 1 G
1000 VIAL (EA) INJECTION EVERY 8 HOURS
Refills: 0 | Status: DISCONTINUED | OUTPATIENT
Start: 2022-12-23 | End: 2022-12-31

## 2022-12-22 RX ORDER — HEPARIN SODIUM 5000 [USP'U]/ML
5000 INJECTION INTRAVENOUS; SUBCUTANEOUS EVERY 12 HOURS
Refills: 0 | Status: DISCONTINUED | OUTPATIENT
Start: 2022-12-22 | End: 2022-12-23

## 2022-12-22 RX ORDER — ROSUVASTATIN CALCIUM 5 MG/1
1 TABLET ORAL
Qty: 0 | Refills: 0 | DISCHARGE

## 2022-12-22 RX ORDER — ACETAMINOPHEN 500 MG
975 TABLET ORAL ONCE
Refills: 0 | Status: COMPLETED | OUTPATIENT
Start: 2022-12-22 | End: 2022-12-22

## 2022-12-22 RX ORDER — CEFAZOLIN SODIUM 1 G
1000 VIAL (EA) INJECTION ONCE
Refills: 0 | Status: COMPLETED | OUTPATIENT
Start: 2022-12-22 | End: 2022-12-23

## 2022-12-22 RX ORDER — LANOLIN ALCOHOL/MO/W.PET/CERES
3 CREAM (GRAM) TOPICAL AT BEDTIME
Refills: 0 | Status: DISCONTINUED | OUTPATIENT
Start: 2022-12-22 | End: 2023-01-06

## 2022-12-22 RX ADMIN — Medication 975 MILLIGRAM(S): at 17:45

## 2022-12-22 NOTE — ED PROVIDER NOTE - PROGRESS NOTE DETAILS
Swathi Coelho DO (PGY2): Applied Surgicel and re-wrapped wounds with fresh bandages. Pending labs, pt TBA. Swathi Coelho DO (PGY2): Hemoglobin 5.5.  1 unit PRBCs ordered.  Patient consented for blood transfusion.  Consent placed in the chart.  Plan to admit to Dr. Landin for transfusion and wound care.  Spoke with Dr. Landin will admit to his service

## 2022-12-22 NOTE — ED PROVIDER NOTE - ATTENDING CONTRIBUTION TO CARE
Dr. Dior, Attending Physician-  I performed a face to face bedside interview with patient regarding history of present illness, review of symptoms and past medical history. I completed an independent physical exam.  I have discussed patient's plan of care with the resident.    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 Patient has multiple fungating lesions to her chest wall - with slow oozing. Has needed transfusions in the past. Symptomatically, complaining of generalized weakness and fatigue. And does note that lesions over her chest wall have been slowly oozing blood. No headaches, fevers, chills, cough, sputum, cp, sob, belly pain, nvd. Physical: afebrile, rrr, ctabl, abdomen soft, RUE lymphedema (that is unchanged from baseline), multiple fungating lesions with slow oozing over the chest wall. Plan: labs, t/s, transfuse, admit.

## 2022-12-22 NOTE — ED PROVIDER NOTE - PHYSICAL EXAMINATION
GENERAL: Awake. Alert. NAD. Well nourished.  HEENT: NC/AT, Conjunctival pallor, no scleral icterus. Airway patent. Moist mucous membranes.  LUNGS: CTAB. No wheezes or rales noted.  CARDIAC: Chest non-tender to palpation. RRR.  ABDOMEN: Soft, NT, ND, no rebound, no guarding.  EXT: Edema at RUE, no LE edema b/l, no calf tenderness, distal pulses 2+ bilaterally  NEURO: A&Ox3. Moving all extremities. Sensation and strength intact throughout.  SKIN: Warm and dry. Fungating, bleeding breast mass at b/l lateral thorax, no surrounding erythema/edema, drainage nonpurulent. Bleeding is nonpulsatile.   PSYCH: Normal affect.

## 2022-12-22 NOTE — ED ADULT NURSE NOTE - OBJECTIVE STATEMENT
87 yof presents A&Ox4, c/o bleeding near R axilla and L breast. Pt has skin cancer, had a biopsy done 3 months ago and states both sites have been bleeding since. Pt currently undergoing radiation and immunotherapy. Bleeding controlled at this time. Swelling noted on bilateral upper extremities.  Respirations even and unlabored, sating 100% on RA, sinus tachycardia on monitor. Pt denies any chest pain, sob, N/V/D. No acute distress noted. 20g IV placed in L AC, labs sent per order. bed in lowest position, side rails up, call bell in hand, safety maintained. awaiting further orders.

## 2022-12-22 NOTE — ED PROVIDER NOTE - CLINICAL SUMMARY MEDICAL DECISION MAKING FREE TEXT BOX
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 Given hx and physical, ddx includes but is not limited to Anemia, complication from known cancer, low concern for arterial bleeding given bleeding is nonpulsatile. Low concern for cellulitis/abscess given physical exam. Plan for labs, meds, TBA for transfusion and wound care.

## 2022-12-22 NOTE — ED ADULT NURSE REASSESSMENT NOTE - NS ED NURSE REASSESS COMMENT FT1
patient started on 1unit of prbc. Patient in no acute distress at this time, will continue to monitor.

## 2022-12-22 NOTE — ED ADULT TRIAGE NOTE - CHIEF COMPLAINT QUOTE
hgb=5.2. pt states she has skin CA and is bleeding from Right axilla and Left breast. pt with swelling to R arm.

## 2022-12-22 NOTE — ED PROVIDER NOTE - OBJECTIVE STATEMENT
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 No associated dyspnea on exertion or lightheadedness. Pt reports last blood transfusion about 3 weeks ago. Denies chest pain, SOB, nausea, vomiting, abdominal pain, headache. Denies hematuria, hematochezia, hematemesis.

## 2022-12-23 DIAGNOSIS — T14.8XXA OTHER INJURY OF UNSPECIFIED BODY REGION, INITIAL ENCOUNTER: ICD-10-CM

## 2022-12-23 DIAGNOSIS — Z29.9 ENCOUNTER FOR PROPHYLACTIC MEASURES, UNSPECIFIED: ICD-10-CM

## 2022-12-23 DIAGNOSIS — D62 ACUTE POSTHEMORRHAGIC ANEMIA: ICD-10-CM

## 2022-12-23 DIAGNOSIS — C44.92 SQUAMOUS CELL CARCINOMA OF SKIN, UNSPECIFIED: ICD-10-CM

## 2022-12-23 LAB
ALBUMIN SERPL ELPH-MCNC: 2.4 G/DL — LOW (ref 3.3–5)
ALP SERPL-CCNC: 61 U/L — SIGNIFICANT CHANGE UP (ref 40–120)
ALT FLD-CCNC: 6 U/L — SIGNIFICANT CHANGE UP (ref 4–33)
ANION GAP SERPL CALC-SCNC: 7 MMOL/L — SIGNIFICANT CHANGE UP (ref 7–14)
ANION GAP SERPL CALC-SCNC: 8 MMOL/L — SIGNIFICANT CHANGE UP (ref 7–14)
AST SERPL-CCNC: 11 U/L — SIGNIFICANT CHANGE UP (ref 4–32)
BASOPHILS # BLD AUTO: 0.03 K/UL — SIGNIFICANT CHANGE UP (ref 0–0.2)
BASOPHILS NFR BLD AUTO: 0.2 % — SIGNIFICANT CHANGE UP (ref 0–2)
BILIRUB SERPL-MCNC: 0.2 MG/DL — SIGNIFICANT CHANGE UP (ref 0.2–1.2)
BUN SERPL-MCNC: 21 MG/DL — SIGNIFICANT CHANGE UP (ref 7–23)
BUN SERPL-MCNC: 25 MG/DL — HIGH (ref 7–23)
CALCIUM SERPL-MCNC: 9 MG/DL — SIGNIFICANT CHANGE UP (ref 8.4–10.5)
CALCIUM SERPL-MCNC: 9.4 MG/DL — SIGNIFICANT CHANGE UP (ref 8.4–10.5)
CHLORIDE SERPL-SCNC: 101 MMOL/L — SIGNIFICANT CHANGE UP (ref 98–107)
CHLORIDE SERPL-SCNC: 102 MMOL/L — SIGNIFICANT CHANGE UP (ref 98–107)
CO2 SERPL-SCNC: 24 MMOL/L — SIGNIFICANT CHANGE UP (ref 22–31)
CO2 SERPL-SCNC: 25 MMOL/L — SIGNIFICANT CHANGE UP (ref 22–31)
CREAT SERPL-MCNC: 0.45 MG/DL — LOW (ref 0.5–1.3)
CREAT SERPL-MCNC: 0.6 MG/DL — SIGNIFICANT CHANGE UP (ref 0.5–1.3)
EGFR: 87 ML/MIN/1.73M2 — SIGNIFICANT CHANGE UP
EGFR: 94 ML/MIN/1.73M2 — SIGNIFICANT CHANGE UP
EOSINOPHIL # BLD AUTO: 0.27 K/UL — SIGNIFICANT CHANGE UP (ref 0–0.5)
EOSINOPHIL NFR BLD AUTO: 1.9 % — SIGNIFICANT CHANGE UP (ref 0–6)
GLUCOSE SERPL-MCNC: 117 MG/DL — HIGH (ref 70–99)
GLUCOSE SERPL-MCNC: 123 MG/DL — HIGH (ref 70–99)
HCT VFR BLD CALC: 18.2 % — CRITICAL LOW (ref 34.5–45)
HCT VFR BLD CALC: 23.2 % — LOW (ref 34.5–45)
HGB BLD-MCNC: 5.5 G/DL — CRITICAL LOW (ref 11.5–15.5)
HGB BLD-MCNC: 7.3 G/DL — LOW (ref 11.5–15.5)
IANC: 12.26 K/UL — HIGH (ref 1.8–7.4)
IMM GRANULOCYTES NFR BLD AUTO: 1 % — HIGH (ref 0–0.9)
LYMPHOCYTES # BLD AUTO: 0.96 K/UL — LOW (ref 1–3.3)
LYMPHOCYTES # BLD AUTO: 6.7 % — LOW (ref 13–44)
MAGNESIUM SERPL-MCNC: 1.9 MG/DL — SIGNIFICANT CHANGE UP (ref 1.6–2.6)
MCHC RBC-ENTMCNC: 26.6 PG — LOW (ref 27–34)
MCHC RBC-ENTMCNC: 27.1 PG — SIGNIFICANT CHANGE UP (ref 27–34)
MCHC RBC-ENTMCNC: 30.2 GM/DL — LOW (ref 32–36)
MCHC RBC-ENTMCNC: 31.5 GM/DL — LOW (ref 32–36)
MCV RBC AUTO: 86.2 FL — SIGNIFICANT CHANGE UP (ref 80–100)
MCV RBC AUTO: 87.9 FL — SIGNIFICANT CHANGE UP (ref 80–100)
MONOCYTES # BLD AUTO: 0.62 K/UL — SIGNIFICANT CHANGE UP (ref 0–0.9)
MONOCYTES NFR BLD AUTO: 4.3 % — SIGNIFICANT CHANGE UP (ref 2–14)
NEUTROPHILS # BLD AUTO: 12.26 K/UL — HIGH (ref 1.8–7.4)
NEUTROPHILS NFR BLD AUTO: 85.9 % — HIGH (ref 43–77)
NRBC # BLD: 0 /100 WBCS — SIGNIFICANT CHANGE UP (ref 0–0)
NRBC # BLD: 0 /100 WBCS — SIGNIFICANT CHANGE UP (ref 0–0)
NRBC # FLD: 0 K/UL — SIGNIFICANT CHANGE UP (ref 0–0)
NRBC # FLD: 0 K/UL — SIGNIFICANT CHANGE UP (ref 0–0)
OB PNL STL: NEGATIVE — SIGNIFICANT CHANGE UP
PHOSPHATE SERPL-MCNC: 3 MG/DL — SIGNIFICANT CHANGE UP (ref 2.5–4.5)
PLATELET # BLD AUTO: 250 K/UL — SIGNIFICANT CHANGE UP (ref 150–400)
PLATELET # BLD AUTO: 305 K/UL — SIGNIFICANT CHANGE UP (ref 150–400)
POTASSIUM SERPL-MCNC: 4 MMOL/L — SIGNIFICANT CHANGE UP (ref 3.5–5.3)
POTASSIUM SERPL-MCNC: 4.4 MMOL/L — SIGNIFICANT CHANGE UP (ref 3.5–5.3)
POTASSIUM SERPL-SCNC: 4 MMOL/L — SIGNIFICANT CHANGE UP (ref 3.5–5.3)
POTASSIUM SERPL-SCNC: 4.4 MMOL/L — SIGNIFICANT CHANGE UP (ref 3.5–5.3)
PROT SERPL-MCNC: 5.1 G/DL — LOW (ref 6–8.3)
RBC # BLD: 2.07 M/UL — LOW (ref 3.8–5.2)
RBC # BLD: 2.69 M/UL — LOW (ref 3.8–5.2)
RBC # FLD: 16.1 % — HIGH (ref 10.3–14.5)
RBC # FLD: 16.2 % — HIGH (ref 10.3–14.5)
SODIUM SERPL-SCNC: 133 MMOL/L — LOW (ref 135–145)
SODIUM SERPL-SCNC: 134 MMOL/L — LOW (ref 135–145)
WBC # BLD: 14.28 K/UL — HIGH (ref 3.8–10.5)
WBC # BLD: 14.94 K/UL — HIGH (ref 3.8–10.5)
WBC # FLD AUTO: 14.28 K/UL — HIGH (ref 3.8–10.5)
WBC # FLD AUTO: 14.94 K/UL — HIGH (ref 3.8–10.5)

## 2022-12-23 RX ORDER — RISPERIDONE 4 MG/1
0.5 TABLET ORAL
Refills: 0 | Status: DISCONTINUED | OUTPATIENT
Start: 2022-12-23 | End: 2023-01-06

## 2022-12-23 RX ORDER — FERROUS SULFATE 325(65) MG
325 TABLET ORAL DAILY
Refills: 0 | Status: DISCONTINUED | OUTPATIENT
Start: 2022-12-23 | End: 2023-01-06

## 2022-12-23 RX ORDER — SENNA PLUS 8.6 MG/1
2 TABLET ORAL AT BEDTIME
Refills: 0 | Status: DISCONTINUED | OUTPATIENT
Start: 2022-12-23 | End: 2023-01-06

## 2022-12-23 RX ORDER — ASCORBIC ACID 60 MG
500 TABLET,CHEWABLE ORAL DAILY
Refills: 0 | Status: DISCONTINUED | OUTPATIENT
Start: 2022-12-23 | End: 2023-01-06

## 2022-12-23 RX ORDER — ATORVASTATIN CALCIUM 80 MG/1
40 TABLET, FILM COATED ORAL AT BEDTIME
Refills: 0 | Status: DISCONTINUED | OUTPATIENT
Start: 2022-12-23 | End: 2023-01-06

## 2022-12-23 RX ORDER — FOLIC ACID 0.8 MG
1 TABLET ORAL DAILY
Refills: 0 | Status: DISCONTINUED | OUTPATIENT
Start: 2022-12-23 | End: 2023-01-06

## 2022-12-23 RX ORDER — SERTRALINE 25 MG/1
100 TABLET, FILM COATED ORAL DAILY
Refills: 0 | Status: DISCONTINUED | OUTPATIENT
Start: 2022-12-23 | End: 2023-01-06

## 2022-12-23 RX ADMIN — ATORVASTATIN CALCIUM 40 MILLIGRAM(S): 80 TABLET, FILM COATED ORAL at 22:30

## 2022-12-23 RX ADMIN — Medication 100 MILLIGRAM(S): at 06:07

## 2022-12-23 RX ADMIN — Medication 650 MILLIGRAM(S): at 02:42

## 2022-12-23 RX ADMIN — Medication 500 MILLIGRAM(S): at 12:12

## 2022-12-23 RX ADMIN — Medication 100 MILLIGRAM(S): at 01:42

## 2022-12-23 RX ADMIN — Medication 325 MILLIGRAM(S): at 12:11

## 2022-12-23 RX ADMIN — SERTRALINE 100 MILLIGRAM(S): 25 TABLET, FILM COATED ORAL at 12:11

## 2022-12-23 RX ADMIN — Medication 100 MILLIGRAM(S): at 15:00

## 2022-12-23 RX ADMIN — Medication 1 TABLET(S): at 12:12

## 2022-12-23 RX ADMIN — Medication 650 MILLIGRAM(S): at 12:11

## 2022-12-23 RX ADMIN — Medication 650 MILLIGRAM(S): at 23:23

## 2022-12-23 RX ADMIN — Medication 650 MILLIGRAM(S): at 22:29

## 2022-12-23 RX ADMIN — RISPERIDONE 0.5 MILLIGRAM(S): 4 TABLET ORAL at 06:07

## 2022-12-23 RX ADMIN — Medication 650 MILLIGRAM(S): at 13:00

## 2022-12-23 RX ADMIN — SENNA PLUS 2 TABLET(S): 8.6 TABLET ORAL at 22:30

## 2022-12-23 RX ADMIN — Medication 1 MILLIGRAM(S): at 12:11

## 2022-12-23 RX ADMIN — Medication 650 MILLIGRAM(S): at 01:42

## 2022-12-23 RX ADMIN — Medication 100 MILLIGRAM(S): at 22:32

## 2022-12-23 RX ADMIN — RISPERIDONE 0.5 MILLIGRAM(S): 4 TABLET ORAL at 18:18

## 2022-12-23 NOTE — PROGRESS NOTE ADULT - ASSESSMENT
88 y/o F with pmhx of skin cancer (squamous cell carcinoma) with mets, presented for abnormal lab test. Found to have severe anemia. Admit for anemia.

## 2022-12-23 NOTE — H&P ADULT - HISTORY OF PRESENT ILLNESS
This is a 86 y/o F with pmhx of skin cancer (squamous cell carcinoma) with mets, presented for abnormal lab test. The patient resides in [facility]. the patient has a chronic R chest wound which is where her skin cancer is located. She states that he has been bleeding a lot lately, and has been for a while. The patient recently got transfused 3 weeks ago for anemia. She states her anemia is likely from the wound. Denies GI bleeding. The patient denies fevers. No other symptoms at this time other than lightheadedness, which had resolved while the patient was in the ED.    Patient is requesting to go back to [facility].

## 2022-12-23 NOTE — PATIENT PROFILE ADULT - FALL HARM RISK - HARM RISK INTERVENTIONS

## 2022-12-23 NOTE — PATIENT PROFILE ADULT - NSPROPOAPRESSUREINJURY_GEN_A_NUR
In 2015, patient had elevated LDL in the 140s with normal HDL levels. She was never medicated for this and has had persistently elevated total cholesterol when she gets blood. She is requesting screening for high cholesterol today.   no

## 2022-12-23 NOTE — PROGRESS NOTE ADULT - SUBJECTIVE AND OBJECTIVE BOX
PATIENT SEEN AND EXAMINED ON :- 12/23/22  DATE OF SERVICE:  12/23/22           Interim events noted,Labs ,Radiological studies and Cardiology tests reviewed .       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 The patient resides in [facility]. the patient has a chronic R chest wound which is where her skin cancer is located. She states that he has been bleeding a lot lately, and has been for a while. The patient recently got transfused 3 weeks ago for anemia. She states her anemia is likely from the wound. Denies GI bleeding. The patient denies fevers. No other symptoms at this time other than lightheadedness, which had resolved while the patient was in the ED.    Patient is requesting to go back to [facility]. (23 Dec 2022 02:50)      INTERIM EVENTS:Patient seen at bedside ,interim events noted.      PMH -reviewed admission note, no change since admission  HEART FAILURE: Acute[ ]Chronic[ ] Systolic[ ] Diastolic[ ] Combined Systolic and Diastolic[ ]  CAD[ ] CABG[ ] PCI[ ]  DEVICES[ ] PPM[ ] ICD[ ] ILR[ ]  ATRIAL FIBRILLATION[ ] Paroxysmal[ ] Permanent[ ] CHADS2-[  ]  KAMRAN[ ] CKD1[ ] CKD2[ ] CKD3[ ] CKD4[ ] ESRD[ ]  COPD[ ] HTN[ ]   DM[ ] Type1[ ] Type 2[ ]   CVA[ ] Paresis[ ]    AMBULATION: Assisted[ ] Cane/walker[ ] Independent[ ]    MEDICATIONS  (STANDING):  ascorbic acid 500 milliGRAM(s) Oral daily  atorvastatin 40 milliGRAM(s) Oral at bedtime  ceFAZolin   IVPB      ceFAZolin   IVPB 1000 milliGRAM(s) IV Intermittent every 8 hours  ferrous    sulfate 325 milliGRAM(s) Oral daily  folic acid 1 milliGRAM(s) Oral daily  multivitamin 1 Tablet(s) Oral daily  risperiDONE   Tablet 0.5 milliGRAM(s) Oral two times a day  senna 2 Tablet(s) Oral at bedtime  sertraline 100 milliGRAM(s) Oral daily    MEDICATIONS  (PRN):  acetaminophen     Tablet .. 650 milliGRAM(s) Oral every 6 hours PRN Temp greater or equal to 38C (100.4F), Mild Pain (1 - 3)  melatonin 3 milliGRAM(s) Oral at bedtime PRN Insomnia            REVIEW OF SYSTEMS:  Constitutional: [ ] fever, [ ]weight loss,  [ ]fatigue [ ]weight gain  Eyes: [ ] visual changes  Respiratory: [ ]shortness of breath;  [ ] cough, [ ]wheezing, [ ]chills, [ ]hemoptysis  Cardiovascular: [ ] chest pain, [ ]palpitations, [ ]dizziness,  [ ]leg swelling[ ]orthopnea[ ]PND  Gastrointestinal: [ ] abdominal pain, [ ]nausea, [ ]vomiting,  [ ]diarrhea [ ]Constipation [ ]Melena  Genitourinary: [ ] dysuria, [ ] hematuria [ ]Powell  Neurologic: [ ] headaches [ ] tremors[ ]weakness [ ]Paralysis Right[ ] Left[ ]  Skin: [ ] itching, [ ]burning, [ ] rashes  Endocrine: [ ] heat or cold intolerance  Musculoskeletal: [ ] joint pain or swelling; [ ] muscle, back, or extremity pain  Psychiatric: [ ] depression, [ ]anxiety, [ ]mood swings, or [ ]difficulty sleeping  Hematologic: [ ] easy bruising, [ ] bleeding gums    [ ] All remaining systems negative except as per above.   [ ]Unable to obtain.  [x] No change in ROS since admission      Vital Signs Last 24 Hrs  T(C): 37.3 (23 Dec 2022 16:36), Max: 37.7 (22 Dec 2022 19:15)  T(F): 99.1 (23 Dec 2022 16:36), Max: 99.9 (22 Dec 2022 19:15)  HR: 98 (23 Dec 2022 16:36) (92 - 102)  BP: 101/45 (23 Dec 2022 09:59) (101/45 - 140/58)  BP(mean): --  RR: 18 (23 Dec 2022 16:36) (16 - 20)  SpO2: 100% (23 Dec 2022 16:36) (96% - 100%)    Parameters below as of 23 Dec 2022 16:36  Patient On (Oxygen Delivery Method): room air      I&O's Summary      PHYSICAL EXAM:  General: No acute distress BMI-  HEENT: EOMI, PERRL  Neck: Supple, [ ] JVD  Lungs: Equal air entry bilaterally; [ ] rales [ ] wheezing [ ] rhonchi  Heart: Regular rate and rhythm; [x ] murmur   2/6 [ x] systolic [ ] diastolic [ ] radiation[ ] rubs [ ]  gallops  Abdomen: Nontender, bowel sounds present  Extremities: No clubbing, cyanosis, [ ] edema [ ]Pulses  equal and intact  Nervous system:  Alert & Oriented X3, no focal deficits  Psychiatric: Normal affect  Skin: No rashes or lesions    LABS:  12-23    134<L>  |  101  |  21  ----------------------------<  123<H>  4.4   |  25  |  0.60    Ca    9.4      23 Dec 2022 13:27  Phos  3.0     12-23  Mg     1.90     12-23    TPro  5.1<L>  /  Alb  2.4<L>  /  TBili  0.2  /  DBili  x   /  AST  11  /  ALT  6   /  AlkPhos  61  12-23    Creatinine Trend: 0.60<--, 0.45<--, 0.47<--, 0.51<--                        7.3    14.94 )-----------( 305      ( 23 Dec 2022 13:27 )             23.2     PT/INR - ( 22 Dec 2022 17:12 )   PT: 12.9 sec;   INR: 1.11 ratio         PTT - ( 22 Dec 2022 17:12 )  PTT:29.8 sec

## 2022-12-23 NOTE — H&P ADULT - ASSESSMENT
86 y/o F with pmhx of skin cancer (squamous cell carcinoma) with mets, presented for abnormal lab test. Found to have severe anemia. Admit for anemia.

## 2022-12-23 NOTE — H&P ADULT - NSHPPHYSICALEXAM_GEN_ALL_CORE
PHYSICAL EXAM:  VITALS: Vital Signs Last 24 Hrs  T(C): 36.9 (23 Dec 2022 00:30), Max: 37.7 (22 Dec 2022 19:15)  T(F): 98.4 (23 Dec 2022 00:30), Max: 99.9 (22 Dec 2022 19:15)  HR: 100 (23 Dec 2022 00:30) (82 - 102)  BP: 111/57 (23 Dec 2022 00:30) (103/59 - 140/58)  BP(mean): --  RR: 18 (23 Dec 2022 00:30) (16 - 20)  SpO2: 99% (23 Dec 2022 00:30) (96% - 100%)    Parameters below as of 22 Dec 2022 22:15  Patient On (Oxygen Delivery Method): room air      GENERAL: NAD, well-developed, well-nourished, cachetic appearing  HEAD:  Atraumatic, Normocephalic  EYES: EOMI, PERRL, conjunctiva and sclera clear  ENT: Moist Mucus Membranes present, no ulcers appreciated  NECK: Supple, No JVD  CHEST/LUNG: Clear to auscultation bilaterally; No wheezes, rales or rhonchi, no accessory muscle use, large R sided chest wound with significant purulence and bleeding with bandages  HEART: Regular rate and rhythm; No murmurs, rubs, or gallops, (+)S1, S2  ABDOMEN: Soft, Nontender, Nondistended; Normal Bowel sounds   EXTREMITIES:  2+ Peripheral Pulses, No clubbing, cyanosis, or edema  PSYCH: normal mood and affect  NEUROLOGY: AAOx3, non-focal  SKIN: described as above

## 2022-12-23 NOTE — PROGRESS NOTE ADULT - TIME BILLING
- Review of records, telemetry, vital signs and daily labs.   - General and cardiovascular physical examination.  - Generation of cardiovascular treatment plan.  - Coordination of care.      Patient was seen and examined by me on 12/23/22,interim events noted,labs and radiology studies reviewed.  Cayetano Landin MD,FACC.  [address] [place], [place] [id number].  [phone]

## 2022-12-23 NOTE — H&P ADULT - PROBLEM SELECTOR PLAN 1
Assessment:  - patient presented for worsening of skin wound which is her malignancy  - there is significant bleeding and purulence on the wound site on the R chest  - patient reported getting immunotherapy for the skin cancer    Plan:  - wound care consult  - will start cefazolin due to appearance with purulence  - given hx of mets, unlikely to benefit from surgical intervention

## 2022-12-23 NOTE — H&P ADULT - NSHPLABSRESULTS_GEN_ALL_CORE
5.5    12.93 )-----------( 290      ( 22 Dec 2022 17:12 )             18.7       12-22    135  |  102  |  29<H>  ----------------------------<  114<H>  4.2   |  24  |  0.47<L>    Ca    9.7      22 Dec 2022 17:23    TPro  5.8<L>  /  Alb  2.9<L>  /  TBili  <0.2  /  DBili  x   /  AST  14  /  ALT  5   /  AlkPhos  79  12-22            PT/INR - ( 22 Dec 2022 17:12 )   PT: 12.9 sec;   INR: 1.11 ratio         PTT - ( 22 Dec 2022 17:12 )  PTT:29.8 sec

## 2022-12-23 NOTE — H&P ADULT - NSHPREVIEWOFSYSTEMS_GEN_ALL_CORE
REVIEW OF SYSTEMS:    CONSTITUTIONAL: No weakness, fevers or chills  EYES/ENT: No visual changes;  No dysphagia; No sore throat; No rhinorrhea; No sinus pain/pressure  NECK: No pain or stiffness  RESPIRATORY: No cough, wheezing, hemoptysis; No shortness of breath  CARDIOVASCULAR: No chest pain or palpitations; No lower extremity edema  GASTROINTESTINAL: No abdominal or epigastric pain. No nausea, vomiting, or hematemesis; No diarrhea or constipation. No melena or hematochezia.  GENITOURINARY: No dysuria, frequency or hematuria  NEUROLOGICAL: No numbness or weakness  MSK: ambulates with cane  SKIN: + R chest wound  All other review of systems is negative unless indicated above.

## 2022-12-23 NOTE — H&P ADULT - PROBLEM SELECTOR PLAN 3
Assessment:  - hb of 5.5  - source of bleeding likely from wound  - symptoms of mild lightheadedness  - receiving 1u prbc at bedside    Plan:  - will trend CBC  - may need another unit if repeat cbc still shows hb < 7  - wound care as above  - may need chronic transfusions given the severity of the malignancy on the R chest

## 2022-12-24 LAB
ANION GAP SERPL CALC-SCNC: 11 MMOL/L — SIGNIFICANT CHANGE UP (ref 7–14)
BUN SERPL-MCNC: 20 MG/DL — SIGNIFICANT CHANGE UP (ref 7–23)
CALCIUM SERPL-MCNC: 9.2 MG/DL — SIGNIFICANT CHANGE UP (ref 8.4–10.5)
CHLORIDE SERPL-SCNC: 103 MMOL/L — SIGNIFICANT CHANGE UP (ref 98–107)
CO2 SERPL-SCNC: 21 MMOL/L — LOW (ref 22–31)
CREAT SERPL-MCNC: 0.5 MG/DL — SIGNIFICANT CHANGE UP (ref 0.5–1.3)
EGFR: 91 ML/MIN/1.73M2 — SIGNIFICANT CHANGE UP
GLUCOSE SERPL-MCNC: 98 MG/DL — SIGNIFICANT CHANGE UP (ref 70–99)
HCT VFR BLD CALC: 21.9 % — LOW (ref 34.5–45)
HGB BLD-MCNC: 6.6 G/DL — CRITICAL LOW (ref 11.5–15.5)
MAGNESIUM SERPL-MCNC: 2 MG/DL — SIGNIFICANT CHANGE UP (ref 1.6–2.6)
MCHC RBC-ENTMCNC: 26.9 PG — LOW (ref 27–34)
MCHC RBC-ENTMCNC: 30.1 GM/DL — LOW (ref 32–36)
MCV RBC AUTO: 89.4 FL — SIGNIFICANT CHANGE UP (ref 80–100)
NRBC # BLD: 0 /100 WBCS — SIGNIFICANT CHANGE UP (ref 0–0)
NRBC # FLD: 0 K/UL — SIGNIFICANT CHANGE UP (ref 0–0)
PHOSPHATE SERPL-MCNC: 3.2 MG/DL — SIGNIFICANT CHANGE UP (ref 2.5–4.5)
PLATELET # BLD AUTO: 262 K/UL — SIGNIFICANT CHANGE UP (ref 150–400)
POTASSIUM SERPL-MCNC: 3.9 MMOL/L — SIGNIFICANT CHANGE UP (ref 3.5–5.3)
POTASSIUM SERPL-SCNC: 3.9 MMOL/L — SIGNIFICANT CHANGE UP (ref 3.5–5.3)
RBC # BLD: 2.45 M/UL — LOW (ref 3.8–5.2)
RBC # FLD: 16.7 % — HIGH (ref 10.3–14.5)
SODIUM SERPL-SCNC: 135 MMOL/L — SIGNIFICANT CHANGE UP (ref 135–145)
WBC # BLD: 10.59 K/UL — HIGH (ref 3.8–10.5)
WBC # FLD AUTO: 10.59 K/UL — HIGH (ref 3.8–10.5)

## 2022-12-24 RX ORDER — FUROSEMIDE 40 MG
20 TABLET ORAL ONCE
Refills: 0 | Status: COMPLETED | OUTPATIENT
Start: 2022-12-24 | End: 2022-12-24

## 2022-12-24 RX ADMIN — RISPERIDONE 0.5 MILLIGRAM(S): 4 TABLET ORAL at 05:50

## 2022-12-24 RX ADMIN — Medication 100 MILLIGRAM(S): at 13:54

## 2022-12-24 RX ADMIN — Medication 100 MILLIGRAM(S): at 05:50

## 2022-12-24 RX ADMIN — RISPERIDONE 0.5 MILLIGRAM(S): 4 TABLET ORAL at 17:22

## 2022-12-24 RX ADMIN — Medication 650 MILLIGRAM(S): at 23:10

## 2022-12-24 RX ADMIN — Medication 650 MILLIGRAM(S): at 22:22

## 2022-12-24 RX ADMIN — Medication 20 MILLIGRAM(S): at 19:14

## 2022-12-24 RX ADMIN — Medication 100 MILLIGRAM(S): at 22:22

## 2022-12-24 RX ADMIN — Medication 325 MILLIGRAM(S): at 13:11

## 2022-12-24 RX ADMIN — ATORVASTATIN CALCIUM 40 MILLIGRAM(S): 80 TABLET, FILM COATED ORAL at 22:22

## 2022-12-24 RX ADMIN — Medication 1 MILLIGRAM(S): at 13:12

## 2022-12-24 RX ADMIN — SERTRALINE 100 MILLIGRAM(S): 25 TABLET, FILM COATED ORAL at 13:12

## 2022-12-24 RX ADMIN — Medication 1 TABLET(S): at 13:13

## 2022-12-24 RX ADMIN — Medication 500 MILLIGRAM(S): at 13:12

## 2022-12-24 NOTE — PROGRESS NOTE ADULT - SUBJECTIVE AND OBJECTIVE BOX
PATIENT SEEN AND EXAMINED ON :- 12/24/22  DATE OF SERVICE:      12/24/22       Interim events noted,Labs ,Radiological studies and Cardiology tests reviewed .       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 The patient resides in [facility]. the patient has a chronic R chest wound which is where her skin cancer is located. She states that he has been bleeding a lot lately, and has been for a while. The patient recently got transfused 3 weeks ago for anemia. She states her anemia is likely from the wound. Denies GI bleeding. The patient denies fevers. No other symptoms at this time other than lightheadedness, which had resolved while the patient was in the ED.    Patient is requesting to go back to [facility]. (23 Dec 2022 02:50)      INTERIM EVENTS:Patient seen at bedside ,interim events noted.      PMH -reviewed admission note, no change since admission  HEART FAILURE: Acute[ ]Chronic[ ] Systolic[ ] Diastolic[ ] Combined Systolic and Diastolic[ ]  CAD[ ] CABG[ ] PCI[ ]  DEVICES[ ] PPM[ ] ICD[ ] ILR[ ]  ATRIAL FIBRILLATION[ ] Paroxysmal[ ] Permanent[ ] CHADS2-[  ]  KAMRAN[ ] CKD1[ ] CKD2[ ] CKD3[ ] CKD4[ ] ESRD[ ]  COPD[ ] HTN[ ]   DM[ ] Type1[ ] Type 2[ ]   CVA[ ] Paresis[ ]    AMBULATION: Assisted[ ] Cane/walker[ ] Independent[ ]    MEDICATIONS  (STANDING):  ascorbic acid 500 milliGRAM(s) Oral daily  atorvastatin 40 milliGRAM(s) Oral at bedtime  ceFAZolin   IVPB      ceFAZolin   IVPB 1000 milliGRAM(s) IV Intermittent every 8 hours  ferrous    sulfate 325 milliGRAM(s) Oral daily  folic acid 1 milliGRAM(s) Oral daily  multivitamin 1 Tablet(s) Oral daily  risperiDONE   Tablet 0.5 milliGRAM(s) Oral two times a day  senna 2 Tablet(s) Oral at bedtime  sertraline 100 milliGRAM(s) Oral daily    MEDICATIONS  (PRN):  acetaminophen     Tablet .. 650 milliGRAM(s) Oral every 6 hours PRN Temp greater or equal to 38C (100.4F), Mild Pain (1 - 3)  melatonin 3 milliGRAM(s) Oral at bedtime PRN Insomnia            REVIEW OF SYSTEMS:  Constitutional: [ ] fever, [ ]weight loss,  [ ]fatigue [ ]weight gain  Eyes: [ ] visual changes  Respiratory: [ ]shortness of breath;  [ ] cough, [ ]wheezing, [ ]chills, [ ]hemoptysis  Cardiovascular: [ ] chest pain, [ ]palpitations, [ ]dizziness,  [ ]leg swelling[ ]orthopnea[ ]PND  Gastrointestinal: [ ] abdominal pain, [ ]nausea, [ ]vomiting,  [ ]diarrhea [ ]Constipation [ ]Melena  Genitourinary: [ ] dysuria, [ ] hematuria [ ]Powell  Neurologic: [ ] headaches [ ] tremors[ ]weakness [ ]Paralysis Right[ ] Left[ ]  Skin: [ ] itching, [ ]burning, [ ] rashes  Endocrine: [ ] heat or cold intolerance  Musculoskeletal: [ ] joint pain or swelling; [ ] muscle, back, or extremity pain  Psychiatric: [ ] depression, [ ]anxiety, [ ]mood swings, or [ ]difficulty sleeping  Hematologic: [ ] easy bruising, [ ] bleeding gums    [ ] All remaining systems negative except as per above.   [ ]Unable to obtain.  [x] No change in ROS since admission      Vital Signs Last 24 Hrs  T(C): 37.6 (24 Dec 2022 17:16), Max: 37.8 (23 Dec 2022 21:19)  T(F): 99.7 (24 Dec 2022 17:16), Max: 100 (23 Dec 2022 21:19)  HR: 98 (24 Dec 2022 17:16) (90 - 108)  BP: 120/52 (24 Dec 2022 17:16) (111/51 - 128/45)  BP(mean): 70 (24 Dec 2022 09:41) (66 - 70)  RR: 17 (24 Dec 2022 17:16) (16 - 18)  SpO2: 100% (24 Dec 2022 17:16) (100% - 100%)    Parameters below as of 24 Dec 2022 17:16  Patient On (Oxygen Delivery Method): room air      I&O's Summary    23 Dec 2022 07:01  -  24 Dec 2022 07:00  --------------------------------------------------------  IN: 0 mL / OUT: 550 mL / NET: -550 mL        PHYSICAL EXAM:  General: No acute distress BMI-  HEENT: EOMI, PERRL  Neck: Supple, [ ] JVD  Lungs: Equal air entry bilaterally; [ ] rales [ ] wheezing [ ] rhonchi  Heart: Regular rate and rhythm; [x ] murmur   2/6 [ x] systolic [ ] diastolic [ ] radiation[ ] rubs [ ]  gallops  Abdomen: Nontender, bowel sounds present  Extremities: No clubbing, cyanosis, [ ] edema [ ]Pulses  equal and intact  Nervous system:  Alert & Oriented X3, no focal deficits  Psychiatric: Normal affect  Skin: No rashes or lesions    LABS:  12-24    135  |  103  |  20  ----------------------------<  98  3.9   |  21<L>  |  0.50    Ca    9.2      24 Dec 2022 06:30  Phos  3.2     12-24  Mg     2.00     12-24    TPro  5.1<L>  /  Alb  2.4<L>  /  TBili  0.2  /  DBili  x   /  AST  11  /  ALT  6   /  AlkPhos  61  12-23    Creatinine Trend: 0.50<--, 0.60<--, 0.45<--, 0.47<--, 0.51<--                        6.6    10.59 )-----------( 262      ( 24 Dec 2022 06:30 )             21.9

## 2022-12-24 NOTE — PROGRESS NOTE ADULT - TIME BILLING
- Review of records, telemetry, vital signs and daily labs.   - General and cardiovascular physical examination.  - Generation of cardiovascular treatment plan.  - Coordination of care.      Patient was seen and examined by me on 12/24/22,interim events noted,labs and radiology studies reviewed.  Cayetano Landin MD,FACC.  [address] [place], [place] [id number].  [phone]

## 2022-12-25 LAB
ANION GAP SERPL CALC-SCNC: 11 MMOL/L — SIGNIFICANT CHANGE UP (ref 7–14)
BUN SERPL-MCNC: 16 MG/DL — SIGNIFICANT CHANGE UP (ref 7–23)
CALCIUM SERPL-MCNC: 9.6 MG/DL — SIGNIFICANT CHANGE UP (ref 8.4–10.5)
CHLORIDE SERPL-SCNC: 102 MMOL/L — SIGNIFICANT CHANGE UP (ref 98–107)
CO2 SERPL-SCNC: 23 MMOL/L — SIGNIFICANT CHANGE UP (ref 22–31)
CREAT SERPL-MCNC: 0.49 MG/DL — LOW (ref 0.5–1.3)
EGFR: 92 ML/MIN/1.73M2 — SIGNIFICANT CHANGE UP
GLUCOSE SERPL-MCNC: 99 MG/DL — SIGNIFICANT CHANGE UP (ref 70–99)
HCT VFR BLD CALC: 27.7 % — LOW (ref 34.5–45)
HGB BLD-MCNC: 9 G/DL — LOW (ref 11.5–15.5)
MAGNESIUM SERPL-MCNC: 2 MG/DL — SIGNIFICANT CHANGE UP (ref 1.6–2.6)
MCHC RBC-ENTMCNC: 28.2 PG — SIGNIFICANT CHANGE UP (ref 27–34)
MCHC RBC-ENTMCNC: 32.5 GM/DL — SIGNIFICANT CHANGE UP (ref 32–36)
MCV RBC AUTO: 86.8 FL — SIGNIFICANT CHANGE UP (ref 80–100)
NRBC # BLD: 0 /100 WBCS — SIGNIFICANT CHANGE UP (ref 0–0)
NRBC # FLD: 0 K/UL — SIGNIFICANT CHANGE UP (ref 0–0)
PHOSPHATE SERPL-MCNC: 3.8 MG/DL — SIGNIFICANT CHANGE UP (ref 2.5–4.5)
PLATELET # BLD AUTO: 267 K/UL — SIGNIFICANT CHANGE UP (ref 150–400)
POTASSIUM SERPL-MCNC: 4 MMOL/L — SIGNIFICANT CHANGE UP (ref 3.5–5.3)
POTASSIUM SERPL-SCNC: 4 MMOL/L — SIGNIFICANT CHANGE UP (ref 3.5–5.3)
RBC # BLD: 3.19 M/UL — LOW (ref 3.8–5.2)
RBC # FLD: 15.9 % — HIGH (ref 10.3–14.5)
SODIUM SERPL-SCNC: 136 MMOL/L — SIGNIFICANT CHANGE UP (ref 135–145)
WBC # BLD: 13.45 K/UL — HIGH (ref 3.8–10.5)
WBC # FLD AUTO: 13.45 K/UL — HIGH (ref 3.8–10.5)

## 2022-12-25 RX ADMIN — Medication 1 MILLIGRAM(S): at 11:57

## 2022-12-25 RX ADMIN — Medication 100 MILLIGRAM(S): at 14:16

## 2022-12-25 RX ADMIN — Medication 500 MILLIGRAM(S): at 11:57

## 2022-12-25 RX ADMIN — Medication 650 MILLIGRAM(S): at 16:30

## 2022-12-25 RX ADMIN — RISPERIDONE 0.5 MILLIGRAM(S): 4 TABLET ORAL at 17:40

## 2022-12-25 RX ADMIN — Medication 650 MILLIGRAM(S): at 10:30

## 2022-12-25 RX ADMIN — Medication 325 MILLIGRAM(S): at 11:56

## 2022-12-25 RX ADMIN — Medication 1 TABLET(S): at 11:56

## 2022-12-25 RX ADMIN — Medication 650 MILLIGRAM(S): at 22:45

## 2022-12-25 RX ADMIN — Medication 650 MILLIGRAM(S): at 15:39

## 2022-12-25 RX ADMIN — Medication 100 MILLIGRAM(S): at 05:44

## 2022-12-25 RX ADMIN — ATORVASTATIN CALCIUM 40 MILLIGRAM(S): 80 TABLET, FILM COATED ORAL at 21:25

## 2022-12-25 RX ADMIN — Medication 650 MILLIGRAM(S): at 09:35

## 2022-12-25 RX ADMIN — SENNA PLUS 2 TABLET(S): 8.6 TABLET ORAL at 21:25

## 2022-12-25 RX ADMIN — RISPERIDONE 0.5 MILLIGRAM(S): 4 TABLET ORAL at 05:44

## 2022-12-25 RX ADMIN — SERTRALINE 100 MILLIGRAM(S): 25 TABLET, FILM COATED ORAL at 11:57

## 2022-12-25 RX ADMIN — Medication 100 MILLIGRAM(S): at 21:26

## 2022-12-25 NOTE — PROGRESS NOTE ADULT - SUBJECTIVE AND OBJECTIVE BOX
PATIENT SEEN AND EXAMINED ON :- 12/25/2022  DATE OF SERVICE:      12/25/2022       Interim events noted,Labs ,Radiological studies and Cardiology tests reviewed .     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 The patient resides in [facility]. the patient has a chronic R chest wound which is where her skin cancer is located. She states that he has been bleeding a lot lately, and has been for a while. The patient recently got transfused 3 weeks ago for anemia. She states her anemia is likely from the wound. Denies GI bleeding. The patient denies fevers. No other symptoms at this time other than lightheadedness, which had resolved while the patient was in the ED.    Patient is requesting to go back to [facility]. (23 Dec 2022 02:50)      INTERIM EVENTS:Patient seen at bedside ,interim events noted.      PMH -reviewed admission note, no change since admission  HEART FAILURE: Acute[ ]Chronic[ ] Systolic[ ] Diastolic[ ] Combined Systolic and Diastolic[ ]  CAD[ ] CABG[ ] PCI[ ]  DEVICES[ ] PPM[ ] ICD[ ] ILR[ ]  ATRIAL FIBRILLATION[ ] Paroxysmal[ ] Permanent[ ] CHADS2-[  ]  KAMRAN[ ] CKD1[ ] CKD2[ ] CKD3[ ] CKD4[ ] ESRD[ ]  COPD[ ] HTN[ ]   DM[ ] Type1[ ] Type 2[ ]   CVA[ ] Paresis[ ]    AMBULATION: Assisted[ ] Cane/walker[ ] Independent[ ]    MEDICATIONS  (STANDING):  ascorbic acid 500 milliGRAM(s) Oral daily  atorvastatin 40 milliGRAM(s) Oral at bedtime  ceFAZolin   IVPB 1000 milliGRAM(s) IV Intermittent every 8 hours  ceFAZolin   IVPB      ferrous    sulfate 325 milliGRAM(s) Oral daily  folic acid 1 milliGRAM(s) Oral daily  multivitamin 1 Tablet(s) Oral daily  risperiDONE   Tablet 0.5 milliGRAM(s) Oral two times a day  senna 2 Tablet(s) Oral at bedtime  sertraline 100 milliGRAM(s) Oral daily    MEDICATIONS  (PRN):  acetaminophen     Tablet .. 650 milliGRAM(s) Oral every 6 hours PRN Temp greater or equal to 38C (100.4F), Mild Pain (1 - 3)  melatonin 3 milliGRAM(s) Oral at bedtime PRN Insomnia            REVIEW OF SYSTEMS:  Constitutional: [ ] fever, [ ]weight loss,  [ ]fatigue [ ]weight gain  Eyes: [ ] visual changes  Respiratory: [ ]shortness of breath;  [ ] cough, [ ]wheezing, [ ]chills, [ ]hemoptysis  Cardiovascular: [ ] chest pain, [ ]palpitations, [ ]dizziness,  [ ]leg swelling[ ]orthopnea[ ]PND  Gastrointestinal: [ ] abdominal pain, [ ]nausea, [ ]vomiting,  [ ]diarrhea [ ]Constipation [ ]Melena  Genitourinary: [ ] dysuria, [ ] hematuria [ ]Powell  Neurologic: [ ] headaches [ ] tremors[ ]weakness [ ]Paralysis Right[ ] Left[ ]  Skin: [ ] itching, [ ]burning, [ ] rashes  Endocrine: [ ] heat or cold intolerance  Musculoskeletal: [ ] joint pain or swelling; [ ] muscle, back, or extremity pain  Psychiatric: [ ] depression, [ ]anxiety, [ ]mood swings, or [ ]difficulty sleeping  Hematologic: [ ] easy bruising, [ ] bleeding gums    [ ] All remaining systems negative except as per above.   [ ]Unable to obtain.  [x] No change in ROS since admission      Vital Signs Last 24 Hrs  T(C): 37 (25 Dec 2022 11:02), Max: 37.6 (24 Dec 2022 17:16)  T(F): 98.6 (25 Dec 2022 11:02), Max: 99.7 (24 Dec 2022 17:16)  HR: 65 (25 Dec 2022 11:02) (65 - 98)  BP: 107/52 (25 Dec 2022 11:02) (102/42 - 142/55)  BP(mean): --  RR: 17 (25 Dec 2022 11:02) (16 - 17)  SpO2: 100% (25 Dec 2022 11:02) (98% - 100%)    Parameters below as of 25 Dec 2022 11:02  Patient On (Oxygen Delivery Method): room air      I&O's Summary    24 Dec 2022 07:01  -  25 Dec 2022 07:00  --------------------------------------------------------  IN: 50 mL / OUT: 600 mL / NET: -550 mL    25 Dec 2022 07:01  -  25 Dec 2022 17:15  --------------------------------------------------------  IN: 0 mL / OUT: 200 mL / NET: -200 mL        PHYSICAL EXAM:  General: No acute distress BMI-  HEENT: EOMI, PERRL  Neck: Supple, [ ] JVD  Lungs: Equal air entry bilaterally; [ ] rales [ ] wheezing [ ] rhonchi  Heart: Regular rate and rhythm; [x ] murmur   2/6 [ x] systolic [ ] diastolic [ ] radiation[ ] rubs [ ]  gallops  Abdomen: Nontender, bowel sounds present  Extremities: No clubbing, cyanosis, [ ] edema [ ]Pulses  equal and intact  Nervous system:  Alert & Oriented X3, no focal deficits  Psychiatric: Normal affect  Skin: No rashes or lesions    LABS:  12-25    136  |  102  |  16  ----------------------------<  99  4.0   |  23  |  0.49<L>    Ca    9.6      25 Dec 2022 06:01  Phos  3.8     12-25  Mg     2.00     12-25      Creatinine Trend: 0.49<--, 0.50<--, 0.60<--, 0.45<--, 0.47<--, 0.51<--                        9.0    13.45 )-----------( 267      ( 25 Dec 2022 08:01 )             27.7

## 2022-12-25 NOTE — PROGRESS NOTE ADULT - TIME BILLING
- Review of records, telemetry, vital signs and daily labs.   - General and cardiovascular physical examination.  - Generation of cardiovascular treatment plan.  - Coordination of care.      Patient was seen and examined by me on 12/25/22,interim events noted,labs and radiology studies reviewed.  Cayetano Landin MD,FACC.  [address] [place], [place] [id number].  [phone]

## 2022-12-26 ENCOUNTER — TRANSCRIPTION ENCOUNTER (OUTPATIENT)
Age: 87
End: 2022-12-26

## 2022-12-26 LAB
ANION GAP SERPL CALC-SCNC: 9 MMOL/L — SIGNIFICANT CHANGE UP (ref 7–14)
BUN SERPL-MCNC: 8 MG/DL — SIGNIFICANT CHANGE UP (ref 7–23)
CALCIUM SERPL-MCNC: 9.1 MG/DL — SIGNIFICANT CHANGE UP (ref 8.4–10.5)
CHLORIDE SERPL-SCNC: 106 MMOL/L — SIGNIFICANT CHANGE UP (ref 98–107)
CO2 SERPL-SCNC: 23 MMOL/L — SIGNIFICANT CHANGE UP (ref 22–31)
CREAT SERPL-MCNC: 0.74 MG/DL — SIGNIFICANT CHANGE UP (ref 0.5–1.3)
EGFR: 79 ML/MIN/1.73M2 — SIGNIFICANT CHANGE UP
GLUCOSE SERPL-MCNC: 99 MG/DL — SIGNIFICANT CHANGE UP (ref 70–99)
HCT VFR BLD CALC: 33.1 % — LOW (ref 34.5–45)
HGB BLD-MCNC: 10.4 G/DL — LOW (ref 11.5–15.5)
MAGNESIUM SERPL-MCNC: 1.5 MG/DL — LOW (ref 1.6–2.6)
MCHC RBC-ENTMCNC: 26.6 PG — LOW (ref 27–34)
MCHC RBC-ENTMCNC: 31.4 GM/DL — LOW (ref 32–36)
MCV RBC AUTO: 84.7 FL — SIGNIFICANT CHANGE UP (ref 80–100)
NRBC # BLD: 0 /100 WBCS — SIGNIFICANT CHANGE UP (ref 0–0)
NRBC # FLD: 0 K/UL — SIGNIFICANT CHANGE UP (ref 0–0)
PHOSPHATE SERPL-MCNC: 2.7 MG/DL — SIGNIFICANT CHANGE UP (ref 2.5–4.5)
PLATELET # BLD AUTO: 359 K/UL — SIGNIFICANT CHANGE UP (ref 150–400)
POTASSIUM SERPL-MCNC: 3.9 MMOL/L — SIGNIFICANT CHANGE UP (ref 3.5–5.3)
POTASSIUM SERPL-SCNC: 3.9 MMOL/L — SIGNIFICANT CHANGE UP (ref 3.5–5.3)
RBC # BLD: 3.91 M/UL — SIGNIFICANT CHANGE UP (ref 3.8–5.2)
RBC # FLD: 11.9 % — SIGNIFICANT CHANGE UP (ref 10.3–14.5)
SODIUM SERPL-SCNC: 138 MMOL/L — SIGNIFICANT CHANGE UP (ref 135–145)
WBC # BLD: 7.97 K/UL — SIGNIFICANT CHANGE UP (ref 3.8–10.5)
WBC # FLD AUTO: 7.97 K/UL — SIGNIFICANT CHANGE UP (ref 3.8–10.5)

## 2022-12-26 RX ORDER — MAGNESIUM SULFATE 500 MG/ML
1 VIAL (ML) INJECTION ONCE
Refills: 0 | Status: COMPLETED | OUTPATIENT
Start: 2022-12-26 | End: 2022-12-26

## 2022-12-26 RX ADMIN — Medication 650 MILLIGRAM(S): at 17:02

## 2022-12-26 RX ADMIN — Medication 650 MILLIGRAM(S): at 17:55

## 2022-12-26 RX ADMIN — SERTRALINE 100 MILLIGRAM(S): 25 TABLET, FILM COATED ORAL at 11:20

## 2022-12-26 RX ADMIN — RISPERIDONE 0.5 MILLIGRAM(S): 4 TABLET ORAL at 07:50

## 2022-12-26 RX ADMIN — Medication 3 MILLIGRAM(S): at 02:46

## 2022-12-26 RX ADMIN — Medication 325 MILLIGRAM(S): at 11:21

## 2022-12-26 RX ADMIN — Medication 100 MILLIGRAM(S): at 13:04

## 2022-12-26 RX ADMIN — RISPERIDONE 0.5 MILLIGRAM(S): 4 TABLET ORAL at 17:02

## 2022-12-26 RX ADMIN — Medication 1 TABLET(S): at 11:20

## 2022-12-26 RX ADMIN — Medication 100 GRAM(S): at 09:48

## 2022-12-26 RX ADMIN — Medication 500 MILLIGRAM(S): at 11:20

## 2022-12-26 RX ADMIN — Medication 100 MILLIGRAM(S): at 05:16

## 2022-12-26 RX ADMIN — Medication 650 MILLIGRAM(S): at 07:16

## 2022-12-26 RX ADMIN — Medication 1 MILLIGRAM(S): at 11:20

## 2022-12-26 NOTE — DIETITIAN INITIAL EVALUATION ADULT - OTHER INFO
Medical course: Per chart 87 year old female with pmhx of skin cancer (squamous cell carcinoma) with mets, presented for abnormal lab test. Found to have severe anemia. Admit for anemia.    Nutrition interview: Pt A&Ox4, easily woken up during time of visit. No recent episodes of nausea, vomiting, diarrhea or constipation, last BM noted on 12/24 per RN flowsheets, pt also reports BM this morning. Denies any chewing/swallowing difficulties, dentures in place. No food allergies. Stated UBW: 172# "a few years ago" and reports gradual unintentional weight loss over the years. Intake is % per RN flowsheets and per pt. Feeding skills: tray set up. Pt seen by wound care, note 12/23 for left breast, right breast, right abdomen malignancy wounds. Pt with increased needs 2/2 chronic wounds.   Medical course: Per chart 87 year old female with pmhx of skin cancer (squamous cell carcinoma) with mets, presented for abnormal lab test. Found to have severe anemia. Admit for anemia.    Nutrition interview: Pt A&Ox4, easily woken up during time of visit. No recent episodes of nausea, vomiting, diarrhea or constipation, last BM noted on 12/24 per RN flowsheets, pt also reports BM this morning. Denies any chewing/swallowing difficulties, dentures in place. No food allergies. Stated UBW: 172# "a few years ago" and reports gradual unintentional weight loss over the years. Intake is % per RN flowsheets and per pt. Feeding skills: tray set up. Pt seen by wound care, note 12/23 for left breast, right breast, right abdomen malignancy wounds. Pt with increased needs 2/2 chronic wounds. Pt receiving PRBC transfusion 2/2 anemia. Currently receiving multivitamin + vitamin C to aid in wound healing and iron absorption.

## 2022-12-26 NOTE — DIETITIAN INITIAL EVALUATION ADULT - ADD RECOMMEND
1) Recommend regular diet (d/c DASH/TLC)  2) Monitor weights, labs, BM's, skin integrity, p.o. intake.   3) Encourage PO intake and honor food preferences as able.   4) Continue multivitamin +vitamin C to aid in wound healing.

## 2022-12-26 NOTE — DISCHARGE NOTE PROVIDER - HOSPITAL COURSE
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    1. Metastatic SCC skin   -- s/p RT to right breast   -- Currently receiving cemiplimab, LD 12/22. Hold systemic treatment while admitted   -- Follow up with Dr. Alexis after discharge     2. Anemia   -- Recurrent, likely secondary to bleeding skin lesions as well as immunotherapy  -- Consider rad/onc evaluation for RT to left side to aid in bleeding control and assess for RT to right breast as well   -- s/p 4 units pRBC since admission 12/22   -- Today H/H 10.4/33.1    On ___ this case was reviewed with  ____, the patient is medically stable and optimized for discharge as per attending. All medications were reviewed and prescriptions were sent to mutually agreed upon pharmacy. Discharge plan reviewed with patient and/or family.       8Problem/Plan - 1:  ·  Problem: Squamous cell carcinoma of skin.   ·  Plan: -- s/p RT to right breast at [facility]   -- contcemiplimab, LD 12/22. Hold systemic treatment while admitted   -- Follow up with Dr. Alexis after discharge  --CT chest from 12/29 shows overall worsening metastatic burden since July 2022, with diffuse irregular and fungating cutaneous lesions throughout the chest wall with  open lesions and rib invasion, new/enlarging left axillary/chest wall lymphadenopathy and pulmonary metastases.  palliative f/u  - d/c planning.     Problem/Plan - 2:  ·  Problem: Wound of skin.   ·  Plan: wound care f/u.     Problem/Plan - 3:  ·  Problem: Anemia due to acute blood loss.   ·  Plan: 2. Anemia   -- Recurrent, likely secondary to bleeding skin lesions as well as immunotherapy  -- Rad/onc consulted for RT to chest to assist in bleeding control. Had CT chest, follow up read   -- s/p 5 units pRBC since admission 12/22   -- Anemia workup shows some degree of iron deficiency, otherwise unremarkable. Occult blood negative. Cont PO Fe   -- Transfuse to maintain hg >7.     Problem/Plan - 4:  ·  Problem: Prophylactic measure.   ·  Plan: - no dvt ppx due to active bleeding      On  this case was reviewed with Dr. Urvashi hoffman  the patient is medically stable and optimized for discharge as per attending. All medications were reviewed and reconciled. Discharge plan reviewed with patient and/or family.       8Problem/Plan - 1:  ·  Problem: Squamous cell carcinoma of skin.   ·  Plan: -- s/p RT to right breast at [facility]   -- contcemiplimab, LD 12/22. Hold systemic treatment while admitted   -- Follow up with Dr. Alexis after discharge  --CT chest from 12/29 shows overall worsening metastatic burden since July 2022, with diffuse irregular and fungating cutaneous lesions throughout the chest wall with  open lesions and rib invasion, new/enlarging left axillary/chest wall lymphadenopathy and pulmonary metastases.  palliative f/u  - d/c planning.     Problem/Plan - 2:  ·  Problem: Wound of skin.   ·  Plan: wound care f/u.     Problem/Plan - 3:  ·  Problem: Anemia due to acute blood loss.   ·  Plan: 2. Anemia   -- Recurrent, likely secondary to bleeding skin lesions as well as immunotherapy  -- Rad/onc consulted for RT to chest to assist in bleeding control. Had CT chest, follow up read   -- s/p 5 units pRBC since admission 12/22   -- Anemia workup shows some degree of iron deficiency, otherwise unremarkable. Occult blood negative. Cont PO Fe   -- Transfuse to maintain hg >7.     Problem/Plan - 4:  ·  Problem: Prophylactic measure.   ·  Plan: - no dvt ppx due to active bleeding      On  this case was reviewed with Dr. Urvashi Monteiro  the patient is medically stable and optimized for discharge as per attending. All medications were reviewed and reconciled. Discharge plan reviewed with patient and/or family.

## 2022-12-26 NOTE — DISCHARGE NOTE PROVIDER - NSDCCPCAREPLAN_GEN_ALL_CORE_FT
PRINCIPAL DISCHARGE DIAGNOSIS  Diagnosis: Severe anemia  Assessment and Plan of Treatment: Follow-up with your outpatient provider for further monitoring of your blood counts.   Monitor for signs/symptoms indicating worsening of disease, such as, easy bleeding/bruising, pale skin, fatigue, dizziness, increased heart rate, or chest pain.        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   Your last dose of immunotherapy with cemiplimab every 3 weeks was on 12/22. You were admitted for recurrent anemia likely secondary to bleeding from malignant skin lesions as well as suppression from   - Follow up with Dr. Alexis after discharge    Diagnosis: HLD (hyperlipidemia)  Assessment and Plan of Treatment: .Continue prescribed medications to control your cholesterol levels and a DASH (Low fat/salt) diet. Follow up with your primary care provider upon discharge for further management and monitoring of cholesterol levels.       PRINCIPAL DISCHARGE DIAGNOSIS  Diagnosis: Severe anemia  Assessment and Plan of Treatment: Follow-up with your outpatient provider for further monitoring of your blood counts.   Monitor for signs/symptoms indicating worsening of disease, such as, easy bleeding/bruising, pale skin, fatigue, dizziness, increased heart rate, or chest pain.        SECONDARY DISCHARGE DIAGNOSES  Diagnosis: HLD (hyperlipidemia)  Assessment and Plan of Treatment: .Continue Rosuvastatin to control your cholesterol levels and a DASH (Low fat/salt) diet. Follow up with your primary care provider upon discharge for further management and monitoring of cholesterol levels.      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   Your last dose of immunotherapy with cemiplimab every 3 weeks was on 12/22. You were admitted for recurrent anemia likely secondary to bleeding from malignant skin lesions as well as suppression from   - Follow up with Dr. Alexis after discharge. You were seen by Radiology medicine Attending, no plans for rasiation therapy     PRINCIPAL DISCHARGE DIAGNOSIS  Diagnosis: Severe anemia  Assessment and Plan of Treatment: Follow-up with your outpatient provider for further monitoring of your blood counts.   Monitor for signs/symptoms indicating worsening of disease, such as, easy bleeding/bruising, pale skin, fatigue, dizziness, increased heart rate, or chest pain.        SECONDARY DISCHARGE DIAGNOSES  Diagnosis: HLD (hyperlipidemia)  Assessment and Plan of Treatment: .Continue medication to control your cholesterol levels as directed  and a DASH (Low fat/salt) diet. Follow up with your primary care provider upon discharge for further management and monitoring of cholesterol levels.      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   Your last dose of immunotherapy with cemiplimab every 3 weeks was on 12/22. You were admitted for recurrent anemia likely secondary to bleeding from malignant skin lesions as well as suppression from   - Follow up with Dr. Alexis after discharge. You were seen by Radiology medicine Attending, no plans for rasiation therapy

## 2022-12-26 NOTE — DIETITIAN INITIAL EVALUATION ADULT - PERTINENT LABORATORY DATA
12-26    138  |  106  |  8   ----------------------------<  99  3.9   |  23  |  0.74    Ca    9.1      26 Dec 2022 07:02  Phos  2.7     12-26  Mg     1.50     12-26

## 2022-12-26 NOTE — DISCHARGE NOTE PROVIDER - NSDCMRMEDTOKEN_GEN_ALL_CORE_FT
ascorbic acid 500 mg oral tablet: 1 tab(s) orally once a day  Aspercreme with Lidocaine 4% topical film: Apply topically to affected area once a day  ferrous sulfate 325 mg (65 mg elemental iron) oral tablet: 1 tab(s) orally once a day  folic acid 1 mg oral tablet: 1 tab(s) orally once a day  MiraLax oral powder for reconstitution: 17 gram(s) orally once a day  Multiple Vitamins oral tablet: 1 tab(s) orally once a day  risperiDONE 0.5 mg oral tablet: 1 tab(s) orally 2 times a day  rosuvastatin 10 mg oral tablet: 1 tab(s) orally once a day  Senna 8.6 mg oral tablet: 2 tab(s) orally once a day (at bedtime)  sertraline 100 mg oral tablet: 1 tab(s) orally once a day  Ultram 50 mg oral tablet: 1 tab(s) orally every 12 hours, As Needed   acetaminophen 325 mg oral tablet: 2 tab(s) orally every 6 hours, As needed, Temp greater or equal to 38C (100.4F), Mild Pain (1 - 3)  ascorbic acid 500 mg oral tablet: 1 tab(s) orally once a day  atorvastatin 40 mg oral tablet: 1 tab(s) orally once a day (at bedtime)  ferrous sulfate 325 mg (65 mg elemental iron) oral tablet: 1 tab(s) orally once a day  folic acid 1 mg oral tablet: 1 tab(s) orally once a day  melatonin 3 mg oral tablet: 1 tab(s) orally once a day (at bedtime), As needed, Insomnia  MiraLax oral powder for reconstitution: 17 gram(s) orally once a day  Multiple Vitamins oral tablet: 1 tab(s) orally once a day  risperiDONE 0.5 mg oral tablet: 1 tab(s) orally 2 times a day  rosuvastatin 10 mg oral tablet: 1 tab(s) orally once a day  senna leaf extract oral tablet: 2 tab(s) orally once a day (at bedtime)  sertraline 100 mg oral tablet: 1 tab(s) orally once a day  Ultram 50 mg oral tablet: 1 tab(s) orally every 12 hours, As Needed   acetaminophen 325 mg oral tablet: 2 tab(s) orally every 6 hours, As needed, Temp greater or equal to 38C (100.4F), Mild Pain (1 - 3)  ascorbic acid 500 mg oral tablet: 1 tab(s) orally once a day  atorvastatin 40 mg oral tablet: 1 tab(s) orally once a day (at bedtime)  ferrous sulfate 325 mg (65 mg elemental iron) oral tablet: 1 tab(s) orally once a day  folic acid 1 mg oral tablet: 1 tab(s) orally once a day  melatonin 3 mg oral tablet: 1 tab(s) orally once a day (at bedtime), As needed, Insomnia  MiraLax oral powder for reconstitution: 17 gram(s) orally once a day  Multiple Vitamins oral tablet: 1 tab(s) orally once a day  risperiDONE 0.5 mg oral tablet: 1 tab(s) orally 2 times a day  senna leaf extract oral tablet: 2 tab(s) orally once a day (at bedtime)  sertraline 100 mg oral tablet: 1 tab(s) orally once a day  Ultram 50 mg oral tablet: 1 tab(s) orally every 12 hours, As Needed

## 2022-12-26 NOTE — PROGRESS NOTE ADULT - SUBJECTIVE AND OBJECTIVE BOX
PATIENT SEEN AND EXAMINED ON :- 12/26/22  DATE OF SERVICE:     12/26/22        Interim events noted,Labs ,Radiological studies and Cardiology tests reviewed .       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 The patient resides in [facility]. the patient has a chronic R chest wound which is where her skin cancer is located. She states that he has been bleeding a lot lately, and has been for a while. The patient recently got transfused 3 weeks ago for anemia. She states her anemia is likely from the wound. Denies GI bleeding. The patient denies fevers. No other symptoms at this time other than lightheadedness, which had resolved while the patient was in the ED.    Patient is requesting to go back to [facility]. (23 Dec 2022 02:50)      INTERIM EVENTS:Patient seen at bedside ,interim events noted.      PMH -reviewed admission note, no change since admission  HEART FAILURE: Acute[ ]Chronic[ ] Systolic[ ] Diastolic[ ] Combined Systolic and Diastolic[ ]  CAD[ ] CABG[ ] PCI[ ]  DEVICES[ ] PPM[ ] ICD[ ] ILR[ ]  ATRIAL FIBRILLATION[ ] Paroxysmal[ ] Permanent[ ] CHADS2-[  ]  KAMRAN[ ] CKD1[ ] CKD2[ ] CKD3[ ] CKD4[ ] ESRD[ ]  COPD[ ] HTN[ ]   DM[ ] Type1[ ] Type 2[ ]   CVA[ ] Paresis[ ]    AMBULATION: Assisted[ ] Cane/walker[ ] Independent[ ]    MEDICATIONS  (STANDING):  ascorbic acid 500 milliGRAM(s) Oral daily  atorvastatin 40 milliGRAM(s) Oral at bedtime  ceFAZolin   IVPB      ceFAZolin   IVPB 1000 milliGRAM(s) IV Intermittent every 8 hours  ferrous    sulfate 325 milliGRAM(s) Oral daily  folic acid 1 milliGRAM(s) Oral daily  multivitamin 1 Tablet(s) Oral daily  risperiDONE   Tablet 0.5 milliGRAM(s) Oral two times a day  senna 2 Tablet(s) Oral at bedtime  sertraline 100 milliGRAM(s) Oral daily    MEDICATIONS  (PRN):  acetaminophen     Tablet .. 650 milliGRAM(s) Oral every 6 hours PRN Temp greater or equal to 38C (100.4F), Mild Pain (1 - 3)  melatonin 3 milliGRAM(s) Oral at bedtime PRN Insomnia            REVIEW OF SYSTEMS:  Constitutional: [ ] fever, [ ]weight loss,  [ ]fatigue [ ]weight gain  Eyes: [ ] visual changes  Respiratory: [ ]shortness of breath;  [ ] cough, [ ]wheezing, [ ]chills, [ ]hemoptysis  Cardiovascular: [ ] chest pain, [ ]palpitations, [ ]dizziness,  [ ]leg swelling[ ]orthopnea[ ]PND  Gastrointestinal: [ ] abdominal pain, [ ]nausea, [ ]vomiting,  [ ]diarrhea [ ]Constipation [ ]Melena  Genitourinary: [ ] dysuria, [ ] hematuria [ ]Powell  Neurologic: [ ] headaches [ ] tremors[ ]weakness [ ]Paralysis Right[ ] Left[ ]  Skin: [ ] itching, [ ]burning, [ ] rashes  Endocrine: [ ] heat or cold intolerance  Musculoskeletal: [ ] joint pain or swelling; [ ] muscle, back, or extremity pain  Psychiatric: [ ] depression, [ ]anxiety, [ ]mood swings, or [ ]difficulty sleeping  Hematologic: [ ] easy bruising, [ ] bleeding gums    [ ] All remaining systems negative except as per above.   [ ]Unable to obtain.  [x] No change in ROS since admission      Vital Signs Last 24 Hrs  T(C): 36.9 (26 Dec 2022 16:39), Max: 36.9 (26 Dec 2022 09:27)  T(F): 98.5 (26 Dec 2022 16:39), Max: 98.5 (26 Dec 2022 09:27)  HR: 93 (26 Dec 2022 16:39) (85 - 93)  BP: 116/52 (26 Dec 2022 16:39) (109/41 - 122/56)  BP(mean): --  RR: 18 (26 Dec 2022 16:39) (18 - 18)  SpO2: 100% (26 Dec 2022 16:39) (98% - 100%)    Parameters below as of 26 Dec 2022 16:39  Patient On (Oxygen Delivery Method): room air      I&O's Summary    25 Dec 2022 07:01  -  26 Dec 2022 07:00  --------------------------------------------------------  IN: 220 mL / OUT: 950 mL / NET: -730 mL        PHYSICAL EXAM:  General: No acute distress BMI-  HEENT: EOMI, PERRL  Neck: Supple, [ ] JVD  Lungs: Equal air entry bilaterally; [ ] rales [ ] wheezing [ ] rhonchi  Heart: Regular rate and rhythm; [x ] murmur   2/6 [ x] systolic [ ] diastolic [ ] radiation[ ] rubs [ ]  gallops  Abdomen: Nontender, bowel sounds present  Extremities: No clubbing, cyanosis, [ ] edema [ ]Pulses  equal and intact  Nervous system:  Alert & Oriented X3, no focal deficits  Psychiatric: Normal affect  Skin: No rashes or lesions    LABS:  12-26    138  |  106  |  8   ----------------------------<  99  3.9   |  23  |  0.74    Ca    9.1      26 Dec 2022 07:02  Phos  2.7     12-26  Mg     1.50     12-26      Creatinine Trend: 0.74<--, 0.49<--, 0.50<--, 0.60<--, 0.45<--, 0.47<--                        10.4   7.97  )-----------( 359      ( 26 Dec 2022 07:02 )             33.1

## 2022-12-26 NOTE — DIETITIAN INITIAL EVALUATION ADULT - PERTINENT MEDS FT
MEDICATIONS  (STANDING):  ascorbic acid 500 milliGRAM(s) Oral daily  atorvastatin 40 milliGRAM(s) Oral at bedtime  ceFAZolin   IVPB 1000 milliGRAM(s) IV Intermittent every 8 hours  ceFAZolin   IVPB      ferrous    sulfate 325 milliGRAM(s) Oral daily  folic acid 1 milliGRAM(s) Oral daily  multivitamin 1 Tablet(s) Oral daily  risperiDONE   Tablet 0.5 milliGRAM(s) Oral two times a day  senna 2 Tablet(s) Oral at bedtime  sertraline 100 milliGRAM(s) Oral daily    MEDICATIONS  (PRN):  acetaminophen     Tablet .. 650 milliGRAM(s) Oral every 6 hours PRN Temp greater or equal to 38C (100.4F), Mild Pain (1 - 3)  melatonin 3 milliGRAM(s) Oral at bedtime PRN Insomnia

## 2022-12-26 NOTE — DISCHARGE NOTE PROVIDER - NSDCFUADDINST_GEN_ALL_CORE_FT
ZULEYMA chest wall wounds  Recommend daily dressing changes. Use liberal saline to loosen previously placed surgicel/nu-knit to prevent removal of adherent clot. Gently clean would with saline and gauze. Re-apply Nu-Knit vs. Surgicel, apply Xeroform to exposed surfaces that are not bleeding. ABD pads over this, then compressive wrap around chest with ACE

## 2022-12-26 NOTE — PROGRESS NOTE ADULT - TIME BILLING
- Review of records, telemetry, vital signs and daily labs.   - General and cardiovascular physical examination.  - Generation of cardiovascular treatment plan.  - Coordination of care.      Patient was seen and examined by me on 12/26/22,interim events noted,labs and radiology studies reviewed.  Cayetano Landin MD,FACC.  [address] [place], [place] [id number].  [phone]

## 2022-12-26 NOTE — DISCHARGE NOTE PROVIDER - DETAILS OF MALNUTRITION DIAGNOSIS/DIAGNOSES
This patient has been assessed with a concern for Malnutrition and was treated during this hospitalization for the following Nutrition diagnosis/diagnoses:     -  12/26/2022: Severe protein-calorie malnutrition   -  12/26/2022: Underweight (BMI < 19)

## 2022-12-26 NOTE — DIETITIAN INITIAL EVALUATION ADULT - ORAL INTAKE PTA/DIET HISTORY
Pt admitted from [facility]. Pt was on regular diet, Ensure plus 2x/day, and LPS 2x/day per transfer records. Pt was also receiving vitamin C, ferrous sulfate, folic acid, and multivitamin PTA, continues in house. Pt reports great appetite.

## 2022-12-26 NOTE — DISCHARGE NOTE PROVIDER - CARE PROVIDER_API CALL
Peyman Alexis)  Hematology; Medical Oncology  [address] [place] [address]  Phone: [phone]  Fax: [phone]  Established Patient  Follow Up Time: 1 week

## 2022-12-26 NOTE — PROGRESS NOTE ADULT - ASSESSMENT
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    1. Metastatic SCC skin   -- s/p RT to right breast   -- Currently receiving cemiplimab, LD 12/22. Hold systemic treatment while admitted   -- Follow up with Dr. Alexis after discharge     2. Anemia   -- Recurrent, likely secondary to bleeding skin lesions as well as immunotherapy  -- Consider rad/onc evaluation for RT to left side to aid in bleeding control and assess for RT to right breast as well   -- s/p 4 units pRBC since admission 12/22   -- Transfuse to maintain hg >7      Lucy Pastrana PA-C  Hematology/Oncology  [facility]  [phone] (office)  [phone] (alt office)  Evenings and weekends please call MD on call or office

## 2022-12-26 NOTE — PROGRESS NOTE ADULT - SUBJECTIVE AND OBJECTIVE BOX
Patient is a 87y old  Female who presents with a chief complaint of Anemia     (26 Dec 2022 10:25)    This is an 87 year old female with metastatic squamous cell carcinoma of skin, affecting chest wall. She is s/p RT to right breast region. PET/CT 10/2022 demonstrated hypermetabolic, malignant appearing lesions involving the bilateral chest wall and right abdominal wall soft tissue, metastatic left axillary and right inguinal adenopathy, suspicious right external iliac lymph nodes, metastatic left lower lobe pulmonary nodule. Patient has been on immunotherapy with cemiplimab q3 weeks, last dose 12/22. She presents here with recurrent anemia likely secondary to bleeding from malignant skin lesions as well as suppression from immunotherapy. Patient seen this afternoon, reports that though she is bleeding from the right breast lesion, she is also bleeding more from a lateral left breast lesion.     MEDICATIONS  (STANDING):  ascorbic acid 500 milliGRAM(s) Oral daily  atorvastatin 40 milliGRAM(s) Oral at bedtime  ceFAZolin   IVPB      ceFAZolin   IVPB 1000 milliGRAM(s) IV Intermittent every 8 hours  ferrous    sulfate 325 milliGRAM(s) Oral daily  folic acid 1 milliGRAM(s) Oral daily  multivitamin 1 Tablet(s) Oral daily  risperiDONE   Tablet 0.5 milliGRAM(s) Oral two times a day  senna 2 Tablet(s) Oral at bedtime  sertraline 100 milliGRAM(s) Oral daily    MEDICATIONS  (PRN):  acetaminophen     Tablet .. 650 milliGRAM(s) Oral every 6 hours PRN Temp greater or equal to 38C (100.4F), Mild Pain (1 - 3)  melatonin 3 milliGRAM(s) Oral at bedtime PRN Insomnia        Vital Signs Last 24 Hrs  T(C): 36.9 (26 Dec 2022 09:27), Max: 36.9 (26 Dec 2022 09:27)  T(F): 98.5 (26 Dec 2022 09:27), Max: 98.5 (26 Dec 2022 09:27)  HR: 85 (26 Dec 2022 09:27) (85 - 89)  BP: 109/41 (26 Dec 2022 09:27) (109/41 - 122/56)  BP(mean): --  RR: 18 (26 Dec 2022 09:27) (17 - 18)  SpO2: 99% (26 Dec 2022 05:39) (98% - 100%)    Parameters below as of 26 Dec 2022 09:27  Patient On (Oxygen Delivery Method): room air        PE  NAD  Awake, alert  Anicteric, MMM  Chest wall wrapped in ace wrap  Abd soft, NT, ND  Right upper extremity with significant edema, chronic   No rash grossly  FROM                          10.4   7.97  )-----------( 359      ( 26 Dec 2022 07:02 )             33.1       12-26    138  |  106  |  8   ----------------------------<  99  3.9   |  23  |  0.74    Ca    9.1      26 Dec 2022 07:02  Phos  2.7     12-26  Mg     1.50     12-26

## 2022-12-27 LAB
ANION GAP SERPL CALC-SCNC: 10 MMOL/L — SIGNIFICANT CHANGE UP (ref 7–14)
BUN SERPL-MCNC: 17 MG/DL — SIGNIFICANT CHANGE UP (ref 7–23)
CALCIUM SERPL-MCNC: 9.5 MG/DL — SIGNIFICANT CHANGE UP (ref 8.4–10.5)
CHLORIDE SERPL-SCNC: 103 MMOL/L — SIGNIFICANT CHANGE UP (ref 98–107)
CO2 SERPL-SCNC: 23 MMOL/L — SIGNIFICANT CHANGE UP (ref 22–31)
CREAT SERPL-MCNC: 0.48 MG/DL — LOW (ref 0.5–1.3)
EGFR: 92 ML/MIN/1.73M2 — SIGNIFICANT CHANGE UP
GLUCOSE SERPL-MCNC: 96 MG/DL — SIGNIFICANT CHANGE UP (ref 70–99)
HCT VFR BLD CALC: 27.5 % — LOW (ref 34.5–45)
HGB BLD-MCNC: 8.5 G/DL — LOW (ref 11.5–15.5)
MAGNESIUM SERPL-MCNC: 2.1 MG/DL — SIGNIFICANT CHANGE UP (ref 1.6–2.6)
MCHC RBC-ENTMCNC: 28 PG — SIGNIFICANT CHANGE UP (ref 27–34)
MCHC RBC-ENTMCNC: 30.9 GM/DL — LOW (ref 32–36)
MCV RBC AUTO: 90.5 FL — SIGNIFICANT CHANGE UP (ref 80–100)
NRBC # BLD: 0 /100 WBCS — SIGNIFICANT CHANGE UP (ref 0–0)
NRBC # FLD: 0 K/UL — SIGNIFICANT CHANGE UP (ref 0–0)
PHOSPHATE SERPL-MCNC: 3 MG/DL — SIGNIFICANT CHANGE UP (ref 2.5–4.5)
PLATELET # BLD AUTO: 270 K/UL — SIGNIFICANT CHANGE UP (ref 150–400)
POTASSIUM SERPL-MCNC: 4.5 MMOL/L — SIGNIFICANT CHANGE UP (ref 3.5–5.3)
POTASSIUM SERPL-SCNC: 4.5 MMOL/L — SIGNIFICANT CHANGE UP (ref 3.5–5.3)
RBC # BLD: 3.04 M/UL — LOW (ref 3.8–5.2)
RBC # FLD: 16.5 % — HIGH (ref 10.3–14.5)
SARS-COV-2 RNA SPEC QL NAA+PROBE: SIGNIFICANT CHANGE UP
SODIUM SERPL-SCNC: 136 MMOL/L — SIGNIFICANT CHANGE UP (ref 135–145)
WBC # BLD: 12.58 K/UL — HIGH (ref 3.8–10.5)
WBC # FLD AUTO: 12.58 K/UL — HIGH (ref 3.8–10.5)

## 2022-12-27 RX ADMIN — SERTRALINE 100 MILLIGRAM(S): 25 TABLET, FILM COATED ORAL at 11:17

## 2022-12-27 RX ADMIN — Medication 650 MILLIGRAM(S): at 15:21

## 2022-12-27 RX ADMIN — RISPERIDONE 0.5 MILLIGRAM(S): 4 TABLET ORAL at 17:14

## 2022-12-27 RX ADMIN — SENNA PLUS 2 TABLET(S): 8.6 TABLET ORAL at 00:30

## 2022-12-27 RX ADMIN — Medication 650 MILLIGRAM(S): at 14:01

## 2022-12-27 RX ADMIN — ATORVASTATIN CALCIUM 40 MILLIGRAM(S): 80 TABLET, FILM COATED ORAL at 21:39

## 2022-12-27 RX ADMIN — Medication 100 MILLIGRAM(S): at 21:42

## 2022-12-27 RX ADMIN — Medication 650 MILLIGRAM(S): at 22:30

## 2022-12-27 RX ADMIN — RISPERIDONE 0.5 MILLIGRAM(S): 4 TABLET ORAL at 05:31

## 2022-12-27 RX ADMIN — Medication 500 MILLIGRAM(S): at 11:17

## 2022-12-27 RX ADMIN — Medication 1 MILLIGRAM(S): at 11:17

## 2022-12-27 RX ADMIN — Medication 325 MILLIGRAM(S): at 11:17

## 2022-12-27 RX ADMIN — ATORVASTATIN CALCIUM 40 MILLIGRAM(S): 80 TABLET, FILM COATED ORAL at 00:29

## 2022-12-27 RX ADMIN — Medication 650 MILLIGRAM(S): at 21:39

## 2022-12-27 RX ADMIN — Medication 100 MILLIGRAM(S): at 13:01

## 2022-12-27 RX ADMIN — Medication 100 MILLIGRAM(S): at 05:27

## 2022-12-27 RX ADMIN — Medication 1 TABLET(S): at 11:17

## 2022-12-27 RX ADMIN — Medication 100 MILLIGRAM(S): at 00:30

## 2022-12-27 NOTE — PROGRESS NOTE ADULT - TIME BILLING
- Review of records, telemetry, vital signs and daily labs.   - General and cardiovascular physical examination.  - Generation of cardiovascular treatment plan.  - Coordination of care.      Patient was seen and examined by me on 12/27/22,interim events noted,labs and radiology studies reviewed.  Cayetano Landin MD,FACC.  [address] [place], [place] [id number].  [phone]

## 2022-12-27 NOTE — PROGRESS NOTE ADULT - ASSESSMENT
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    1. Metastatic SCC skin   -- s/p RT to right breast   -- Currently receiving cemiplimab, LD 12/22. Hold systemic treatment while admitted   -- Follow up with Dr. Alexis after discharge     2. Anemia   -- Recurrent, likely secondary to bleeding skin lesions as well as immunotherapy  -- Rad/onc consulted for RT to chest to assist in bleeding control  -- s/p 4 units pRBC since admission 12/22   -- Transfuse to maintain hg >7    Will continue to follow.    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    1. Metastatic SCC skin   -- s/p RT to right breast   -- Currently receiving cemiplimab, LD 12/22. Hold systemic treatment while admitted   -- Follow up with Dr. Alexis after discharge     2. Anemia   -- Recurrent, likely secondary to bleeding skin lesions as well as immunotherapy  -- Rad/onc consulted for RT to chest to assist in bleeding control  -- s/p 4 units pRBC since admission 12/22   -- Transfuse to maintain hg >7  - ldh in office minimally elevated, bili normal, very unlikely hemolysis   Will continue to follow.    Lucy Pastrana PA-C  Hematology/Oncology  [facility]  [phone] (office)  [phone] (alt office)  Evenings and weekends please call MD on call or office

## 2022-12-27 NOTE — PROGRESS NOTE ADULT - SUBJECTIVE AND OBJECTIVE BOX
Patient is a 87y old  Female who presents with a chief complaint of wound (26 Dec 2022 16:07)    Patient seen this afternoon. She feels fine, no new complaints. Has continued bleeding at site of her skin malignancy whenever compression is removed. Hg decreased from 10.4 --> 8.5.    MEDICATIONS  (STANDING):  ascorbic acid 500 milliGRAM(s) Oral daily  atorvastatin 40 milliGRAM(s) Oral at bedtime  ceFAZolin   IVPB      ceFAZolin   IVPB 1000 milliGRAM(s) IV Intermittent every 8 hours  ferrous    sulfate 325 milliGRAM(s) Oral daily  folic acid 1 milliGRAM(s) Oral daily  multivitamin 1 Tablet(s) Oral daily  risperiDONE   Tablet 0.5 milliGRAM(s) Oral two times a day  senna 2 Tablet(s) Oral at bedtime  sertraline 100 milliGRAM(s) Oral daily    MEDICATIONS  (PRN):  acetaminophen     Tablet .. 650 milliGRAM(s) Oral every 6 hours PRN Temp greater or equal to 38C (100.4F), Mild Pain (1 - 3)  melatonin 3 milliGRAM(s) Oral at bedtime PRN Insomnia        Vital Signs Last 24 Hrs  T(C): 36.6 (27 Dec 2022 09:15), Max: 36.9 (26 Dec 2022 16:39)  T(F): 97.8 (27 Dec 2022 09:15), Max: 98.5 (26 Dec 2022 16:39)  HR: 69 (27 Dec 2022 09:15) (69 - 94)  BP: 109/46 (27 Dec 2022 09:15) (109/46 - 127/58)  BP(mean): --  RR: 17 (27 Dec 2022 09:15) (17 - 18)  SpO2: 99% (27 Dec 2022 09:15) (99% - 100%)    Parameters below as of 27 Dec 2022 09:15  Patient On (Oxygen Delivery Method): room air        PE  NAD  Awake, alert  Anicteric, MMM  RUE edema   No rash grossly  FROM                          8.5    12.58 )-----------( 270      ( 27 Dec 2022 05:40 )             27.5       12-27    136  |  103  |  17  ----------------------------<  96  4.5   |  23  |  0.48<L>    Ca    9.5      27 Dec 2022 05:40  Phos  3.0     12-27  Mg     2.10     12-27

## 2022-12-27 NOTE — PROGRESS NOTE ADULT - SUBJECTIVE AND OBJECTIVE BOX
PATIENT SEEN AND EXAMINED ON :- 12/27/22  DATE OF SERVICE:    12/27/22         Interim events noted,Labs ,Radiological studies and Cardiology tests reviewed .       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 The patient resides in [facility]. the patient has a chronic R chest wound which is where her skin cancer is located. She states that he has been bleeding a lot lately, and has been for a while. The patient recently got transfused 3 weeks ago for anemia. She states her anemia is likely from the wound. Denies GI bleeding. The patient denies fevers. No other symptoms at this time other than lightheadedness, which had resolved while the patient was in the ED.    Patient is requesting to go back to [facility]. (23 Dec 2022 02:50)      INTERIM EVENTS:Patient seen at bedside ,interim events noted.      PMH -reviewed admission note, no change since admission  HEART FAILURE: Acute[ ]Chronic[ ] Systolic[ ] Diastolic[ ] Combined Systolic and Diastolic[ ]  CAD[ ] CABG[ ] PCI[ ]  DEVICES[ ] PPM[ ] ICD[ ] ILR[ ]  ATRIAL FIBRILLATION[ ] Paroxysmal[ ] Permanent[ ] CHADS2-[  ]  KAMRAN[ ] CKD1[ ] CKD2[ ] CKD3[ ] CKD4[ ] ESRD[ ]  COPD[ ] HTN[ ]   DM[ ] Type1[ ] Type 2[ ]   CVA[ ] Paresis[ ]    AMBULATION: Assisted[ ] Cane/walker[ ] Independent[ ]    MEDICATIONS  (STANDING):  ascorbic acid 500 milliGRAM(s) Oral daily  atorvastatin 40 milliGRAM(s) Oral at bedtime  ceFAZolin   IVPB      ceFAZolin   IVPB 1000 milliGRAM(s) IV Intermittent every 8 hours  ferrous    sulfate 325 milliGRAM(s) Oral daily  folic acid 1 milliGRAM(s) Oral daily  multivitamin 1 Tablet(s) Oral daily  risperiDONE   Tablet 0.5 milliGRAM(s) Oral two times a day  senna 2 Tablet(s) Oral at bedtime  sertraline 100 milliGRAM(s) Oral daily    MEDICATIONS  (PRN):  acetaminophen     Tablet .. 650 milliGRAM(s) Oral every 6 hours PRN Temp greater or equal to 38C (100.4F), Mild Pain (1 - 3)  melatonin 3 milliGRAM(s) Oral at bedtime PRN Insomnia            REVIEW OF SYSTEMS:  Constitutional: [ ] fever, [ ]weight loss,  [ ]fatigue [ ]weight gain  Eyes: [ ] visual changes  Respiratory: [ ]shortness of breath;  [ ] cough, [ ]wheezing, [ ]chills, [ ]hemoptysis  Cardiovascular: [ ] chest pain, [ ]palpitations, [ ]dizziness,  [ ]leg swelling[ ]orthopnea[ ]PND  Gastrointestinal: [ ] abdominal pain, [ ]nausea, [ ]vomiting,  [ ]diarrhea [ ]Constipation [ ]Melena  Genitourinary: [ ] dysuria, [ ] hematuria [ ]Powell  Neurologic: [ ] headaches [ ] tremors[ ]weakness [ ]Paralysis Right[ ] Left[ ]  Skin: [ ] itching, [ ]burning, [ ] rashes  Endocrine: [ ] heat or cold intolerance  Musculoskeletal: [ ] joint pain or swelling; [ ] muscle, back, or extremity pain  Psychiatric: [ ] depression, [ ]anxiety, [ ]mood swings, or [ ]difficulty sleeping  Hematologic: [ ] easy bruising, [ ] bleeding gums    [ ] All remaining systems negative except as per above.   [ ]Unable to obtain.  [x] No change in ROS since admission      Vital Signs Last 24 Hrs  T(C): 36.7 (27 Dec 2022 17:13), Max: 36.8 (27 Dec 2022 01:00)  T(F): 98 (27 Dec 2022 17:13), Max: 98.2 (27 Dec 2022 01:00)  HR: 89 (27 Dec 2022 17:13) (69 - 94)  BP: 107/54 (27 Dec 2022 17:13) (107/54 - 127/58)  BP(mean): --  RR: 18 (27 Dec 2022 17:13) (17 - 18)  SpO2: 99% (27 Dec 2022 17:13) (99% - 100%)    Parameters below as of 27 Dec 2022 17:13  Patient On (Oxygen Delivery Method): room air      I&O's Summary    26 Dec 2022 07:01  -  27 Dec 2022 07:00  --------------------------------------------------------  IN: 0 mL / OUT: 700 mL / NET: -700 mL        PHYSICAL EXAM:  General: No acute distress BMI-  HEENT: EOMI, PERRL  Neck: Supple, [ ] JVD  Lungs: Equal air entry bilaterally; [ ] rales [ ] wheezing [ ] rhonchi  Heart: Regular rate and rhythm; [x ] murmur   2/6 [ x] systolic [ ] diastolic [ ] radiation[ ] rubs [ ]  gallops  Abdomen: Nontender, bowel sounds present  Extremities: No clubbing, cyanosis, [ ] edema [ ]Pulses  equal and intact  Nervous system:  Alert & Oriented X3, no focal deficits  Psychiatric: Normal affect  Skin: No rashes or lesions    LABS:  12-27    136  |  103  |  17  ----------------------------<  96  4.5   |  23  |  0.48<L>    Ca    9.5      27 Dec 2022 05:40  Phos  3.0     12-27  Mg     2.10     12-27      Creatinine Trend: 0.48<--, 0.74<--, 0.49<--, 0.50<--, 0.60<--, 0.45<--                        8.5    12.58 )-----------( 270      ( 27 Dec 2022 05:40 )             27.5

## 2022-12-28 LAB
ANION GAP SERPL CALC-SCNC: 10 MMOL/L — SIGNIFICANT CHANGE UP (ref 7–14)
BUN SERPL-MCNC: 17 MG/DL — SIGNIFICANT CHANGE UP (ref 7–23)
CALCIUM SERPL-MCNC: 8.5 MG/DL — SIGNIFICANT CHANGE UP (ref 8.4–10.5)
CHLORIDE SERPL-SCNC: 93 MMOL/L — LOW (ref 98–107)
CO2 SERPL-SCNC: 24 MMOL/L — SIGNIFICANT CHANGE UP (ref 22–31)
CREAT SERPL-MCNC: 0.45 MG/DL — LOW (ref 0.5–1.3)
EGFR: 94 ML/MIN/1.73M2 — SIGNIFICANT CHANGE UP
GLUCOSE SERPL-MCNC: 361 MG/DL — HIGH (ref 70–99)
HCT VFR BLD CALC: 23.6 % — LOW (ref 34.5–45)
HCT VFR BLD CALC: 24.5 % — LOW (ref 34.5–45)
HGB BLD-MCNC: 7.3 G/DL — LOW (ref 11.5–15.5)
HGB BLD-MCNC: 7.7 G/DL — LOW (ref 11.5–15.5)
MAGNESIUM SERPL-MCNC: 1.8 MG/DL — SIGNIFICANT CHANGE UP (ref 1.6–2.6)
MCHC RBC-ENTMCNC: 28.2 PG — SIGNIFICANT CHANGE UP (ref 27–34)
MCHC RBC-ENTMCNC: 28.4 PG — SIGNIFICANT CHANGE UP (ref 27–34)
MCHC RBC-ENTMCNC: 30.9 GM/DL — LOW (ref 32–36)
MCHC RBC-ENTMCNC: 31.4 GM/DL — LOW (ref 32–36)
MCV RBC AUTO: 90.4 FL — SIGNIFICANT CHANGE UP (ref 80–100)
MCV RBC AUTO: 91.1 FL — SIGNIFICANT CHANGE UP (ref 80–100)
NRBC # BLD: 0 /100 WBCS — SIGNIFICANT CHANGE UP (ref 0–0)
NRBC # BLD: 0 /100 WBCS — SIGNIFICANT CHANGE UP (ref 0–0)
NRBC # FLD: 0 K/UL — SIGNIFICANT CHANGE UP (ref 0–0)
NRBC # FLD: 0 K/UL — SIGNIFICANT CHANGE UP (ref 0–0)
OSMOLALITY UR: 406 MOSM/KG — SIGNIFICANT CHANGE UP (ref 50–1200)
PHOSPHATE SERPL-MCNC: 2.9 MG/DL — SIGNIFICANT CHANGE UP (ref 2.5–4.5)
PLATELET # BLD AUTO: 230 K/UL — SIGNIFICANT CHANGE UP (ref 150–400)
PLATELET # BLD AUTO: 241 K/UL — SIGNIFICANT CHANGE UP (ref 150–400)
POTASSIUM SERPL-MCNC: 3.8 MMOL/L — SIGNIFICANT CHANGE UP (ref 3.5–5.3)
POTASSIUM SERPL-SCNC: 3.8 MMOL/L — SIGNIFICANT CHANGE UP (ref 3.5–5.3)
RBC # BLD: 2.59 M/UL — LOW (ref 3.8–5.2)
RBC # BLD: 2.71 M/UL — LOW (ref 3.8–5.2)
RBC # FLD: 16.1 % — HIGH (ref 10.3–14.5)
RBC # FLD: 16.1 % — HIGH (ref 10.3–14.5)
SODIUM SERPL-SCNC: 127 MMOL/L — LOW (ref 135–145)
SODIUM UR-SCNC: 57 MMOL/L — SIGNIFICANT CHANGE UP
WBC # BLD: 10.54 K/UL — HIGH (ref 3.8–10.5)
WBC # BLD: 14.11 K/UL — HIGH (ref 3.8–10.5)
WBC # FLD AUTO: 10.54 K/UL — HIGH (ref 3.8–10.5)
WBC # FLD AUTO: 14.11 K/UL — HIGH (ref 3.8–10.5)

## 2022-12-28 RX ORDER — SODIUM CHLORIDE 9 MG/ML
1000 INJECTION INTRAMUSCULAR; INTRAVENOUS; SUBCUTANEOUS
Refills: 0 | Status: DISCONTINUED | OUTPATIENT
Start: 2022-12-28 | End: 2022-12-29

## 2022-12-28 RX ADMIN — Medication 650 MILLIGRAM(S): at 11:12

## 2022-12-28 RX ADMIN — ATORVASTATIN CALCIUM 40 MILLIGRAM(S): 80 TABLET, FILM COATED ORAL at 21:49

## 2022-12-28 RX ADMIN — Medication 325 MILLIGRAM(S): at 11:12

## 2022-12-28 RX ADMIN — Medication 100 MILLIGRAM(S): at 05:57

## 2022-12-28 RX ADMIN — Medication 650 MILLIGRAM(S): at 21:49

## 2022-12-28 RX ADMIN — Medication 1 TABLET(S): at 11:12

## 2022-12-28 RX ADMIN — Medication 100 MILLIGRAM(S): at 21:49

## 2022-12-28 RX ADMIN — Medication 650 MILLIGRAM(S): at 22:53

## 2022-12-28 RX ADMIN — Medication 1 MILLIGRAM(S): at 11:12

## 2022-12-28 RX ADMIN — Medication 100 MILLIGRAM(S): at 13:05

## 2022-12-28 RX ADMIN — SODIUM CHLORIDE 75 MILLILITER(S): 9 INJECTION INTRAMUSCULAR; INTRAVENOUS; SUBCUTANEOUS at 08:42

## 2022-12-28 RX ADMIN — RISPERIDONE 0.5 MILLIGRAM(S): 4 TABLET ORAL at 17:10

## 2022-12-28 RX ADMIN — Medication 650 MILLIGRAM(S): at 15:18

## 2022-12-28 RX ADMIN — Medication 500 MILLIGRAM(S): at 11:12

## 2022-12-28 RX ADMIN — SERTRALINE 100 MILLIGRAM(S): 25 TABLET, FILM COATED ORAL at 11:12

## 2022-12-28 RX ADMIN — RISPERIDONE 0.5 MILLIGRAM(S): 4 TABLET ORAL at 05:54

## 2022-12-28 NOTE — PROVIDER CONTACT NOTE (OTHER) - SITUATION
upon dressing change to chest wounds, excessive bleeding noted. nu knit applied all over. compressed with abd pads and ace wrap.

## 2022-12-28 NOTE — PROGRESS NOTE ADULT - SUBJECTIVE AND OBJECTIVE BOX
PATIENT SEEN AND EXAMINED ON :- 12/28/22  DATE OF SERVICE:    12/28/22         Interim events noted,Labs ,Radiological studies and Cardiology tests reviewed .       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 The patient resides in [facility]. the patient has a chronic R chest wound which is where her skin cancer is located. She states that he has been bleeding a lot lately, and has been for a while. The patient recently got transfused 3 weeks ago for anemia. She states her anemia is likely from the wound. Denies GI bleeding. The patient denies fevers. No other symptoms at this time other than lightheadedness, which had resolved while the patient was in the ED.    Patient is requesting to go back to [facility]. (23 Dec 2022 02:50)      INTERIM EVENTS:Patient seen at bedside ,interim events noted.      PMH -reviewed admission note, no change since admission  HEART FAILURE: Acute[ ]Chronic[ ] Systolic[ ] Diastolic[ ] Combined Systolic and Diastolic[ ]  CAD[ ] CABG[ ] PCI[ ]  DEVICES[ ] PPM[ ] ICD[ ] ILR[ ]  ATRIAL FIBRILLATION[ ] Paroxysmal[ ] Permanent[ ] CHADS2-[  ]  KAMRAN[ ] CKD1[ ] CKD2[ ] CKD3[ ] CKD4[ ] ESRD[ ]  COPD[ ] HTN[ ]   DM[ ] Type1[ ] Type 2[ ]   CVA[ ] Paresis[ ]    AMBULATION: Assisted[ ] Cane/walker[ ] Independent[ ]    MEDICATIONS  (STANDING):  ascorbic acid 500 milliGRAM(s) Oral daily  atorvastatin 40 milliGRAM(s) Oral at bedtime  ceFAZolin   IVPB      ceFAZolin   IVPB 1000 milliGRAM(s) IV Intermittent every 8 hours  ferrous    sulfate 325 milliGRAM(s) Oral daily  folic acid 1 milliGRAM(s) Oral daily  multivitamin 1 Tablet(s) Oral daily  risperiDONE   Tablet 0.5 milliGRAM(s) Oral two times a day  senna 2 Tablet(s) Oral at bedtime  sertraline 100 milliGRAM(s) Oral daily  sodium chloride 0.9%. 1000 milliLiter(s) (75 mL/Hr) IV Continuous <Continuous>    MEDICATIONS  (PRN):  acetaminophen     Tablet .. 650 milliGRAM(s) Oral every 6 hours PRN Temp greater or equal to 38C (100.4F), Mild Pain (1 - 3)  melatonin 3 milliGRAM(s) Oral at bedtime PRN Insomnia            REVIEW OF SYSTEMS:  Constitutional: [ ] fever, [ ]weight loss,  [ ]fatigue [ ]weight gain  Eyes: [ ] visual changes  Respiratory: [ ]shortness of breath;  [ ] cough, [ ]wheezing, [ ]chills, [ ]hemoptysis  Cardiovascular: [ ] chest pain, [ ]palpitations, [ ]dizziness,  [ ]leg swelling[ ]orthopnea[ ]PND  Gastrointestinal: [ ] abdominal pain, [ ]nausea, [ ]vomiting,  [ ]diarrhea [ ]Constipation [ ]Melena  Genitourinary: [ ] dysuria, [ ] hematuria [ ]Powell  Neurologic: [ ] headaches [ ] tremors[ ]weakness [ ]Paralysis Right[ ] Left[ ]  Skin: [ ] itching, [ ]burning, [ ] rashes  Endocrine: [ ] heat or cold intolerance  Musculoskeletal: [ ] joint pain or swelling; [ ] muscle, back, or extremity pain  Psychiatric: [ ] depression, [ ]anxiety, [ ]mood swings, or [ ]difficulty sleeping  Hematologic: [ ] easy bruising, [ ] bleeding gums    [ ] All remaining systems negative except as per above.   [ ]Unable to obtain.  [x] No change in ROS since admission      Vital Signs Last 24 Hrs  T(C): 36.9 (28 Dec 2022 19:09), Max: 37.1 (28 Dec 2022 16:31)  T(F): 98.5 (28 Dec 2022 19:09), Max: 98.7 (28 Dec 2022 16:31)  HR: 94 (28 Dec 2022 19:09) (84 - 97)  BP: 118/44 (28 Dec 2022 19:09) (104/42 - 137/51)  BP(mean): --  RR: 17 (28 Dec 2022 19:09) (16 - 18)  SpO2: 100% (28 Dec 2022 19:09) (99% - 100%)    Parameters below as of 28 Dec 2022 19:09  Patient On (Oxygen Delivery Method): room air      I&O's Summary    27 Dec 2022 07:01  -  28 Dec 2022 07:00  --------------------------------------------------------  IN: 0 mL / OUT: 1200 mL / NET: -1200 mL        PHYSICAL EXAM:  General: No acute distress BMI-  HEENT: EOMI, PERRL  Neck: Supple, [ ] JVD  Lungs: Equal air entry bilaterally; [ ] rales [ ] wheezing [ ] rhonchi  Heart: Regular rate and rhythm; [x ] murmur   2/6 [ x] systolic [ ] diastolic [ ] radiation[ ] rubs [ ]  gallops  Abdomen: Nontender, bowel sounds present  Extremities: No clubbing, cyanosis, [ ] edema [ ]Pulses  equal and intact  Nervous system:  Alert & Oriented X3, no focal deficits  Psychiatric: Normal affect  Skin: No rashes or lesions    LABS:  12-28    127<L>  |  93<L>  |  17  ----------------------------<  361<H>  3.8   |  24  |  0.45<L>    Ca    8.5      28 Dec 2022 06:34  Phos  2.9     12-28  Mg     1.80     12-28      Creatinine Trend: 0.45<--, 0.48<--, 0.74<--, 0.49<--, 0.50<--, 0.60<--                        7.3    14.11 )-----------( 241      ( 28 Dec 2022 14:30 )             23.6

## 2022-12-28 NOTE — PROGRESS NOTE ADULT - SUBJECTIVE AND OBJECTIVE BOX
86 y/o admitted for chest wall bleeding.  On the one hand, she is reputed to have a right breast cancer which was the source of the bleeding and is being treated at [facility].  However the patient gives no record of this. 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  On a brief view of the chest wall there are bilateral friable masses.  The patient states the right side received 5 radiation treatments during her stay at [facility] about a month ago.  However I cannot find a department at [facility].      So to make a recommendation for radiation treatment we need to secure some records, namely:  a pathology report and records of previous radiation.  I will check with her oncology group.  However yesterday they stated they were unaware of previous radiation.

## 2022-12-28 NOTE — PROGRESS NOTE ADULT - ASSESSMENT
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    1. Metastatic SCC skin   -- s/p RT to right breast (per patient, and per outpatient onc notes, though no records of treatments)   -- Currently receiving cemiplimab, LD 12/22. Hold systemic treatment while admitted   -- Follow up with Dr. Alexis after discharge     2. Anemia   -- Recurrent, likely secondary to bleeding skin lesions as well as immunotherapy  -- Rad/onc consulted for RT to chest to assist in bleeding control  -- s/p 4 units pRBC since admission 12/22   -- Hg decreasing. Will check anemia workup to evaluate for other contributing factors. Check occult blood   -- Low suspicion for hemolysis. Outpatient labs with minimally elevated LDH, normal bilirubin  -- Transfuse to maintain hg >7    Will continue to follow.    Lucy Pastrana PA-C  Hematology/Oncology  [facility]  [phone] (office)  [phone] (alt office)  Evenings and weekends please call MD on call or office

## 2022-12-28 NOTE — PROGRESS NOTE ADULT - SUBJECTIVE AND OBJECTIVE BOX
Patient is a 87y old  Female who presents with a chief complaint of wound (28 Dec 2022 08:27)    Patient seen this afternoon. No new complaints, but still bleeding from skin lesions on chest.   Attempted to obtain more history regarding her radiation history, as there are no records available in our outpatient EMR. Per patient, she had 5 sessions of RT to right chest at [facility] about 2 months ago.  Per discussion with her outpatient oncologist Dr. Alexis, no available records in chart, and no record of her getting radiation treatment at our outpatient facilities.     MEDICATIONS  (STANDING):  ascorbic acid 500 milliGRAM(s) Oral daily  atorvastatin 40 milliGRAM(s) Oral at bedtime  ceFAZolin   IVPB      ceFAZolin   IVPB 1000 milliGRAM(s) IV Intermittent every 8 hours  ferrous    sulfate 325 milliGRAM(s) Oral daily  folic acid 1 milliGRAM(s) Oral daily  multivitamin 1 Tablet(s) Oral daily  risperiDONE   Tablet 0.5 milliGRAM(s) Oral two times a day  senna 2 Tablet(s) Oral at bedtime  sertraline 100 milliGRAM(s) Oral daily  sodium chloride 0.9%. 1000 milliLiter(s) (75 mL/Hr) IV Continuous <Continuous>    MEDICATIONS  (PRN):  acetaminophen     Tablet .. 650 milliGRAM(s) Oral every 6 hours PRN Temp greater or equal to 38C (100.4F), Mild Pain (1 - 3)  melatonin 3 milliGRAM(s) Oral at bedtime PRN Insomnia        Vital Signs Last 24 Hrs  T(C): 36.9 (28 Dec 2022 09:28), Max: 36.9 (28 Dec 2022 09:28)  T(F): 98.4 (28 Dec 2022 09:28), Max: 98.4 (28 Dec 2022 09:28)  HR: 92 (28 Dec 2022 09:28) (88 - 97)  BP: 110/46 (28 Dec 2022 09:28) (105/34 - 137/51)  BP(mean): --  RR: 18 (28 Dec 2022 09:28) (16 - 18)  SpO2: 100% (28 Dec 2022 09:28) (99% - 100%)    Parameters below as of 28 Dec 2022 09:28  Patient On (Oxygen Delivery Method): room air        PE  NAD  Awake, alert  Anicteric, MMM  significant RUE edema   No rash grossly  FROM                          7.7    10.54 )-----------( 230      ( 28 Dec 2022 06:34 )             24.5       12-28    127<L>  |  93<L>  |  17  ----------------------------<  361<H>  3.8   |  24  |  0.45<L>    Ca    8.5      28 Dec 2022 06:34  Phos  2.9     12-28  Mg     1.80     12-28

## 2022-12-28 NOTE — PROGRESS NOTE ADULT - TIME BILLING
- Review of records, telemetry, vital signs and daily labs.   - General and cardiovascular physical examination.  - Generation of cardiovascular treatment plan.  - Coordination of care.      Patient was seen and examined by me on 12/28/22,interim events noted,labs and radiology studies reviewed.  Cayetano Landin MD,FACC.  [address] [place], [place] [id number].  [phone]

## 2022-12-29 LAB
ANION GAP SERPL CALC-SCNC: 8 MMOL/L — SIGNIFICANT CHANGE UP (ref 7–14)
BUN SERPL-MCNC: 16 MG/DL — SIGNIFICANT CHANGE UP (ref 7–23)
CALCIUM SERPL-MCNC: 7.7 MG/DL — LOW (ref 8.4–10.5)
CHLORIDE SERPL-SCNC: 109 MMOL/L — HIGH (ref 98–107)
CO2 SERPL-SCNC: 20 MMOL/L — LOW (ref 22–31)
CREAT SERPL-MCNC: 0.49 MG/DL — LOW (ref 0.5–1.3)
EGFR: 92 ML/MIN/1.73M2 — SIGNIFICANT CHANGE UP
FERRITIN SERPL-MCNC: 86 NG/ML — SIGNIFICANT CHANGE UP (ref 15–150)
FOLATE SERPL-MCNC: >20 NG/ML — HIGH (ref 3.1–17.5)
GLUCOSE SERPL-MCNC: 90 MG/DL — SIGNIFICANT CHANGE UP (ref 70–99)
HAPTOGLOB SERPL-MCNC: 161 MG/DL — SIGNIFICANT CHANGE UP (ref 34–200)
HCT VFR BLD CALC: 24 % — LOW (ref 34.5–45)
HCT VFR BLD CALC: 30.2 % — LOW (ref 34.5–45)
HGB BLD-MCNC: 7.6 G/DL — LOW (ref 11.5–15.5)
HGB BLD-MCNC: 9.7 G/DL — LOW (ref 11.5–15.5)
IRON SATN MFR SERPL: 12 % — LOW (ref 14–50)
IRON SATN MFR SERPL: 21 UG/DL — LOW (ref 30–160)
LDH SERPL L TO P-CCNC: 96 U/L — LOW (ref 135–225)
MAGNESIUM SERPL-MCNC: 1.5 MG/DL — LOW (ref 1.6–2.6)
MCHC RBC-ENTMCNC: 28.1 PG — SIGNIFICANT CHANGE UP (ref 27–34)
MCHC RBC-ENTMCNC: 28.6 PG — SIGNIFICANT CHANGE UP (ref 27–34)
MCHC RBC-ENTMCNC: 31.7 GM/DL — LOW (ref 32–36)
MCHC RBC-ENTMCNC: 32.1 GM/DL — SIGNIFICANT CHANGE UP (ref 32–36)
MCV RBC AUTO: 87.5 FL — SIGNIFICANT CHANGE UP (ref 80–100)
MCV RBC AUTO: 90.2 FL — SIGNIFICANT CHANGE UP (ref 80–100)
NRBC # BLD: 0 /100 WBCS — SIGNIFICANT CHANGE UP (ref 0–0)
NRBC # BLD: 0 /100 WBCS — SIGNIFICANT CHANGE UP (ref 0–0)
NRBC # FLD: 0 K/UL — SIGNIFICANT CHANGE UP (ref 0–0)
NRBC # FLD: 0 K/UL — SIGNIFICANT CHANGE UP (ref 0–0)
PHOSPHATE SERPL-MCNC: 2.9 MG/DL — SIGNIFICANT CHANGE UP (ref 2.5–4.5)
PLATELET # BLD AUTO: 189 K/UL — SIGNIFICANT CHANGE UP (ref 150–400)
PLATELET # BLD AUTO: 268 K/UL — SIGNIFICANT CHANGE UP (ref 150–400)
POTASSIUM SERPL-MCNC: 3.6 MMOL/L — SIGNIFICANT CHANGE UP (ref 3.5–5.3)
POTASSIUM SERPL-SCNC: 3.6 MMOL/L — SIGNIFICANT CHANGE UP (ref 3.5–5.3)
RBC # BLD: 2.66 M/UL — LOW (ref 3.8–5.2)
RBC # BLD: 3.45 M/UL — LOW (ref 3.8–5.2)
RBC # FLD: 16 % — HIGH (ref 10.3–14.5)
RBC # FLD: 16.1 % — HIGH (ref 10.3–14.5)
SODIUM SERPL-SCNC: 137 MMOL/L — SIGNIFICANT CHANGE UP (ref 135–145)
TIBC SERPL-MCNC: 175 UG/DL — LOW (ref 220–430)
UIBC SERPL-MCNC: 154 UG/DL — SIGNIFICANT CHANGE UP (ref 110–370)
VIT B12 SERPL-MCNC: 1959 PG/ML — HIGH (ref 200–900)
WBC # BLD: 10.1 K/UL — SIGNIFICANT CHANGE UP (ref 3.8–10.5)
WBC # BLD: 14.98 K/UL — HIGH (ref 3.8–10.5)
WBC # FLD AUTO: 10.1 K/UL — SIGNIFICANT CHANGE UP (ref 3.8–10.5)
WBC # FLD AUTO: 14.98 K/UL — HIGH (ref 3.8–10.5)

## 2022-12-29 PROCEDURE — 71250 CT THORAX DX C-: CPT | Mod: 26

## 2022-12-29 PROCEDURE — 93971 EXTREMITY STUDY: CPT | Mod: 26

## 2022-12-29 RX ORDER — MAGNESIUM SULFATE 500 MG/ML
2 VIAL (ML) INJECTION ONCE
Refills: 0 | Status: COMPLETED | OUTPATIENT
Start: 2022-12-29 | End: 2022-12-29

## 2022-12-29 RX ADMIN — ATORVASTATIN CALCIUM 40 MILLIGRAM(S): 80 TABLET, FILM COATED ORAL at 22:00

## 2022-12-29 RX ADMIN — Medication 100 MILLIGRAM(S): at 06:16

## 2022-12-29 RX ADMIN — RISPERIDONE 0.5 MILLIGRAM(S): 4 TABLET ORAL at 06:16

## 2022-12-29 RX ADMIN — Medication 25 GRAM(S): at 08:11

## 2022-12-29 RX ADMIN — RISPERIDONE 0.5 MILLIGRAM(S): 4 TABLET ORAL at 18:09

## 2022-12-29 RX ADMIN — Medication 1 TABLET(S): at 11:54

## 2022-12-29 RX ADMIN — Medication 1 MILLIGRAM(S): at 11:54

## 2022-12-29 RX ADMIN — SERTRALINE 100 MILLIGRAM(S): 25 TABLET, FILM COATED ORAL at 11:54

## 2022-12-29 RX ADMIN — Medication 650 MILLIGRAM(S): at 20:21

## 2022-12-29 RX ADMIN — Medication 100 MILLIGRAM(S): at 15:02

## 2022-12-29 RX ADMIN — Medication 500 MILLIGRAM(S): at 11:55

## 2022-12-29 RX ADMIN — SODIUM CHLORIDE 75 MILLILITER(S): 9 INJECTION INTRAMUSCULAR; INTRAVENOUS; SUBCUTANEOUS at 06:16

## 2022-12-29 RX ADMIN — Medication 325 MILLIGRAM(S): at 11:55

## 2022-12-29 RX ADMIN — Medication 100 MILLIGRAM(S): at 21:59

## 2022-12-29 NOTE — PROGRESS NOTE ADULT - TIME BILLING
- Review of records, telemetry, vital signs and daily labs.   - General and cardiovascular physical examination.  - Generation of cardiovascular treatment plan.  - Coordination of care.      Patient was seen and examined by me on 12/29/22,interim events noted,labs and radiology studies reviewed.  Cayetano Landin MD,FACC.  [address] [place], [place] [id number].  [phone]

## 2022-12-29 NOTE — PROGRESS NOTE ADULT - SUBJECTIVE AND OBJECTIVE BOX
Patient is a 87y old  Female who presents with a chief complaint of wound (28 Dec 2022 19:47)    Patient seen this afternoon, returned from CT. Receiving blood. No new complaints.    MEDICATIONS  (STANDING):  ascorbic acid 500 milliGRAM(s) Oral daily  atorvastatin 40 milliGRAM(s) Oral at bedtime  ceFAZolin   IVPB      ceFAZolin   IVPB 1000 milliGRAM(s) IV Intermittent every 8 hours  ferrous    sulfate 325 milliGRAM(s) Oral daily  folic acid 1 milliGRAM(s) Oral daily  multivitamin 1 Tablet(s) Oral daily  risperiDONE   Tablet 0.5 milliGRAM(s) Oral two times a day  senna 2 Tablet(s) Oral at bedtime  sertraline 100 milliGRAM(s) Oral daily    MEDICATIONS  (PRN):  acetaminophen     Tablet .. 650 milliGRAM(s) Oral every 6 hours PRN Temp greater or equal to 38C (100.4F), Mild Pain (1 - 3)  melatonin 3 milliGRAM(s) Oral at bedtime PRN Insomnia        Vital Signs Last 24 Hrs  T(C): 37 (29 Dec 2022 09:37), Max: 37.1 (28 Dec 2022 16:31)  T(F): 98.6 (29 Dec 2022 09:37), Max: 98.7 (28 Dec 2022 16:31)  HR: 92 (29 Dec 2022 09:37) (82 - 94)  BP: 104/39 (29 Dec 2022 09:37) (104/39 - 118/44)  BP(mean): --  RR: 17 (29 Dec 2022 09:37) (17 - 18)  SpO2: 100% (29 Dec 2022 09:37) (99% - 100%)    Parameters below as of 29 Dec 2022 09:37  Patient On (Oxygen Delivery Method): room air        PE  NAD  Awake, alert  Anicteric, MMM  RUE edema   No rash grossly  FROM                          7.6    10.10 )-----------( 189      ( 29 Dec 2022 03:49 )             24.0       12-29    137  |  109<H>  |  16  ----------------------------<  90  3.6   |  20<L>  |  0.49<L>    Ca    7.7<L>      29 Dec 2022 03:49  Phos  2.9     12-29  Mg     1.50     12-29

## 2022-12-29 NOTE — PROGRESS NOTE ADULT - SUBJECTIVE AND OBJECTIVE BOX
PATIENT SEEN AND EXAMINED ON :- 12/29/22  DATE OF SERVICE:  12/29/22           Interim events noted,Labs ,Radiological studies and Cardiology tests reviewed .       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 The patient resides in [facility]. the patient has a chronic R chest wound which is where her skin cancer is located. She states that he has been bleeding a lot lately, and has been for a while. The patient recently got transfused 3 weeks ago for anemia. She states her anemia is likely from the wound. Denies GI bleeding. The patient denies fevers. No other symptoms at this time other than lightheadedness, which had resolved while the patient was in the ED.    Patient is requesting to go back to [facility]. (23 Dec 2022 02:50)      INTERIM EVENTS:Patient seen at bedside ,interim events noted.      PMH -reviewed admission note, no change since admission  HEART FAILURE: Acute[ ]Chronic[ ] Systolic[ ] Diastolic[ ] Combined Systolic and Diastolic[ ]  CAD[ ] CABG[ ] PCI[ ]  DEVICES[ ] PPM[ ] ICD[ ] ILR[ ]  ATRIAL FIBRILLATION[ ] Paroxysmal[ ] Permanent[ ] CHADS2-[  ]  KAMRAN[ ] CKD1[ ] CKD2[ ] CKD3[ ] CKD4[ ] ESRD[ ]  COPD[ ] HTN[ ]   DM[ ] Type1[ ] Type 2[ ]   CVA[ ] Paresis[ ]    AMBULATION: Assisted[ ] Cane/walker[ ] Independent[ ]    MEDICATIONS  (STANDING):  ascorbic acid 500 milliGRAM(s) Oral daily  atorvastatin 40 milliGRAM(s) Oral at bedtime  ceFAZolin   IVPB      ceFAZolin   IVPB 1000 milliGRAM(s) IV Intermittent every 8 hours  ferrous    sulfate 325 milliGRAM(s) Oral daily  folic acid 1 milliGRAM(s) Oral daily  multivitamin 1 Tablet(s) Oral daily  risperiDONE   Tablet 0.5 milliGRAM(s) Oral two times a day  senna 2 Tablet(s) Oral at bedtime  sertraline 100 milliGRAM(s) Oral daily    MEDICATIONS  (PRN):  acetaminophen     Tablet .. 650 milliGRAM(s) Oral every 6 hours PRN Temp greater or equal to 38C (100.4F), Mild Pain (1 - 3)  melatonin 3 milliGRAM(s) Oral at bedtime PRN Insomnia            REVIEW OF SYSTEMS:  Constitutional: [ ] fever, [ ]weight loss,  [ ]fatigue [ ]weight gain  Eyes: [ ] visual changes  Respiratory: [ ]shortness of breath;  [ ] cough, [ ]wheezing, [ ]chills, [ ]hemoptysis  Cardiovascular: [ ] chest pain, [ ]palpitations, [ ]dizziness,  [ ]leg swelling[ ]orthopnea[ ]PND  Gastrointestinal: [ ] abdominal pain, [ ]nausea, [ ]vomiting,  [ ]diarrhea [ ]Constipation [ ]Melena  Genitourinary: [ ] dysuria, [ ] hematuria [ ]Powell  Neurologic: [ ] headaches [ ] tremors[ ]weakness [ ]Paralysis Right[ ] Left[ ]  Skin: [ ] itching, [ ]burning, [ ] rashes  Endocrine: [ ] heat or cold intolerance  Musculoskeletal: [ ] joint pain or swelling; [ ] muscle, back, or extremity pain  Psychiatric: [ ] depression, [ ]anxiety, [ ]mood swings, or [ ]difficulty sleeping  Hematologic: [ ] easy bruising, [ ] bleeding gums    [ ] All remaining systems negative except as per above.   [ ]Unable to obtain.  [x] No change in ROS since admission      Vital Signs Last 24 Hrs  T(C): 37.2 (29 Dec 2022 17:36), Max: 37.2 (29 Dec 2022 17:36)  T(F): 99 (29 Dec 2022 17:36), Max: 99 (29 Dec 2022 17:36)  HR: 86 (29 Dec 2022 17:36) (82 - 92)  BP: 126/50 (29 Dec 2022 17:36) (104/39 - 129/54)  BP(mean): --  RR: 18 (29 Dec 2022 17:36) (17 - 18)  SpO2: 100% (29 Dec 2022 17:36) (99% - 100%)    Parameters below as of 29 Dec 2022 17:36  Patient On (Oxygen Delivery Method): room air      I&O's Summary    29 Dec 2022 07:01  -  29 Dec 2022 19:29  --------------------------------------------------------  IN: 540 mL / OUT: 0 mL / NET: 540 mL        PHYSICAL EXAM:  General: No acute distress BMI-  HEENT: EOMI, PERRL  Neck: Supple, [ ] JVD  Lungs: Equal air entry bilaterally; [ ] rales [ ] wheezing [ ] rhonchi  Heart: Regular rate and rhythm; [x ] murmur   2/6 [ x] systolic [ ] diastolic [ ] radiation[ ] rubs [ ]  gallops  Abdomen: Nontender, bowel sounds present  Extremities: No clubbing, cyanosis, [ ] edema [ ]Pulses  equal and intact  Nervous system:  Alert & Oriented X3, no focal deficits  Psychiatric: Normal affect  Skin: No rashes or lesions    LABS:  12-29    137  |  109<H>  |  16  ----------------------------<  90  3.6   |  20<L>  |  0.49<L>    Ca    7.7<L>      29 Dec 2022 03:49  Phos  2.9     12-29  Mg     1.50     12-29      Creatinine Trend: 0.49<--, 0.45<--, 0.48<--, 0.74<--, 0.49<--, 0.50<--                        7.6    10.10 )-----------( 189      ( 29 Dec 2022 03:49 )             24.0

## 2022-12-29 NOTE — PROGRESS NOTE ADULT - ASSESSMENT
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    1. Metastatic SCC skin   -- s/p RT to right breast at [facility]   -- Currently receiving cemiplimab, LD 12/22. Hold systemic treatment while admitted   -- Follow up with Dr. Alexis after discharge     2. Anemia   -- Recurrent, likely secondary to bleeding skin lesions as well as immunotherapy  -- Rad/onc consulted for RT to chest to assist in bleeding control. Had CT chest, follow up read   -- s/p 5 units pRBC since admission 12/22   -- Anemia workup shows some degree of iron deficiency, otherwise unremarkable. Occult blood negative. Cont PO Fe   -- Transfuse to maintain hg >7    Will continue to follow.    Lucy Pastrana PA-C  Hematology/Oncology  [facility]  [phone] (office)  [phone] (alt office)  Evenings and weekends please call MD on call or office

## 2022-12-30 LAB
ANION GAP SERPL CALC-SCNC: 8 MMOL/L — SIGNIFICANT CHANGE UP (ref 7–14)
BUN SERPL-MCNC: 12 MG/DL — SIGNIFICANT CHANGE UP (ref 7–23)
CALCIUM SERPL-MCNC: 9.1 MG/DL — SIGNIFICANT CHANGE UP (ref 8.4–10.5)
CHLORIDE SERPL-SCNC: 103 MMOL/L — SIGNIFICANT CHANGE UP (ref 98–107)
CO2 SERPL-SCNC: 24 MMOL/L — SIGNIFICANT CHANGE UP (ref 22–31)
CREAT SERPL-MCNC: 0.41 MG/DL — LOW (ref 0.5–1.3)
EGFR: 96 ML/MIN/1.73M2 — SIGNIFICANT CHANGE UP
GLUCOSE SERPL-MCNC: 90 MG/DL — SIGNIFICANT CHANGE UP (ref 70–99)
HCT VFR BLD CALC: 30.9 % — LOW (ref 34.5–45)
HGB BLD-MCNC: 9.4 G/DL — LOW (ref 11.5–15.5)
MAGNESIUM SERPL-MCNC: 2 MG/DL — SIGNIFICANT CHANGE UP (ref 1.6–2.6)
MCHC RBC-ENTMCNC: 27.4 PG — SIGNIFICANT CHANGE UP (ref 27–34)
MCHC RBC-ENTMCNC: 30.4 GM/DL — LOW (ref 32–36)
MCV RBC AUTO: 90.1 FL — SIGNIFICANT CHANGE UP (ref 80–100)
NRBC # BLD: 0 /100 WBCS — SIGNIFICANT CHANGE UP (ref 0–0)
NRBC # FLD: 0 K/UL — SIGNIFICANT CHANGE UP (ref 0–0)
PHOSPHATE SERPL-MCNC: 2.9 MG/DL — SIGNIFICANT CHANGE UP (ref 2.5–4.5)
PLATELET # BLD AUTO: 263 K/UL — SIGNIFICANT CHANGE UP (ref 150–400)
POTASSIUM SERPL-MCNC: 4.2 MMOL/L — SIGNIFICANT CHANGE UP (ref 3.5–5.3)
POTASSIUM SERPL-SCNC: 4.2 MMOL/L — SIGNIFICANT CHANGE UP (ref 3.5–5.3)
RBC # BLD: 3.43 M/UL — LOW (ref 3.8–5.2)
RBC # FLD: 16.3 % — HIGH (ref 10.3–14.5)
SODIUM SERPL-SCNC: 135 MMOL/L — SIGNIFICANT CHANGE UP (ref 135–145)
WBC # BLD: 12.22 K/UL — HIGH (ref 3.8–10.5)
WBC # FLD AUTO: 12.22 K/UL — HIGH (ref 3.8–10.5)

## 2022-12-30 RX ADMIN — Medication 1 TABLET(S): at 13:37

## 2022-12-30 RX ADMIN — Medication 650 MILLIGRAM(S): at 07:52

## 2022-12-30 RX ADMIN — Medication 500 MILLIGRAM(S): at 13:36

## 2022-12-30 RX ADMIN — Medication 100 MILLIGRAM(S): at 07:46

## 2022-12-30 RX ADMIN — RISPERIDONE 0.5 MILLIGRAM(S): 4 TABLET ORAL at 18:15

## 2022-12-30 RX ADMIN — Medication 100 MILLIGRAM(S): at 13:36

## 2022-12-30 RX ADMIN — RISPERIDONE 0.5 MILLIGRAM(S): 4 TABLET ORAL at 06:45

## 2022-12-30 RX ADMIN — Medication 100 MILLIGRAM(S): at 21:17

## 2022-12-30 RX ADMIN — SERTRALINE 100 MILLIGRAM(S): 25 TABLET, FILM COATED ORAL at 13:37

## 2022-12-30 RX ADMIN — Medication 650 MILLIGRAM(S): at 16:30

## 2022-12-30 RX ADMIN — Medication 1 MILLIGRAM(S): at 13:36

## 2022-12-30 RX ADMIN — ATORVASTATIN CALCIUM 40 MILLIGRAM(S): 80 TABLET, FILM COATED ORAL at 21:17

## 2022-12-30 RX ADMIN — Medication 650 MILLIGRAM(S): at 17:01

## 2022-12-30 RX ADMIN — Medication 325 MILLIGRAM(S): at 13:37

## 2022-12-30 NOTE — PROGRESS NOTE ADULT - SUBJECTIVE AND OBJECTIVE BOX
PATIENT SEEN AND EXAMINED ON :- 12/30/22  DATE OF SERVICE:   12/30/22          Interim events noted,Labs ,Radiological studies and Cardiology tests reviewed .       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 The patient resides in [facility]. the patient has a chronic R chest wound which is where her skin cancer is located. She states that he has been bleeding a lot lately, and has been for a while. The patient recently got transfused 3 weeks ago for anemia. She states her anemia is likely from the wound. Denies GI bleeding. The patient denies fevers. No other symptoms at this time other than lightheadedness, which had resolved while the patient was in the ED.    Patient is requesting to go back to [facility]. (23 Dec 2022 02:50)      INTERIM EVENTS:Patient seen at bedside ,interim events noted.      PMH -reviewed admission note, no change since admission  HEART FAILURE: Acute[ ]Chronic[ ] Systolic[ ] Diastolic[ ] Combined Systolic and Diastolic[ ]  CAD[ ] CABG[ ] PCI[ ]  DEVICES[ ] PPM[ ] ICD[ ] ILR[ ]  ATRIAL FIBRILLATION[ ] Paroxysmal[ ] Permanent[ ] CHADS2-[  ]  KAMRAN[ ] CKD1[ ] CKD2[ ] CKD3[ ] CKD4[ ] ESRD[ ]  COPD[ ] HTN[ ]   DM[ ] Type1[ ] Type 2[ ]   CVA[ ] Paresis[ ]    AMBULATION: Assisted[ ] Cane/walker[ ] Independent[ ]    MEDICATIONS  (STANDING):  ascorbic acid 500 milliGRAM(s) Oral daily  atorvastatin 40 milliGRAM(s) Oral at bedtime  ceFAZolin   IVPB 1000 milliGRAM(s) IV Intermittent every 8 hours  ceFAZolin   IVPB      ferrous    sulfate 325 milliGRAM(s) Oral daily  folic acid 1 milliGRAM(s) Oral daily  multivitamin 1 Tablet(s) Oral daily  risperiDONE   Tablet 0.5 milliGRAM(s) Oral two times a day  senna 2 Tablet(s) Oral at bedtime  sertraline 100 milliGRAM(s) Oral daily    MEDICATIONS  (PRN):  acetaminophen     Tablet .. 650 milliGRAM(s) Oral every 6 hours PRN Temp greater or equal to 38C (100.4F), Mild Pain (1 - 3)  melatonin 3 milliGRAM(s) Oral at bedtime PRN Insomnia            REVIEW OF SYSTEMS:  Constitutional: [ ] fever, [ ]weight loss,  [ ]fatigue [ ]weight gain  Eyes: [ ] visual changes  Respiratory: [ ]shortness of breath;  [ ] cough, [ ]wheezing, [ ]chills, [ ]hemoptysis  Cardiovascular: [ ] chest pain, [ ]palpitations, [ ]dizziness,  [ ]leg swelling[ ]orthopnea[ ]PND  Gastrointestinal: [ ] abdominal pain, [ ]nausea, [ ]vomiting,  [ ]diarrhea [ ]Constipation [ ]Melena  Genitourinary: [ ] dysuria, [ ] hematuria [ ]Powell  Neurologic: [ ] headaches [ ] tremors[ ]weakness [ ]Paralysis Right[ ] Left[ ]  Skin: [ ] itching, [ ]burning, [ ] rashes  Endocrine: [ ] heat or cold intolerance  Musculoskeletal: [ ] joint pain or swelling; [ ] muscle, back, or extremity pain  Psychiatric: [ ] depression, [ ]anxiety, [ ]mood swings, or [ ]difficulty sleeping  Hematologic: [ ] easy bruising, [ ] bleeding gums    [ ] All remaining systems negative except as per above.   [ ]Unable to obtain.  [x] No change in ROS since admission      Vital Signs Last 24 Hrs  T(C): 37.6 (30 Dec 2022 17:24), Max: 37.6 (29 Dec 2022 21:23)  T(F): 99.7 (30 Dec 2022 17:24), Max: 99.7 (29 Dec 2022 21:23)  HR: 93 (30 Dec 2022 17:24) (87 - 93)  BP: 105/41 (30 Dec 2022 17:24) (98/40 - 120/47)  BP(mean): --  RR: 18 (30 Dec 2022 17:24) (18 - 18)  SpO2: 99% (30 Dec 2022 17:24) (99% - 100%)    Parameters below as of 30 Dec 2022 17:24  Patient On (Oxygen Delivery Method): room air      I&O's Summary    29 Dec 2022 07:01  -  30 Dec 2022 07:00  --------------------------------------------------------  IN: 540 mL / OUT: 1000 mL / NET: -460 mL        PHYSICAL EXAM:  General: No acute distress BMI-  HEENT: EOMI, PERRL  Neck: Supple, [ ] JVD  Lungs: Equal air entry bilaterally; [ ] rales [ ] wheezing [ ] rhonchi  Heart: Regular rate and rhythm; [x ] murmur   2/6 [ x] systolic [ ] diastolic [ ] radiation[ ] rubs [ ]  gallops  Abdomen: Nontender, bowel sounds present  Extremities: No clubbing, cyanosis, [ ] edema [ ]Pulses  equal and intact  Nervous system:  Alert & Oriented X3, no focal deficits  Psychiatric: Normal affect  Skin: No rashes or lesions    LABS:  12-30    135  |  103  |  12  ----------------------------<  90  4.2   |  24  |  0.41<L>    Ca    9.1      30 Dec 2022 06:00  Phos  2.9     12-30  Mg     2.00     12-30      Creatinine Trend: 0.41<--, 0.49<--, 0.45<--, 0.48<--, 0.74<--, 0.49<--                        9.4    12.22 )-----------( 263      ( 30 Dec 2022 06:00 )             30.9

## 2022-12-30 NOTE — PROGRESS NOTE ADULT - TIME BILLING
- Review of records, telemetry, vital signs and daily labs.   - General and cardiovascular physical examination.  - Generation of cardiovascular treatment plan.  - Coordination of care.      Patient was seen and examined by me on 12/30/22,interim events noted,labs and radiology studies reviewed.  Cayetano Landin MD,FACC.  [address] [place], [place] [id number].  [phone]

## 2022-12-30 NOTE — PROGRESS NOTE ADULT - ASSESSMENT
86 y/o F with pmhx of skin cancer (squamous cell carcinoma) with mets, presented for abnormal lab test. Found to have severe anemia. Admit for anemia.      137  |  109<H>  |  16  ----------------------------<  90  3.6   |  20<L>  |  0.49<L>    Ca    7.7<L>      29 Dec 2022 03:49  Phos  2.9     12-29  Mg     1.50     12-29

## 2022-12-31 LAB
ANION GAP SERPL CALC-SCNC: 11 MMOL/L — SIGNIFICANT CHANGE UP (ref 7–14)
BUN SERPL-MCNC: 18 MG/DL — SIGNIFICANT CHANGE UP (ref 7–23)
CALCIUM SERPL-MCNC: 9.5 MG/DL — SIGNIFICANT CHANGE UP (ref 8.4–10.5)
CHLORIDE SERPL-SCNC: 98 MMOL/L — SIGNIFICANT CHANGE UP (ref 98–107)
CO2 SERPL-SCNC: 21 MMOL/L — LOW (ref 22–31)
CREAT SERPL-MCNC: 0.5 MG/DL — SIGNIFICANT CHANGE UP (ref 0.5–1.3)
EGFR: 91 ML/MIN/1.73M2 — SIGNIFICANT CHANGE UP
GLUCOSE SERPL-MCNC: 89 MG/DL — SIGNIFICANT CHANGE UP (ref 70–99)
HCT VFR BLD CALC: 29 % — LOW (ref 34.5–45)
HGB BLD-MCNC: 8.9 G/DL — LOW (ref 11.5–15.5)
MAGNESIUM SERPL-MCNC: 1.8 MG/DL — SIGNIFICANT CHANGE UP (ref 1.6–2.6)
MCHC RBC-ENTMCNC: 27.7 PG — SIGNIFICANT CHANGE UP (ref 27–34)
MCHC RBC-ENTMCNC: 30.7 GM/DL — LOW (ref 32–36)
MCV RBC AUTO: 90.3 FL — SIGNIFICANT CHANGE UP (ref 80–100)
NRBC # BLD: 0 /100 WBCS — SIGNIFICANT CHANGE UP (ref 0–0)
NRBC # FLD: 0 K/UL — SIGNIFICANT CHANGE UP (ref 0–0)
PHOSPHATE SERPL-MCNC: 3 MG/DL — SIGNIFICANT CHANGE UP (ref 2.5–4.5)
PLATELET # BLD AUTO: 243 K/UL — SIGNIFICANT CHANGE UP (ref 150–400)
POTASSIUM SERPL-MCNC: 4.4 MMOL/L — SIGNIFICANT CHANGE UP (ref 3.5–5.3)
POTASSIUM SERPL-SCNC: 4.4 MMOL/L — SIGNIFICANT CHANGE UP (ref 3.5–5.3)
RBC # BLD: 3.21 M/UL — LOW (ref 3.8–5.2)
RBC # FLD: 15.7 % — HIGH (ref 10.3–14.5)
SODIUM SERPL-SCNC: 130 MMOL/L — LOW (ref 135–145)
WBC # BLD: 10.31 K/UL — SIGNIFICANT CHANGE UP (ref 3.8–10.5)
WBC # FLD AUTO: 10.31 K/UL — SIGNIFICANT CHANGE UP (ref 3.8–10.5)

## 2022-12-31 RX ADMIN — Medication 100 MILLIGRAM(S): at 21:40

## 2022-12-31 RX ADMIN — SERTRALINE 100 MILLIGRAM(S): 25 TABLET, FILM COATED ORAL at 14:30

## 2022-12-31 RX ADMIN — Medication 1 TABLET(S): at 14:30

## 2022-12-31 RX ADMIN — Medication 650 MILLIGRAM(S): at 15:12

## 2022-12-31 RX ADMIN — Medication 650 MILLIGRAM(S): at 14:42

## 2022-12-31 RX ADMIN — Medication 650 MILLIGRAM(S): at 01:52

## 2022-12-31 RX ADMIN — Medication 100 MILLIGRAM(S): at 14:29

## 2022-12-31 RX ADMIN — ATORVASTATIN CALCIUM 40 MILLIGRAM(S): 80 TABLET, FILM COATED ORAL at 21:40

## 2022-12-31 RX ADMIN — RISPERIDONE 0.5 MILLIGRAM(S): 4 TABLET ORAL at 05:48

## 2022-12-31 RX ADMIN — Medication 100 MILLIGRAM(S): at 05:48

## 2022-12-31 RX ADMIN — RISPERIDONE 0.5 MILLIGRAM(S): 4 TABLET ORAL at 18:12

## 2022-12-31 RX ADMIN — Medication 325 MILLIGRAM(S): at 14:30

## 2022-12-31 RX ADMIN — Medication 500 MILLIGRAM(S): at 14:30

## 2022-12-31 RX ADMIN — Medication 1 MILLIGRAM(S): at 14:30

## 2022-12-31 NOTE — PROGRESS NOTE ADULT - ASSESSMENT
88 y/o F with pmhx of skin cancer (squamous cell carcinoma) with mets, presented for abnormal lab test. Found to have severe anemia. Admit for anemia.      137  |  109<H>  |  16  ----------------------------<  90  3.6   |  20<L>  |  0.49<L>    Ca    7.7<L>      29 Dec 2022 03:49  Phos  2.9     12-29  Mg     1.50     12-29

## 2022-12-31 NOTE — PROGRESS NOTE ADULT - TIME BILLING
- Review of records, telemetry, vital signs and daily labs.   - General and cardiovascular physical examination.  - Generation of cardiovascular treatment plan.  - Coordination of care.      Patient was seen and examined by me on 12/31/22,interim events noted,labs and radiology studies reviewed.  Cayetano Landin MD,FACC.  [address] [place], [place] [id number].  [phone]

## 2022-12-31 NOTE — PROGRESS NOTE ADULT - ASSESSMENT
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    1. Metastatic SCC skin   -- s/p RT to right breast at [facility]   -- Currently receiving cemiplimab, LD 12/22. Hold systemic treatment while admitted   -- Follow up with Dr. Alexis after discharge  --CT chest from 12/29 shows overall worsening metastatic burden since July 2022, with diffuse irregular and fungating cutaneous lesions throughout the chest wall with  open lesions and rib invasion, new/enlarging left axillary/chest wall lymphadenopathy and pulmonary metastases.    2. Anemia   -- Recurrent, likely secondary to bleeding skin lesions as well as immunotherapy  -- Rad/onc consulted for RT to chest to assist in bleeding control.   -- s/p 5 units pRBC since admission 12/22   -- Anemia workup shows some degree of iron deficiency, otherwise unremarkable. Occult blood negative. Cont PO Fe   -- Transfuse to maintain hg >7    Will continue to follow.    Aparna Ingram NP  Hematology/ Oncology  [facility]  [phone] (office)  [phone] (alt office)  Evenings and weekends please call MD on call or office

## 2022-12-31 NOTE — PROGRESS NOTE ADULT - SUBJECTIVE AND OBJECTIVE BOX
PATIENT SEEN AND EXAMINED ON :- 12/31/22  DATE OF SERVICE:   12/31/22          Interim events noted,Labs ,Radiological studies and Cardiology tests reviewed .       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 The patient resides in [facility]. the patient has a chronic R chest wound which is where her skin cancer is located. She states that he has been bleeding a lot lately, and has been for a while. The patient recently got transfused 3 weeks ago for anemia. She states her anemia is likely from the wound. Denies GI bleeding. The patient denies fevers. No other symptoms at this time other than lightheadedness, which had resolved while the patient was in the ED.    Patient is requesting to go back to [facility]. (23 Dec 2022 02:50)      INTERIM EVENTS:Patient seen at bedside ,interim events noted.      PMH -reviewed admission note, no change since admission  HEART FAILURE: Acute[ ]Chronic[ ] Systolic[ ] Diastolic[ ] Combined Systolic and Diastolic[ ]  CAD[ ] CABG[ ] PCI[ ]  DEVICES[ ] PPM[ ] ICD[ ] ILR[ ]  ATRIAL FIBRILLATION[ ] Paroxysmal[ ] Permanent[ ] CHADS2-[  ]  KAMRAN[ ] CKD1[ ] CKD2[ ] CKD3[ ] CKD4[ ] ESRD[ ]  COPD[ ] HTN[ ]   DM[ ] Type1[ ] Type 2[ ]   CVA[ ] Paresis[ ]    AMBULATION: Assisted[ ] Cane/walker[ ] Independent[ ]    MEDICATIONS  (STANDING):  ascorbic acid 500 milliGRAM(s) Oral daily  atorvastatin 40 milliGRAM(s) Oral at bedtime  ceFAZolin   IVPB      ceFAZolin   IVPB 1000 milliGRAM(s) IV Intermittent every 8 hours  ferrous    sulfate 325 milliGRAM(s) Oral daily  folic acid 1 milliGRAM(s) Oral daily  multivitamin 1 Tablet(s) Oral daily  risperiDONE   Tablet 0.5 milliGRAM(s) Oral two times a day  senna 2 Tablet(s) Oral at bedtime  sertraline 100 milliGRAM(s) Oral daily    MEDICATIONS  (PRN):  acetaminophen     Tablet .. 650 milliGRAM(s) Oral every 6 hours PRN Temp greater or equal to 38C (100.4F), Mild Pain (1 - 3)  melatonin 3 milliGRAM(s) Oral at bedtime PRN Insomnia            REVIEW OF SYSTEMS:  Constitutional: [ ] fever, [ ]weight loss,  [ ]fatigue [ ]weight gain  Eyes: [ ] visual changes  Respiratory: [ ]shortness of breath;  [ ] cough, [ ]wheezing, [ ]chills, [ ]hemoptysis  Cardiovascular: [ ] chest pain, [ ]palpitations, [ ]dizziness,  [ ]leg swelling[ ]orthopnea[ ]PND  Gastrointestinal: [ ] abdominal pain, [ ]nausea, [ ]vomiting,  [ ]diarrhea [ ]Constipation [ ]Melena  Genitourinary: [ ] dysuria, [ ] hematuria [ ]Powell  Neurologic: [ ] headaches [ ] tremors[ ]weakness [ ]Paralysis Right[ ] Left[ ]  Skin: [ ] itching, [ ]burning, [ ] rashes  Endocrine: [ ] heat or cold intolerance  Musculoskeletal: [ ] joint pain or swelling; [ ] muscle, back, or extremity pain  Psychiatric: [ ] depression, [ ]anxiety, [ ]mood swings, or [ ]difficulty sleeping  Hematologic: [ ] easy bruising, [ ] bleeding gums    [ ] All remaining systems negative except as per above.   [ ]Unable to obtain.  [x] No change in ROS since admission      Vital Signs Last 24 Hrs  T(C): 36.7 (31 Dec 2022 16:34), Max: 37.1 (31 Dec 2022 05:51)  T(F): 98 (31 Dec 2022 16:34), Max: 98.8 (31 Dec 2022 09:50)  HR: 84 (31 Dec 2022 16:34) (68 - 95)  BP: 108/35 (31 Dec 2022 16:34) (100/39 - 125/55)  BP(mean): --  RR: 18 (31 Dec 2022 16:34) (18 - 18)  SpO2: 98% (31 Dec 2022 16:34) (98% - 100%)    Parameters below as of 31 Dec 2022 16:34  Patient On (Oxygen Delivery Method): room air      I&O's Summary    30 Dec 2022 07:01  -  31 Dec 2022 07:00  --------------------------------------------------------  IN: 0 mL / OUT: 900 mL / NET: -900 mL        PHYSICAL EXAM:  General: No acute distress BMI-  HEENT: EOMI, PERRL  Neck: Supple, [ ] JVD  Lungs: Equal air entry bilaterally; [ ] rales [ ] wheezing [ ] rhonchi  Heart: Regular rate and rhythm; [x ] murmur   2/6 [ x] systolic [ ] diastolic [ ] radiation[ ] rubs [ ]  gallops  Abdomen: Nontender, bowel sounds present  Extremities: No clubbing, cyanosis, [ ] edema [ ]Pulses  equal and intact  Nervous system:  Alert & Oriented X3, no focal deficits  Psychiatric: Normal affect  Skin: No rashes or lesions    LABS:  12-31    130<L>  |  98  |  18  ----------------------------<  89  4.4   |  21<L>  |  0.50    Ca    9.5      31 Dec 2022 06:16  Phos  3.0     12-31  Mg     1.80     12-31      Creatinine Trend: 0.50<--, 0.41<--, 0.49<--, 0.45<--, 0.48<--, 0.74<--                        8.9    10.31 )-----------( 243      ( 31 Dec 2022 06:16 )             29.0

## 2022-12-31 NOTE — PROGRESS NOTE ADULT - SUBJECTIVE AND OBJECTIVE BOX
Patient is a 87y old  Female who presents with a chief complaint of wound (30 Dec 2022 10:47)    Patient seen and examined this morning at bedside. patient noted resting comfortably in bed offers no new complaints.     MEDICATIONS  (STANDING):  ascorbic acid 500 milliGRAM(s) Oral daily  atorvastatin 40 milliGRAM(s) Oral at bedtime  ceFAZolin   IVPB      ceFAZolin   IVPB 1000 milliGRAM(s) IV Intermittent every 8 hours  ferrous    sulfate 325 milliGRAM(s) Oral daily  folic acid 1 milliGRAM(s) Oral daily  multivitamin 1 Tablet(s) Oral daily  risperiDONE   Tablet 0.5 milliGRAM(s) Oral two times a day  senna 2 Tablet(s) Oral at bedtime  sertraline 100 milliGRAM(s) Oral daily    MEDICATIONS  (PRN):  acetaminophen     Tablet .. 650 milliGRAM(s) Oral every 6 hours PRN Temp greater or equal to 38C (100.4F), Mild Pain (1 - 3)  melatonin 3 milliGRAM(s) Oral at bedtime PRN Insomnia      ROS  No fever, sweats, chills  No epistaxis, HA, sore throat  No CP, SOB, cough, sputum  No n/v/d, abd pain, melena, hematochezia  + LUE edema  No rash  No anxiety  No back pain, joint pain  No bleeding, bruising  No dysuria, hematuria    Vital Signs Last 24 Hrs  T(C): 37.1 (31 Dec 2022 09:50), Max: 37.6 (30 Dec 2022 17:24)  T(F): 98.8 (31 Dec 2022 09:50), Max: 99.7 (30 Dec 2022 17:24)  HR: 86 (31 Dec 2022 09:50) (68 - 93)  BP: 100/39 (31 Dec 2022 09:50) (100/39 - 116/44)  BP(mean): --  RR: 18 (31 Dec 2022 09:50) (18 - 18)  SpO2: 100% (31 Dec 2022 09:50) (99% - 100%)    Parameters below as of 31 Dec 2022 09:50  Patient On (Oxygen Delivery Method): room air        PE  NAD  Awake, alert  Anicteric, MMM  RRR  CTAB  Abd soft, NT, ND  No c/c  +LUEE  No rash grossly  FROM                          8.9    10.31 )-----------( 243      ( 31 Dec 2022 06:16 )             29.0       12-31    130<L>  |  98  |  18  ----------------------------<  89  4.4   |  21<L>  |  0.50    Ca    9.5      31 Dec 2022 06:16  Phos  3.0     12-31  Mg     1.80     12-31      ACC: [id number] EXAM:  CT CHEST                          PROCEDURE DATE:  12/29/2022          INTERPRETATION:  CLINICAL INFORMATION: History of anemia, metastatic   squamous cell carcinoma of the skin. Bilateral breast malignant skin   lesion bleeding. Evaluate malignancy.    COMPARISON: CT chest 7/18/2022.    CONTRAST/COMPLICATIONS:  IV Contrast: None  Oral Contrast: None  Complications: None    PROCEDURE:  CT scan of the chest was obtained without intravenous contrast.    FINDINGS:    MEDIASTINUM/LYMPH NODES: Enlarging left axillary/chest wall   lymphadenopathy with largest lymph node measuring 1.3 cm (2-74.   Multinodular right thyroid lobe. Left thyroidectomy.    HEART/VASCULATURE: The heart is normal in size. Calcifications coronary   artery calcification and stenting Calcifications of the aortic valve and   mitral annulus. The great vessels are normal in size. Small pericardial   effusion. Hypodensity of the blood pool in relation to left ventricular   myocardium suggests underlying anemia.    AIRWAYS/LUNGS/PLEURA: Central airways are clear.  New and enlarging bilateral pulmonary metastases since CT 7/18/2022.    For reference:  Left lower lobe nodule measures 1.8 cm (2-205) compared to prior which   measured 1.3 cm.  New right lower lobe nodule measuring 0.8 cm (2-109).    Small right pleural effusion with subadjacent atelectatic lung. Trace   left pleural effusion with subadjacent atelectatic lung demonstrating   calcifications. Subpleural reticulation in the right middle lobe may be   secondary to reported right breast/chest wall radiotherapy. There is   septal thickening at the bases; lymphangitic carcinomatosis is a   possibility.    UPPER ABDOMEN: Splenomegaly with spleen measuring 15.6 cm. Cholelithiasis.    BONES/SOFT TISSUES: Extensive irregular and fungating cutaneous lesions   diffusely involving the anterior and lateral chest wall, new and   worsening since July 2022. For reference along the left lateral chest   wall, maximal skin thickness measures approximately 1.5 cm in depth   compared to 0.6 cm previously. Open wounds suggested, with discontinuity   of the skin surface and locules of air. The fungating right breast mass   measures approximately 4 cm in depth, invades the right axillary space   and adjacent right fourth and fifth ribs, overall extent not fully   evaluated in the absence of IV contrast.      IMPRESSION:  Overall worsening metastatic burden since July 2022, with diffuse   irregular and fungating cutaneous lesions throughout the chest wall with   open lesions and rib invasion, new/enlarging left axillary/chest wall   lymphadenopathy and pulmonary metastases.

## 2023-01-01 LAB
ANION GAP SERPL CALC-SCNC: 12 MMOL/L — SIGNIFICANT CHANGE UP (ref 7–14)
BUN SERPL-MCNC: 15 MG/DL — SIGNIFICANT CHANGE UP (ref 7–23)
CALCIUM SERPL-MCNC: 9.9 MG/DL — SIGNIFICANT CHANGE UP (ref 8.4–10.5)
CHLORIDE SERPL-SCNC: 98 MMOL/L — SIGNIFICANT CHANGE UP (ref 98–107)
CO2 SERPL-SCNC: 22 MMOL/L — SIGNIFICANT CHANGE UP (ref 22–31)
CREAT SERPL-MCNC: 0.46 MG/DL — LOW (ref 0.5–1.3)
EGFR: 93 ML/MIN/1.73M2 — SIGNIFICANT CHANGE UP
GLUCOSE SERPL-MCNC: 85 MG/DL — SIGNIFICANT CHANGE UP (ref 70–99)
HCT VFR BLD CALC: 29.2 % — LOW (ref 34.5–45)
HGB BLD-MCNC: 8.8 G/DL — LOW (ref 11.5–15.5)
MAGNESIUM SERPL-MCNC: 1.8 MG/DL — SIGNIFICANT CHANGE UP (ref 1.6–2.6)
MCHC RBC-ENTMCNC: 27.4 PG — SIGNIFICANT CHANGE UP (ref 27–34)
MCHC RBC-ENTMCNC: 30.1 GM/DL — LOW (ref 32–36)
MCV RBC AUTO: 91 FL — SIGNIFICANT CHANGE UP (ref 80–100)
NRBC # BLD: 0 /100 WBCS — SIGNIFICANT CHANGE UP (ref 0–0)
NRBC # FLD: 0 K/UL — SIGNIFICANT CHANGE UP (ref 0–0)
PHOSPHATE SERPL-MCNC: 3.5 MG/DL — SIGNIFICANT CHANGE UP (ref 2.5–4.5)
PLATELET # BLD AUTO: 247 K/UL — SIGNIFICANT CHANGE UP (ref 150–400)
POTASSIUM SERPL-MCNC: 4.5 MMOL/L — SIGNIFICANT CHANGE UP (ref 3.5–5.3)
POTASSIUM SERPL-SCNC: 4.5 MMOL/L — SIGNIFICANT CHANGE UP (ref 3.5–5.3)
RBC # BLD: 3.21 M/UL — LOW (ref 3.8–5.2)
RBC # FLD: 15.5 % — HIGH (ref 10.3–14.5)
SODIUM SERPL-SCNC: 132 MMOL/L — LOW (ref 135–145)
WBC # BLD: 10.47 K/UL — SIGNIFICANT CHANGE UP (ref 3.8–10.5)
WBC # FLD AUTO: 10.47 K/UL — SIGNIFICANT CHANGE UP (ref 3.8–10.5)

## 2023-01-01 RX ADMIN — RISPERIDONE 0.5 MILLIGRAM(S): 4 TABLET ORAL at 05:04

## 2023-01-01 RX ADMIN — Medication 325 MILLIGRAM(S): at 11:22

## 2023-01-01 RX ADMIN — Medication 1 TABLET(S): at 11:23

## 2023-01-01 RX ADMIN — Medication 650 MILLIGRAM(S): at 02:49

## 2023-01-01 RX ADMIN — Medication 500 MILLIGRAM(S): at 11:27

## 2023-01-01 RX ADMIN — Medication 650 MILLIGRAM(S): at 19:55

## 2023-01-01 RX ADMIN — RISPERIDONE 0.5 MILLIGRAM(S): 4 TABLET ORAL at 19:44

## 2023-01-01 RX ADMIN — Medication 1 MILLIGRAM(S): at 11:23

## 2023-01-01 RX ADMIN — SERTRALINE 100 MILLIGRAM(S): 25 TABLET, FILM COATED ORAL at 11:22

## 2023-01-01 RX ADMIN — ATORVASTATIN CALCIUM 40 MILLIGRAM(S): 80 TABLET, FILM COATED ORAL at 22:06

## 2023-01-01 NOTE — PROGRESS NOTE ADULT - TIME BILLING
- Review of records, telemetry, vital signs and daily labs.   - General and cardiovascular physical examination.  - Generation of cardiovascular treatment plan.  - Coordination of care.      Patient was seen and examined by me on 1/1/23,interim events noted,labs and radiology studies reviewed.  Cayetano Landin MD,FACC.  [address] [place], [place] [id number].  [phone]

## 2023-01-01 NOTE — PROGRESS NOTE ADULT - SUBJECTIVE AND OBJECTIVE BOX
PATIENT SEEN AND EXAMINED ON :- 1/1/23  DATE OF SERVICE: 1/1/23            Interim events noted,Labs ,Radiological studies and Cardiology tests reviewed .       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 The patient resides in [facility]. the patient has a chronic R chest wound which is where her skin cancer is located. She states that he has been bleeding a lot lately, and has been for a while. The patient recently got transfused 3 weeks ago for anemia. She states her anemia is likely from the wound. Denies GI bleeding. The patient denies fevers. No other symptoms at this time other than lightheadedness, which had resolved while the patient was in the ED.    Patient is requesting to go back to [facility]. (23 Dec 2022 02:50)      INTERIM EVENTS:Patient seen at bedside ,interim events noted.      PMH -reviewed admission note, no change since admission  HEART FAILURE: Acute[ ]Chronic[ ] Systolic[ ] Diastolic[ ] Combined Systolic and Diastolic[ ]  CAD[ ] CABG[ ] PCI[ ]  DEVICES[ ] PPM[ ] ICD[ ] ILR[ ]  ATRIAL FIBRILLATION[ ] Paroxysmal[ ] Permanent[ ] CHADS2-[  ]  KAMRAN[ ] CKD1[ ] CKD2[ ] CKD3[ ] CKD4[ ] ESRD[ ]  COPD[ ] HTN[ ]   DM[ ] Type1[ ] Type 2[ ]   CVA[ ] Paresis[ ]    AMBULATION: Assisted[ ] Cane/walker[ ] Independent[ ]    MEDICATIONS  (STANDING):  ascorbic acid 500 milliGRAM(s) Oral daily  atorvastatin 40 milliGRAM(s) Oral at bedtime  ferrous    sulfate 325 milliGRAM(s) Oral daily  folic acid 1 milliGRAM(s) Oral daily  multivitamin 1 Tablet(s) Oral daily  risperiDONE   Tablet 0.5 milliGRAM(s) Oral two times a day  senna 2 Tablet(s) Oral at bedtime  sertraline 100 milliGRAM(s) Oral daily    MEDICATIONS  (PRN):  acetaminophen     Tablet .. 650 milliGRAM(s) Oral every 6 hours PRN Temp greater or equal to 38C (100.4F), Mild Pain (1 - 3)  melatonin 3 milliGRAM(s) Oral at bedtime PRN Insomnia            REVIEW OF SYSTEMS:  Constitutional: [ ] fever, [ ]weight loss,  [ ]fatigue [ ]weight gain  Eyes: [ ] visual changes  Respiratory: [ ]shortness of breath;  [ ] cough, [ ]wheezing, [ ]chills, [ ]hemoptysis  Cardiovascular: [ ] chest pain, [ ]palpitations, [ ]dizziness,  [ ]leg swelling[ ]orthopnea[ ]PND  Gastrointestinal: [ ] abdominal pain, [ ]nausea, [ ]vomiting,  [ ]diarrhea [ ]Constipation [ ]Melena  Genitourinary: [ ] dysuria, [ ] hematuria [ ]Powell  Neurologic: [ ] headaches [ ] tremors[ ]weakness [ ]Paralysis Right[ ] Left[ ]  Skin: [ ] itching, [ ]burning, [ ] rashes  Endocrine: [ ] heat or cold intolerance  Musculoskeletal: [ ] joint pain or swelling; [ ] muscle, back, or extremity pain  Psychiatric: [ ] depression, [ ]anxiety, [ ]mood swings, or [ ]difficulty sleeping  Hematologic: [ ] easy bruising, [ ] bleeding gums    [ ] All remaining systems negative except as per above.   [ ]Unable to obtain.  [x] No change in ROS since admission      Vital Signs Last 24 Hrs  T(C): 37.1 (01 Jan 2023 10:42), Max: 37.1 (01 Jan 2023 10:42)  T(F): 98.7 (01 Jan 2023 10:42), Max: 98.7 (01 Jan 2023 10:42)  HR: 95 (01 Jan 2023 10:42) (83 - 95)  BP: 119/47 (01 Jan 2023 10:42) (104/45 - 119/47)  BP(mean): 62 (01 Jan 2023 10:42) (62 - 62)  RR: 18 (01 Jan 2023 10:42) (18 - 18)  SpO2: 100% (01 Jan 2023 10:42) (99% - 100%)    Parameters below as of 01 Jan 2023 10:42  Patient On (Oxygen Delivery Method): room air      I&O's Summary      PHYSICAL EXAM:  General: No acute distress BMI-  HEENT: EOMI, PERRL  Neck: Supple, [ ] JVD  Lungs: Equal air entry bilaterally; [ ] rales [ ] wheezing [ ] rhonchi  Heart: Regular rate and rhythm; [x ] murmur   2/6 [ x] systolic [ ] diastolic [ ] radiation[ ] rubs [ ]  gallops  Abdomen: Nontender, bowel sounds present  Extremities: No clubbing, cyanosis, [ ] edema [ ]Pulses  equal and intact  Nervous system:  Alert & Oriented X3, no focal deficits  Psychiatric: Normal affect  Skin: No rashes or lesions    LABS:  01-01    132<L>  |  98  |  15  ----------------------------<  85  4.5   |  22  |  0.46<L>    Ca    9.9      01 Jan 2023 07:00  Phos  3.5     01-01  Mg     1.80     01-01      Creatinine Trend: 0.46<--, 0.50<--, 0.41<--, 0.49<--, 0.45<--, 0.48<--                        8.8    10.47 )-----------( 247      ( 01 Jan 2023 07:00 )             29.2

## 2023-01-02 LAB
ANION GAP SERPL CALC-SCNC: 8 MMOL/L — SIGNIFICANT CHANGE UP (ref 7–14)
BUN SERPL-MCNC: 17 MG/DL — SIGNIFICANT CHANGE UP (ref 7–23)
CALCIUM SERPL-MCNC: 9.6 MG/DL — SIGNIFICANT CHANGE UP (ref 8.4–10.5)
CHLORIDE SERPL-SCNC: 100 MMOL/L — SIGNIFICANT CHANGE UP (ref 98–107)
CO2 SERPL-SCNC: 24 MMOL/L — SIGNIFICANT CHANGE UP (ref 22–31)
CREAT SERPL-MCNC: 0.36 MG/DL — LOW (ref 0.5–1.3)
EGFR: 99 ML/MIN/1.73M2 — SIGNIFICANT CHANGE UP
GLUCOSE SERPL-MCNC: 98 MG/DL — SIGNIFICANT CHANGE UP (ref 70–99)
HCT VFR BLD CALC: 26.2 % — LOW (ref 34.5–45)
HGB BLD-MCNC: 8 G/DL — LOW (ref 11.5–15.5)
MAGNESIUM SERPL-MCNC: 1.6 MG/DL — SIGNIFICANT CHANGE UP (ref 1.6–2.6)
MCHC RBC-ENTMCNC: 27.7 PG — SIGNIFICANT CHANGE UP (ref 27–34)
MCHC RBC-ENTMCNC: 30.5 GM/DL — LOW (ref 32–36)
MCV RBC AUTO: 90.7 FL — SIGNIFICANT CHANGE UP (ref 80–100)
NRBC # BLD: 0 /100 WBCS — SIGNIFICANT CHANGE UP (ref 0–0)
NRBC # FLD: 0 K/UL — SIGNIFICANT CHANGE UP (ref 0–0)
PHOSPHATE SERPL-MCNC: 3.4 MG/DL — SIGNIFICANT CHANGE UP (ref 2.5–4.5)
PLATELET # BLD AUTO: 246 K/UL — SIGNIFICANT CHANGE UP (ref 150–400)
POTASSIUM SERPL-MCNC: 4.1 MMOL/L — SIGNIFICANT CHANGE UP (ref 3.5–5.3)
POTASSIUM SERPL-SCNC: 4.1 MMOL/L — SIGNIFICANT CHANGE UP (ref 3.5–5.3)
RBC # BLD: 2.89 M/UL — LOW (ref 3.8–5.2)
RBC # FLD: 15 % — HIGH (ref 10.3–14.5)
SARS-COV-2 RNA SPEC QL NAA+PROBE: SIGNIFICANT CHANGE UP
SODIUM SERPL-SCNC: 132 MMOL/L — LOW (ref 135–145)
WBC # BLD: 10.91 K/UL — HIGH (ref 3.8–10.5)
WBC # FLD AUTO: 10.91 K/UL — HIGH (ref 3.8–10.5)

## 2023-01-02 RX ADMIN — Medication 1 MILLIGRAM(S): at 11:53

## 2023-01-02 RX ADMIN — SENNA PLUS 2 TABLET(S): 8.6 TABLET ORAL at 21:42

## 2023-01-02 RX ADMIN — RISPERIDONE 0.5 MILLIGRAM(S): 4 TABLET ORAL at 05:09

## 2023-01-02 RX ADMIN — ATORVASTATIN CALCIUM 40 MILLIGRAM(S): 80 TABLET, FILM COATED ORAL at 21:42

## 2023-01-02 RX ADMIN — Medication 650 MILLIGRAM(S): at 15:00

## 2023-01-02 RX ADMIN — RISPERIDONE 0.5 MILLIGRAM(S): 4 TABLET ORAL at 17:49

## 2023-01-02 RX ADMIN — Medication 650 MILLIGRAM(S): at 14:15

## 2023-01-02 RX ADMIN — Medication 1 TABLET(S): at 11:52

## 2023-01-02 RX ADMIN — Medication 500 MILLIGRAM(S): at 11:53

## 2023-01-02 RX ADMIN — Medication 325 MILLIGRAM(S): at 11:52

## 2023-01-02 RX ADMIN — SERTRALINE 100 MILLIGRAM(S): 25 TABLET, FILM COATED ORAL at 11:53

## 2023-01-02 NOTE — PROGRESS NOTE ADULT - SUBJECTIVE AND OBJECTIVE BOX
PATIENT SEEN AND EXAMINED ON :- 1/2/23  DATE OF SERVICE:    1/23/22         Interim events noted,Labs ,Radiological studies and Cardiology tests reviewed .       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 The patient resides in [facility]. the patient has a chronic R chest wound which is where her skin cancer is located. She states that he has been bleeding a lot lately, and has been for a while. The patient recently got transfused 3 weeks ago for anemia. She states her anemia is likely from the wound. Denies GI bleeding. The patient denies fevers. No other symptoms at this time other than lightheadedness, which had resolved while the patient was in the ED.    Patient is requesting to go back to [facility]. (23 Dec 2022 02:50)      INTERIM EVENTS:Patient seen at bedside ,interim events noted.      PMH -reviewed admission note, no change since admission  HEART FAILURE: Acute[ ]Chronic[ ] Systolic[ ] Diastolic[ ] Combined Systolic and Diastolic[ ]  CAD[ ] CABG[ ] PCI[ ]  DEVICES[ ] PPM[ ] ICD[ ] ILR[ ]  ATRIAL FIBRILLATION[ ] Paroxysmal[ ] Permanent[ ] CHADS2-[  ]  KAMRAN[ ] CKD1[ ] CKD2[ ] CKD3[ ] CKD4[ ] ESRD[ ]  COPD[ ] HTN[ ]   DM[ ] Type1[ ] Type 2[ ]   CVA[ ] Paresis[ ]    AMBULATION: Assisted[ ] Cane/walker[ ] Independent[ ]    MEDICATIONS  (STANDING):  ascorbic acid 500 milliGRAM(s) Oral daily  atorvastatin 40 milliGRAM(s) Oral at bedtime  ferrous    sulfate 325 milliGRAM(s) Oral daily  folic acid 1 milliGRAM(s) Oral daily  multivitamin 1 Tablet(s) Oral daily  risperiDONE   Tablet 0.5 milliGRAM(s) Oral two times a day  senna 2 Tablet(s) Oral at bedtime  sertraline 100 milliGRAM(s) Oral daily    MEDICATIONS  (PRN):  acetaminophen     Tablet .. 650 milliGRAM(s) Oral every 6 hours PRN Temp greater or equal to 38C (100.4F), Mild Pain (1 - 3)  melatonin 3 milliGRAM(s) Oral at bedtime PRN Insomnia            REVIEW OF SYSTEMS:  Constitutional: [ ] fever, [ ]weight loss,  [ ]fatigue [ ]weight gain  Eyes: [ ] visual changes  Respiratory: [ ]shortness of breath;  [ ] cough, [ ]wheezing, [ ]chills, [ ]hemoptysis  Cardiovascular: [ ] chest pain, [ ]palpitations, [ ]dizziness,  [ ]leg swelling[ ]orthopnea[ ]PND  Gastrointestinal: [ ] abdominal pain, [ ]nausea, [ ]vomiting,  [ ]diarrhea [ ]Constipation [ ]Melena  Genitourinary: [ ] dysuria, [ ] hematuria [ ]Powell  Neurologic: [ ] headaches [ ] tremors[ ]weakness [ ]Paralysis Right[ ] Left[ ]  Skin: [ ] itching, [ ]burning, [ ] rashes  Endocrine: [ ] heat or cold intolerance  Musculoskeletal: [ ] joint pain or swelling; [ ] muscle, back, or extremity pain  Psychiatric: [ ] depression, [ ]anxiety, [ ]mood swings, or [ ]difficulty sleeping  Hematologic: [ ] easy bruising, [ ] bleeding gums    [ ] All remaining systems negative except as per above.   [ ]Unable to obtain.  [x] No change in ROS since admission      Vital Signs Last 24 Hrs  T(C): 37.1 (02 Jan 2023 14:10), Max: 37.1 (02 Jan 2023 14:10)  T(F): 98.8 (02 Jan 2023 14:10), Max: 98.8 (02 Jan 2023 14:10)  HR: 85 (02 Jan 2023 14:10) (85 - 93)  BP: 110/53 (02 Jan 2023 14:10) (110/53 - 125/57)  BP(mean): --  RR: 18 (02 Jan 2023 14:10) (16 - 18)  SpO2: 100% (02 Jan 2023 14:10) (99% - 100%)    Parameters below as of 02 Jan 2023 14:10  Patient On (Oxygen Delivery Method): room air      I&O's Summary    02 Jan 2023 07:01  -  02 Jan 2023 20:10  --------------------------------------------------------  IN: 560 mL / OUT: 600 mL / NET: -40 mL        PHYSICAL EXAM:  General: No acute distress BMI-  HEENT: EOMI, PERRL  Neck: Supple, [ ] JVD  Lungs: Equal air entry bilaterally; [ ] rales [ ] wheezing [ ] rhonchi  Heart: Regular rate and rhythm; [x ] murmur   2/6 [ x] systolic [ ] diastolic [ ] radiation[ ] rubs [ ]  gallops  Abdomen: Nontender, bowel sounds present  Extremities: No clubbing, cyanosis, [ ] edema [ ]Pulses  equal and intact  Nervous system:  Alert & Oriented X3, no focal deficits  Psychiatric: Normal affect  Skin: No rashes or lesions    LABS:  01-02    132<L>  |  100  |  17  ----------------------------<  98  4.1   |  24  |  0.36<L>    Ca    9.6      02 Jan 2023 06:12  Phos  3.4     01-02  Mg     1.60     01-02      Creatinine Trend: 0.36<--, 0.46<--, 0.50<--, 0.41<--, 0.49<--, 0.45<--                        8.0    10.91 )-----------( 246      ( 02 Jan 2023 06:12 )             26.2

## 2023-01-02 NOTE — PROGRESS NOTE ADULT - TIME BILLING
- Review of records, telemetry, vital signs and daily labs.   - General and cardiovascular physical examination.  - Generation of cardiovascular treatment plan.  - Coordination of care.      Patient was seen and examined by me on 1/2/23,interim events noted,labs and radiology studies reviewed.  Cayetano Landin MD,FACC.  [address] [place], [place] [id number].  [phone]

## 2023-01-02 NOTE — PROGRESS NOTE ADULT - SUBJECTIVE AND OBJECTIVE BOX
Patient is a 87y old  Female who presents with a chief complaint of wound (01 Jan 2023 10:54)    Patient seen this morning. She feels well and has no new complaints at this time. Still bleeding from  wounds. Hg slowly dropping.     MEDICATIONS  (STANDING):  ascorbic acid 500 milliGRAM(s) Oral daily  atorvastatin 40 milliGRAM(s) Oral at bedtime  ferrous    sulfate 325 milliGRAM(s) Oral daily  folic acid 1 milliGRAM(s) Oral daily  multivitamin 1 Tablet(s) Oral daily  risperiDONE   Tablet 0.5 milliGRAM(s) Oral two times a day  senna 2 Tablet(s) Oral at bedtime  sertraline 100 milliGRAM(s) Oral daily    MEDICATIONS  (PRN):  acetaminophen     Tablet .. 650 milliGRAM(s) Oral every 6 hours PRN Temp greater or equal to 38C (100.4F), Mild Pain (1 - 3)  melatonin 3 milliGRAM(s) Oral at bedtime PRN Insomnia        Vital Signs Last 24 Hrs  T(C): 36.7 (02 Jan 2023 09:40), Max: 37 (02 Jan 2023 05:07)  T(F): 98 (02 Jan 2023 09:40), Max: 98.6 (02 Jan 2023 05:07)  HR: 93 (02 Jan 2023 09:40) (85 - 93)  BP: 117/56 (02 Jan 2023 09:40) (117/56 - 125/57)  BP(mean): --  RR: 16 (02 Jan 2023 09:40) (16 - 18)  SpO2: 100% (02 Jan 2023 09:40) (99% - 100%)    Parameters below as of 02 Jan 2023 09:40  Patient On (Oxygen Delivery Method): room air        PE  NAD  Awake, alert, pleasant   Anicteric  bandage around chest   No c/c/e  No rash grossly  FROM                          8.0    10.91 )-----------( 246      ( 02 Jan 2023 06:12 )             26.2       01-02    132<L>  |  100  |  17  ----------------------------<  98  4.1   |  24  |  0.36<L>    Ca    9.6      02 Jan 2023 06:12  Phos  3.4     01-02  Mg     1.60     01-02         Patient is a 87y old  Female who presents with a chief complaint of wound (01 Jan 2023 10:54)    Patient seen this morning. She feels well but states that her right arm bothered her more than usual overnight. Still bleeding from  wounds. Hg slowly dropping.     MEDICATIONS  (STANDING):  ascorbic acid 500 milliGRAM(s) Oral daily  atorvastatin 40 milliGRAM(s) Oral at bedtime  ferrous    sulfate 325 milliGRAM(s) Oral daily  folic acid 1 milliGRAM(s) Oral daily  multivitamin 1 Tablet(s) Oral daily  risperiDONE   Tablet 0.5 milliGRAM(s) Oral two times a day  senna 2 Tablet(s) Oral at bedtime  sertraline 100 milliGRAM(s) Oral daily    MEDICATIONS  (PRN):  acetaminophen     Tablet .. 650 milliGRAM(s) Oral every 6 hours PRN Temp greater or equal to 38C (100.4F), Mild Pain (1 - 3)  melatonin 3 milliGRAM(s) Oral at bedtime PRN Insomnia        Vital Signs Last 24 Hrs  T(C): 36.7 (02 Jan 2023 09:40), Max: 37 (02 Jan 2023 05:07)  T(F): 98 (02 Jan 2023 09:40), Max: 98.6 (02 Jan 2023 05:07)  HR: 93 (02 Jan 2023 09:40) (85 - 93)  BP: 117/56 (02 Jan 2023 09:40) (117/56 - 125/57)  BP(mean): --  RR: 16 (02 Jan 2023 09:40) (16 - 18)  SpO2: 100% (02 Jan 2023 09:40) (99% - 100%)    Parameters below as of 02 Jan 2023 09:40  Patient On (Oxygen Delivery Method): room air        PE  NAD  Awake, alert, pleasant   Anicteric  bandage around chest   right upper ext with significant edema  No rash grossly  FROM                          8.0    10.91 )-----------( 246      ( 02 Jan 2023 06:12 )             26.2       01-02    132<L>  |  100  |  17  ----------------------------<  98  4.1   |  24  |  0.36<L>    Ca    9.6      02 Jan 2023 06:12  Phos  3.4     01-02  Mg     1.60     01-02

## 2023-01-02 NOTE — PROGRESS NOTE ADULT - ASSESSMENT
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    1. Metastatic SCC skin   -- s/p RT to right breast at [facility]   -- Currently receiving cemiplimab, LD 12/22. Hold systemic treatment while admitted   -- Follow up with Dr. Alexis after discharge  --CT chest from 12/29 shows overall worsening metastatic burden since July 2022, with diffuse irregular and fungating cutaneous lesions throughout the chest wall with  open lesions and rib invasion, new/enlarging left axillary/chest wall lymphadenopathy and pulmonary metastases.    2. Anemia   -- Recurrent, likely secondary to bleeding skin lesions as well as immunotherapy  -- Rad/onc consulted for RT to chest to assist in bleeding control - f/u plan  -- s/p 6 units pRBC since admission  -- Anemia workup shows some degree of iron deficiency, otherwise unremarkable. Occult blood negative. Cont PO Fe   -- Transfuse to maintain hg >7    Will continue to follow.    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    1. Metastatic SCC skin   -- s/p RT to right breast at [facility]   -- Currently receiving cemiplimab, LD 12/22. Hold systemic treatment while admitted   -- Follow up with Dr. Alexis after discharge  --CT chest from 12/29 shows overall worsening metastatic burden since July 2022, with diffuse irregular and fungating cutaneous lesions throughout the chest wall with  open lesions and rib invasion, new/enlarging left axillary/chest wall lymphadenopathy and pulmonary metastases.    2. Anemia   -- Recurrent, likely secondary to bleeding skin lesions as well as immunotherapy  -- Rad/onc consulted for RT to chest to assist in bleeding control - f/u plan  -- s/p 6 units pRBC since admission  -- Anemia workup shows some degree of iron deficiency, otherwise unremarkable. Occult blood negative. Cont PO Fe   -- Transfuse to maintain hg >7    3. RUE edema  -- duplex w/o DVT   -- chronic, likely related to worsening malignancy    Will continue to follow.    Lucy Pastrana PA-C  Hematology/Oncology  [facility]  [phone] (office)  [phone] (alt office)  Evenings and weekends please call MD on call or office

## 2023-01-03 LAB
ANION GAP SERPL CALC-SCNC: 11 MMOL/L — SIGNIFICANT CHANGE UP (ref 7–14)
BUN SERPL-MCNC: 16 MG/DL — SIGNIFICANT CHANGE UP (ref 7–23)
CALCIUM SERPL-MCNC: 9.3 MG/DL — SIGNIFICANT CHANGE UP (ref 8.4–10.5)
CHLORIDE SERPL-SCNC: 102 MMOL/L — SIGNIFICANT CHANGE UP (ref 98–107)
CO2 SERPL-SCNC: 21 MMOL/L — LOW (ref 22–31)
CREAT SERPL-MCNC: 0.49 MG/DL — LOW (ref 0.5–1.3)
EGFR: 92 ML/MIN/1.73M2 — SIGNIFICANT CHANGE UP
GLUCOSE SERPL-MCNC: 99 MG/DL — SIGNIFICANT CHANGE UP (ref 70–99)
HCT VFR BLD CALC: 24.4 % — LOW (ref 34.5–45)
HGB BLD-MCNC: 7.5 G/DL — LOW (ref 11.5–15.5)
MAGNESIUM SERPL-MCNC: 1.7 MG/DL — SIGNIFICANT CHANGE UP (ref 1.6–2.6)
MCHC RBC-ENTMCNC: 27.8 PG — SIGNIFICANT CHANGE UP (ref 27–34)
MCHC RBC-ENTMCNC: 30.7 GM/DL — LOW (ref 32–36)
MCV RBC AUTO: 90.4 FL — SIGNIFICANT CHANGE UP (ref 80–100)
NRBC # BLD: 0 /100 WBCS — SIGNIFICANT CHANGE UP (ref 0–0)
NRBC # FLD: 0 K/UL — SIGNIFICANT CHANGE UP (ref 0–0)
PHOSPHATE SERPL-MCNC: 3.4 MG/DL — SIGNIFICANT CHANGE UP (ref 2.5–4.5)
PLATELET # BLD AUTO: 261 K/UL — SIGNIFICANT CHANGE UP (ref 150–400)
POTASSIUM SERPL-MCNC: 4.5 MMOL/L — SIGNIFICANT CHANGE UP (ref 3.5–5.3)
POTASSIUM SERPL-SCNC: 4.5 MMOL/L — SIGNIFICANT CHANGE UP (ref 3.5–5.3)
RBC # BLD: 2.7 M/UL — LOW (ref 3.8–5.2)
RBC # FLD: 15.1 % — HIGH (ref 10.3–14.5)
SODIUM SERPL-SCNC: 134 MMOL/L — LOW (ref 135–145)
WBC # BLD: 10.68 K/UL — HIGH (ref 3.8–10.5)
WBC # FLD AUTO: 10.68 K/UL — HIGH (ref 3.8–10.5)

## 2023-01-03 RX ADMIN — Medication 650 MILLIGRAM(S): at 04:20

## 2023-01-03 RX ADMIN — RISPERIDONE 0.5 MILLIGRAM(S): 4 TABLET ORAL at 18:08

## 2023-01-03 RX ADMIN — SERTRALINE 100 MILLIGRAM(S): 25 TABLET, FILM COATED ORAL at 12:34

## 2023-01-03 RX ADMIN — ATORVASTATIN CALCIUM 40 MILLIGRAM(S): 80 TABLET, FILM COATED ORAL at 20:58

## 2023-01-03 RX ADMIN — RISPERIDONE 0.5 MILLIGRAM(S): 4 TABLET ORAL at 05:38

## 2023-01-03 RX ADMIN — Medication 1 MILLIGRAM(S): at 12:33

## 2023-01-03 RX ADMIN — Medication 650 MILLIGRAM(S): at 03:33

## 2023-01-03 RX ADMIN — Medication 650 MILLIGRAM(S): at 20:59

## 2023-01-03 RX ADMIN — Medication 650 MILLIGRAM(S): at 21:59

## 2023-01-03 RX ADMIN — Medication 1 TABLET(S): at 12:33

## 2023-01-03 RX ADMIN — Medication 325 MILLIGRAM(S): at 12:33

## 2023-01-03 RX ADMIN — Medication 500 MILLIGRAM(S): at 12:33

## 2023-01-03 NOTE — PROGRESS NOTE ADULT - ASSESSMENT
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    1. Metastatic SCC skin   -- s/p RT to right breast at [facility]   -- Currently receiving cemiplimab, LD 12/22. Hold systemic treatment while admitted   -- Follow up with Dr. Alexis after discharge  --CT chest from 12/29 shows overall worsening metastatic burden since July 2022, with diffuse irregular and fungating cutaneous lesions throughout the chest wall with  open lesions and rib invasion, new/enlarging left axillary/chest wall lymphadenopathy and pulmonary metastases.    2. Anemia   -- Recurrent, likely secondary to bleeding skin lesions as well as immunotherapy  -- s/p 6 units pRBC since admission  -- Anemia workup shows some degree of iron deficiency, otherwise unremarkable. Occult blood negative. Cont PO Fe   -- Rad/onc to follow up for RT to assist in bleeding control   -- Transfuse to maintain hg >7    3. RUE edema  -- duplex w/o DVT   -- chronic, likely related to worsening malignancy    Will continue to follow.    Lucy Pastrana PA-C  Hematology/Oncology  [facility]  [phone] (office)  [phone] (alt office)  Evenings and weekends please call MD on call or office

## 2023-01-03 NOTE — PROGRESS NOTE ADULT - SUBJECTIVE AND OBJECTIVE BOX
PATIENT SEEN AND EXAMINED ON :- 1/3/23  DATE OF SERVICE:  1/3/23           Interim events noted,Labs ,Radiological studies and Cardiology tests reviewed .       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 The patient resides in [facility]. the patient has a chronic R chest wound which is where her skin cancer is located. She states that he has been bleeding a lot lately, and has been for a while. The patient recently got transfused 3 weeks ago for anemia. She states her anemia is likely from the wound. Denies GI bleeding. The patient denies fevers. No other symptoms at this time other than lightheadedness, which had resolved while the patient was in the ED.    Patient is requesting to go back to [facility]. (23 Dec 2022 02:50)      INTERIM EVENTS:Patient seen at bedside ,interim events noted.      PMH -reviewed admission note, no change since admission  HEART FAILURE: Acute[ ]Chronic[ ] Systolic[ ] Diastolic[ ] Combined Systolic and Diastolic[ ]  CAD[ ] CABG[ ] PCI[ ]  DEVICES[ ] PPM[ ] ICD[ ] ILR[ ]  ATRIAL FIBRILLATION[ ] Paroxysmal[ ] Permanent[ ] CHADS2-[  ]  KAMRAN[ ] CKD1[ ] CKD2[ ] CKD3[ ] CKD4[ ] ESRD[ ]  COPD[ ] HTN[ ]   DM[ ] Type1[ ] Type 2[ ]   CVA[ ] Paresis[ ]    AMBULATION: Assisted[ ] Cane/walker[ ] Independent[ ]    MEDICATIONS  (STANDING):  ascorbic acid 500 milliGRAM(s) Oral daily  atorvastatin 40 milliGRAM(s) Oral at bedtime  ferrous    sulfate 325 milliGRAM(s) Oral daily  folic acid 1 milliGRAM(s) Oral daily  multivitamin 1 Tablet(s) Oral daily  risperiDONE   Tablet 0.5 milliGRAM(s) Oral two times a day  senna 2 Tablet(s) Oral at bedtime  sertraline 100 milliGRAM(s) Oral daily    MEDICATIONS  (PRN):  acetaminophen     Tablet .. 650 milliGRAM(s) Oral every 6 hours PRN Temp greater or equal to 38C (100.4F), Mild Pain (1 - 3)  melatonin 3 milliGRAM(s) Oral at bedtime PRN Insomnia            REVIEW OF SYSTEMS:  Constitutional: [ ] fever, [ ]weight loss,  [ ]fatigue [ ]weight gain  Eyes: [ ] visual changes  Respiratory: [ ]shortness of breath;  [ ] cough, [ ]wheezing, [ ]chills, [ ]hemoptysis  Cardiovascular: [ ] chest pain, [ ]palpitations, [ ]dizziness,  [ ]leg swelling[ ]orthopnea[ ]PND  Gastrointestinal: [ ] abdominal pain, [ ]nausea, [ ]vomiting,  [ ]diarrhea [ ]Constipation [ ]Melena  Genitourinary: [ ] dysuria, [ ] hematuria [ ]Powell  Neurologic: [ ] headaches [ ] tremors[ ]weakness [ ]Paralysis Right[ ] Left[ ]  Skin: [ ] itching, [ ]burning, [ ] rashes  Endocrine: [ ] heat or cold intolerance  Musculoskeletal: [ ] joint pain or swelling; [ ] muscle, back, or extremity pain  Psychiatric: [ ] depression, [ ]anxiety, [ ]mood swings, or [ ]difficulty sleeping  Hematologic: [ ] easy bruising, [ ] bleeding gums    [ ] All remaining systems negative except as per above.   [ ]Unable to obtain.  [x] No change in ROS since admission      Vital Signs Last 24 Hrs  T(C): 37.1 (03 Jan 2023 17:18), Max: 37.1 (02 Jan 2023 21:48)  T(F): 98.7 (03 Jan 2023 17:18), Max: 98.8 (02 Jan 2023 21:48)  HR: 89 (03 Jan 2023 17:18) (84 - 97)  BP: 110/42 (03 Jan 2023 17:18) (108/51 - 119/43)  BP(mean): --  RR: 17 (03 Jan 2023 17:18) (17 - 18)  SpO2: 100% (03 Jan 2023 17:18) (100% - 100%)    Parameters below as of 03 Jan 2023 17:18  Patient On (Oxygen Delivery Method): room air      I&O's Summary    02 Jan 2023 07:01  -  03 Jan 2023 07:00  --------------------------------------------------------  IN: 560 mL / OUT: 600 mL / NET: -40 mL        PHYSICAL EXAM:  General: No acute distress BMI-  HEENT: EOMI, PERRL  Neck: Supple, [ ] JVD  Lungs: Equal air entry bilaterally; [ ] rales [ ] wheezing [ ] rhonchi  Heart: Regular rate and rhythm; [x ] murmur   2/6 [ x] systolic [ ] diastolic [ ] radiation[ ] rubs [ ]  gallops  Abdomen: Nontender, bowel sounds present  Extremities: No clubbing, cyanosis, [ ] edema [ ]Pulses  equal and intact  Nervous system:  Alert & Oriented X3, no focal deficits  Psychiatric: Normal affect  Skin: No rashes or lesions    LABS:  01-03    134<L>  |  102  |  16  ----------------------------<  99  4.5   |  21<L>  |  0.49<L>    Ca    9.3      03 Jan 2023 06:25  Phos  3.4     01-03  Mg     1.70     01-03      Creatinine Trend: 0.49<--, 0.36<--, 0.46<--, 0.50<--, 0.41<--, 0.49<--                        7.5    10.68 )-----------( 261      ( 03 Jan 2023 06:25 )             24.4

## 2023-01-03 NOTE — PROGRESS NOTE ADULT - TIME BILLING
- Review of records, telemetry, vital signs and daily labs.   - General and cardiovascular physical examination.  - Generation of cardiovascular treatment plan.  - Coordination of care.      Patient was seen and examined by me on 1/3/23,interim events noted,labs and radiology studies reviewed.  Cayetano Landin MD,FACC.  [address] [place], [place] [id number].  [phone]

## 2023-01-03 NOTE — PROGRESS NOTE ADULT - SUBJECTIVE AND OBJECTIVE BOX
Patient is a 87y old  Female who presents with a chief complaint of wound (02 Jan 2023 12:26)    Patient seen this afternoon. She reports feeling well overall, states that bleeding is slightly improved, though hg still dropping.     MEDICATIONS  (STANDING):  ascorbic acid 500 milliGRAM(s) Oral daily  atorvastatin 40 milliGRAM(s) Oral at bedtime  ferrous    sulfate 325 milliGRAM(s) Oral daily  folic acid 1 milliGRAM(s) Oral daily  multivitamin 1 Tablet(s) Oral daily  risperiDONE   Tablet 0.5 milliGRAM(s) Oral two times a day  senna 2 Tablet(s) Oral at bedtime  sertraline 100 milliGRAM(s) Oral daily    MEDICATIONS  (PRN):  acetaminophen     Tablet .. 650 milliGRAM(s) Oral every 6 hours PRN Temp greater or equal to 38C (100.4F), Mild Pain (1 - 3)  melatonin 3 milliGRAM(s) Oral at bedtime PRN Insomnia        Vital Signs Last 24 Hrs  T(C): 36.7 (03 Jan 2023 05:41), Max: 37.1 (02 Jan 2023 14:10)  T(F): 98 (03 Jan 2023 05:41), Max: 98.8 (02 Jan 2023 14:10)  HR: 84 (03 Jan 2023 05:41) (84 - 97)  BP: 119/43 (03 Jan 2023 05:41) (110/53 - 119/43)  BP(mean): --  RR: 18 (03 Jan 2023 05:41) (18 - 18)  SpO2: 100% (03 Jan 2023 05:41) (100% - 100%)    Parameters below as of 03 Jan 2023 05:41  Patient On (Oxygen Delivery Method): room air        PE  NAD  Awake, alert  Anicteric  bandage around chest   RUE very swollen   No rash grossly  FROM                          7.5    10.68 )-----------( 261      ( 03 Jan 2023 06:25 )             24.4       01-03    134<L>  |  102  |  16  ----------------------------<  99  4.5   |  21<L>  |  0.49<L>    Ca    9.3      03 Jan 2023 06:25  Phos  3.4     01-03  Mg     1.70     01-03

## 2023-01-04 LAB
ANION GAP SERPL CALC-SCNC: 8 MMOL/L — SIGNIFICANT CHANGE UP (ref 7–14)
BUN SERPL-MCNC: 18 MG/DL — SIGNIFICANT CHANGE UP (ref 7–23)
CALCIUM SERPL-MCNC: 9.3 MG/DL — SIGNIFICANT CHANGE UP (ref 8.4–10.5)
CHLORIDE SERPL-SCNC: 99 MMOL/L — SIGNIFICANT CHANGE UP (ref 98–107)
CO2 SERPL-SCNC: 22 MMOL/L — SIGNIFICANT CHANGE UP (ref 22–31)
CREAT SERPL-MCNC: 0.45 MG/DL — LOW (ref 0.5–1.3)
EGFR: 94 ML/MIN/1.73M2 — SIGNIFICANT CHANGE UP
GLUCOSE SERPL-MCNC: 86 MG/DL — SIGNIFICANT CHANGE UP (ref 70–99)
HCT VFR BLD CALC: 23.1 % — LOW (ref 34.5–45)
HGB BLD-MCNC: 7 G/DL — CRITICAL LOW (ref 11.5–15.5)
MAGNESIUM SERPL-MCNC: 1.7 MG/DL — SIGNIFICANT CHANGE UP (ref 1.6–2.6)
MCHC RBC-ENTMCNC: 27.6 PG — SIGNIFICANT CHANGE UP (ref 27–34)
MCHC RBC-ENTMCNC: 30.3 GM/DL — LOW (ref 32–36)
MCV RBC AUTO: 90.9 FL — SIGNIFICANT CHANGE UP (ref 80–100)
NRBC # BLD: 0 /100 WBCS — SIGNIFICANT CHANGE UP (ref 0–0)
NRBC # FLD: 0 K/UL — SIGNIFICANT CHANGE UP (ref 0–0)
PHOSPHATE SERPL-MCNC: 3.2 MG/DL — SIGNIFICANT CHANGE UP (ref 2.5–4.5)
PLATELET # BLD AUTO: 257 K/UL — SIGNIFICANT CHANGE UP (ref 150–400)
POTASSIUM SERPL-MCNC: 4.2 MMOL/L — SIGNIFICANT CHANGE UP (ref 3.5–5.3)
POTASSIUM SERPL-SCNC: 4.2 MMOL/L — SIGNIFICANT CHANGE UP (ref 3.5–5.3)
RBC # BLD: 2.54 M/UL — LOW (ref 3.8–5.2)
RBC # FLD: 15.2 % — HIGH (ref 10.3–14.5)
SODIUM SERPL-SCNC: 129 MMOL/L — LOW (ref 135–145)
WBC # BLD: 10.65 K/UL — HIGH (ref 3.8–10.5)
WBC # FLD AUTO: 10.65 K/UL — HIGH (ref 3.8–10.5)

## 2023-01-04 RX ORDER — FUROSEMIDE 40 MG
20 TABLET ORAL ONCE
Refills: 0 | Status: COMPLETED | OUTPATIENT
Start: 2023-01-04 | End: 2023-01-04

## 2023-01-04 RX ADMIN — RISPERIDONE 0.5 MILLIGRAM(S): 4 TABLET ORAL at 05:30

## 2023-01-04 RX ADMIN — RISPERIDONE 0.5 MILLIGRAM(S): 4 TABLET ORAL at 17:39

## 2023-01-04 RX ADMIN — Medication 650 MILLIGRAM(S): at 15:46

## 2023-01-04 RX ADMIN — Medication 325 MILLIGRAM(S): at 12:21

## 2023-01-04 RX ADMIN — SERTRALINE 100 MILLIGRAM(S): 25 TABLET, FILM COATED ORAL at 12:21

## 2023-01-04 RX ADMIN — Medication 1 TABLET(S): at 12:20

## 2023-01-04 RX ADMIN — Medication 1 MILLIGRAM(S): at 12:21

## 2023-01-04 RX ADMIN — Medication 20 MILLIGRAM(S): at 22:54

## 2023-01-04 RX ADMIN — ATORVASTATIN CALCIUM 40 MILLIGRAM(S): 80 TABLET, FILM COATED ORAL at 22:55

## 2023-01-04 RX ADMIN — Medication 500 MILLIGRAM(S): at 12:21

## 2023-01-04 NOTE — CHART NOTE - NSCHARTNOTEFT_GEN_A_CORE
RD follow-up for severe malnutrition. 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       Patient with good PO intake being recorded in flowsheets.  Patient with low serum Na per current labs.  No GI distress reported i.e. nausea, vomiting, diarrhea, last BM 1/1/23 per flowsheets.    Source: Patient [ ]    Family [ ]     other [ X ] Chart Review     Diet, Regular:   DASH/TLC {Sodium & Cholesterol Restricted} (DASH) (12-23-22 @ 08:35)    Current Weight: 12/23 - 53.7kg, no new weights to assess     Pertinent Medications:   ascorbic acid  atorvastatin  ferrous    sulfate  folic acid  multivitamin  risperiDONE   Tablet  senna  sertraline    Pertinent Labs:  01-04 Na129 mmol/L<L> Glu 86 mg/dL K+ 4.2 mmol/L Cr  0.45 mg/dL<L> BUN 18 mg/dL 01-04 Phos 3.2 mg/dL    Skin: No pressure injuries noted, however, patient noted with malignancy related skin  wounds, lesions.  Patient with 4+ edema to rt arm, noted to be likely secondary to worsening malignancy.      Estimated Needs:   [X] no change since previous assessment  [ ] recalculated:     Previous Nutrition Diagnosis: Severe Malnutrition, ongoing    Additional Recommendations:   1) Monitor weights, PO intake/diet tolerance, skin integrity, pertinent labs.   2) Suggest Ensure Plus High Protein Therapeutic Shake 2x daily (700kcal, 40g protein) and diet liberalization to regular as previously recommended.  3) Please consistently document % PO intake in nursing flowsheets to assess adequacy of nutritional intake/monitor need for further nutritional intervention(s).   4) Consider sodium chloride tabs if clinically appropriate.    Amelia Mancilla, MS, RDN, CDN  Pager [phone]  Also available on MS Teams RD follow-up for severe malnutrition. 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       Patient with good PO intake being recorded in flowsheets.  Patient with low serum Na per current labs.  No GI distress reported i.e. nausea, vomiting, diarrhea, last BM 1/1/23 per flowsheets. Patient eating at time of visit, good PO intake per RD observation. Patient denies any complaints, discussed adding Ensure HP which was communicated to provider.    Source: Patient [ ]    Family [ ]     other [ X ] Chart Review     Diet, Regular:   DASH/TLC {Sodium & Cholesterol Restricted} (DASH) (12-23-22 @ 08:35)    Current Weight: 12/23 - 53.7kg, no new weights to assess     Pertinent Medications:   ascorbic acid  atorvastatin  ferrous    sulfate  folic acid  multivitamin  risperiDONE   Tablet  senna  sertraline    Pertinent Labs:  01-04 Na129 mmol/L<L> Glu 86 mg/dL K+ 4.2 mmol/L Cr  0.45 mg/dL<L> BUN 18 mg/dL 01-04 Phos 3.2 mg/dL    Skin: No pressure injuries noted, however, patient noted with malignancy related skin  wounds, lesions.  Patient with 4+ edema to rt arm, noted to be likely secondary to worsening malignancy.      Estimated Needs:   [X] no change since previous assessment  [ ] recalculated:     Previous Nutrition Diagnosis: Severe Malnutrition, ongoing    Additional Recommendations:   1) Monitor weights, PO intake/diet tolerance, skin integrity, pertinent labs.   2) Suggest Ensure Plus High Protein Therapeutic Shake 2x daily (700kcal, 40g protein) and diet liberalization to regular as previously recommended.  3) Please consistently document % PO intake in nursing flowsheets to assess adequacy of nutritional intake/monitor need for further nutritional intervention(s).   4) Consider sodium chloride tabs if clinically appropriate.    Amelia Mancilla, MS, RDN, CDN  Pager [phone]  Also available on MS Teams

## 2023-01-04 NOTE — PROGRESS NOTE ADULT - TIME BILLING
- Review of records, telemetry, vital signs and daily labs.   - General and cardiovascular physical examination.  - Generation of cardiovascular treatment plan.  - Coordination of care.      Patient was seen and examined by me on 1/23/22,interim events noted,labs and radiology studies reviewed.  Cayetano Landin MD,FACC.  [address] [place], [place] [id number].  [phone] - Review of records, telemetry, vital signs and daily labs.   - General and cardiovascular physical examination.  - Generation of cardiovascular treatment plan.  - Coordination of care.      Patient was seen and examined by me on 1/3/23,interim events noted,labs and radiology studies reviewed.  Cayetano Landin MD,FACC.  [address] [place], [place] [id number].  [phone]

## 2023-01-04 NOTE — PROGRESS NOTE ADULT - ASSESSMENT
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    1. Metastatic SCC skin   -- s/p RT to right breast at [facility]   -- Currently receiving cemiplimab, LD 12/22. Hold systemic treatment while admitted   -- Follow up with Dr. Alexis after discharge  -- CT chest from 12/29 shows overall worsening metastatic burden since July 2022, with diffuse irregular and fungating cutaneous lesions throughout the chest wall with  open lesions and rib invasion, new/enlarging left axillary/chest wall lymphadenopathy and pulmonary metastases.    2. Anemia   -- Recurrent, likely secondary to bleeding skin lesions as well as immunotherapy  -- Anemia workup shows some degree of iron deficiency, otherwise unremarkable. Occult blood negative. Cont PO Fe   -- Rad/onc eval noted- no plans for inpatient RT.   -- Continue supportive transfusions, maintain hg >7    3. RUE edema  -- duplex w/o DVT   -- chronic, likely related to worsening malignancy    Will continue to follow.    Lucy Pastrana PA-C  Hematology/Oncology  [facility]  [phone] (office)  [phone] (alt office)  Evenings and weekends please call MD on call or office

## 2023-01-04 NOTE — CHART NOTE - NSCHARTNOTEFT_GEN_A_CORE
Medicine NPO note    Pt H and H 7/23.1 , pt has peter chest wounds bleeding , Attending , Dr. Landin notified , 2 units of PRBC ordered to be transfused and 20 mg IV Lasix to be given after 2nd unit , CBC to be repeated after 2nfd unit. Pt finished 2 nd unit, oral temp 99.5, endorsed to night provider to monitor temp , patient asymptomatic, no distress.    Aparna Bai, NP-C  Department of Medicine- ACP  In House Pager #[phone]

## 2023-01-04 NOTE — PROGRESS NOTE ADULT - SUBJECTIVE AND OBJECTIVE BOX
Patient is a 87y old  Female who presents with a chief complaint of wound (04 Jan 2023 13:02)    Patient seen this afternoon. Feels well, no new complaints. Hg continues to decrease, s/p 1 unit pRBC.    MEDICATIONS  (STANDING):  ascorbic acid 500 milliGRAM(s) Oral daily  atorvastatin 40 milliGRAM(s) Oral at bedtime  ferrous    sulfate 325 milliGRAM(s) Oral daily  folic acid 1 milliGRAM(s) Oral daily  multivitamin 1 Tablet(s) Oral daily  risperiDONE   Tablet 0.5 milliGRAM(s) Oral two times a day  senna 2 Tablet(s) Oral at bedtime  sertraline 100 milliGRAM(s) Oral daily    MEDICATIONS  (PRN):  acetaminophen     Tablet .. 650 milliGRAM(s) Oral every 6 hours PRN Temp greater or equal to 38C (100.4F), Mild Pain (1 - 3)  melatonin 3 milliGRAM(s) Oral at bedtime PRN Insomnia        Vital Signs Last 24 Hrs  T(C): 37.2 (04 Jan 2023 12:25), Max: 37.3 (03 Jan 2023 21:39)  T(F): 98.9 (04 Jan 2023 12:25), Max: 99.2 (03 Jan 2023 21:39)  HR: 95 (04 Jan 2023 12:25) (85 - 102)  BP: 112/45 (04 Jan 2023 12:25) (95/35 - 120/45)  BP(mean): --  RR: 18 (04 Jan 2023 12:25) (17 - 18)  SpO2: 100% (04 Jan 2023 12:25) (99% - 100%)    Parameters below as of 04 Jan 2023 12:25  Patient On (Oxygen Delivery Method): room air        PE  NAD  Awake, alert  Anicteric  significant RUE edema   No rash grossly  FROM                          7.0    10.65 )-----------( 257      ( 04 Jan 2023 05:43 )             23.1       01-04    129<L>  |  99  |  18  ----------------------------<  86  4.2   |  22  |  0.45<L>    Ca    9.3      04 Jan 2023 05:43  Phos  3.2     01-04  Mg     1.70     01-04

## 2023-01-04 NOTE — PROGRESS NOTE ADULT - SUBJECTIVE AND OBJECTIVE BOX
87y.o. F h/o squamous cell carcinoma of skin, with right chest wound and having bleeding causing anemia the cause of her ED admission on 12/22/22.     No plans for inpatient chemotherapy.  Was reported to have had prior radiation therapy @ [facility] recently; 5 fractions.  I called [facility]; they have no record of her being a patient there for radiation therapy.     Pathology is in our system showing squamous cell ca.   If she has had recent/prior RT to the chest wall; we need to know where/site/dosage before considering  any radiation treatment here at [facility].    Seen by heme onc:  to F/u with Dr. Alexis.   No plans for systemic therapy while admitted.       -- s/p RT to right breast at [facility]   -- Currently receiving cemiplimab, LD 12/22. Hold systemic treatment while admitted   -- Follow up with Dr. Alexis after discharge  --CT chest from 12/29 shows overall worsening metastatic burden since July 2022, with diffuse irregular and fungating cutaneous lesions throughout the chest wall with  open lesions and rib invasion, new/enlarging left axillary/chest wall lymphadenopathy and pulmonary metastases.    < from: CT Chest No Cont (12.29.22 @ 15:08) >    IMPRESSION:  Overall worsening metastatic burden since July 2022, with diffuse   irregular and fungating cutaneous lesions throughout the chest wall with   open lesions and rib invasion, new/enlarging left axillary/chest wall   lymphadenopathy and pulmonary metastases.         87y.o. F h/o squamous cell carcinoma of skin, with right chest wound and having bleeding causing anemia the cause of her ED admission on 12/22/22.     No plans for inpatient chemotherapy.  She follows with Dr. Alexis and also Dr. Blake from [facility].     Was reported to have had prior radiation therapy @ [facility] recently; 5 fractions.  I called [facility]; they have no record of her being a patient there for radiation therapy. Yet, Ms. Mejia  contends she did have RT there at the end of September 2022 to the right lateral chest wall.     Pathology is in our system showing squamous cell ca.   If she has had recent/prior RT to the chest wall; we need to know where/site/dosage before considering  any radiation treatment here at [facility] but cannot affirm previous RT.    Seen by heme onc:  to F/u with Dr. Alexis.   No plans for systemic therapy while admitted. I called Dr. Alexis but was unable to reach him today.    Per heme onc:   -- s/p RT to right breast at [facility]   -- Currently receiving cemiplimab, LD 12/22. Hold systemic treatment while admitted   -- Follow up with Dr. Alexis after discharge  --CT chest from 12/29 shows overall worsening metastatic burden since July 2022, with diffuse irregular and fungating cutaneous lesions throughout the chest wall with  open lesions and rib invasion, new/enlarging left axillary/chest wall lymphadenopathy and pulmonary metastases.    < from: CT Chest No Cont (12.29.22 @ 15:08) >    IMPRESSION:  Overall worsening metastatic burden since July 2022, with diffuse   irregular and fungating cutaneous lesions throughout the chest wall with   open lesions and rib invasion, new/enlarging left axillary/chest wall   lymphadenopathy and pulmonary metastases.    P/E:     She was receiving blood transfusion  when seen.  a.o. x 3.  stable, NAD.   KPS 50.  entire chest wall wrapped down to the abdomen.       Will d/w Dr. Aguilar (on call today) and attempt to reach her oncologist to find out her prior RT records.  She also resides at [facility] and wishes to go back when she can.  She gave up having an apartment since JUly 2022  and has been in/out of hospitals.   87y.o. F h/o squamous cell carcinoma of skin, with right chest wound and having bleeding causing anemia the cause of her ED admission on 12/22/22.     No plans for inpatient chemotherapy.  She follows with Dr. Alexis and also Dr. Blake from [facility].     Was reported to have had prior radiation therapy @ [facility] recently; 5 fractions.  I called [facility]; they have no record of her being a patient there for radiation therapy. Yet, Ms. Mejia  contends she did have RT there at the end of September 2022 to the right lateral chest wall.     Pathology is in our system showing squamous cell ca.   If she has had recent/prior RT to the chest wall; we need to know where/site/dosage before considering  any radiation treatment here at [facility] but cannot affirm previous RT.    Seen by heme onc:  to F/u with Dr. Alexis.   No plans for systemic therapy while admitted. I called Dr. Alexis but was unable to reach him today.    Per heme onc:   -- s/p RT to right breast at [facility]   -- Currently receiving cemiplimab, LD 12/22. Hold systemic treatment while admitted   -- Follow up with Dr. Alexis after discharge  --CT chest from 12/29 shows overall worsening metastatic burden since July 2022, with diffuse irregular and fungating cutaneous lesions throughout the chest wall with  open lesions and rib invasion, new/enlarging left axillary/chest wall lymphadenopathy and pulmonary metastases.    < from: CT Chest No Cont (12.29.22 @ 15:08) >    IMPRESSION:  Overall worsening metastatic burden since July 2022, with diffuse   irregular and fungating cutaneous lesions throughout the chest wall with   open lesions and rib invasion, new/enlarging left axillary/chest wall   lymphadenopathy and pulmonary metastases.    P/E:     She was receiving blood transfusion  when seen.  a.o. x 3.  stable, NAD.   KPS 50.  entire chest wall wrapped down to the abdomen.   right arm= ++ lymphedema.       Will d/w Dr. Aguilar (on call today) and attempt to reach her oncologist to find out her prior RT records.  She also resides at [facility] and wishes to go back when she can.  She gave up having an apartment since JUly 2022  and has been in/out of hospitals.  Retreating her (after obtaining records hopefully) will be challenging as she cannot raise her arms often required for palliative chest RT.  She was able to raise her arms a few months ago when lymphedema was not present.  Options include palliative RT after obtaining prior RT records, vs hospice care which would also be appropriate given her current imaging and poor prognosis.   D/w Dr. Hernandez and Dr. Aguilar.

## 2023-01-04 NOTE — PROGRESS NOTE ADULT - SUBJECTIVE AND OBJECTIVE BOX
PATIENT SEEN AND EXAMINED ON :- 1/4/23  DATE OF SERVICE:   1/4/23          Interim events noted,Labs ,Radiological studies and Cardiology tests reviewed .       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 The patient resides in [facility]. the patient has a chronic R chest wound which is where her skin cancer is located. She states that he has been bleeding a lot lately, and has been for a while. The patient recently got transfused 3 weeks ago for anemia. She states her anemia is likely from the wound. Denies GI bleeding. The patient denies fevers. No other symptoms at this time other than lightheadedness, which had resolved while the patient was in the ED.    Patient is requesting to go back to [facility]. (23 Dec 2022 02:50)      INTERIM EVENTS:Patient seen at bedside ,interim events noted.      PMH -reviewed admission note, no change since admission  HEART FAILURE: Acute[ ]Chronic[ ] Systolic[ ] Diastolic[ ] Combined Systolic and Diastolic[ ]  CAD[ ] CABG[ ] PCI[ ]  DEVICES[ ] PPM[ ] ICD[ ] ILR[ ]  ATRIAL FIBRILLATION[ ] Paroxysmal[ ] Permanent[ ] CHADS2-[  ]  KAMRAN[ ] CKD1[ ] CKD2[ ] CKD3[ ] CKD4[ ] ESRD[ ]  COPD[ ] HTN[ ]   DM[ ] Type1[ ] Type 2[ ]   CVA[ ] Paresis[ ]    AMBULATION: Assisted[ ] Cane/walker[ ] Independent[ ]    MEDICATIONS  (STANDING):  ascorbic acid 500 milliGRAM(s) Oral daily  atorvastatin 40 milliGRAM(s) Oral at bedtime  ferrous    sulfate 325 milliGRAM(s) Oral daily  folic acid 1 milliGRAM(s) Oral daily  furosemide   Injectable 20 milliGRAM(s) IV Push once  multivitamin 1 Tablet(s) Oral daily  risperiDONE   Tablet 0.5 milliGRAM(s) Oral two times a day  senna 2 Tablet(s) Oral at bedtime  sertraline 100 milliGRAM(s) Oral daily    MEDICATIONS  (PRN):  acetaminophen     Tablet .. 650 milliGRAM(s) Oral every 6 hours PRN Temp greater or equal to 38C (100.4F), Mild Pain (1 - 3)  melatonin 3 milliGRAM(s) Oral at bedtime PRN Insomnia      REVIEW OF SYSTEMS:  Constitutional: [ ] fever, [ ]weight loss,  [ ]fatigue [ ]weight gain  Eyes: [ ] visual changes  Respiratory: [ ]shortness of breath;  [ ] cough, [ ]wheezing, [ ]chills, [ ]hemoptysis  Cardiovascular: [ ] chest pain, [ ]palpitations, [ ]dizziness,  [ ]leg swelling[ ]orthopnea[ ]PND  Gastrointestinal: [ ] abdominal pain, [ ]nausea, [ ]vomiting,  [ ]diarrhea [ ]Constipation [ ]Melena  Genitourinary: [ ] dysuria, [ ] hematuria [ ]Powell  Neurologic: [ ] headaches [ ] tremors[ ]weakness [ ]Paralysis Right[ ] Left[ ]  Skin: [ ] itching, [ ]burning, [ ] rashes  Endocrine: [ ] heat or cold intolerance  Musculoskeletal: [ ] joint pain or swelling; [ ] muscle, back, or extremity pain  Psychiatric: [ ] depression, [ ]anxiety, [ ]mood swings, or [ ]difficulty sleeping  Hematologic: [ ] easy bruising, [ ] bleeding gums    [ ] All remaining systems negative except as per above.   [ ]Unable to obtain.  [x] No change in ROS since admission      Vital Signs Last 24 Hrs  T(C): 37.1 (04 Jan 2023 20:40), Max: 37.5 (04 Jan 2023 17:28)  T(F): 98.7 (04 Jan 2023 20:40), Max: 99.5 (04 Jan 2023 17:28)  HR: 96 (04 Jan 2023 20:40) (85 - 102)  BP: 107/43 (04 Jan 2023 20:40) (95/35 - 120/45)  BP(mean): --  RR: 17 (04 Jan 2023 20:40) (17 - 18)  SpO2: 100% (04 Jan 2023 20:40) (99% - 100%)    Parameters below as of 04 Jan 2023 20:40  Patient On (Oxygen Delivery Method): room air      I&O's Summary    03 Jan 2023 07:01  -  04 Jan 2023 07:00  --------------------------------------------------------  IN: 360 mL / OUT: 750 mL / NET: -390 mL        PHYSICAL EXAM:  General: No acute distress BMI-  HEENT: EOMI, PERRL  Neck: Supple, [ ] JVD  Lungs: Equal air entry bilaterally; [ ] rales [ ] wheezing [ ] rhonchi  Heart: Regular rate and rhythm; [x ] murmur   2/6 [ x] systolic [ ] diastolic [ ] radiation[ ] rubs [ ]  gallops  Abdomen: Nontender, bowel sounds present  Extremities: No clubbing, cyanosis, [ ] edema [ ]Pulses  equal and intact  Nervous system:  Alert & Oriented X3, no focal deficits  Psychiatric: Normal affect  Skin: No rashes or lesions    LABS:  01-04    129<L>  |  99  |  18  ----------------------------<  86  4.2   |  22  |  0.45<L>    Ca    9.3      04 Jan 2023 05:43  Phos  3.2     01-04  Mg     1.70     01-04      Creatinine Trend: 0.45<--, 0.49<--, 0.36<--, 0.46<--, 0.50<--, 0.41<--                        7.0    10.65 )-----------( 257      ( 04 Jan 2023 05:43 )             23.1

## 2023-01-05 DIAGNOSIS — Z51.5 ENCOUNTER FOR PALLIATIVE CARE: ICD-10-CM

## 2023-01-05 DIAGNOSIS — C44.92 SQUAMOUS CELL CARCINOMA OF SKIN, UNSPECIFIED: ICD-10-CM

## 2023-01-05 DIAGNOSIS — Z71.89 OTHER SPECIFIED COUNSELING: ICD-10-CM

## 2023-01-05 LAB
ANION GAP SERPL CALC-SCNC: 9 MMOL/L — SIGNIFICANT CHANGE UP (ref 7–14)
BASOPHILS # BLD AUTO: 0.06 K/UL — SIGNIFICANT CHANGE UP (ref 0–0.2)
BASOPHILS NFR BLD AUTO: 0.5 % — SIGNIFICANT CHANGE UP (ref 0–2)
BUN SERPL-MCNC: 18 MG/DL — SIGNIFICANT CHANGE UP (ref 7–23)
CALCIUM SERPL-MCNC: 9.4 MG/DL — SIGNIFICANT CHANGE UP (ref 8.4–10.5)
CHLORIDE SERPL-SCNC: 100 MMOL/L — SIGNIFICANT CHANGE UP (ref 98–107)
CO2 SERPL-SCNC: 24 MMOL/L — SIGNIFICANT CHANGE UP (ref 22–31)
CREAT SERPL-MCNC: 0.56 MG/DL — SIGNIFICANT CHANGE UP (ref 0.5–1.3)
EGFR: 89 ML/MIN/1.73M2 — SIGNIFICANT CHANGE UP
EOSINOPHIL # BLD AUTO: 0.22 K/UL — SIGNIFICANT CHANGE UP (ref 0–0.5)
EOSINOPHIL NFR BLD AUTO: 1.7 % — SIGNIFICANT CHANGE UP (ref 0–6)
GLUCOSE SERPL-MCNC: 107 MG/DL — HIGH (ref 70–99)
HCT VFR BLD CALC: 29.2 % — LOW (ref 34.5–45)
HGB BLD-MCNC: 9.3 G/DL — LOW (ref 11.5–15.5)
IANC: 10.37 K/UL — HIGH (ref 1.8–7.4)
IMM GRANULOCYTES NFR BLD AUTO: 0.3 % — SIGNIFICANT CHANGE UP (ref 0–0.9)
LYMPHOCYTES # BLD AUTO: 1.75 K/UL — SIGNIFICANT CHANGE UP (ref 1–3.3)
LYMPHOCYTES # BLD AUTO: 13.3 % — SIGNIFICANT CHANGE UP (ref 13–44)
MAGNESIUM SERPL-MCNC: 1.6 MG/DL — SIGNIFICANT CHANGE UP (ref 1.6–2.6)
MCHC RBC-ENTMCNC: 28.3 PG — SIGNIFICANT CHANGE UP (ref 27–34)
MCHC RBC-ENTMCNC: 31.8 GM/DL — LOW (ref 32–36)
MCV RBC AUTO: 88.8 FL — SIGNIFICANT CHANGE UP (ref 80–100)
MONOCYTES # BLD AUTO: 0.72 K/UL — SIGNIFICANT CHANGE UP (ref 0–0.9)
MONOCYTES NFR BLD AUTO: 5.5 % — SIGNIFICANT CHANGE UP (ref 2–14)
NEUTROPHILS # BLD AUTO: 10.37 K/UL — HIGH (ref 1.8–7.4)
NEUTROPHILS NFR BLD AUTO: 78.7 % — HIGH (ref 43–77)
NRBC # BLD: 0 /100 WBCS — SIGNIFICANT CHANGE UP (ref 0–0)
NRBC # FLD: 0 K/UL — SIGNIFICANT CHANGE UP (ref 0–0)
PHOSPHATE SERPL-MCNC: 3.1 MG/DL — SIGNIFICANT CHANGE UP (ref 2.5–4.5)
PLATELET # BLD AUTO: 260 K/UL — SIGNIFICANT CHANGE UP (ref 150–400)
POTASSIUM SERPL-MCNC: 4.1 MMOL/L — SIGNIFICANT CHANGE UP (ref 3.5–5.3)
POTASSIUM SERPL-SCNC: 4.1 MMOL/L — SIGNIFICANT CHANGE UP (ref 3.5–5.3)
RBC # BLD: 3.29 M/UL — LOW (ref 3.8–5.2)
RBC # FLD: 14.6 % — HIGH (ref 10.3–14.5)
SARS-COV-2 RNA SPEC QL NAA+PROBE: SIGNIFICANT CHANGE UP
SODIUM SERPL-SCNC: 133 MMOL/L — LOW (ref 135–145)
WBC # BLD: 13.16 K/UL — HIGH (ref 3.8–10.5)
WBC # FLD AUTO: 13.16 K/UL — HIGH (ref 3.8–10.5)

## 2023-01-05 PROCEDURE — 99497 ADVNCD CARE PLAN 30 MIN: CPT | Mod: 25

## 2023-01-05 PROCEDURE — 99223 1ST HOSP IP/OBS HIGH 75: CPT

## 2023-01-05 RX ORDER — MAGNESIUM OXIDE 400 MG ORAL TABLET 241.3 MG
400 TABLET ORAL
Refills: 0 | Status: COMPLETED | OUTPATIENT
Start: 2023-01-05 | End: 2023-01-05

## 2023-01-05 RX ADMIN — RISPERIDONE 0.5 MILLIGRAM(S): 4 TABLET ORAL at 18:07

## 2023-01-05 RX ADMIN — MAGNESIUM OXIDE 400 MG ORAL TABLET 400 MILLIGRAM(S): 241.3 TABLET ORAL at 15:09

## 2023-01-05 RX ADMIN — SERTRALINE 100 MILLIGRAM(S): 25 TABLET, FILM COATED ORAL at 11:58

## 2023-01-05 RX ADMIN — Medication 1 MILLIGRAM(S): at 12:00

## 2023-01-05 RX ADMIN — Medication 325 MILLIGRAM(S): at 12:00

## 2023-01-05 RX ADMIN — RISPERIDONE 0.5 MILLIGRAM(S): 4 TABLET ORAL at 06:40

## 2023-01-05 RX ADMIN — Medication 650 MILLIGRAM(S): at 14:59

## 2023-01-05 RX ADMIN — Medication 650 MILLIGRAM(S): at 20:53

## 2023-01-05 RX ADMIN — ATORVASTATIN CALCIUM 40 MILLIGRAM(S): 80 TABLET, FILM COATED ORAL at 21:45

## 2023-01-05 RX ADMIN — Medication 650 MILLIGRAM(S): at 14:09

## 2023-01-05 RX ADMIN — MAGNESIUM OXIDE 400 MG ORAL TABLET 400 MILLIGRAM(S): 241.3 TABLET ORAL at 21:45

## 2023-01-05 RX ADMIN — Medication 1 TABLET(S): at 12:00

## 2023-01-05 RX ADMIN — Medication 500 MILLIGRAM(S): at 12:00

## 2023-01-05 RX ADMIN — MAGNESIUM OXIDE 400 MG ORAL TABLET 400 MILLIGRAM(S): 241.3 TABLET ORAL at 12:10

## 2023-01-05 NOTE — PROGRESS NOTE ADULT - NUTRITIONAL ASSESSMENT
This patient has been assessed with a concern for Malnutrition and has been determined to have a diagnosis/diagnoses of Severe protein-calorie malnutrition and Underweight (BMI < 19).    This patient is being managed with:   Diet Regular-  DASH/TLC {Sodium & Cholesterol Restricted} (DASH)  Entered: Dec 23 2022  8:35AM    
This patient has been assessed with a concern for Malnutrition and has been determined to have a diagnosis/diagnoses of Severe protein-calorie malnutrition and Underweight (BMI < 19).    This patient is being managed with:   Diet Regular-  Supplement Feeding Modality:  Oral  Ensure Plus High Protein Cans or Servings Per Day:  1       Frequency:  Two Times a day  Entered: Jan 4 2023  6:12PM    
This patient has been assessed with a concern for Malnutrition and has been determined to have a diagnosis/diagnoses of Severe protein-calorie malnutrition and Underweight (BMI < 19).    This patient is being managed with:   Diet Regular-  Supplement Feeding Modality:  Oral  Ensure Plus High Protein Cans or Servings Per Day:  1       Frequency:  Two Times a day  Entered: Jan 4 2023  6:12PM    
This patient has been assessed with a concern for Malnutrition and has been determined to have a diagnosis/diagnoses of Severe protein-calorie malnutrition and Underweight (BMI < 19).    This patient is being managed with:   Diet Regular-  DASH/TLC {Sodium & Cholesterol Restricted} (DASH)  Entered: Dec 23 2022  8:35AM    

## 2023-01-05 NOTE — PROGRESS NOTE ADULT - PROBLEM SELECTOR PLAN 3
2. Anemia   -- Recurrent, likely secondary to bleeding skin lesions as well as immunotherapy  -- Rad/onc consulted for RT to chest to assist in bleeding control. Had CT chest, follow up read   -- s/p 5 units pRBC since admission 12/22   -- Anemia workup shows some degree of iron deficiency, otherwise unremarkable. Occult blood negative. Cont PO Fe   -- Transfuse to maintain hg >7
- Recurrent, likely secondary to bleeding skin lesions as well as immunotherapy  -- Rad/onc consulted for RT to chest to assist in bleeding control  -- s/p 4 units pRBC since admission 12/22   -- Transfuse to maintain hg >7
- s/p 2uPRBC   - Hgb stable
2. Anemia   -- Recurrent, likely secondary to bleeding skin lesions as well as immunotherapy  -- Rad/onc consulted for RT to chest to assist in bleeding control. Had CT chest, follow up read   -- s/p 5 units pRBC since admission 12/22   -- Anemia workup shows some degree of iron deficiency, otherwise unremarkable. Occult blood negative. Cont PO Fe   -- Transfuse to maintain hg >7
- Recurrent, likely secondary to bleeding skin lesions as well as immunotherapy  -- Rad/onc consulted for RT to chest to assist in bleeding control  -- s/p 4 units pRBC since admission 12/22   -- Transfuse to maintain hg >7
- s/p 2uPRBC   - Hgb 7.3 12/23  - trend & transfuse for hgb >7  - may need chronic transfusions given the severity of the malignancy on the R chest
2. Anemia   -- Recurrent, likely secondary to bleeding skin lesions as well as immunotherapy  -- Rad/onc consulted for RT to chest to assist in bleeding control. Had CT chest, follow up read   -- s/p 5 units pRBC since admission 12/22   -- Anemia workup shows some degree of iron deficiency, otherwise unremarkable. Occult blood negative. Cont PO Fe   -- Transfuse to maintain hg >7
Assessment:  - hb of 5.5  - source of bleeding likely from wound  - symptoms of mild lightheadedness  - receiving 1u prbc at bedside    Plan:  - will trend CBC  - may need another unit if repeat cbc still shows hb < 7  - wound care as above  - may need chronic transfusions given the severity of the malignancy on the R chest
- Recurrent, likely secondary to bleeding skin lesions as well as immunotherapy  -- Rad/onc consulted for RT to chest to assist in bleeding control  -- s/p 4 units pRBC since admission 12/22   -- Transfuse to maintain hg >7
- s/p 2uPRBC   - Hgb 7.3 12/23  - trend & transfuse for hgb >7  - may need chronic transfusions given the severity of the malignancy on the R chest
2. Anemia   -- Recurrent, likely secondary to bleeding skin lesions as well as immunotherapy  -- Rad/onc consulted for RT to chest to assist in bleeding control. Had CT chest, follow up read   -- s/p 5 units pRBC since admission 12/22   -- Anemia workup shows some degree of iron deficiency, otherwise unremarkable. Occult blood negative. Cont PO Fe   -- Transfuse to maintain hg >7

## 2023-01-05 NOTE — PROGRESS NOTE ADULT - PROBLEM SELECTOR PROBLEM 2
Wound of skin

## 2023-01-05 NOTE — PROGRESS NOTE ADULT - SUBJECTIVE AND OBJECTIVE BOX
PATIENT SEEN AND EXAMINED ON :- 1/5/23  DATE OF SERVICE:   1/5/23          Interim events noted,Labs ,Radiological studies and Cardiology tests reviewed .         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 The patient resides in [facility]. the patient has a chronic R chest wound which is where her skin cancer is located. She states that he has been bleeding a lot lately, and has been for a while. The patient recently got transfused 3 weeks ago for anemia. She states her anemia is likely from the wound. Denies GI bleeding. The patient denies fevers. No other symptoms at this time other than lightheadedness, which had resolved while the patient was in the ED.    Patient is requesting to go back to [facility]. (23 Dec 2022 02:50)      INTERIM EVENTS:Patient seen at bedside ,interim events noted.      PMH -reviewed admission note, no change since admission  HEART FAILURE: Acute[ ]Chronic[ ] Systolic[ ] Diastolic[ ] Combined Systolic and Diastolic[ ]  CAD[ ] CABG[ ] PCI[ ]  DEVICES[ ] PPM[ ] ICD[ ] ILR[ ]  ATRIAL FIBRILLATION[ ] Paroxysmal[ ] Permanent[ ] CHADS2-[  ]  KAMRAN[ ] CKD1[ ] CKD2[ ] CKD3[ ] CKD4[ ] ESRD[ ]  COPD[ ] HTN[ ]   DM[ ] Type1[ ] Type 2[ ]   CVA[ ] Paresis[ ]    AMBULATION: Assisted[ ] Cane/walker[ ] Independent[ ]    MEDICATIONS  (STANDING):  ascorbic acid 500 milliGRAM(s) Oral daily  atorvastatin 40 milliGRAM(s) Oral at bedtime  ferrous    sulfate 325 milliGRAM(s) Oral daily  folic acid 1 milliGRAM(s) Oral daily  magnesium oxide 400 milliGRAM(s) Oral three times a day with meals  multivitamin 1 Tablet(s) Oral daily  risperiDONE   Tablet 0.5 milliGRAM(s) Oral two times a day  senna 2 Tablet(s) Oral at bedtime  sertraline 100 milliGRAM(s) Oral daily    MEDICATIONS  (PRN):  acetaminophen     Tablet .. 650 milliGRAM(s) Oral every 6 hours PRN Temp greater or equal to 38C (100.4F), Mild Pain (1 - 3)  melatonin 3 milliGRAM(s) Oral at bedtime PRN Insomnia            REVIEW OF SYSTEMS:  Constitutional: [ ] fever, [ ]weight loss,  [ ]fatigue [ ]weight gain  Eyes: [ ] visual changes  Respiratory: [ ]shortness of breath;  [ ] cough, [ ]wheezing, [ ]chills, [ ]hemoptysis  Cardiovascular: [ ] chest pain, [ ]palpitations, [ ]dizziness,  [ ]leg swelling[ ]orthopnea[ ]PND  Gastrointestinal: [ ] abdominal pain, [ ]nausea, [ ]vomiting,  [ ]diarrhea [ ]Constipation [ ]Melena  Genitourinary: [ ] dysuria, [ ] hematuria [ ]Powell  Neurologic: [ ] headaches [ ] tremors[ ]weakness [ ]Paralysis Right[ ] Left[ ]  Skin: [ ] itching, [ ]burning, [ ] rashes  Endocrine: [ ] heat or cold intolerance  Musculoskeletal: [ ] joint pain or swelling; [ ] muscle, back, or extremity pain  Psychiatric: [ ] depression, [ ]anxiety, [ ]mood swings, or [ ]difficulty sleeping  Hematologic: [ ] easy bruising, [ ] bleeding gums    [ ] All remaining systems negative except as per above.   [ ]Unable to obtain.  [x] No change in ROS since admission      Vital Signs Last 24 Hrs  T(C): 36.8 (05 Jan 2023 17:19), Max: 37.1 (04 Jan 2023 20:40)  T(F): 98.3 (05 Jan 2023 17:19), Max: 98.7 (04 Jan 2023 20:40)  HR: 91 (05 Jan 2023 17:19) (88 - 102)  BP: 108/46 (05 Jan 2023 17:19) (107/43 - 126/50)  BP(mean): --  RR: 18 (05 Jan 2023 17:19) (17 - 18)  SpO2: 100% (05 Jan 2023 17:19) (98% - 100%)    Parameters below as of 05 Jan 2023 17:19  Patient On (Oxygen Delivery Method): room air      I&O's Summary    04 Jan 2023 07:01  -  05 Jan 2023 07:00  --------------------------------------------------------  IN: 0 mL / OUT: 1 mL / NET: -1 mL        PHYSICAL EXAM:  General: No acute distress BMI-  HEENT: EOMI, PERRL  Neck: Supple, [ ] JVD  Lungs: Equal air entry bilaterally; [ ] rales [ ] wheezing [ ] rhonchi  Heart: Regular rate and rhythm; [x ] murmur   2/6 [ x] systolic [ ] diastolic [ ] radiation[ ] rubs [ ]  gallops  Abdomen: Nontender, bowel sounds present  Extremities: No clubbing, cyanosis, [ ] edema [ ]Pulses  equal and intact  Nervous system:  Alert & Oriented X3, no focal deficits  Psychiatric: Normal affect  Skin: No rashes or lesions    LABS:  01-05    133<L>  |  100  |  18  ----------------------------<  107<H>  4.1   |  24  |  0.56    Ca    9.4      05 Jan 2023 03:09  Phos  3.1     01-05  Mg     1.60     01-05      Creatinine Trend: 0.56<--, 0.45<--, 0.49<--, 0.36<--, 0.46<--, 0.50<--                        9.3    13.16 )-----------( 260      ( 05 Jan 2023 03:09 )             29.2

## 2023-01-05 NOTE — CONSULT NOTE ADULT - PROBLEM SELECTOR RECOMMENDATION 2
Patient has HCP form completed 7/21/2022 (per chart review) She re-affirmed that her proxies are her nephew, Bill Mason ([phone]), niece Teresa and her sister in law Dolly Mejia [phone].   Please see separate [facility] note- FULL CODE at this time

## 2023-01-05 NOTE — PROGRESS NOTE ADULT - PROBLEM SELECTOR PLAN 2
wound care f/u
- as above
wound care f/u

## 2023-01-05 NOTE — PROGRESS NOTE ADULT - SUBJECTIVE AND OBJECTIVE BOX
Patient is a 87y old  Female who presents with a chief complaint of wound (04 Jan 2023 14:19)    Patient seen this morning. She feels well and has no new complaints.     MEDICATIONS  (STANDING):  ascorbic acid 500 milliGRAM(s) Oral daily  atorvastatin 40 milliGRAM(s) Oral at bedtime  ferrous    sulfate 325 milliGRAM(s) Oral daily  folic acid 1 milliGRAM(s) Oral daily  magnesium oxide 400 milliGRAM(s) Oral three times a day with meals  multivitamin 1 Tablet(s) Oral daily  risperiDONE   Tablet 0.5 milliGRAM(s) Oral two times a day  senna 2 Tablet(s) Oral at bedtime  sertraline 100 milliGRAM(s) Oral daily    MEDICATIONS  (PRN):  acetaminophen     Tablet .. 650 milliGRAM(s) Oral every 6 hours PRN Temp greater or equal to 38C (100.4F), Mild Pain (1 - 3)  melatonin 3 milliGRAM(s) Oral at bedtime PRN Insomnia        Vital Signs Last 24 Hrs  T(C): 36.3 (05 Jan 2023 09:13), Max: 37.5 (04 Jan 2023 17:28)  T(F): 97.3 (05 Jan 2023 09:13), Max: 99.5 (04 Jan 2023 17:28)  HR: 102 (05 Jan 2023 09:13) (88 - 102)  BP: 117/45 (05 Jan 2023 09:13) (103/46 - 126/50)  BP(mean): --  RR: 18 (05 Jan 2023 09:13) (17 - 18)  SpO2: 98% (05 Jan 2023 09:13) (98% - 100%)    Parameters below as of 05 Jan 2023 09:13  Patient On (Oxygen Delivery Method): room air        PE  NAD  Awake, alert  Anicteric  RUE edema   No rash grossly                            9.3    13.16 )-----------( 260      ( 05 Jan 2023 03:09 )             29.2       01-05    133<L>  |  100  |  18  ----------------------------<  107<H>  4.1   |  24  |  0.56    Ca    9.4      05 Jan 2023 03:09  Phos  3.1     01-05  Mg     1.60     01-05

## 2023-01-05 NOTE — CONSULT NOTE ADULT - SUBJECTIVE AND OBJECTIVE BOX
Wound SURGERY CONSULT NOTE    HPI:  This is a 88 y/o F with pmhx of skin cancer (squamous cell carcinoma) with mets, presented for abnormal lab test. The patient resides in [facility]. the patient has a chronic R chest wound which is where her skin cancer is located. She states that he has been bleeding a lot lately, and has been for a while. The patient recently got transfused 3 weeks ago for anemia. She states her anemia is likely from the wound. Denies GI bleeding. The patient denies fevers. No other symptoms at this time other than lightheadedness, which had resolved while the patient was in the ED.    Patient is requesting to go back to [facility]. (23 Dec 2022 02:50)    Interval Hx: Patient transfused pRBC x 2 o/n. She denies any history of bloody or tarry stools, and says that she does not take any blood thinning medications.       N/V/D,  BM/ Flatus,   NGT,     palp/ sob/dyspnea/ cp,       Fever/Chills/Sweats    Wound consult requested to assist w/ management of bleeding wounds of B/L anterior chest wall.     Current Diet: Diet, Regular:   DASH/TLC Sodium & Cholesterol Restricted (DASH) (12-23-22 @ 08:35)      PAST MEDICAL & SURGICAL HISTORY:  HLD (hyperlipidemia)      TIA (transient ischemic attack)      Carotid stenosis      Primary squamous cell carcinoma of skin      Thyroid mass          REVIEW OF SYSTEMS:  Skin: Chest wall wounds as noted above  General: Denies fever/chills, reports good appetite  Vascular/Lymphatic: Swelling of RUE for the past 2 months (at time of SCC diagnosis)  All other systems negative    MEDICATIONS  (STANDING):  ascorbic acid 500 milliGRAM(s) Oral daily  atorvastatin 40 milliGRAM(s) Oral at bedtime  ceFAZolin   IVPB      ceFAZolin   IVPB 1000 milliGRAM(s) IV Intermittent every 8 hours  ferrous    sulfate 325 milliGRAM(s) Oral daily  folic acid 1 milliGRAM(s) Oral daily  multivitamin 1 Tablet(s) Oral daily  risperiDONE   Tablet 0.5 milliGRAM(s) Oral two times a day  senna 2 Tablet(s) Oral at bedtime  sertraline 100 milliGRAM(s) Oral daily    MEDICATIONS  (PRN):  acetaminophen     Tablet .. 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 Single. Non-smoker.     FAMILY HISTORY:  Family history of lung cancer (Father)        PHYSICAL EXAM:  Vital Signs Last 24 Hrs  T(C): 37.5 (23 Dec 2022 09:59), Max: 37.7 (22 Dec 2022 19:15)  T(F): 99.5 (23 Dec 2022 09:59), Max: 99.9 (22 Dec 2022 19:15)  HR: 99 (23 Dec 2022 09:59) (82 - 102)  BP: 101/45 (23 Dec 2022 09:59) (101/45 - 140/58)  BP(mean): --  RR: 18 (23 Dec 2022 09:59) (16 - 20)  SpO2: 96% (23 Dec 2022 09:59) (96% - 100%)    Parameters below as of 23 Dec 2022 09:59  Patient On (Oxygen Delivery Method): room air        Constitutional: NAD    (+) low airloss support surface, (+) fluidized postioining devices, (+) complete cair boots     HEENT:  NC/AT, PERRL, EOMI, mucosa moist, throat clear, trachea midline, neck supple    Cardiovascular: RRR   Respiratory: No use of accessory muscles, no stridor    Gastrointestinal: soft NT/ND  Rectal: Soft, tarry and melenic stool present. No janki blood per rectum.     Neurology: Weakened symmetrically. A&O x 3. No focal deficits.     Musculoskeletal: Cachexia, strength 4/5 B/L.     Vascular:   - RUE: Marked lymphedema. Warm, good capillary refill. Radial pulse 2+  - Remaining extremities: Warm, good capillary refill.     Skin:   L BREAST/CHEST WALL  - Fungating mass around caudal border of breast, extending to midline. Continuous and brisk capillary/venule bleeding from raw surfaces upon removal of prior dressing. No malodor. Some superficial necrosis present, especially around areolar complex.   R BREAST/CHEST WALL  - R breast tissue removed (appears to have had total mastectomy previously), large open at site. Depth is into subcutaneous tissue, ribs are palpable but there is no exposure of fascia/bone of chest wall. Base of this wound has some granulation tissue. There is, likewise, profuse oozing of capillary/venule blood at this site, especially around the periphery. No malodor, no necrotic tissue at this site. Wound extends up into axillar.   R LOWER CHEST WALL  - Fungating mass of anterolateral chest wall, with some superficial necrosis. No malodor or drainage. Some capillary oozing from raw surfaces.     LABS/ CULTURES/ RADIOLOGY:                        5.5    14.28 )-----------( 250      ( 23 Dec 2022 02:50 )             18.2       133  |  102  |  25  ----------------------------<  117      [12-23-22 @ 02:50]  4.0   |  24  |  0.45        Ca     9.0     [12-23-22 @ 02:50]      Mg     1.90     [12-23-22 @ 02:50]      Phos  3.0     [12-23-22 @ 02:50]    TPro  5.1  /  Alb  2.4  /  TBili  0.2  /  DBili  x   /  AST  11  /  ALT  6   /  AlkPhos  61  [12-23-22 @ 02:50]    PT/INR: PT 12.9 , INR 1.11       [12-22-22 @ 17:12]  PTT: 29.8       [12-22-22 @ 17:12]
GENERAL SURGERY CONSULT NOTE  --------------------------------------------------------------------------------------------    HPI: This is a 88 y/o F with pmhx of skin cancer (squamous cell carcinoma) with mets, presented for abnormal lab test. The patient resides in [facility]. the patient has a chronic R chest wound which is where her skin cancer is located. She states that he has been bleeding a lot lately, and has been for a while. The patient recently got transfused 3 weeks ago for anemia. She states her anemia is likely from the wound. Denies GI bleeding. The patient denies fevers. No other symptoms at this time other than lightheadedness, which had resolved while the patient was in the ED. Patient transfused pRBC x 2 o/n. She denies any history of bloody or tarry stools, and says that she does not take any blood thinning medications.     Surgery is consulted for multiple bleeding wound(SCC s/p radiation) in the chest  Patient denies fevers/chills, denies lightheadedness/dizziness, denies SOB/chest pain, denies nausea/vomiting, denies constipation/diarrhea.  ***    ROS: 10-system review is otherwise negative except HPI above.      PAST MEDICAL & SURGICAL HISTORY:  HLD (hyperlipidemia)      TIA (transient ischemic attack)      Carotid stenosis      Primary squamous cell carcinoma of skin      Thyroid mass        FAMILY HISTORY:  Family history of lung cancer (Father)      [] Family history not pertinent as reviewed with the patient and family    SOCIAL HISTORY:  Denies ETOH use, Denies tobacco use, Denies illicit    ALLERGIES: No Known Allergies      HOME MEDICATIONS:   · 	sertraline 100 mg oral tablet: Last Dose Taken:  , 1 tab(s) orally once a day  · 	Aspercreme with Lidocaine 4% topical film: Last Dose Taken:  , Apply topically to affected area once a day  · 	MiraLax oral powder for reconstitution: Last Dose Taken:  , 17 gram(s) orally once a day  · 	Senna 8.6 mg oral tablet: 2 tab(s) orally once a day (at bedtime)  · 	ascorbic acid 500 mg oral tablet: Last Dose Taken:  , 1 tab(s) orally once a day  · 	ferrous sulfate 325 mg (65 mg elemental iron) oral tablet: 1 tab(s) orally once a day  · 	folic acid 1 mg oral tablet: 1 tab(s) orally once a day  · 	Multiple Vitamins oral tablet: 1 tab(s) orally once a day  · 	risperiDONE 0.5 mg oral tablet: 1 tab(s) orally 2 times a day  · 	rosuvastatin 10 mg oral tablet: 1 tab(s) orally once a day  · 	Ultram 50 mg oral tablet: 1 tab(s) orally every 12 hours, As Needed    CURRENT MEDICATIONS  MEDICATIONS (STANDING): ascorbic acid 500 milliGRAM(s) Oral daily  atorvastatin 40 milliGRAM(s) Oral at bedtime  ceFAZolin   IVPB      ceFAZolin   IVPB 1000 milliGRAM(s) IV Intermittent every 8 hours  ferrous    sulfate 325 milliGRAM(s) Oral daily  folic acid 1 milliGRAM(s) Oral daily  multivitamin 1 Tablet(s) Oral daily  risperiDONE   Tablet 0.5 milliGRAM(s) Oral two times a day  senna 2 Tablet(s) Oral at bedtime  sertraline 100 milliGRAM(s) Oral daily    MEDICATIONS (PRN):acetaminophen     Tablet .. 650 milliGRAM(s) Oral every 6 hours PRN Temp greater or equal to 38C (100.4F), Mild Pain (1 - 3)  melatonin 3 milliGRAM(s) Oral at bedtime PRN Insomnia    --------------------------------------------------------------------------------------------    Vitals:   T(C): 37.3 (12-23-22 @ 16:36), Max: 37.5 (12-23-22 @ 09:59)  HR: 98 (12-23-22 @ 16:36) (92 - 102)  BP: 101/45 (12-23-22 @ 09:59) (101/45 - 140/58)  RR: 18 (12-23-22 @ 16:36) (18 - 18)  SpO2: 100% (12-23-22 @ 16:36) (96% - 100%)  CAPILLARY BLOOD GLUCOSE        CAPILLARY BLOOD GLUCOSE          12-23 @ 07:01  -  12-23 @ 21:01  --------------------------------------------------------  IN:  Total IN: 0 mL    OUT:    Voided (mL): 550 mL  Total OUT: 550 mL    Total NET: -550 mL        Height (cm): 170.2 (12-23 @ 14:48)  Weight (kg): 53.7 (12-23 @ 14:48)  BMI (kg/m2): 18.5 (12-23 @ 14:48)  BSA (m2): 1.62 (12-23 @ 14:48)    PHYSICAL EXAM:   General: NAD, Lying in bed in moderate distress  Neuro: A+Ox3  HEENT: NC/AT  Neck: Soft, supple  Cardio: RRR  Resp: Good effort, CTA b/l  Thorax:   L BREAST/CHEST WALL  - Fungating mass around caudal border of breast, extending to midline. Continuous and brisk capillary/venule bleeding R BREAST/CHEST WALL  - R breast tissue removed (appears to have had total mastectomy previously), large open at site. Depth is into subcutaneous tissue, profuse oozing of capillary/venule blood at this site, especially around the periphery. No malodor, no necrotic tissue at this site. Wound extends up into axillar.   R LOWER CHEST WALL  - Fungating mass of anterolateral chest wall, with some superficial necrosis. No malodor or drainage. Some capillary oozing from raw surfaces.     Breast: No lesions/masses, no drainage  GI/Abd: Soft, NT/ND, no rebound/guarding, no masses palpated  Vascular: All 4 extremities warm.  Skin: Intact, no breakdown  Lymphatic/Nodes: RUE Marked lymphedema.   Musculoskeletal: All 4 extremities moving spontaneously, no limitations  --------------------------------------------------------------------------------------------    LABS  CBC (12-23 @ 13:27)                              7.3<L>                         14.94<H>  )----------------(  305        --    % Neutrophils, --    % Lymphocytes, ANC: --                                  23.2<L>  CBC (12-23 @ 02:50)                              5.5<LL>                         14.28<H>  )----------------(  250        85.9<H>% Neutrophils, 6.7<L>% Lymphocytes, ANC: 12.26<H>                              18.2<LL>    BMP (12-23 @ 13:27)             134<L>  |  101     |  21    		Ca++ --      Ca 9.4                ---------------------------------( 123<H>		Mg --                 4.4     |  25      |  0.60  			Ph --      BMP (12-23 @ 02:50)             133<L>  |  102     |  25<H> 		Ca++ --      Ca 9.0                ---------------------------------( 117<H>		Mg 1.90               4.0     |  24      |  0.45<L>			Ph 3.0       LFTs (12-23 @ 02:50)      TPro 5.1<L> / Alb 2.4<L> / TBili 0.2 / DBili -- / AST 11 / ALT 6 / AlkPhos 61  LFTs (12-22 @ 17:23)      TPro 5.8<L> / Alb 2.9<L> / TBili <0.2 / DBili -- / AST 14 / ALT 5 / AlkPhos 79    Coags (12-22 @ 17:12)  aPTT 29.8 / INR 1.11 / PT 12.9          --------------------------------------------------------------------------------------------    MICROBIOLOGY      --------------------------------------------------------------------------------------------    IMAGING  
[facility]  Natalie Burton, Palliative Care Attending  Contact Info: Page [phone] (including Nights/Weekends), message on Microsoft Teams (Natalie Burton), or leave  at Palliative Office [phone] (non-urgent)     HPI:  This is a 86 y/o F with pmhx of skin cancer (squamous cell carcinoma) with mets, presented for abnormal lab test. The patient resides in [facility]. the patient has a chronic R chest wound which is where her skin cancer is located. She states that he has been bleeding a lot lately, and has been for a while. The patient recently got transfused 3 weeks ago for anemia. She states her anemia is likely from the wound. Denies GI bleeding. The patient denies fevers. No other symptoms at this time other than lightheadedness, which had resolved while the patient was in the ED.    Patient is requesting to go back to [facility]. (23 Dec 2022 02:50)    PERTINENT PM/SXH:   HLD (hyperlipidemia)  TIA (transient ischemic attack)  Carotid stenosis  Primary squamous cell carcinoma of skin  Thyroid mass    FAMILY HISTORY:  Family history of lung cancer (Father)    SOCIAL HISTORY:   Significant other/partner[ ]  Children[ ]  Orthodox/Spirituality:  Substance hx:  [ ]   Tobacco hx:  [ ]   Alcohol hx: [ ]   Home Opioid hx:  [ ] I-Stop Reference No: This report was requested by: Natalie Burton | Reference #: [id number]    Others' Prescriptions  Patient Name: Prabha Valadez Date: 1935  Address: [address] [place] [id number]: Female  Rx Written	Rx Dispensed	Drug	Quantity	Days Supply	Prescriber Name	Prescriber Susan #	Payment Method	Dispenser  12/02/2022	12/02/2022	tramadol hcl 50 mg tablet	14	7	Mel Chung N (MSN)	[id number]	Cash	[facility]  09/22/2022	09/22/2022	tramadol hcl 50 mg tablet	14	7	Jose Mehta MD	[id number]	Abel	[facility]  Living Situation: [ ]Home  [ ]Long term care  [ ]Rehab [ ]Other    ADVANCE DIRECTIVES:    DNR/MOLST  [ ]  Living Will  [ ]   DECISION MAKER(s): Patient   [ x] Health Care Proxy(s)  [ ] Surrogate(s)  [ ] Guardian           Name(s): Bill Mejia (nephew) Phone Number(s): [phone]    BASELINE (I)ADL(s) (prior to admission): Patient [facility] at [facility]. Ambulates with rolling walker.   [place]: [ ]Total  [ x] Moderate [ ]Dependent    Allergies    No Known Allergies    Intolerances    MEDICATIONS  (STANDING):  ascorbic acid 500 milliGRAM(s) Oral daily  atorvastatin 40 milliGRAM(s) Oral at bedtime  ferrous    sulfate 325 milliGRAM(s) Oral daily  folic acid 1 milliGRAM(s) Oral daily  magnesium oxide 400 milliGRAM(s) Oral three times a day with meals  multivitamin 1 Tablet(s) Oral daily  risperiDONE   Tablet 0.5 milliGRAM(s) Oral two times a day  senna 2 Tablet(s) Oral at bedtime  sertraline 100 milliGRAM(s) Oral daily    MEDICATIONS  (PRN):  acetaminophen     Tablet .. 650 milliGRAM(s) Oral every 6 hours PRN Temp greater or equal to 38C (100.4F), Mild Pain (1 - 3)  melatonin 3 milliGRAM(s) Oral at bedtime PRN Insomnia    PRESENT SYMPTOMS: [ ]Unable to self-report  [ ] CPOT [ ] PAINADs [ ] RDOS  Source if other than patient:  [ ]Family   [ ]Team     Pain: [ ]yes [x ]no  QOL impact -   Location -                    Aggravating factors -  Quality -  Radiation -  Timing-  Severity (0-10 scale):  Minimal acceptable level/pain goal (0-10 scale):     CPOT:    https://www.[facility].org/getattachment/giz56c06-5o8a-8u9c-5e3a-8411l1079o6h/Critical-Care-Pain-Observation-Tool-(CPOT)    Dyspnea:                           [ ]Mild [ ]Moderate [ ]Severe  Anxiety:                             [ ]Mild [ ]Moderate [ ]Severe  Fatigue:                             [ ]Mild [ ]Moderate [ ]Severe  Nausea:                             [ ]Mild [ ]Moderate [ ]Severe  Loss of appetite:              [ ]Mild [ ]Moderate [ ]Severe  Constipation:                    [ ]Mild [ ]Moderate [ ]Severe    Other Symptoms:  [ x]All other review of systems negative     PCSSQ[Palliative Care Spiritual Screening Question]   Severity (0-10):  Chaplaincy Referral: [ ] yes [ ] refused [ ] following [ x] deferred     Caregiver [place]? : [ ] yes [x ] no [ ] Deferred [ ] Declined             Social work referral [ ] Patient & Family Centered Care Referral [ ]  Anticipatory Grief present?:  [ ] yes [ x] no  [ ] Deferred                  Social work referral [ ] Patient & Family Centered Care Referral [ ]    PHYSICAL EXAM:  Vital Signs Last 24 Hrs  T(C): 36.3 (05 Jan 2023 09:13), Max: 37.5 (04 Jan 2023 17:28)  T(F): 97.3 (05 Jan 2023 09:13), Max: 99.5 (04 Jan 2023 17:28)  HR: 102 (05 Jan 2023 09:13) (88 - 102)  BP: 117/45 (05 Jan 2023 09:13) (106/41 - 126/50)  BP(mean): --  RR: 18 (05 Jan 2023 09:13) (17 - 18)  SpO2: 98% (05 Jan 2023 09:13) (98% - 100%)    Parameters below as of 05 Jan 2023 09:13  Patient On (Oxygen Delivery Method): room air     I&O's Summary    04 Jan 2023 07:01  -  05 Jan 2023 07:00  --------------------------------------------------------  IN: 0 mL / OUT: 1 mL / NET: -1 mL      GENERAL: [ ]Cachexia    [x ]Alert  [ x]Oriented x4   [ ]Lethargic  [ ]Unarousable  [x ]Verbal  [ ]Non-Verbal  Behavioral:   [ ] Anxiety  [ ] Delirium [ ] Agitation [ x] Other  HEENT:  [x ]Normal   [ ]Dry mouth   [ ]ET Tube/Trach  [ ]Oral lesions  PULMONARY:   [ ]Clear [ ]Tachypnea  [ ]Audible excessive secretions   [ ]Rhonchi        [ ]Right [ ]Left [ ]Bilateral  [ ]Crackles        [ ]Right [ ]Left [ ]Bilateral  [ ]Wheezing     [ ]Right [ ]Left [ ]Bilateral  [ x]Diminished breath sounds [ ]right [ ]left [ x]bilateral  CARDIOVASCULAR:    [ ]Regular [ ]Irregular [ x]Tachy  [ ]Rupert [ ]Murmur [ ]Other  GASTROINTESTINAL:  [x ]Soft  [ ]Distended   [ ]+BS  [x ]Non tender [ ]Tender  [ ]Other [ ]PEG [ ]OGT/ NGT  Last BM: 1/4  GENITOURINARY:  [ x]Normal [ ] Incontinent   [ ]Oliguria/Anuria   [ ]Powell  MUSCULOSKELETAL:   [ ]Normal   [x ]Weakness  [ ]Bed/Wheelchair bound [ ]Edema  NEUROLOGIC:   [ x]No focal deficits  [ ]Cognitive impairment  [ ]Dysphagia [ ]Dysarthria [ ]Paresis [ ]Other   SKIN: Please see flowsheets - pt's entire chest is dressed with ABD, shalonda   [ ]Normal  [ ]Rash  [ ]Other  [ ]Pressure ulcer(s)       Present on admission [ ]y [ ]n    CRITICAL CARE:  [ ] Shock Present  [ ]Septic [ ]Cardiogenic [ ]Neurologic [ ]Hypovolemic  [ ]  Vasopressors [ ]  Inotropes   [ ]Respiratory failure present [ ]Mechanical ventilation [ ]Non-invasive ventilatory support [ ]High flow    [ ]Acute  [ ]Chronic [ ]Hypoxic  [ ]Hypercarbic [ ]Other  [ ]Other organ failure     LABS:                        9.3    13.16 )-----------( 260      ( 05 Jan 2023 03:09 )             29.2   01-05    133<L>  |  100  |  18  ----------------------------<  107<H>  4.1   |  24  |  0.56    Ca    9.4      05 Jan 2023 03:09  Phos  3.1     01-05  Mg     1.60     01-05    RADIOLOGY & ADDITIONAL STUDIES: < from: CT Chest No Cont (12.29.22 @ 15:08) >  IMPRESSION:  Overall worsening metastatic burden since July 2022, with diffuse   irregular and fungating cutaneous lesions throughout the chest wall with   open lesions and rib invasion, new/enlarging left axillary/chest wall   lymphadenopathy and pulmonary metastases.    < end of copied text >      PROTEIN CALORIE MALNUTRITION PRESENT: [ ]mild [ ]moderate [ ]severe [ ]underweight [ ]morbid obesity  https://www.andeal.org/vault/2440/web/files/ONC/Table_Clinical%20Characteristics%20to%20Document%20Malnutrition-White%20JV%20et%20al%202012.pdf    Height (cm): 170.2 (12-23-22 @ 14:48), 170.2 (08-18-22 @ 13:43), 170.2 (08-11-22 @ 09:47)  Weight (kg): 53.7 (12-23-22 @ 14:48), 49.8 (08-18-22 @ 13:43), 45.4 (08-11-22 @ 09:47)  BMI (kg/m2): 18.5 (12-23-22 @ 14:48), 17.2 (08-18-22 @ 13:43), 15.7 (08-11-22 @ 09:47)    [ ]PPSV2 < or = to 30% [ ]significant weight loss  [ ]poor nutritional intake  [ ]anasarca[ ]Artificial Nutrition      Other REFERRALS:  [ ]Hospice  [ ]Child Life  [ ]Social Work  [ ]Case management [ ]Holistic Therapy

## 2023-01-05 NOTE — CONSULT NOTE ADULT - PROBLEM SELECTOR RECOMMENDATION 9
Outpatient oncologist: Dr. Alexis at [facility]  Patient reports receiving immunotherapy (last dose 12/22). She states she has 4 more more sessions of immunotherapy for a total of 8 sessions spaced out 3 weeks apart. Her goal is to go back to [facility] and resume her cancer treatments. Outpatient oncologist: Dr. Alexis at [facility]  Patient reports receiving immunotherapy (last dose 12/22). She states she has 4 more more sessions of immunotherapy for a total of 8 sessions spaced out 3 weeks apart. Her goal is to go back to [facility] and resume her cancer treatments. Of note, patient would not be eligible for hospice if she is still pursuing immunotherapy.

## 2023-01-05 NOTE — PROGRESS NOTE ADULT - PROBLEM SELECTOR PROBLEM 3
Anemia due to acute blood loss

## 2023-01-05 NOTE — PROGRESS NOTE ADULT - PROVIDER SPECIALTY LIST ADULT
Cardiology
Heme/Onc
Rad Onc
Rad Onc
Heme/Onc
Cardiology

## 2023-01-05 NOTE — PROGRESS NOTE ADULT - PROBLEM SELECTOR PROBLEM 1
Squamous cell carcinoma of skin

## 2023-01-05 NOTE — PROGRESS NOTE ADULT - ASSESSMENT
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    1. Metastatic SCC skin   -- s/p RT to right breast at [facility]   -- Currently receiving cemiplimab, LD 12/22. Hold systemic treatment while admitted   -- Follow up with Dr. Alexis after discharge  -- CT chest from 12/29 shows overall worsening metastatic burden since July 2022, with diffuse irregular and fungating cutaneous lesions throughout the chest wall with  open lesions and rib invasion, new/enlarging left axillary/chest wall lymphadenopathy and pulmonary metastases.    2. Anemia   -- Recurrent, likely secondary to bleeding skin lesions as well as immunotherapy  -- Anemia workup shows some degree of iron deficiency, otherwise unremarkable. Occult blood negative. Cont PO Fe   -- Rad/onc eval noted- cannot offer palliative RT   -- Continue supportive transfusions, maintain hg >7    3. RUE edema  -- duplex w/o DVT   -- chronic, likely related to worsening malignancy    Pt to continue follow up with Dr. Alexis after discharge.     Lucy Pastrana PA-C  Hematology/Oncology  [facility]  [phone] (office)  [phone] (alt office)  Evenings and weekends please call MD on call or office

## 2023-01-05 NOTE — PROGRESS NOTE ADULT - NS ATTEND OPT1 GEN_ALL_CORE
I independently performed the documented:
I independently performed the documented:
I attest my time as attending is greater than 50% of the total combined time spent on qualifying patient care activities by the PA/NP and attending.
I independently performed the documented:
I independently performed the documented:

## 2023-01-05 NOTE — CONSULT NOTE ADULT - PROBLEM SELECTOR RECOMMENDATION 3
Thank you for allowing us to participate in your patient's care. Goals are clear. Please page [phone] for any q's or c's.     Natalie Burton D.O.   Palliative Medicine

## 2023-01-05 NOTE — PROGRESS NOTE ADULT - TIME BILLING
- Review of records, telemetry, vital signs and daily labs.   - General and cardiovascular physical examination.  - Generation of cardiovascular treatment plan.  - Coordination of care.      Patient was seen and examined by me on 1/5 /23,interim events noted,labs and radiology studies reviewed.  Cayetano Landin MD,FACC.  [address] [place], [place] [id number].  [phone]

## 2023-01-05 NOTE — PROGRESS NOTE ADULT - NS ATTEND AMEND GEN_ALL_CORE FT
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  She is on treatment with cemiplimab, has received radiation to her right breast area in the past.  She is admitted with anemia/blood transfusion, has been chronic bleeding from her skin lesions.  She feels the bleeding is more on the right now.   Her Hg has responded to blood transfusion.  Would consider rad/onc evaluation to see if she could benefit for RT to her left side to control the bleeding.
agree with above
pt with bleeding from skin lesions. RT called for consult.
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 Patient has been on cemiplimab though recent CT imaging had shown worsened disease since July 2022. Treatment however was only started in October 2022. In the interim follow-up with radiation oncology regarding palliative radiation to control bleeding.
pt still oozing from breast. rad onc follow up.
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 She previously had received radiation to the right breast at [facility]. Otherwise continue transfusion to maintain hemoglobin > 7. Continue wound care.
Patient was determined she would not benefit from palliative radiation due to the extent of involvement.   She will follow up outpatient with Dr. Alexis.   Continue transfusional support.  She will need her counts to be monitored closely at rehab.

## 2023-01-05 NOTE — PROGRESS NOTE ADULT - REASON FOR ADMISSION
wound

## 2023-01-05 NOTE — GOALS OF CARE CONVERSATION - ADVANCED CARE PLANNING - CONVERSATION DETAILS
Referral for complex decision making and symptom management in setting of advanced malignancy. Reviewed patient's oncological course and current clinical condition. Patient states she is on active immunotherapy with Dr. Alexis and would like to continue with 4 more treatments of immunotherapy. She was unable to explain the intent of the DMT, however is hopeful that this treatment will extend her life.     Re-affirmed her HCP as her nephew, Bill Roberto ([phone]), nimerlyn Moore, and BRAYDON Catarina Roberto ([phone]).     Discussed advanced directives including CPR and mechanical ventilation in setting of malignancy. Reviewed the risks and benefits of these interventions at the EOL in setting of malignancy sharing that these interventions might pose more burden than benefit. She states she has never discussed these directives with her family but will consider discussing them further with her family. She does state she has living will but is unsure what her living will states. Encouraged her to complete MOLST form if she makes any decisions regarding code status. Explained that MOLST form can be completed at [facility] as well.     Support provided

## 2023-01-05 NOTE — CONSULT NOTE ADULT - ASSESSMENT
87 year-old woman with known metastatic SCC of the chest wall, underwent palliating radiation therapy and is currently on an immunotherapy regimen (per medical oncology outpatient), presenting with anemia and weakness. Found to have bleeding - at low, but continuous, rate - from fungating masses of L breast and R anterolateral chest wall, and large open oncologic wound of R breast/axilla. Slight tachycardia (100 bpm), but otherwise hemodynamically normative. pRBC x 2 transfused o/n with no response in HGB/HCT. Gross oozing/venous bleeding was seen during chest wound dressing change.     plan:  - no surgical intervention indication  - suggest nu knit for local hemostasis  - care per primary team and wound care  - please recall with any other questions    Praveen Morton PGY-2  B team Surgery  [id number]   
Assessment:   87 year-old woman with known metastatic SCC of the chest wall, underwent palliating radiation therapy and is currently on an immunotherapy regimen (per medical oncology outpatient), presenting with anemia and weakness. Found to have bleeding - at low, but continuous, rate - from fungating masses of L breast and R anterolateral chest wall, and large open oncologic wound of R breast/axilla. Slight tachycardia (100 bpm), but otherwise hemodynamically normative. pRBC x 2 transfused o/n with no response in HGB/HCT. Tarry melenic stool seen while examining      Plan:  BLEEDING WOUNDS OF B/L CHEST WALL  - Hemostasis achieved at bedside with application of Surgicel, ABDs and manual pressure. Surgery consult called for further assessment, case discussed with both on-call resident and attending (Dr. Rowena Hernandez) at the bedside.   - Nu-Knit topical hemostatic agent left at bedside. These large sheets of material should be used at subsequent dressing changes, along with Wylie Gauze as needed for additional hemostasis.   - Recommend daily dressing changes. Use liberal saline to loosen previously placed surgicel/nu-knit to prevent removal of adherent clot. Gently clean would with saline and gauze. Re-apply Nu-Knit vs. Surgicel, apply Xeroform to exposed surfaces that are not bleeding. ABD pads over this, then compressive wrap around chest with ACE. Any strikethrough of blood should prompt takedown of dressings for examination, as hemostasis is inadequate.   - Appreciate Surgery's consult and recommendations    MELENIC STOOL  - Suspect added component of GI bleeding contributing to failure of response to transfusion  - Fecal sample sent for occult blood testing  - Serial CBCs recommended to primary team  - Appreciate Surgery's consult and recommendations        Moisturize intact skin w/ SWEEN cream BID  Encourage good PO nutrition, with emphasis on protein intake  Continue turning and positioning w/ offloading assistive devices as per protocol  Continue w/ Pericare as per protocol  Waffle Cushion to chair when oob to chair  Continue w/ low air loss fluidized bed surface     Care as per medicine, will follow w/ you    Upon discharge f/u as outpatient at [facility] [address] [place] [address] [phone]    Thank you for this consult  Raj Santos MD    If after 4PM or before 7:30AM on Mon-Friday or weekend/holiday please contact general surgery for urgent matters.   Team A- [id number]   Team B- [id number]  For non-urgent matters e-mail [email or web address]    I/We spent (50min) minutes face to face with this patient of which more than 50% of the time was spent counseling & coordinating care of this pt    
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 Palliative consulted for complex decision making regarding goc in setting of malignancy.

## 2023-01-05 NOTE — PROGRESS NOTE ADULT - PROBLEM SELECTOR PLAN 4
- no dvt ppx due to active bleeding        d/c planning
- no dvt ppx due to active bleeding
- no dvt ppx due to active bleeding        d/c planning
- no dvt ppx due to active bleeding
- no dvt ppx due to active bleeding
- no dvt ppx due to active bleeding        d/c planning
- no dvt ppx due to active bleeding        d/c planning
- no dvt ppx due to active bleeding        dispo: d/c planning
- no dvt ppx due to active bleeding
- no dvt ppx due to active bleeding        d/c planning

## 2023-01-05 NOTE — CHART NOTE - NSCHARTNOTEFT_GEN_A_CORE
Case discussed amongst Dr. Hernandez, Dr. Aguilar, and myself and   her imaging has been reviewed.    Given her poor prognosis, widespread metastatic disease, inability to raise her arms due to lymphedema outside of the   treatment field, and large field to treat (entire chest is wrapped from clavicle to abdomen due to bleeding bilaterally)  palliative RT cannot be offered.      We recommend hospice if no other care can be offered.

## 2023-01-06 ENCOUNTER — TRANSCRIPTION ENCOUNTER (OUTPATIENT)
Age: 88
End: 2023-01-06

## 2023-01-06 VITALS
RESPIRATION RATE: 18 BRPM | OXYGEN SATURATION: 99 % | DIASTOLIC BLOOD PRESSURE: 46 MMHG | SYSTOLIC BLOOD PRESSURE: 113 MMHG | TEMPERATURE: 99 F | HEART RATE: 100 BPM

## 2023-01-06 LAB
ANION GAP SERPL CALC-SCNC: 10 MMOL/L — SIGNIFICANT CHANGE UP (ref 7–14)
BUN SERPL-MCNC: 18 MG/DL — SIGNIFICANT CHANGE UP (ref 7–23)
CALCIUM SERPL-MCNC: 9.6 MG/DL — SIGNIFICANT CHANGE UP (ref 8.4–10.5)
CHLORIDE SERPL-SCNC: 100 MMOL/L — SIGNIFICANT CHANGE UP (ref 98–107)
CO2 SERPL-SCNC: 25 MMOL/L — SIGNIFICANT CHANGE UP (ref 22–31)
CREAT SERPL-MCNC: 0.43 MG/DL — LOW (ref 0.5–1.3)
EGFR: 95 ML/MIN/1.73M2 — SIGNIFICANT CHANGE UP
GLUCOSE SERPL-MCNC: 94 MG/DL — SIGNIFICANT CHANGE UP (ref 70–99)
HCT VFR BLD CALC: 28.9 % — LOW (ref 34.5–45)
HGB BLD-MCNC: 9.1 G/DL — LOW (ref 11.5–15.5)
MAGNESIUM SERPL-MCNC: 1.9 MG/DL — SIGNIFICANT CHANGE UP (ref 1.6–2.6)
MCHC RBC-ENTMCNC: 28.3 PG — SIGNIFICANT CHANGE UP (ref 27–34)
MCHC RBC-ENTMCNC: 31.5 GM/DL — LOW (ref 32–36)
MCV RBC AUTO: 89.8 FL — SIGNIFICANT CHANGE UP (ref 80–100)
NRBC # BLD: 0 /100 WBCS — SIGNIFICANT CHANGE UP (ref 0–0)
NRBC # FLD: 0 K/UL — SIGNIFICANT CHANGE UP (ref 0–0)
PHOSPHATE SERPL-MCNC: 2.8 MG/DL — SIGNIFICANT CHANGE UP (ref 2.5–4.5)
PLATELET # BLD AUTO: 285 K/UL — SIGNIFICANT CHANGE UP (ref 150–400)
POTASSIUM SERPL-MCNC: 4.6 MMOL/L — SIGNIFICANT CHANGE UP (ref 3.5–5.3)
POTASSIUM SERPL-SCNC: 4.6 MMOL/L — SIGNIFICANT CHANGE UP (ref 3.5–5.3)
RBC # BLD: 3.22 M/UL — LOW (ref 3.8–5.2)
RBC # FLD: 14.7 % — HIGH (ref 10.3–14.5)
SODIUM SERPL-SCNC: 135 MMOL/L — SIGNIFICANT CHANGE UP (ref 135–145)
WBC # BLD: 12.28 K/UL — HIGH (ref 3.8–10.5)
WBC # FLD AUTO: 12.28 K/UL — HIGH (ref 3.8–10.5)

## 2023-01-06 RX ORDER — LIDOCAINE 4 G/100G
1 CREAM TOPICAL
Qty: 0 | Refills: 0 | DISCHARGE

## 2023-01-06 RX ORDER — SENNA PLUS 8.6 MG/1
2 TABLET ORAL
Qty: 0 | Refills: 0 | DISCHARGE

## 2023-01-06 RX ORDER — ROSUVASTATIN CALCIUM 5 MG/1
1 TABLET ORAL
Qty: 0 | Refills: 0 | DISCHARGE

## 2023-01-06 RX ORDER — ACETAMINOPHEN 500 MG
2 TABLET ORAL
Qty: 0 | Refills: 0 | DISCHARGE
Start: 2023-01-06

## 2023-01-06 RX ORDER — ATORVASTATIN CALCIUM 80 MG/1
1 TABLET, FILM COATED ORAL
Qty: 0 | Refills: 0 | DISCHARGE
Start: 2023-01-06

## 2023-01-06 RX ORDER — LANOLIN ALCOHOL/MO/W.PET/CERES
1 CREAM (GRAM) TOPICAL
Qty: 0 | Refills: 0 | DISCHARGE
Start: 2023-01-06

## 2023-01-06 RX ORDER — SENNA PLUS 8.6 MG/1
2 TABLET ORAL
Qty: 0 | Refills: 0 | DISCHARGE
Start: 2023-01-06

## 2023-01-06 RX ADMIN — Medication 650 MILLIGRAM(S): at 12:17

## 2023-01-06 RX ADMIN — Medication 1 MILLIGRAM(S): at 11:29

## 2023-01-06 RX ADMIN — RISPERIDONE 0.5 MILLIGRAM(S): 4 TABLET ORAL at 07:13

## 2023-01-06 RX ADMIN — Medication 500 MILLIGRAM(S): at 11:30

## 2023-01-06 RX ADMIN — SERTRALINE 100 MILLIGRAM(S): 25 TABLET, FILM COATED ORAL at 11:29

## 2023-01-06 RX ADMIN — Medication 325 MILLIGRAM(S): at 11:30

## 2023-01-06 RX ADMIN — RISPERIDONE 0.5 MILLIGRAM(S): 4 TABLET ORAL at 17:09

## 2023-01-06 RX ADMIN — Medication 1 TABLET(S): at 11:30

## 2023-01-06 RX ADMIN — Medication 650 MILLIGRAM(S): at 11:28

## 2023-01-06 NOTE — DISCHARGE NOTE NURSING/CASE MANAGEMENT/SOCIAL WORK - NSDCPEFALRISK_GEN_ALL_CORE
For information on Fall & Injury Prevention, visit: [email or web address] OR  [email or web address] OR  https://www.cdc.gov/steadi/patient.html

## 2023-01-06 NOTE — DISCHARGE NOTE NURSING/CASE MANAGEMENT/SOCIAL WORK - PATIENT PORTAL LINK FT
You can access the FollowMyHealth Patient Portal offered by [facility] by registering at the following website: [email or web address]. By joining [employer]’s FollowMyHealth portal, you will also be able to view your health information using other applications (apps) compatible with our system.

## 2023-01-06 NOTE — DISCHARGE NOTE NURSING/CASE MANAGEMENT/SOCIAL WORK - NSDCVIVACCINE_GEN_ALL_CORE_FT
influenza, injectable, quadrivalent, preservative free; 18-Sep-2018 16:23; Linh LindoRN); [employer]; [id number] (Exp. Date: 30-Jun-2019); IntraMuscular; Deltoid Right.; 0.5 milliLiter(s); VIS (VIS Published: 07-Aug-2015, VIS Presented: 18-Sep-2018);

## 2023-01-19 ENCOUNTER — INPATIENT (INPATIENT)
Facility: HOSPITAL | Age: 88
LOS: 0 days | Discharge: NURSING, REHAB OR LONG-TERM CARE | End: 2023-01-20
Attending: INTERNAL MEDICINE | Admitting: INTERNAL MEDICINE
Payer: MEDICARE

## 2023-01-19 VITALS
HEIGHT: 67 IN | TEMPERATURE: 98 F | DIASTOLIC BLOOD PRESSURE: 54 MMHG | HEART RATE: 107 BPM | OXYGEN SATURATION: 97 % | RESPIRATION RATE: 15 BRPM | SYSTOLIC BLOOD PRESSURE: 94 MMHG

## 2023-01-19 DIAGNOSIS — E07.9 DISORDER OF THYROID, UNSPECIFIED: Chronic | ICD-10-CM

## 2023-01-19 DIAGNOSIS — D64.9 ANEMIA, UNSPECIFIED: ICD-10-CM

## 2023-01-19 LAB
ALBUMIN SERPL ELPH-MCNC: 2.6 G/DL — LOW (ref 3.3–5)
ALP SERPL-CCNC: 62 U/L — SIGNIFICANT CHANGE UP (ref 40–120)
ALT FLD-CCNC: 8 U/L — SIGNIFICANT CHANGE UP (ref 4–33)
ANION GAP SERPL CALC-SCNC: 11 MMOL/L — SIGNIFICANT CHANGE UP (ref 7–14)
APTT BLD: 29.9 SEC — SIGNIFICANT CHANGE UP (ref 27–36.3)
AST SERPL-CCNC: 13 U/L — SIGNIFICANT CHANGE UP (ref 4–32)
B PERT DNA SPEC QL NAA+PROBE: SIGNIFICANT CHANGE UP
B PERT+PARAPERT DNA PNL SPEC NAA+PROBE: SIGNIFICANT CHANGE UP
BASOPHILS # BLD AUTO: 0.01 K/UL — SIGNIFICANT CHANGE UP (ref 0–0.2)
BASOPHILS NFR BLD AUTO: 0.1 % — SIGNIFICANT CHANGE UP (ref 0–2)
BILIRUB SERPL-MCNC: <0.2 MG/DL — SIGNIFICANT CHANGE UP (ref 0.2–1.2)
BORDETELLA PARAPERTUSSIS (RAPRVP): SIGNIFICANT CHANGE UP
BUN SERPL-MCNC: 30 MG/DL — HIGH (ref 7–23)
C PNEUM DNA SPEC QL NAA+PROBE: SIGNIFICANT CHANGE UP
CALCIUM SERPL-MCNC: 9.5 MG/DL — SIGNIFICANT CHANGE UP (ref 8.4–10.5)
CHLORIDE SERPL-SCNC: 99 MMOL/L — SIGNIFICANT CHANGE UP (ref 98–107)
CO2 SERPL-SCNC: 24 MMOL/L — SIGNIFICANT CHANGE UP (ref 22–31)
CREAT SERPL-MCNC: 0.49 MG/DL — LOW (ref 0.5–1.3)
EGFR: 92 ML/MIN/1.73M2 — SIGNIFICANT CHANGE UP
EOSINOPHIL # BLD AUTO: 0.08 K/UL — SIGNIFICANT CHANGE UP (ref 0–0.5)
EOSINOPHIL NFR BLD AUTO: 0.6 % — SIGNIFICANT CHANGE UP (ref 0–6)
FLUAV SUBTYP SPEC NAA+PROBE: SIGNIFICANT CHANGE UP
FLUBV RNA SPEC QL NAA+PROBE: SIGNIFICANT CHANGE UP
GLUCOSE SERPL-MCNC: 114 MG/DL — HIGH (ref 70–99)
HADV DNA SPEC QL NAA+PROBE: SIGNIFICANT CHANGE UP
HCOV 229E RNA SPEC QL NAA+PROBE: SIGNIFICANT CHANGE UP
HCOV HKU1 RNA SPEC QL NAA+PROBE: SIGNIFICANT CHANGE UP
HCOV NL63 RNA SPEC QL NAA+PROBE: SIGNIFICANT CHANGE UP
HCOV OC43 RNA SPEC QL NAA+PROBE: SIGNIFICANT CHANGE UP
HCT VFR BLD CALC: 15.5 % — CRITICAL LOW (ref 34.5–45)
HCT VFR BLD CALC: 24.8 % — LOW (ref 34.5–45)
HGB BLD-MCNC: 4.8 G/DL — CRITICAL LOW (ref 11.5–15.5)
HGB BLD-MCNC: 8.2 G/DL — LOW (ref 11.5–15.5)
HMPV RNA SPEC QL NAA+PROBE: SIGNIFICANT CHANGE UP
HPIV1 RNA SPEC QL NAA+PROBE: SIGNIFICANT CHANGE UP
HPIV2 RNA SPEC QL NAA+PROBE: SIGNIFICANT CHANGE UP
HPIV3 RNA SPEC QL NAA+PROBE: SIGNIFICANT CHANGE UP
HPIV4 RNA SPEC QL NAA+PROBE: SIGNIFICANT CHANGE UP
IANC: 11.55 K/UL — HIGH (ref 1.8–7.4)
IMM GRANULOCYTES NFR BLD AUTO: 0.6 % — SIGNIFICANT CHANGE UP (ref 0–0.9)
INR BLD: 1.15 RATIO — SIGNIFICANT CHANGE UP (ref 0.88–1.16)
LYMPHOCYTES # BLD AUTO: 1.7 K/UL — SIGNIFICANT CHANGE UP (ref 1–3.3)
LYMPHOCYTES # BLD AUTO: 12.2 % — LOW (ref 13–44)
M PNEUMO DNA SPEC QL NAA+PROBE: SIGNIFICANT CHANGE UP
MCHC RBC-ENTMCNC: 27.3 PG — SIGNIFICANT CHANGE UP (ref 27–34)
MCHC RBC-ENTMCNC: 27.4 PG — SIGNIFICANT CHANGE UP (ref 27–34)
MCHC RBC-ENTMCNC: 31 GM/DL — LOW (ref 32–36)
MCHC RBC-ENTMCNC: 33.1 GM/DL — SIGNIFICANT CHANGE UP (ref 32–36)
MCV RBC AUTO: 82.9 FL — SIGNIFICANT CHANGE UP (ref 80–100)
MCV RBC AUTO: 88.1 FL — SIGNIFICANT CHANGE UP (ref 80–100)
MONOCYTES # BLD AUTO: 0.57 K/UL — SIGNIFICANT CHANGE UP (ref 0–0.9)
MONOCYTES NFR BLD AUTO: 4.1 % — SIGNIFICANT CHANGE UP (ref 2–14)
NEUTROPHILS # BLD AUTO: 11.55 K/UL — HIGH (ref 1.8–7.4)
NEUTROPHILS NFR BLD AUTO: 82.4 % — HIGH (ref 43–77)
NRBC # BLD: 0 /100 WBCS — SIGNIFICANT CHANGE UP (ref 0–0)
NRBC # BLD: 0 /100 WBCS — SIGNIFICANT CHANGE UP (ref 0–0)
NRBC # FLD: 0 K/UL — SIGNIFICANT CHANGE UP (ref 0–0)
NRBC # FLD: 0 K/UL — SIGNIFICANT CHANGE UP (ref 0–0)
PLATELET # BLD AUTO: 290 K/UL — SIGNIFICANT CHANGE UP (ref 150–400)
PLATELET # BLD AUTO: 297 K/UL — SIGNIFICANT CHANGE UP (ref 150–400)
POTASSIUM SERPL-MCNC: 3.9 MMOL/L — SIGNIFICANT CHANGE UP (ref 3.5–5.3)
POTASSIUM SERPL-SCNC: 3.9 MMOL/L — SIGNIFICANT CHANGE UP (ref 3.5–5.3)
PROT SERPL-MCNC: 5.2 G/DL — LOW (ref 6–8.3)
PROTHROM AB SERPL-ACNC: 13.4 SEC — SIGNIFICANT CHANGE UP (ref 10.5–13.4)
RAPID RVP RESULT: SIGNIFICANT CHANGE UP
RBC # BLD: 1.76 M/UL — LOW (ref 3.8–5.2)
RBC # BLD: 2.99 M/UL — LOW (ref 3.8–5.2)
RBC # FLD: 15.3 % — HIGH (ref 10.3–14.5)
RBC # FLD: 16.3 % — HIGH (ref 10.3–14.5)
RSV RNA SPEC QL NAA+PROBE: SIGNIFICANT CHANGE UP
RV+EV RNA SPEC QL NAA+PROBE: SIGNIFICANT CHANGE UP
SARS-COV-2 RNA SPEC QL NAA+PROBE: SIGNIFICANT CHANGE UP
SODIUM SERPL-SCNC: 134 MMOL/L — LOW (ref 135–145)
WBC # BLD: 13.99 K/UL — HIGH (ref 3.8–10.5)
WBC # BLD: 17.75 K/UL — HIGH (ref 3.8–10.5)
WBC # FLD AUTO: 13.99 K/UL — HIGH (ref 3.8–10.5)
WBC # FLD AUTO: 17.75 K/UL — HIGH (ref 3.8–10.5)

## 2023-01-19 PROCEDURE — 71045 X-RAY EXAM CHEST 1 VIEW: CPT | Mod: 26

## 2023-01-19 PROCEDURE — 99223 1ST HOSP IP/OBS HIGH 75: CPT

## 2023-01-19 PROCEDURE — 99291 CRITICAL CARE FIRST HOUR: CPT | Mod: GC

## 2023-01-19 RX ORDER — SENNA PLUS 8.6 MG/1
2 TABLET ORAL AT BEDTIME
Refills: 0 | Status: DISCONTINUED | OUTPATIENT
Start: 2023-01-19 | End: 2023-01-20

## 2023-01-19 RX ORDER — ACETAMINOPHEN 500 MG
650 TABLET ORAL EVERY 6 HOURS
Refills: 0 | Status: DISCONTINUED | OUTPATIENT
Start: 2023-01-19 | End: 2023-01-20

## 2023-01-19 RX ORDER — ONDANSETRON 8 MG/1
4 TABLET, FILM COATED ORAL EVERY 8 HOURS
Refills: 0 | Status: DISCONTINUED | OUTPATIENT
Start: 2023-01-19 | End: 2023-01-20

## 2023-01-19 RX ORDER — RISPERIDONE 4 MG/1
0.5 TABLET ORAL
Refills: 0 | Status: DISCONTINUED | OUTPATIENT
Start: 2023-01-19 | End: 2023-01-20

## 2023-01-19 RX ORDER — LANOLIN ALCOHOL/MO/W.PET/CERES
3 CREAM (GRAM) TOPICAL AT BEDTIME
Refills: 0 | Status: DISCONTINUED | OUTPATIENT
Start: 2023-01-19 | End: 2023-01-20

## 2023-01-19 RX ORDER — ASCORBIC ACID 60 MG
500 TABLET,CHEWABLE ORAL DAILY
Refills: 0 | Status: DISCONTINUED | OUTPATIENT
Start: 2023-01-19 | End: 2023-01-20

## 2023-01-19 RX ORDER — SERTRALINE 25 MG/1
100 TABLET, FILM COATED ORAL DAILY
Refills: 0 | Status: DISCONTINUED | OUTPATIENT
Start: 2023-01-19 | End: 2023-01-20

## 2023-01-19 RX ORDER — FERROUS SULFATE 325(65) MG
325 TABLET ORAL DAILY
Refills: 0 | Status: DISCONTINUED | OUTPATIENT
Start: 2023-01-19 | End: 2023-01-20

## 2023-01-19 RX ORDER — ATORVASTATIN CALCIUM 80 MG/1
40 TABLET, FILM COATED ORAL AT BEDTIME
Refills: 0 | Status: DISCONTINUED | OUTPATIENT
Start: 2023-01-19 | End: 2023-01-20

## 2023-01-19 RX ORDER — POLYETHYLENE GLYCOL 3350 17 G/17G
17 POWDER, FOR SOLUTION ORAL DAILY
Refills: 0 | Status: DISCONTINUED | OUTPATIENT
Start: 2023-01-19 | End: 2023-01-20

## 2023-01-19 RX ORDER — FOLIC ACID 0.8 MG
1 TABLET ORAL DAILY
Refills: 0 | Status: DISCONTINUED | OUTPATIENT
Start: 2023-01-19 | End: 2023-01-20

## 2023-01-19 RX ADMIN — RISPERIDONE 0.5 MILLIGRAM(S): 4 TABLET ORAL at 19:06

## 2023-01-19 RX ADMIN — Medication 500 MILLIGRAM(S): at 13:02

## 2023-01-19 RX ADMIN — Medication 1 TABLET(S): at 13:01

## 2023-01-19 RX ADMIN — SERTRALINE 100 MILLIGRAM(S): 25 TABLET, FILM COATED ORAL at 13:01

## 2023-01-19 RX ADMIN — Medication 1 MILLIGRAM(S): at 13:02

## 2023-01-19 RX ADMIN — Medication 325 MILLIGRAM(S): at 13:01

## 2023-01-19 NOTE — ED PROVIDER NOTE - NS ED ROS FT
GENERAL: No fever or chills, EYES: no change in vision, HEENT: no trouble swallowing or speaking, CARDIAC: no chest pain, PULMONARY: no cough or SOB, GI: no abdominal pain, no nausea, no vomiting, no diarrhea or constipation, : No changes in urination, SKIN: no rashes, NEURO: no headache,  MSK: No joint pain     All other ROS negative unless otherwise specified in HPI.     ~Butch Rivera M.D. Resident

## 2023-01-19 NOTE — H&P ADULT - PROBLEM SELECTOR PLAN 1
-chronic chest wound due to malignancy  -seen by onc during recent hospital admission. Patient to follow up with Dr. Alexis as outpatient  - wound care consult  - Patient receiving flagyl at  for PNA? Will obtain CXR to further evaluate. At this time no clinical signs of PNA. Patient denies fevers or cough. Patient has chronic mild leukocytosis. Recent CT with metastatic disease.   - given hx of mets, unlikely to benefit from surgical intervention  -Was seen by palliative care on 1/5/23 - patient wants to continue with immunotherapy as outpatient.  -Spoke to family Catarina (BRAYDON) over the phone today who understands that treatment is not effective and she will speak to patient about palliative care and DNR status. -chronic chest wound due to malignancy  -seen by onc during recent hospital admission. Patient to follow up with Dr. Alexis as outpatient  - wound care consult  - Patient receiving flagyl at  for PNA? Will obtain CXR to further evaluate. At this time no clinical signs of PNA. Patient denies fevers or cough. Patient has chronic mild leukocytosis. Recent CT with metastatic disease. Low clinical suspicion for PNA. Monitor off antibiotics.   - given hx of mets, unlikely to benefit from surgical intervention  -Was seen by palliative care on 1/5/23 - patient wants to continue with immunotherapy as outpatient.  -Spoke to family Catarina (BRAYDON) over the phone today who understands that treatment is not effective and she will speak to patient about palliative care and DNR status.

## 2023-01-19 NOTE — CONSULT NOTE ADULT - ASSESSMENT
SHAVONNE MONTALVO is a 87y Female who presents with a chief complaint of anemia    Metastatic Squamous Cell Carcinoma of Skin  - Patient follows with Dr. Alexis, [facility].  - Patient is on cemiplimab, last administered on January 12th, 2023.  - Pending PET/CT to evaluate treatment response.  - No systemic therapy while inpatient.  - Previously had radiation to right breast at [facility].   - Previously evaluated by radiation oncology here at [facility]; no role for palliative radiation at this time.    Anemia  - In the setting of continued bleeding from skin wound.  - Monitor CBC and transfuse to maintain hemoglobin > 7.  - Continue wound care.    Will continue to follow.    Rao Ward MD  Hematology/Oncology  O: [phone]

## 2023-01-19 NOTE — ED PROVIDER NOTE - OBJECTIVE STATEMENT
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 No associated dyspnea on exertion or lightheadedness. Pt reports last blood transfusion on previous admission. Denies fever/chills, chest pain, palpitations, SOB, nausea, vomiting, abdominal pain, headache, trauma. Denies hematuria, hematochezia, hematemesis.

## 2023-01-19 NOTE — ED ADULT NURSE REASSESSMENT NOTE - NS ED NURSE REASSESS COMMENT FT1
Pt received from [facility] RN A&Ox4. Pt resting in bed comfortably, blood transfusion complete. Denies any cp, SOB. Pt does not offer any acute complaints at this time.

## 2023-01-19 NOTE — ED ADULT NURSE REASSESSMENT NOTE - NS ED NURSE REASSESS COMMENT FT1
Pt resting in stretcher comfortably. Pt A&Ox4. Respirations are even and unlabored, skin intact. Pt denies SOB, cp, Pt updated on plan of care. Awaiting further provider orders

## 2023-01-19 NOTE — ED ADULT NURSE NOTE - CHIEF COMPLAINT QUOTE
Pt arrives to ED from [facility] for low H&H, 4.8 & 15.6 with elevated WBC 15.6 arriving for transfusions.  Pt was discharged this month from [facility] for anemia.  Pt has right arm swelling with pain since July.  Pt also has chronic rt breast wound that bleeds arriving with wound dressed at 20:00.  Collin reports diagnosed positive PNA 2 days ago.  Pt on Flagyl since 1/16.

## 2023-01-19 NOTE — H&P ADULT - PROBLEM SELECTOR PLAN 3
-acute blood loss anemia due to bleeding chest wound  - source of bleeding likely from wound  - received 2u prbc in ED  - will trend CBC  - may need another unit if repeat cbc still shows hb < 7  - wound care as above  - may need chronic transfusions given the severity of the malignancy on the R chest  -c/w MVI, folic acid, iron supplementation and vit C

## 2023-01-19 NOTE — H&P ADULT - HISTORY OF PRESENT ILLNESS
88 y/o F with pmhx of skin cancer (squamous cell carcinoma) with mets presents from [facility] for abnormal lab test. The patient resides in [facility]. She has a chronic R chest wound which is where her skin cancer is located. She has had previous hospitalization for same reason requiring blood transfusions. Most recent hospital stay was 12/22-1/6. She states that her wound bled a lot yesterday as they did not use proper technique for wound care. She states her anemia is likely from the wound. Denies GI bleeding. The patient denies fevers. She also reports that she was started on antibiotic flagyl for PNA recently. Sh denies any cough or fevers.     Vital Signs Last 24 Hrs  T(C): 36.9 (19 Jan 2023 09:43), Max: 37.4 (19 Jan 2023 06:15)  T(F): 98.4 (19 Jan 2023 09:43), Max: 99.3 (19 Jan 2023 06:15)  HR: 106 (19 Jan 2023 09:43) (98 - 107)  BP: 97/66 (19 Jan 2023 09:43) (93/40 - 102/46)  BP(mean): --  RR: 16 (19 Jan 2023 09:43) (15 - 18)  SpO2: 100% (19 Jan 2023 09:43) (97% - 100%)    Parameters below as of 19 Jan 2023 09:43  Patient On (Oxygen Delivery Method): room air

## 2023-01-19 NOTE — ED PROVIDER NOTE - PHYSICAL EXAMINATION
Gen: AAOx3, non-toxic elderly woman lying in bed in NAD, pale  Head: NCAT  HEENT: EOMI, oral mucosa dry, normal conjunctiva, +mucosal pallor  Lung: CTAB, no respiratory distress, no wheezes/rhonchi/rales B/L, speaking in full sentences  CV: tachycardic w/reg rhythm, no murmurs, rubs or gallops  Abd: soft, NTND, no guarding, no CVA tenderness  MSK: no visible deformities  Neuro: No focal sensory or motor deficits  Skin: Warm, well perfused, no rash  Psych: normal affect.   ~Butch Rivera M.D. Resident

## 2023-01-19 NOTE — H&P ADULT - NSHPADDITIONALINFOADULT_GEN_ALL_CORE
Spoke to family Catarina (BRAYDON) and updates provided.   Catarina to speak to patient about advance directives including DNR/DNI and palliative care.

## 2023-01-19 NOTE — ED PROVIDER NOTE - DIFFERENTIAL DIAGNOSIS
Differential Diagnosis anemia: h/o anemia h/o blood transfusion, no active bleeding in ED however h/o recent breast wound bleeding, no GIB

## 2023-01-19 NOTE — ED ADULT TRIAGE NOTE - CHIEF COMPLAINT QUOTE
Pt arrives to ED from [facility] for low H&H, 4.8 & 15.6 with elevated WBC 15.6 arriving for transfusions.  Pt was discharged this month from [facility] for anemia.  Pt has right arm swelling with pain since July.  Pt also has chronic rt breast wound that bleeds arriving with wound dressed at 20:00.  Collin reports diagnosed positive PNA 2 days ago.  Pt on Flagyl since 1/16.

## 2023-01-19 NOTE — ED ADULT NURSE NOTE - OBJECTIVE STATEMENT
Pt rec'd to  25 Pt AOx4. Pt presents to ED from [place] for low hemoglobin of 4.8. 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 Dressing in place upon arrival. MD Turpin at bedside for assessment skin under bandaged noted to be red and moist with serous drainage. Pt sates that  Pt also has R arm swelling and some noted swelling to the upper L arm. Pt rec'd to  25 Pt AOx4. Pt presents to ED from [place] for low hemoglobin of 4.8. 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 Dressing in place upon arrival. MD Turpin at bedside for assessment skin under bandaged noted to be red and moist with serous drainage. Pt sates that  Pt also has R arm swelling and some noted swelling to the upper L arm. Pt denies SOB, CP, H/A, numbness/ tingling. Comfort and safety measures are in place.

## 2023-01-19 NOTE — CONSULT NOTE ADULT - SUBJECTIVE AND OBJECTIVE BOX
CHIEF COMPLAINT  Anemia    HISTORY OF PRESENT ILLNESS  SHAVONNE MONTALVO is a 87y Female who presents with a chief complaint of anemia    Patient was admitted on January 19th after presenting to the Emergency Department with severe anemia. She has had recurrent admissions due to bleed from extensive skin cancer on her chest. In the Emergency Department she was noted to have hemoglobin of 4.8, and she was admitted for further evaluation.     PAST MEDICAL AND SURGICAL HISTORY  Hyperlipidemia  Squamous Cell Carcinoma of Skin    FAMILY HISTORY  Lung Cancer    SOCIAL HISTORY  Denies tobacco use    REVIEW OF SYSTEMS  A complete review of systems was performed; negative except per HPI    PHYSICAL EXAM  T(C): 36.7 (01-19-23 @ 19:42), Max: 37.4 (01-19-23 @ 06:15)  HR: 107 (01-19-23 @ 19:42) (91 - 107)  BP: 133/58 (01-19-23 @ 19:42) (93/40 - 133/58)  RR: 20 (01-19-23 @ 19:42) (15 - 21)  SpO2: 100% (01-19-23 @ 19:42) (97% - 100%)  Constitutional: alert, awake, in no acute distress  Eyes: PERRL, EOMI  HEENT: normocephalic, atraumatic  Neck: supple, non-tender  Cardiovascular: normal perfusion, tachycardia  Respiratory: normal respiratory efforts; no increased use of accessory muscles  Gastrointestinal: soft, non-tender  Musculoskeletal: normal range of motion, no deformities noted  Neurological: alert, CN II to XI grossly intact    LABORATORY DATA                        8.2    17.75 )-----------( 297      ( 19 Jan 2023 15:15 )             24.8     01-19    134<L>  |  99  |  30<H>  ----------------------------<  114<H>  3.9   |  24  |  0.49<L>    Ca    9.5      19 Jan 2023 02:10    TPro  5.2<L>  /  Alb  2.6<L>  /  TBili  <0.2  /  DBili  x   /  AST  13  /  ALT  8   /  AlkPhos  62  01-19

## 2023-01-19 NOTE — ED PROVIDER NOTE - PROGRESS NOTE DETAILS
Pt consented for blood transfusion, discussed risks and benefits, no Hx of transfusion reaction in the past, consent in chart. - Butch Rivera, PGY-2

## 2023-01-19 NOTE — ED PROVIDER NOTE - ATTENDING CONTRIBUTION TO CARE
Afebrile. Awake and Alert. Lungs CTA. Heart RRR. Right squamous cell carcinoma with small amount of fresh blood on dressing from NH. CN II-XII grossly intact. Moves all extremities without lateralization.

## 2023-01-19 NOTE — ED PROVIDER NOTE - CLINICAL SUMMARY MEDICAL DECISION MAKING FREE TEXT BOX
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 VS significant for tachycardia and hypotension, phys exam w/mucosal pallor. Likely 2/2 anemia vs. less likely bleeding from lesions, well bandaged, will eval w/labs, likely consent for blood transfusion, will admit if pt may require greater than 2u pRBC. - Butch Rivera, PGY-2

## 2023-01-20 ENCOUNTER — TRANSCRIPTION ENCOUNTER (OUTPATIENT)
Age: 88
End: 2023-01-20

## 2023-01-20 VITALS
DIASTOLIC BLOOD PRESSURE: 70 MMHG | OXYGEN SATURATION: 100 % | RESPIRATION RATE: 17 BRPM | SYSTOLIC BLOOD PRESSURE: 119 MMHG | TEMPERATURE: 98 F | HEART RATE: 90 BPM

## 2023-01-20 LAB
ANION GAP SERPL CALC-SCNC: 9 MMOL/L — SIGNIFICANT CHANGE UP (ref 7–14)
BUN SERPL-MCNC: 21 MG/DL — SIGNIFICANT CHANGE UP (ref 7–23)
CALCIUM SERPL-MCNC: 9.2 MG/DL — SIGNIFICANT CHANGE UP (ref 8.4–10.5)
CHLORIDE SERPL-SCNC: 102 MMOL/L — SIGNIFICANT CHANGE UP (ref 98–107)
CO2 SERPL-SCNC: 24 MMOL/L — SIGNIFICANT CHANGE UP (ref 22–31)
CREAT SERPL-MCNC: 0.49 MG/DL — LOW (ref 0.5–1.3)
EGFR: 92 ML/MIN/1.73M2 — SIGNIFICANT CHANGE UP
GLUCOSE SERPL-MCNC: 98 MG/DL — SIGNIFICANT CHANGE UP (ref 70–99)
HCT VFR BLD CALC: 24.5 % — LOW (ref 34.5–45)
HGB BLD-MCNC: 7.9 G/DL — LOW (ref 11.5–15.5)
MCHC RBC-ENTMCNC: 27.2 PG — SIGNIFICANT CHANGE UP (ref 27–34)
MCHC RBC-ENTMCNC: 32.2 GM/DL — SIGNIFICANT CHANGE UP (ref 32–36)
MCV RBC AUTO: 84.5 FL — SIGNIFICANT CHANGE UP (ref 80–100)
NRBC # BLD: 0 /100 WBCS — SIGNIFICANT CHANGE UP (ref 0–0)
NRBC # FLD: 0 K/UL — SIGNIFICANT CHANGE UP (ref 0–0)
PLATELET # BLD AUTO: 265 K/UL — SIGNIFICANT CHANGE UP (ref 150–400)
POTASSIUM SERPL-MCNC: 4.1 MMOL/L — SIGNIFICANT CHANGE UP (ref 3.5–5.3)
POTASSIUM SERPL-SCNC: 4.1 MMOL/L — SIGNIFICANT CHANGE UP (ref 3.5–5.3)
RBC # BLD: 2.9 M/UL — LOW (ref 3.8–5.2)
RBC # FLD: 17.2 % — HIGH (ref 10.3–14.5)
SODIUM SERPL-SCNC: 135 MMOL/L — SIGNIFICANT CHANGE UP (ref 135–145)
WBC # BLD: 14.21 K/UL — HIGH (ref 3.8–10.5)
WBC # FLD AUTO: 14.21 K/UL — HIGH (ref 3.8–10.5)

## 2023-01-20 PROCEDURE — 99223 1ST HOSP IP/OBS HIGH 75: CPT

## 2023-01-20 RX ADMIN — Medication 325 MILLIGRAM(S): at 14:40

## 2023-01-20 RX ADMIN — Medication 1 TABLET(S): at 14:40

## 2023-01-20 RX ADMIN — Medication 500 MILLIGRAM(S): at 14:40

## 2023-01-20 RX ADMIN — SERTRALINE 100 MILLIGRAM(S): 25 TABLET, FILM COATED ORAL at 14:40

## 2023-01-20 RX ADMIN — Medication 1 MILLIGRAM(S): at 14:40

## 2023-01-20 RX ADMIN — ATORVASTATIN CALCIUM 40 MILLIGRAM(S): 80 TABLET, FILM COATED ORAL at 00:09

## 2023-01-20 RX ADMIN — RISPERIDONE 0.5 MILLIGRAM(S): 4 TABLET ORAL at 06:52

## 2023-01-20 NOTE — PROGRESS NOTE ADULT - PROBLEM SELECTOR PLAN 1
-chronic chest wound due to malignancy  -seen by onc during recent hospital admission. Patient to follow up with Dr. Alexis as outpatient  - wound care consult  - Patient receiving flagyl at  for PNA? Will obtain CXR to further evaluate. At this time no clinical signs of PNA. Patient denies fevers or cough. Patient has chronic mild leukocytosis. Recent CT with metastatic disease. Low clinical suspicion for PNA. Monitor off antibiotics.   - given hx of mets, unlikely to benefit from surgical intervention  -Was seen by palliative care on 1/5/23 - patient wants to continue with immunotherapy as outpatient.  -Spoke to family Catarina (BRAYDON) over the phone today who understands that treatment is not effective and she will speak to patient about palliative care and DNR status.
-chronic chest wound due to malignancy  -seen by onc during recent hospital admission. Patient to follow up with Dr. Alexis as outpatient  - wound care consult  - Patient receiving flagyl at  for PNA? Will obtain CXR to further evaluate. At this time no clinical signs of PNA. Patient denies fevers or cough. Patient has chronic mild leukocytosis. Recent CT with metastatic disease. Low clinical suspicion for PNA. Monitor off antibiotics.   - given hx of mets, unlikely to benefit from surgical intervention  -Was seen by palliative care on 1/5/23 - patient wants to continue with immunotherapy as outpatient.  -Spoke to family Catarina (BRAYDON) over the phone today who understands that treatment is not effective and she will speak to patient about palliative care and DNR status.

## 2023-01-20 NOTE — DISCHARGE NOTE PROVIDER - PROVIDER TOKENS
FREE:[LAST:[your primary care provider],PHONE:[(   )    -],FAX:[(   )    -],ADDRESS:[follow up with your primary care provider 1-2 weeks]]

## 2023-01-20 NOTE — PROGRESS NOTE ADULT - SUBJECTIVE AND OBJECTIVE BOX
PATIENT SEEN AND EXAMINED ON :- 1/20/23  DATE OF SERVICE: 1/20/23            Interim events noted,Labs ,Radiological studies and Cardiology tests reviewed .       HOSPITAL COURSE: HPI:  86 y/o F with pmhx of skin cancer (squamous cell carcinoma) with mets presents from [facility] for abnormal lab test. The patient resides in [facility]. She has a chronic R chest wound which is where her skin cancer is located. She has had previous hospitalization for same reason requiring blood transfusions. Most recent hospital stay was 12/22-1/6. She states that her wound bled a lot yesterday as they did not use proper technique for wound care. She states her anemia is likely from the wound. Denies GI bleeding. The patient denies fevers. She also reports that she was started on antibiotic flagyl for PNA recently. Sh denies any cough or fevers.     Vital Signs Last 24 Hrs  T(C): 36.9 (19 Jan 2023 09:43), Max: 37.4 (19 Jan 2023 06:15)  T(F): 98.4 (19 Jan 2023 09:43), Max: 99.3 (19 Jan 2023 06:15)  HR: 106 (19 Jan 2023 09:43) (98 - 107)  BP: 97/66 (19 Jan 2023 09:43) (93/40 - 102/46)  BP(mean): --  RR: 16 (19 Jan 2023 09:43) (15 - 18)  SpO2: 100% (19 Jan 2023 09:43) (97% - 100%)    Parameters below as of 19 Jan 2023 09:43  Patient On (Oxygen Delivery Method): room air     (19 Jan 2023 10:21)      INTERIM EVENTS:Patient seen at bedside ,interim events noted.      PMH -reviewed admission note, no change since admission  HEART FAILURE: Acute[ ]Chronic[ ] Systolic[ ] Diastolic[ ] Combined Systolic and Diastolic[ ]  CAD[ ] CABG[ ] PCI[ ]  DEVICES[ ] PPM[ ] ICD[ ] ILR[ ]  ATRIAL FIBRILLATION[ ] Paroxysmal[ ] Permanent[ ] CHADS2-[  ]  KAMRAN[ ] CKD1[ ] CKD2[ ] CKD3[ ] CKD4[ ] ESRD[ ]  COPD[ ] HTN[ ]   DM[ ] Type1[ ] Type 2[ ]   CVA[ ] Paresis[ ]    AMBULATION: Assisted[ ] Cane/walker[ ] Independent[ ]    MEDICATIONS  (STANDING):  ascorbic acid 500 milliGRAM(s) Oral daily  atorvastatin 40 milliGRAM(s) Oral at bedtime  ferrous    sulfate 325 milliGRAM(s) Oral daily  folic acid 1 milliGRAM(s) Oral daily  multivitamin 1 Tablet(s) Oral daily  polyethylene glycol 3350 17 Gram(s) Oral daily  risperiDONE   Tablet 0.5 milliGRAM(s) Oral two times a day  senna 2 Tablet(s) Oral at bedtime  sertraline 100 milliGRAM(s) Oral daily    MEDICATIONS  (PRN):  acetaminophen     Tablet .. 650 milliGRAM(s) Oral every 6 hours PRN Temp greater or equal to 38C (100.4F), Mild Pain (1 - 3)  melatonin 3 milliGRAM(s) Oral at bedtime PRN Insomnia  ondansetron Injectable 4 milliGRAM(s) IV Push every 8 hours PRN Nausea and/or Vomiting            REVIEW OF SYSTEMS:  Constitutional: [ ] fever, [ ]weight loss,  [ ]fatigue [ ]weight gain  Eyes: [ ] visual changes  Respiratory: [ ]shortness of breath;  [ ] cough, [ ]wheezing, [ ]chills, [ ]hemoptysis  Cardiovascular: [ ] chest pain, [ ]palpitations, [ ]dizziness,  [ ]leg swelling[ ]orthopnea[ ]PND  Gastrointestinal: [ ] abdominal pain, [ ]nausea, [ ]vomiting,  [ ]diarrhea [ ]Constipation [ ]Melena  Genitourinary: [ ] dysuria, [ ] hematuria [ ]Powell  Neurologic: [ ] headaches [ ] tremors[ ]weakness [ ]Paralysis Right[ ] Left[ ]  Skin: [ ] itching, [ ]burning, [ ] rashes  Endocrine: [ ] heat or cold intolerance  Musculoskeletal: [ ] joint pain or swelling; [ ] muscle, back, or extremity pain  Psychiatric: [ ] depression, [ ]anxiety, [ ]mood swings, or [ ]difficulty sleeping  Hematologic: [ ] easy bruising, [ ] bleeding gums    [ ] All remaining systems negative except as per above.   [ ]Unable to obtain.  [x] No change in ROS since admission      Vital Signs Last 24 Hrs  T(C): 36.4 (20 Jan 2023 18:10), Max: 36.9 (20 Jan 2023 00:58)  T(F): 97.5 (20 Jan 2023 18:10), Max: 98.5 (20 Jan 2023 00:58)  HR: 90 (20 Jan 2023 18:10) (90 - 107)  BP: 119/70 (20 Jan 2023 18:10) (105/51 - 129/47)  BP(mean): --  RR: 17 (20 Jan 2023 18:10) (16 - 18)  SpO2: 100% (20 Jan 2023 18:10) (98% - 100%)    Parameters below as of 20 Jan 2023 18:10  Patient On (Oxygen Delivery Method): room air      I&O's Summary      PHYSICAL EXAM:  General: No acute distress BMI-  HEENT: EOMI, PERRL  Neck: Supple, [ ] JVD  Lungs: Equal air entry bilaterally; [ ] rales [ ] wheezing [ ] rhonchi  Heart: Regular rate and rhythm; [x ] murmur   2/6 [ x] systolic [ ] diastolic [ ] radiation[ ] rubs [ ]  gallops  Abdomen: Nontender, bowel sounds present  Extremities: No clubbing, cyanosis, [ ] edema [ ]Pulses  equal and intact  Nervous system:  Alert & Oriented X3, no focal deficits  Psychiatric: Normal affect  Skin: No rashes or lesions    LABS:  01-20    135  |  102  |  21  ----------------------------<  98  4.1   |  24  |  0.49<L>    Ca    9.2      20 Jan 2023 06:49    TPro  5.2<L>  /  Alb  2.6<L>  /  TBili  <0.2  /  DBili  x   /  AST  13  /  ALT  8   /  AlkPhos  62  01-19    Creatinine Trend: 0.49<--, 0.49<--, 0.43<--, 0.56<--, 0.45<--, 0.49<--                        7.9    14.21 )-----------( 265      ( 20 Jan 2023 06:49 )             24.5     PT/INR - ( 19 Jan 2023 02:10 )   PT: 13.4 sec;   INR: 1.15 ratio         PTT - ( 19 Jan 2023 02:10 )  PTT:29.9 sec          
PATIENT SEEN AND EXAMINED ON :- 1/19/23  DATE OF SERVICE:    1/19/23         Interim events noted,Labs ,Radiological studies and Cardiology tests reviewed .       HOSPITAL COURSE: HPI:  86 y/o F with pmhx of skin cancer (squamous cell carcinoma) with mets presents from [facility] for abnormal lab test. The patient resides in [facility]. She has a chronic R chest wound which is where her skin cancer is located. She has had previous hospitalization for same reason requiring blood transfusions. Most recent hospital stay was 12/22-1/6. She states that her wound bled a lot yesterday as they did not use proper technique for wound care. She states her anemia is likely from the wound. Denies GI bleeding. The patient denies fevers. She also reports that she was started on antibiotic flagyl for PNA recently. Sh denies any cough or fevers.     Vital Signs Last 24 Hrs  T(C): 36.9 (19 Jan 2023 09:43), Max: 37.4 (19 Jan 2023 06:15)  T(F): 98.4 (19 Jan 2023 09:43), Max: 99.3 (19 Jan 2023 06:15)  HR: 106 (19 Jan 2023 09:43) (98 - 107)  BP: 97/66 (19 Jan 2023 09:43) (93/40 - 102/46)  BP(mean): --  RR: 16 (19 Jan 2023 09:43) (15 - 18)  SpO2: 100% (19 Jan 2023 09:43) (97% - 100%)    Parameters below as of 19 Jan 2023 09:43  Patient On (Oxygen Delivery Method): room air     (19 Jan 2023 10:21)      INTERIM EVENTS:Patient seen at bedside ,interim events noted.      PMH -reviewed admission note, no change since admission  HEART FAILURE: Acute[ ]Chronic[ ] Systolic[ ] Diastolic[ ] Combined Systolic and Diastolic[ ]  CAD[ ] CABG[ ] PCI[ ]  DEVICES[ ] PPM[ ] ICD[ ] ILR[ ]  ATRIAL FIBRILLATION[ ] Paroxysmal[ ] Permanent[ ] CHADS2-[  ]  KAMRAN[ ] CKD1[ ] CKD2[ ] CKD3[ ] CKD4[ ] ESRD[ ]  COPD[ ] HTN[ ]   DM[ ] Type1[ ] Type 2[ ]   CVA[ ] Paresis[ ]    AMBULATION: Assisted[ ] Cane/walker[ ] Independent[ ]    MEDICATIONS  (STANDING):  ascorbic acid 500 milliGRAM(s) Oral daily  atorvastatin 40 milliGRAM(s) Oral at bedtime  ferrous    sulfate 325 milliGRAM(s) Oral daily  folic acid 1 milliGRAM(s) Oral daily  multivitamin 1 Tablet(s) Oral daily  polyethylene glycol 3350 17 Gram(s) Oral daily  risperiDONE   Tablet 0.5 milliGRAM(s) Oral two times a day  senna 2 Tablet(s) Oral at bedtime  sertraline 100 milliGRAM(s) Oral daily    MEDICATIONS  (PRN):  acetaminophen     Tablet .. 650 milliGRAM(s) Oral every 6 hours PRN Temp greater or equal to 38C (100.4F), Mild Pain (1 - 3)  melatonin 3 milliGRAM(s) Oral at bedtime PRN Insomnia  ondansetron Injectable 4 milliGRAM(s) IV Push every 8 hours PRN Nausea and/or Vomiting            REVIEW OF SYSTEMS:  Constitutional: [ ] fever, [ ]weight loss,  [ ]fatigue [ ]weight gain  Eyes: [ ] visual changes  Respiratory: [ ]shortness of breath;  [ ] cough, [ ]wheezing, [ ]chills, [ ]hemoptysis  Cardiovascular: [ ] chest pain, [ ]palpitations, [ ]dizziness,  [ ]leg swelling[ ]orthopnea[ ]PND  Gastrointestinal: [ ] abdominal pain, [ ]nausea, [ ]vomiting,  [ ]diarrhea [ ]Constipation [ ]Melena  Genitourinary: [ ] dysuria, [ ] hematuria [ ]Powell  Neurologic: [ ] headaches [ ] tremors[ ]weakness [ ]Paralysis Right[ ] Left[ ]  Skin: [ ] itching, [ ]burning, [ ] rashes  Endocrine: [ ] heat or cold intolerance  Musculoskeletal: [ ] joint pain or swelling; [ ] muscle, back, or extremity pain  Psychiatric: [ ] depression, [ ]anxiety, [ ]mood swings, or [ ]difficulty sleeping  Hematologic: [ ] easy bruising, [ ] bleeding gums    [ ] All remaining systems negative except as per above.   [ ]Unable to obtain.  [x] No change in ROS since admission      Vital Signs Last 24 Hrs  T(C): 36.7 (19 Jan 2023 19:42), Max: 37.4 (19 Jan 2023 06:15)  T(F): 98 (19 Jan 2023 19:42), Max: 99.4 (19 Jan 2023 15:54)  HR: 107 (19 Jan 2023 19:42) (91 - 107)  BP: 133/58 (19 Jan 2023 19:42) (93/40 - 133/58)  BP(mean): --  RR: 20 (19 Jan 2023 19:42) (15 - 21)  SpO2: 100% (19 Jan 2023 19:42) (97% - 100%)    Parameters below as of 19 Jan 2023 19:42  Patient On (Oxygen Delivery Method): room air      I&O's Summary      PHYSICAL EXAM:  General: No acute distress BMI-  HEENT: EOMI, PERRL  Neck: Supple, [ ] JVD  Lungs: Equal air entry bilaterally; [ ] rales [ ] wheezing [ ] rhonchi  Heart: Regular rate and rhythm; [x ] murmur   2/6 [ x] systolic [ ] diastolic [ ] radiation[ ] rubs [ ]  gallops  Abdomen: Nontender, bowel sounds present  Extremities: No clubbing, cyanosis, [ ] edema [ ]Pulses  equal and intact  Nervous system:  Alert & Oriented X3, no focal deficits  Psychiatric: Normal affect  Skin: No rashes or lesions    LABS:  01-19    134<L>  |  99  |  30<H>  ----------------------------<  114<H>  3.9   |  24  |  0.49<L>    Ca    9.5      19 Jan 2023 02:10    TPro  5.2<L>  /  Alb  2.6<L>  /  TBili  <0.2  /  DBili  x   /  AST  13  /  ALT  8   /  AlkPhos  62  01-19    Creatinine Trend: 0.49<--, 0.43<--, 0.56<--, 0.45<--, 0.49<--, 0.36<--                        8.2    17.75 )-----------( 297      ( 19 Jan 2023 15:15 )             24.8     PT/INR - ( 19 Jan 2023 02:10 )   PT: 13.4 sec;   INR: 1.15 ratio         PTT - ( 19 Jan 2023 02:10 )  PTT:29.9 sec

## 2023-01-20 NOTE — PATIENT PROFILE ADULT - FALL HARM RISK - HARM RISK INTERVENTIONS

## 2023-01-20 NOTE — ED ADULT NURSE REASSESSMENT NOTE - NS ED NURSE REASSESS COMMENT FT1
Pt in NAD, blood transfusion completed, DHIRAJ MEMBRENO speaking with social work to get pt bed at [place].  Will continue to monitor.

## 2023-01-20 NOTE — DISCHARGE NOTE PROVIDER - CARE PROVIDER_API CALL
your primary care provider,   follow up with your primary care provider 1-2 weeks  Phone: (   )    -  Fax: (   )    -  Follow Up Time:

## 2023-01-20 NOTE — CONSULT NOTE ADULT - SUBJECTIVE AND OBJECTIVE BOX
Wound SURGERY CONSULT NOTE    HPI:88 y/o F with pmhx of skin cancer (squamous cell carcinoma) with mets presents from [facility] for abnormal lab test. The patient resides in [facility]. She has a chronic R chest wound which is where her skin cancer is located. She has had previous hospitalization for same reason requiring blood transfusions. Most recent hospital stay was 12/22-1/6. She states that her wound bled a lot yesterday as they did not use proper technique for wound care. She states her anemia is likely from the wound. Denies GI bleeding. The patient denies fevers. She also reports that she was started on antibiotic flagyl for PNA recently. Sh denies any cough or fevers.     Vital Signs Last 24 Hrs  T(C): 36.9 (19 Jan 2023 09:43), Max: 37.4 (19 Jan 2023 06:15)  T(F): 98.4 (19 Jan 2023 09:43), Max: 99.3 (19 Jan 2023 06:15)  HR: 106 (19 Jan 2023 09:43) (98 - 107)  BP: 97/66 (19 Jan 2023 09:43) (93/40 - 102/46)  BP(mean): --  RR: 16 (19 Jan 2023 09:43) (15 - 18)  SpO2: 100% (19 Jan 2023 09:43) (97% - 100%)    Parameters below as of 19 Jan 2023 09:43  Patient On (Oxygen Delivery Method): room air     (19 Jan 2023 10:21)    Wound consult requested to assist w/ management of      Current Diet: Diet, Regular (01-19-23 @ 17:43)      PAST MEDICAL & SURGICAL HISTORY:  HLD (hyperlipidemia)      TIA (transient ischemic attack)      Carotid stenosis      Primary squamous cell carcinoma of skin      Thyroid mass      REVIEW OF SYSTEMS:   General/ Skin/ MSK: see HPI  All other systems negative    MEDICATIONS  (STANDING):  ascorbic acid 500 milliGRAM(s) Oral daily  atorvastatin 40 milliGRAM(s) Oral at bedtime  ferrous    sulfate 325 milliGRAM(s) Oral daily  folic acid 1 milliGRAM(s) Oral daily  multivitamin 1 Tablet(s) Oral daily  polyethylene glycol 3350 17 Gram(s) Oral daily  risperiDONE   Tablet 0.5 milliGRAM(s) Oral two times a day  senna 2 Tablet(s) Oral at bedtime  sertraline 100 milliGRAM(s) Oral daily    MEDICATIONS  (PRN):  acetaminophen     Tablet .. 650 milliGRAM(s) Oral every 6 hours PRN Temp greater or equal to 38C (100.4F), Mild Pain (1 - 3)  melatonin 3 milliGRAM(s) Oral at bedtime PRN Insomnia  ondansetron Injectable 4 milliGRAM(s) IV Push every 8 hours PRN Nausea and/or Vomiting      Allergies    No Known Allergies    Intolerances    SOCIAL HISTORY: Lives at [place] used to work at [employer]     FAMILY HISTORY:  Family history of lung cancer (Father)        PHYSICAL EXAM:  Vital Signs Last 24 Hrs  T(C): 36.3 (20 Jan 2023 13:25), Max: 37.4 (19 Jan 2023 15:54)  T(F): 97.4 (20 Jan 2023 13:25), Max: 99.4 (19 Jan 2023 15:54)  HR: 91 (20 Jan 2023 13:25) (91 - 107)  BP: 129/47 (20 Jan 2023 13:25) (105/51 - 133/58)  BP(mean): --  RR: 16 (20 Jan 2023 13:25) (16 - 21)  SpO2: 100% (20 Jan 2023 13:25) (98% - 100%)    Parameters below as of 20 Jan 2023 13:25  Patient On (Oxygen Delivery Method): room air    FULL ASSESSMENT TO FOLLOW         LABS/ CULTURES/ RADIOLOGY:                        7.9    14.21 )-----------( 265      ( 20 Jan 2023 06:49 )             24.5       135  |  102  |  21  ----------------------------<  98      [01-20-23 @ 06:49]  4.1   |  24  |  0.49        Ca     9.2     [01-20-23 @ 06:49]    TPro  5.2  /  Alb  2.6  /  TBili  <0.2  /  DBili  x   /  AST  13  /  ALT  8   /  AlkPhos  62  [01-19-23 @ 02:10]    PT/INR: PT 13.4 , INR 1.15       [01-19-23 @ 02:10]  PTT: 29.9       [01-19-23 @ 02:10]                          I Wound SURGERY CONSULT NOTE    HPI:86 y/o F with pmhx of skin cancer (squamous cell carcinoma) with mets presents from [facility] for abnormal lab test. The patient resides in [facility]. She has a chronic R chest wound which is where her skin cancer is located. She has had previous hospitalization for same reason requiring blood transfusions. Most recent hospital stay was 12/22-1/6. She states that her wound bled a lot yesterday as they did not use proper technique for wound care. She states her anemia is likely from the wound. Denies GI bleeding. The patient denies fevers. She also reports that she was started on antibiotic flagyl for PNA recently. Sh denies any cough or fevers.     Vital Signs Last 24 Hrs  T(C): 36.9 (19 Jan 2023 09:43), Max: 37.4 (19 Jan 2023 06:15)  T(F): 98.4 (19 Jan 2023 09:43), Max: 99.3 (19 Jan 2023 06:15)  HR: 106 (19 Jan 2023 09:43) (98 - 107)  BP: 97/66 (19 Jan 2023 09:43) (93/40 - 102/46)  BP(mean): --  RR: 16 (19 Jan 2023 09:43) (15 - 18)  SpO2: 100% (19 Jan 2023 09:43) (97% - 100%)    Parameters below as of 19 Jan 2023 09:43  Patient On (Oxygen Delivery Method): room air (19 Jan 2023 10:21)    Wound consult requested to assist w/ management of Right chest wall wound. Patient endorses episode of bleeding from wound yesterday. Patient endorses receiving daily wound care by nurses. Denies regular episodes of bleeding from wound except yesterday. Endorses she noted more bleeding from wounds after her last treatment of radiation therapy to the area around October 2022. Patient endorses she feels ok and only felt dizzy after immunotherapy treatment. As per patient last immunotherapy treatment in October. Patient otherwise w/o complaints. Reports she feels satisfied with the care she is getting at [facility]. Patient denies associated pain from wound. Patient endorses the " nurses at [facility] take a long time to perform my wound care".     Current Diet: Diet, Regular (01-19-23 @ 17:43)      PAST MEDICAL & SURGICAL HISTORY:  HLD (hyperlipidemia)  TIA (transient ischemic attack)  Carotid stenosis  Primary squamous cell carcinoma of skin  Thyroid mass    REVIEW OF SYSTEMS:   General/ Skin/ MSK: see PMH and HPI.   All other systems negative    MEDICATIONS  (STANDING):  ascorbic acid 500 milliGRAM(s) Oral daily  atorvastatin 40 milliGRAM(s) Oral at bedtime  ferrous    sulfate 325 milliGRAM(s) Oral daily  folic acid 1 milliGRAM(s) Oral daily  multivitamin 1 Tablet(s) Oral daily  polyethylene glycol 3350 17 Gram(s) Oral daily  risperiDONE   Tablet 0.5 milliGRAM(s) Oral two times a day  senna 2 Tablet(s) Oral at bedtime  sertraline 100 milliGRAM(s) Oral daily    MEDICATIONS  (PRN):  acetaminophen     Tablet .. 650 milliGRAM(s) Oral every 6 hours PRN Temp greater or equal to 38C (100.4F), Mild Pain (1 - 3)  melatonin 3 milliGRAM(s) Oral at bedtime PRN Insomnia  ondansetron Injectable 4 milliGRAM(s) IV Push every 8 hours PRN Nausea and/or Vomiting    Allergies    No Known Allergies    Intolerances    SOCIAL HISTORY: Lives at [place], used to work at [employer]     FAMILY HISTORY:  Family history of lung cancer (Father)    PHYSICAL EXAM:  Vital Signs Last 24 Hrs  T(C): 36.3 (20 Jan 2023 13:25), Max: 37.4 (19 Jan 2023 15:54)  T(F): 97.4 (20 Jan 2023 13:25), Max: 99.4 (19 Jan 2023 15:54)  HR: 91 (20 Jan 2023 13:25) (91 - 107)  BP: 129/47 (20 Jan 2023 13:25) (105/51 - 133/58)  BP(mean): --  RR: 16 (20 Jan 2023 13:25) (16 - 21)  SpO2: 100% (20 Jan 2023 13:25) (98% - 100%)    Parameters below as of 20 Jan 2023 13:25  Patient On (Oxygen Delivery Method): room air    Constitutional: NAD. Frail. Pale. Thin. Pleasant. AOX4.   offloaded with pillows   HEENT: NC/AT, eyeglasses, mucosa moist,, trachea midline, neck supple  Cardiovascular: Rate regular to tachy   Respiratory: No use of accessory muscles, room air   Gastrointestinal: soft NT/ND. Not bleeding per rectum.   : Functional incontinence at times   Neurology: Weakened symmetrically.  Able to follow commands.   Musculoskeletal: Cachexia, limited aROM.   Vascular:   - RUE: Marked lymphedema. Warm, good capillary refill. Radial pulse 2+  - lower extremities: Mildly cool to touch,  good capillary refill. No overt ischemia. Weak palpable pulses.   Skin:   Right breasts Mastectomy   Large cutaneous malignant wound secondary to SCC involving Bilateral BREAST/Chest wall extending to mid axillary line and right lateral lower quadrant- measure in cluster- [id number]. Tissue type friable, oozing sanguinous drainage in certain areas. Able to achieve hemostasis w/o prolonged manual pressure. Scattered Fibrinous film.  No suprainfection or associated cellulitis.   Right lateral lower quadrant measure separately measuring- 8.5cmx9.5cmx0.3cm. Same clinical description as mentioned above. Mild sanguinous drainage. Able to achieve hemostasis with light compression.   Of note, as per patient dressing was changed last yesterday. Dressing is not saturating thru the ACE wrap. When removed noted moderate serosanguinous drainage in bandage. Noted that patient have in place what appears like Calcium alginate silver. Patient endorses at [facility] they use " a sheet like dressing". Dressing soaked with NS prior to removal. Patient tolerated well. Prior to dressing closure with ACE bandaged, patient with no active oozing.       LABS/ CULTURES/ RADIOLOGY:                        7.9    14.21 )-----------( 265      ( 20 Jan 2023 06:49 )             24.5       135  |  102  |  21  ----------------------------<  98      [01-20-23 @ 06:49]  4.1   |  24  |  0.49        Ca     9.2     [01-20-23 @ 06:49]    TPro  5.2  /  Alb  2.6  /  TBili  <0.2  /  DBili  x   /  AST  13  /  ALT  8   /  AlkPhos  62  [01-19-23 @ 02:10]    PT/INR: PT 13.4 , INR 1.15       [01-19-23 @ 02:10]  PTT: 29.9       [01-19-23 @ 02:10]      ACC: [id number] EXAM:  CT CHEST                          PROCEDURE DATE:  12/29/2022      INTERPRETATION:  CLINICAL INFORMATION: History of anemia, metastatic   squamous cell carcinoma of the skin. Bilateral breast malignant skin   lesion bleeding. Evaluate malignancy.    COMPARISON: CT chest 7/18/2022.    CONTRAST/COMPLICATIONS:  IV Contrast: None  Oral Contrast: None  Complications: None    PROCEDURE:  CT scan of the chest was obtained without intravenous contrast.    FINDINGS:    MEDIASTINUM/LYMPH NODES: Enlarging left axillary/chest wall   lymphadenopathy with largest lymph node measuring 1.3 cm (2-74.   Multinodular right thyroid lobe. Left thyroidectomy.    HEART/VASCULATURE: The heart is normal in size. Calcifications coronary   artery calcification and stenting Calcifications of the aortic valve and   mitral annulus. The great vessels are normal in size. Small pericardial   effusion. Hypodensity of the blood pool in relation to left ventricular   myocardium suggests underlyinganemia.    AIRWAYS/LUNGS/PLEURA: Central airways are clear.  New and enlarging bilateral pulmonary metastases since CT 7/18/2022.    For reference:  Left lower lobe nodule measures 1.8 cm (2-205) compared to prior which   measured 1.3 cm.  New right lower lobe nodule measuring 0.8 cm (2-109).    Small right pleural effusion with subadjacent atelectatic lung. Trace   left pleural effusion with subadjacent atelectatic lung demonstrating   calcifications. Subpleural reticulation in the right middle lobe may be   secondary to reported right breast/chest wall radiotherapy. There is   septal thickening at the bases; lymphangitic carcinomatosis is a   possibility.    UPPER ABDOMEN: Splenomegaly with spleen measuring 15.6 cm. Cholelithiasis.    BONES/SOFT TISSUES: Extensive irregular and fungating cutaneous lesions   diffusely involving the anterior and lateral chest wall, new and   worsening since July 2022. For reference along the left lateral chest   wall, maximal skin thickness measures approximately 1.5 cm in depth   compared to 0.6 cm previously. Open wounds suggested, with discontinuity   of the skin surface and locules of air. The fungating right breast mass   measures approximately 4 cm in depth, invades the right axillary space   and adjacent right fourth and fifth ribs, overall extent not fully   evaluated in the absence of IV contrast.      IMPRESSION:  Overall worsening metastatic burden since July 2022, with diffuse   irregular and fungating cutaneous lesions throughout the chest wall with   open lesions and rib invasion, new/enlarging left axillary/chest wall   lymphadenopathy and pulmonary metastases.        --- End of Report ---                        I Wound SURGERY CONSULT NOTE    HPI:88 y/o F with pmhx of skin cancer (squamous cell carcinoma) with mets presents from [facility] for abnormal lab test. The patient resides in [facility]. She has a chronic R chest wound which is where her skin cancer is located. She has had previous hospitalization for same reason requiring blood transfusions. Most recent hospital stay was 12/22-1/6. She states that her wound bled a lot yesterday as they did not use proper technique for wound care. She states her anemia is likely from the wound. Denies GI bleeding. The patient denies fevers. She also reports that she was started on antibiotic flagyl for PNA recently. Sh denies any cough or fevers.     Vital Signs Last 24 Hrs  T(C): 36.9 (19 Jan 2023 09:43), Max: 37.4 (19 Jan 2023 06:15)  T(F): 98.4 (19 Jan 2023 09:43), Max: 99.3 (19 Jan 2023 06:15)  HR: 106 (19 Jan 2023 09:43) (98 - 107)  BP: 97/66 (19 Jan 2023 09:43) (93/40 - 102/46)  BP(mean): --  RR: 16 (19 Jan 2023 09:43) (15 - 18)  SpO2: 100% (19 Jan 2023 09:43) (97% - 100%)    Parameters below as of 19 Jan 2023 09:43  Patient On (Oxygen Delivery Method): room air (19 Jan 2023 10:21)    Wound consult requested to assist w/ management of Right chest wall wound. Patient endorses episode of bleeding from wound yesterday. Patient endorses receiving daily wound care by nurses. Denies regular episodes of bleeding from wound except yesterday. Endorses she noted more bleeding from wounds after her last treatment of radiation therapy to the area around October 2022. Patient endorses she feels ok and only felt dizzy after immunotherapy treatment. As per patient last immunotherapy treatment in October. Patient otherwise w/o complaints. Reports she feels satisfied with the care she is getting at [facility]. Patient denies associated pain from wound. Patient endorses the " nurses at [facility] take a long time to perform my wound care".     Current Diet: Diet, Regular (01-19-23 @ 17:43)      PAST MEDICAL & SURGICAL HISTORY:  HLD (hyperlipidemia)  TIA (transient ischemic attack)  Carotid stenosis  Primary squamous cell carcinoma of skin  Thyroid mass    REVIEW OF SYSTEMS:   General/ Skin/ MSK: see PMH and HPI.   All other systems negative    MEDICATIONS  (STANDING):  ascorbic acid 500 milliGRAM(s) Oral daily  atorvastatin 40 milliGRAM(s) Oral at bedtime  ferrous    sulfate 325 milliGRAM(s) Oral daily  folic acid 1 milliGRAM(s) Oral daily  multivitamin 1 Tablet(s) Oral daily  polyethylene glycol 3350 17 Gram(s) Oral daily  risperiDONE   Tablet 0.5 milliGRAM(s) Oral two times a day  senna 2 Tablet(s) Oral at bedtime  sertraline 100 milliGRAM(s) Oral daily    MEDICATIONS  (PRN):  acetaminophen     Tablet .. 650 milliGRAM(s) Oral every 6 hours PRN Temp greater or equal to 38C (100.4F), Mild Pain (1 - 3)  melatonin 3 milliGRAM(s) Oral at bedtime PRN Insomnia  ondansetron Injectable 4 milliGRAM(s) IV Push every 8 hours PRN Nausea and/or Vomiting    Allergies    No Known Allergies    Intolerances    SOCIAL HISTORY: Lives at [employer] [place] [employer] [employer], used to work at [employer]     FAMILY HISTORY:  Family history of lung cancer (Father)    PHYSICAL EXAM:  Vital Signs Last 24 Hrs  T(C): 36.3 (20 Jan 2023 13:25), Max: 37.4 (19 Jan 2023 15:54)  T(F): 97.4 (20 Jan 2023 13:25), Max: 99.4 (19 Jan 2023 15:54)  HR: 91 (20 Jan 2023 13:25) (91 - 107)  BP: 129/47 (20 Jan 2023 13:25) (105/51 - 133/58)  BP(mean): --  RR: 16 (20 Jan 2023 13:25) (16 - 21)  SpO2: 100% (20 Jan 2023 13:25) (98% - 100%)    Parameters below as of 20 Jan 2023 13:25  Patient On (Oxygen Delivery Method): room air    Constitutional: NAD. Frail. Pale. Thin. Pleasant. AOX4. Patient receiving blood transfusion.   offloaded with pillows   HEENT: NC/AT, eyeglasses, mucosa moist,, trachea midline, neck supple  Cardiovascular: Rate regular to tachy   Respiratory: No use of accessory muscles, room air   Gastrointestinal: soft NT/ND. Not bleeding per rectum.   : Functional incontinence at times   Neurology: Weakened symmetrically.  Able to follow commands.   Musculoskeletal: Cachexia, limited aROM.   Vascular:   - RUE: Marked lymphedema. Warm, good capillary refill. Radial pulse 2+  - lower extremities: Mildly cool to touch,  good capillary refill. No overt ischemia. Weak palpable pulses.   Skin:   Right breasts Mastectomy   Large cutaneous malignant wound secondary to SCC involving Bilateral BREAST/Chest wall extending to mid axillary line and right lateral lower quadrant- measure in cluster- [id number]. Tissue type friable, oozing sanguinous drainage in certain areas. Able to achieve hemostasis w/o prolonged manual pressure. Scattered Fibrinous film.  No suprainfection or associated cellulitis.   Right lateral lower quadrant measure separately measuring- 8.5cmx9.5cmx0.3cm. Same clinical description as mentioned above. Mild sanguinous drainage. Able to achieve hemostasis with light compression.   Of note, as per patient dressing was changed last yesterday. Dressing is not saturating thru the ACE wrap. When removed noted moderate serosanguinous drainage in bandage. Noted that patient have in place what appears like Calcium alginate silver. Patient endorses at [facility] they use " a sheet like dressing". Dressing soaked with NS prior to removal. Patient tolerated well. Prior to dressing closure with ACE bandaged, patient with no active oozing.   Incontinence/Moisture associated dermatitis in Sacrum and buttocks as evident by erythema. No open ulcers.       LABS/ CULTURES/ RADIOLOGY:                        7.9    14.21 )-----------( 265      ( 20 Jan 2023 06:49 )             24.5       135  |  102  |  21  ----------------------------<  98      [01-20-23 @ 06:49]  4.1   |  24  |  0.49        Ca     9.2     [01-20-23 @ 06:49]    TPro  5.2  /  Alb  2.6  /  TBili  <0.2  /  DBili  x   /  AST  13  /  ALT  8   /  AlkPhos  62  [01-19-23 @ 02:10]    PT/INR: PT 13.4 , INR 1.15       [01-19-23 @ 02:10]  PTT: 29.9       [01-19-23 @ 02:10]      ACC: [id number] EXAM:  CT CHEST                          PROCEDURE DATE:  12/29/2022      INTERPRETATION:  CLINICAL INFORMATION: History of anemia, metastatic   squamous cell carcinoma of the skin. Bilateral breast malignant skin   lesion bleeding. Evaluate malignancy.    COMPARISON: CT chest 7/18/2022.    CONTRAST/COMPLICATIONS:  IV Contrast: None  Oral Contrast: None  Complications: None    PROCEDURE:  CT scan of the chest was obtained without intravenous contrast.    FINDINGS:    MEDIASTINUM/LYMPH NODES: Enlarging left axillary/chest wall   lymphadenopathy with largest lymph node measuring 1.3 cm (2-74.   Multinodular right thyroid lobe. Left thyroidectomy.    HEART/VASCULATURE: The heart is normal in size. Calcifications coronary   artery calcification and stenting Calcifications of the aortic valve and   mitral annulus. The great vessels are normal in size. Small pericardial   effusion. Hypodensity of the blood pool in relation to left ventricular   myocardium suggests underlyinganemia.    AIRWAYS/LUNGS/PLEURA: Central airways are clear.  New and enlarging bilateral pulmonary metastases since CT 7/18/2022.    For reference:  Left lower lobe nodule measures 1.8 cm (2-205) compared to prior which   measured 1.3 cm.  New right lower lobe nodule measuring 0.8 cm (2-109).    Small right pleural effusion with subadjacent atelectatic lung. Trace   left pleural effusion with subadjacent atelectatic lung demonstrating   calcifications. Subpleural reticulation in the right middle lobe may be   secondary to reported right breast/chest wall radiotherapy. There is   septal thickening at the bases; lymphangitic carcinomatosis is a   possibility.    UPPER ABDOMEN: Splenomegaly with spleen measuring 15.6 cm. Cholelithiasis.    BONES/SOFT TISSUES: Extensive irregular and fungating cutaneous lesions   diffusely involving the anterior and lateral chest wall, new and   worsening since July 2022. For reference along the left lateral chest   wall, maximal skin thickness measures approximately 1.5 cm in depth   compared to 0.6 cm previously. Open wounds suggested, with discontinuity   of the skin surface and locules of air. The fungating right breast mass   measures approximately 4 cm in depth, invades the right axillary space   and adjacent right fourth and fifth ribs, overall extent not fully   evaluated in the absence of IV contrast.      IMPRESSION:  Overall worsening metastatic burden since July 2022, with diffuse   irregular and fungating cutaneous lesions throughout the chest wall with   open lesions and rib invasion, new/enlarging left axillary/chest wall   lymphadenopathy and pulmonary metastases.        --- End of Report ---                        I

## 2023-01-20 NOTE — DISCHARGE NOTE PROVIDER - NSDCCPCAREPLAN_GEN_ALL_CORE_FT
PRINCIPAL DISCHARGE DIAGNOSIS  Diagnosis: Acute anemia  Assessment and Plan of Treatment: you were transfused with a unit of packed red blood cells and ok to return to [facility] following transfusion.  Follow up with your primary care physician for further monitoring in 1-2 weeks. Please call to arrange appointment.

## 2023-01-20 NOTE — PROGRESS NOTE ADULT - TIME BILLING
- Review of records, telemetry, vital signs and daily labs.   - General and cardiovascular physical examination.  - Generation of cardiovascular treatment plan.  - Coordination of care.      Patient was seen and examined by me on 1/19/23,interim events noted,labs and radiology studies reviewed.  Cayetano Landin MD,FACC.  [address] [place], [place] [id number].  [phone]
- Review of records, telemetry, vital signs and daily labs.   - General and cardiovascular physical examination.  - Generation of cardiovascular treatment plan.  - Coordination of care.      Patient was seen and examined by me on 1/20/23,interim events noted,labs and radiology studies reviewed.  Cayetano Landin MD,FACC.  [address] [place], [place] [id number].  [phone]

## 2023-01-20 NOTE — DISCHARGE NOTE PROVIDER - NSDCMRMEDTOKEN_GEN_ALL_CORE_FT
acetaminophen 325 mg oral tablet: 2 tab(s) orally every 6 hours, As needed, Temp greater or equal to 38C (100.4F), Mild Pain (1 - 3)  ascorbic acid 500 mg oral tablet: 1 tab(s) orally once a day  atorvastatin 40 mg oral tablet: 1 tab(s) orally once a day (at bedtime)  ferrous sulfate 325 mg (65 mg elemental iron) oral tablet: 1 tab(s) orally once a day  folic acid 1 mg oral tablet: 1 tab(s) orally once a day  melatonin 3 mg oral tablet: 1 tab(s) orally once a day (at bedtime), As needed, Insomnia  MiraLax oral powder for reconstitution: 17 gram(s) orally once a day  Multiple Vitamins oral tablet: 1 tab(s) orally once a day  risperiDONE 0.5 mg oral tablet: 1 tab(s) orally 2 times a day  senna leaf extract oral tablet: 2 tab(s) orally once a day (at bedtime)  sertraline 100 mg oral tablet: 1 tab(s) orally once a day  Ultram 50 mg oral tablet: 1 tab(s) orally every 12 hours, As Needed

## 2023-01-20 NOTE — PROGRESS NOTE ADULT - PROBLEM SELECTOR PLAN 3
-acute blood loss anemia due to bleeding chest wound  - source of bleeding likely from wound  - received 2u prbc in ED  - will trend CBC  - may need another unit if repeat cbc still shows hb < 7  - wound care as above  - may need chronic transfusions given the severity of the malignancy on the R chest  -c/w MVI, folic acid, iron supplementation and vit C
-acute blood loss anemia due to bleeding chest wound  - source of bleeding likely from wound  - received 2u prbc in ED  - will trend CBC  - may need another unit if repeat cbc still shows hb < 7  - wound care as above  - may need chronic transfusions given the severity of the malignancy on the R chest  -c/w MVI, folic acid, iron supplementation and vit C

## 2023-01-20 NOTE — DISCHARGE NOTE NURSING/CASE MANAGEMENT/SOCIAL WORK - NSDCVIVACCINE_GEN_ALL_CORE_FT
influenza, injectable, quadrivalent, preservative free; 18-Sep-2018 16:23; Linh LindoRN); [employer]; [id number] (Exp. Date: 30-Jun-2019); IntraMuscular; Deltoid Right.; 0.5 milliLiter(s); VIS (VIS Published: 07-Aug-2015, VIS Presented: 18-Sep-2018);

## 2023-01-20 NOTE — PROVIDER CONTACT NOTE (OTHER) - ASSESSMENT
Writer contacted MsKian Grzegorz  at [facility] @ [phone] and writer was informed she will send someone from [facility] in 15 minutes. Medical team was made aware.
ACP DHIRAJ Moore pager # [phone] informed pt will be ready to go back to [facility]. Medical team referred this case as pt needs a ride to his facility [facility]. Writer contacted  [facility]  [phone] and spoke with Ysabel Hoyt  at admission.  As per Ysabel Hoyt she will look into this and someone will call soon. Pt resides at [facility] [place] [address]. Medical team was made aware of this intervention and in agreement with this plan.

## 2023-01-20 NOTE — DISCHARGE NOTE PROVIDER - HOSPITAL COURSE
88 y/o F with pmhx of skin cancer (squamous cell carcinoma) with mets presents from [facility] for abnormal lab test. The patient resides in [facility]. She has a chronic R chest wound which is where her skin cancer is located. She has had previous hospitalization for same reason requiring blood transfusions. Most recent hospital stay was 12/22-1/6. She states that her wound bled a lot yesterday as they did not use proper technique for wound care. She states her anemia is likely from the wound. Denies GI bleeding. The patient denies fevers. She also reports that she was started on antibiotic flagyl for PNA recently. Sh denies any cough or fevers.     s/p 1 unit PRBC.  As per Dr. Landin, ok to transfer back to [facility].        On 1/20/23, case was discussed with Dr. Landin, patient is medically cleared and optimized for discharge today. All medications were reviewed with attending, and sent to mutually agreed upon pharmacy   86 y/o F with pmhx of skin cancer (squamous cell carcinoma) with mets presents from [facility] for abnormal lab test. The patient resides in [facility]. She has a chronic R chest wound which is where her skin cancer is located. She has had previous hospitalization for same reason requiring blood transfusions. Most recent hospital stay was 12/22-1/6. She states that her wound bled a lot yesterday as they did not use proper technique for wound care. She states her anemia is likely from the wound. Denies GI bleeding. The patient denies fevers. She also reports that she was started on antibiotic flagyl for PNA recently. Sh denies any cough or fevers.     s/p 1 unit PRBC.  As per Dr. Landin, ok to transfer back to [facility].  No need for repeat CBC         On 1/20/23, case was discussed with Dr. Landin, patient is medically cleared and optimized for discharge today. All medications were reviewed with attending, and sent to mutually agreed upon pharmacy

## 2023-01-20 NOTE — CONSULT NOTE ADULT - ASSESSMENT
Full note to follow   Assessment/Plan:     Wound Consult requested to assist w/ management of     BLEEDING WOUNDS OF B/L CHEST WALL to mid axillary   -On today's assessment bleeding control achieved w/o need of prolonged manual pressure.   - Hemostasis achieved at bedside with application of Surgicel nu kit, ABDs and manual pressure.  - Nu-Knit topical hemostatic agent left at bedside. These large sheets of material should be used at subsequent dressing changes, along with layers of dry Gauze and ABD pads as needed for additional hemostasis.   - Recommend daily dressing changes. Use liberal saline to loosen previously placed surgicel/nu-knit to prevent abrasive removal and decrease risks of bleeding. Gently clean would with saline and gauze. Re-apply Nu-Knit vs. Surgicel, apply Silicone non adherent layer (adapatic touch) to exposed surfaces that are not bleeding. ABD pads over this, then compressive wrap around chest with ACE. Any strikethrough of blood should prompt takedown of dressings for examinatio    Anemia  -Management as per primary team   s/p blood transfusions    Continue turning and positioning w/ offloading assistive devices as per protocol  Continue w/ Pericare as per protocol  Waffle Cushion to chair when oob to chair  Continue w/ low air loss fluidized bed surface   Moisturize intact skin w/ SWEEN cream daily to bilateral feet. Avoid between toes.     Care as per medicine, will follow w/ you    Upon discharge f/u as outpatient at [facility] [address] [place], [place] [phone]  D/w team    Thank you for this consult  Mana Tam, KATHY-BC, CWOCN (pager #[phone])    f after 4PM or before 7:30AM on Mon-Friday or weekend/holiday please contact general surgery for urgent matters/active bleeding   Team A- [id number]   Team B- [id number]  For non-urgent matters e-mail [email or web address]    I spent 75 minutes face to face with this patient of which more than 50% of the time was spent counseling & coordinating care of this pt     Assessment/Plan:     Wound Consult requested to assist w/ management of     BLEEDING WOUNDS OF B/L CHEST WALL to mid axillary   -On today's assessment bleeding control achieved w/o need of prolonged manual pressure.   - Hemostasis achieved at bedside with application of Surgicel nu kit, ABDs and manual pressure.  - Nu-Knit topical hemostatic agent left at bedside. These large sheets of material should be used at subsequent dressing changes, along with layers of dry Gauze and ABD pads as needed for additional hemostasis.   - Recommend daily dressing changes. Use liberal saline to loosen previously placed surgicel/nu-knit to prevent abrasive removal and decrease risks of bleeding. Gently clean would with saline and gauze. Re-apply Nu-Knit vs. Surgicel, apply Silicone non adherent layer (adapatic touch) to exposed surfaces that are not bleeding. ABD pads over this, then compressive wrap around chest with ACE. Any strikethrough of blood should prompt takedown of dressings for examinatio    Anemia  -Management as per primary team   s/p blood transfusions    Continue turning and positioning w/ offloading assistive devices as per protocol  Continue w/ Pericare as per protocol  Waffle Cushion to chair when oob to chair  Continue w/ low air loss fluidized bed surface   Moisturize intact skin w/ SWEEN cream daily to bilateral feet. Avoid between toes.     Care as per medicine, will follow w/ you    Upon discharge f/u as outpatient at [facility] [address] [place], [place] [phone]  D/w team    Thank you for this consult  Mana Tam, KATHY-BC, CWOCN (pager #[phone])    f after 4PM or before 7:30AM on Mon-Friday or weekend/holiday please contact general surgery for urgent matters/active bleeding   Team A- [id number]   Team B- [id number]  For non-urgent matters e-mail [email or web address]    I spent 75 minutes face to face with this patient of which more than 50% of the time was spent counseling & coordinating care of this pt     Assessment/Plan: 86 y/o F with pmhx of skin cancer (squamous cell carcinoma) with mets, presented for abnormal lab test. Found to have severe anemia. Admit for anemia.    Wound Consult requested to assist w/ management of Chest wall wound. Patient known to wound care service line last seen on 12/23/22 for same. Patient at that time seen by general surgical team for uncontrolled bleeding, No surgical intervention warranted. Nu-knit Surgicel recommended for hemostatic dressing.     Squamous Cell Carcinoma with mets   -Management as per primary team   -Heme/Onc following patient as per notes, Patient is on cemiplimab, last administered on January 12th, 2023.  - Pending PET/CT to evaluate treatment response.    BLEEDING WOUNDS OF B/L CHEST WALL to mid axillary   -On today's assessment bleeding control achieved w/o need of prolonged manual pressure.   - Hemostasis achieved at bedside with application of Surgicel nu kit, ABDs and manual pressure.  - Nu-Knit topical hemostatic agent left at bedside. These large sheets of material should be used at subsequent dressing changes, along with layers of dry Gauze and ABD pads as needed for additional hemostasis.   - Recommend daily dressing changes. Use liberal saline to loosen previously placed surgicel/nu-knit to prevent abrasive removal and decrease risks of bleeding. Gently clean would with saline and gauze. Re-apply Nu-Knit vs. Surgicel, apply Silicone non adherent layer (adapatic touch) to exposed surfaces that are not bleeding. ABD pads over this, then compressive wrap around chest with ACE. Any strikethrough of blood should prompt takedown of dressings for examination.     Anemia  -Management as per primary team   -s/p blood transfusion    Additional recommendations   Elevate Right limb with pillows   Consider use of Arm Sling for support   Continue turning and positioning w/ offloading assistive devices as per protocol  Continue w/ Pericare as per protocol  Waffle Cushion to chair when oob to chair  Continue w/ low air loss fluidized bed surface   Moisturize intact skin w/ SWEEN cream daily to bilateral feet. Avoid between toes.   Bilateral buttocks and sacrum IAD/MAD: Clean with skin cleanser. Pat dry. Apply a thin layer of Mena barrier cream. Apply twice a day.     Care as per medicine, will follow w/ you    Upon discharge f/u as outpatient at [facility] [address] [place] [address] [phone]  D/w team    Thank you for this consult  Mana Tam, KATHY-BC, CWOCN (pager #[phone])    f after 4PM or before 7:30AM on Mon-Friday or weekend/holiday please contact general surgery for urgent matters/active bleeding   Team A- [id number]   Team B- [id number]  For non-urgent matters e-mail [email or web address]    I spent 75 minutes face to face with this patient of which more than 50% of the time was spent counseling & coordinating care of this pt     Assessment/Plan: 86 y/o F with pmhx of skin cancer (squamous cell carcinoma) with mets, presented for abnormal lab test. Found to have severe anemia. Admit for anemia. Patient pending discharge to [facility] later today.     Wound Consult requested to assist w/ management of Chest wall wound. Patient known to wound care service line last seen on 12/23/22 for same. Patient at that time seen by general surgical team for uncontrolled bleeding, No surgical intervention warranted. Nu-knit Surgicel recommended for hemostatic dressing.     Squamous Cell Carcinoma with mets   -Management as per primary team   -Heme/Onc following patient as per notes, Patient is on cemiplimab, last administered on January 12th, 2023.  - Pending PET/CT to evaluate treatment response.    BLEEDING WOUNDS OF B/L CHEST WALL to mid axillary   -On today's assessment bleeding control achieved w/o need of prolonged manual pressure.   - Hemostasis achieved at bedside with application of Surgicel nu kit, ABDs and manual pressure.  - Nu-Knit topical hemostatic agent left at bedside. These large sheets of material should be used at subsequent dressing changes, along with layers of dry Gauze and ABD pads as needed for additional hemostasis.   - Recommend daily dressing changes. Use liberal saline to loosen previously placed surgicel/nu-knit to prevent abrasive removal and decrease risks of bleeding. Gently clean would with saline and gauze. Re-apply Nu-Knit vs. Surgicel, apply Silicone non adherent layer (adapatic touch) to exposed surfaces that are not bleeding. ABD pads over this, then compressive wrap around chest with ACE. Any strikethrough of blood should prompt takedown of dressings for examination.     Anemia  -Management as per primary team   -s/p blood transfusion    Additional recommendations   Elevate Right limb with pillows   Consider use of Arm Sling for support   Continue turning and positioning w/ offloading assistive devices as per protocol  Continue w/ Pericare as per protocol  Waffle Cushion to chair when oob to chair  Continue w/ low air loss fluidized bed surface   Moisturize intact skin w/ SWEEN cream daily to bilateral feet. Avoid between toes.   Bilateral buttocks and sacrum IAD/MAD: Clean with skin cleanser. Pat dry. Apply a thin layer of Mena barrier cream. Apply twice a day.     Care as per medicine, will follow w/ you    Upon discharge f/u as outpatient at [facility] [place] [address] [facility] [phone]  D/w team    Thank you for this consult  Mana Tam, KATHY-BC, CWLYNETTEN (pager #[phone])    f after 4PM or before 7:30AM on Mon-Friday or weekend/holiday please contact general surgery for urgent matters/active bleeding   Team A- [id number]   Team B- [id number]  For non-urgent matters e-mail [email or web address]    I spent 75 minutes face to face with this patient of which more than 50% of the time was spent counseling & coordinating care of this pt

## 2023-01-20 NOTE — PROGRESS NOTE ADULT - ASSESSMENT
88 y/o F with pmhx of skin cancer (squamous cell carcinoma) with mets, presented for abnormal lab test. Found to have severe anemia. Admit for anemia.
88 y/o F with pmhx of skin cancer (squamous cell carcinoma) with mets, presented for abnormal lab test. Found to have severe anemia. Admit for anemia.

## 2023-01-20 NOTE — DISCHARGE NOTE NURSING/CASE MANAGEMENT/SOCIAL WORK - PATIENT PORTAL LINK FT
You can access the FollowMyHealth Patient Portal offered by [facility] by registering at the following website: [email or web address]. By joining [employer]’s FollowMyHealth portal, you will also be able to view your health information using other applications (apps) compatible with our system.

## 2023-01-20 NOTE — ED ADULT NURSE REASSESSMENT NOTE - NS ED NURSE REASSESS COMMENT FT1
Pt in NAD, soiled stretcher, changed in bed, 20G Iv placed in left wrist, blood bank called for release. Will continue to monitor.

## 2023-02-06 ENCOUNTER — INPATIENT (INPATIENT)
Facility: HOSPITAL | Age: 88
LOS: 2 days | Discharge: NURSING, REHAB OR LONG-TERM CARE | End: 2023-02-09
Attending: INTERNAL MEDICINE | Admitting: INTERNAL MEDICINE
Payer: MEDICARE

## 2023-02-06 VITALS
RESPIRATION RATE: 15 BRPM | OXYGEN SATURATION: 99 % | SYSTOLIC BLOOD PRESSURE: 112 MMHG | DIASTOLIC BLOOD PRESSURE: 64 MMHG | TEMPERATURE: 99 F | HEART RATE: 116 BPM | HEIGHT: 67 IN

## 2023-02-06 DIAGNOSIS — E07.9 DISORDER OF THYROID, UNSPECIFIED: Chronic | ICD-10-CM

## 2023-02-06 LAB
ANION GAP SERPL CALC-SCNC: 8 MMOL/L — SIGNIFICANT CHANGE UP (ref 7–14)
BILIRUB SERPL-MCNC: <0.2 MG/DL — SIGNIFICANT CHANGE UP (ref 0.2–1.2)
CALCIUM SERPL-MCNC: 9.5 MG/DL — SIGNIFICANT CHANGE UP (ref 8.4–10.5)
CHLORIDE SERPL-SCNC: 100 MMOL/L — SIGNIFICANT CHANGE UP (ref 98–107)
CO2 SERPL-SCNC: 24 MMOL/L — SIGNIFICANT CHANGE UP (ref 22–31)
HCT VFR BLD CALC: 16.3 % — CRITICAL LOW (ref 34.5–45)
HGB BLD-MCNC: 4.7 G/DL — CRITICAL LOW (ref 11.5–15.5)
IANC: 11.44 K/UL — HIGH (ref 1.8–7.4)
MCHC RBC-ENTMCNC: 25 PG — LOW (ref 27–34)
MCHC RBC-ENTMCNC: 28.8 GM/DL — LOW (ref 32–36)
MCV RBC AUTO: 86.7 FL — SIGNIFICANT CHANGE UP (ref 80–100)
PLATELET # BLD AUTO: 341 K/UL — SIGNIFICANT CHANGE UP (ref 150–400)
POTASSIUM SERPL-MCNC: 4.3 MMOL/L — SIGNIFICANT CHANGE UP (ref 3.5–5.3)
POTASSIUM SERPL-SCNC: 4.3 MMOL/L — SIGNIFICANT CHANGE UP (ref 3.5–5.3)
RBC # BLD: 1.88 M/UL — LOW (ref 3.8–5.2)
RBC # FLD: 15.9 % — HIGH (ref 10.3–14.5)
SODIUM SERPL-SCNC: 132 MMOL/L — LOW (ref 135–145)
WBC # BLD: 13.53 K/UL — HIGH (ref 3.8–10.5)
WBC # FLD AUTO: 13.53 K/UL — HIGH (ref 3.8–10.5)

## 2023-02-06 PROCEDURE — 99291 CRITICAL CARE FIRST HOUR: CPT

## 2023-02-06 NOTE — ED ADULT NURSE NOTE - CHIEF COMPLAINT QUOTE
alert oriented from [facility] sent for low HGB 5.1 no c/o CP dizziness or SOB no bleeding noted no blood thinners PMHx schizophrenia depression anemia HLD left and right heel pressure wound sacral pressure wound

## 2023-02-06 NOTE — ED ADULT TRIAGE NOTE - CHIEF COMPLAINT QUOTE
alert oriented from [facility] sent for low HGB 5.1 no c/o CP dizziness or SOB no bleeding noted no blood thinners PMHx schizophrenia depression anemia HLD left and right heel pressure wound sacral pressure wound

## 2023-02-06 NOTE — ED ADULT NURSE NOTE - OBJECTIVE STATEMENT
86y/o F presents to ED, sent from [facility], for blood transfusion. Pt reportedly with Hemoglobin level of 5.1 on labs recently drawn. Pt A&Ox4, speaking in clear sentences. Pt endorses mild weakness during dinner tonight, but otherwise states that's asymptomatic and in her usual state of health. Denies n/v/d, fevers, chills, CP, SOB, active bleeding. Safety maintained. IV access established. Labs collected. Provider examination currently in progress. 88y/o F presents to ED, sent from [facility], for blood transfusion. Pt reportedly with Hemoglobin level of 5.1 on labs recently drawn. Pt A&Ox4, speaking in clear sentences. Pt endorses mild weakness during dinner tonight, and also reports that she's been bleeding from her chest. Upon reviewing Hx pt noted with SCC chest wound. Pt refused to allow writer to assess site. Denies n/v/d, fevers, chills, CP, SOB. Safety maintained. IV access established. Labs collected. Provider examination currently in progress.

## 2023-02-06 NOTE — ED ADULT NURSE NOTE - NSIMPLEMENTINTERV_GEN_ALL_ED
Implemented All Fall with Harm Risk Interventions:  [employer] to call system. Call bell, personal items and telephone within reach. Instruct patient to call for assistance. Room bathroom lighting operational. Non-slip footwear when patient is off stretcher. Physically safe environment: no spills, clutter or unnecessary equipment. Stretcher in lowest position, wheels locked, appropriate side rails in place. Provide visual cue, wrist band, yellow gown, etc. Monitor gait and stability. Monitor for mental status changes and reorient to person, place, and time. Review medications for side effects contributing to fall risk. Reinforce activity limits and safety measures with patient and family. Provide visual clues: red socks.

## 2023-02-07 DIAGNOSIS — D50.0 IRON DEFICIENCY ANEMIA SECONDARY TO BLOOD LOSS (CHRONIC): ICD-10-CM

## 2023-02-07 DIAGNOSIS — R60.0 LOCALIZED EDEMA: ICD-10-CM

## 2023-02-07 DIAGNOSIS — D64.9 ANEMIA, UNSPECIFIED: ICD-10-CM

## 2023-02-07 DIAGNOSIS — F39 UNSPECIFIED MOOD [AFFECTIVE] DISORDER: ICD-10-CM

## 2023-02-07 DIAGNOSIS — C44.92 SQUAMOUS CELL CARCINOMA OF SKIN, UNSPECIFIED: ICD-10-CM

## 2023-02-07 DIAGNOSIS — Z29.9 ENCOUNTER FOR PROPHYLACTIC MEASURES, UNSPECIFIED: ICD-10-CM

## 2023-02-07 LAB
ALBUMIN SERPL ELPH-MCNC: 2.5 G/DL — LOW (ref 3.3–5)
ALP SERPL-CCNC: 93 U/L — SIGNIFICANT CHANGE UP (ref 40–120)
ALT FLD-CCNC: 10 U/L — SIGNIFICANT CHANGE UP (ref 4–33)
ANISOCYTOSIS BLD QL: SLIGHT — SIGNIFICANT CHANGE UP
APTT BLD: 22.9 SEC — LOW (ref 27–36.3)
AST SERPL-CCNC: 14 U/L — SIGNIFICANT CHANGE UP (ref 4–32)
BASOPHILS # BLD AUTO: 0 K/UL — SIGNIFICANT CHANGE UP (ref 0–0.2)
BASOPHILS NFR BLD AUTO: 0 % — SIGNIFICANT CHANGE UP (ref 0–2)
BUN SERPL-MCNC: 31 MG/DL — HIGH (ref 7–23)
CREAT SERPL-MCNC: 0.56 MG/DL — SIGNIFICANT CHANGE UP (ref 0.5–1.3)
DACRYOCYTES BLD QL SMEAR: SLIGHT — SIGNIFICANT CHANGE UP
EGFR: 89 ML/MIN/1.73M2 — SIGNIFICANT CHANGE UP
EOSINOPHIL # BLD AUTO: 0 K/UL — SIGNIFICANT CHANGE UP (ref 0–0.5)
EOSINOPHIL NFR BLD AUTO: 0 % — SIGNIFICANT CHANGE UP (ref 0–6)
FLUAV AG NPH QL: SIGNIFICANT CHANGE UP
FLUBV AG NPH QL: SIGNIFICANT CHANGE UP
GIANT PLATELETS BLD QL SMEAR: PRESENT — SIGNIFICANT CHANGE UP
GLUCOSE SERPL-MCNC: 128 MG/DL — HIGH (ref 70–99)
HCT VFR BLD CALC: 25.8 % — LOW (ref 34.5–45)
HGB BLD-MCNC: 8 G/DL — LOW (ref 11.5–15.5)
HYPOCHROMIA BLD QL: SLIGHT — SIGNIFICANT CHANGE UP
INR BLD: 1.2 RATIO — HIGH (ref 0.88–1.16)
LYMPHOCYTES # BLD AUTO: 1.3 K/UL — SIGNIFICANT CHANGE UP (ref 1–3.3)
LYMPHOCYTES # BLD AUTO: 9.6 % — LOW (ref 13–44)
MACROCYTES BLD QL: SLIGHT — SIGNIFICANT CHANGE UP
MANUAL SMEAR VERIFICATION: SIGNIFICANT CHANGE UP
MCHC RBC-ENTMCNC: 25.9 PG — LOW (ref 27–34)
MCHC RBC-ENTMCNC: 31 GM/DL — LOW (ref 32–36)
MCV RBC AUTO: 83.5 FL — SIGNIFICANT CHANGE UP (ref 80–100)
MONOCYTES # BLD AUTO: 0.23 K/UL — SIGNIFICANT CHANGE UP (ref 0–0.9)
MONOCYTES NFR BLD AUTO: 1.7 % — LOW (ref 2–14)
NEUTROPHILS # BLD AUTO: 12 K/UL — HIGH (ref 1.8–7.4)
NEUTROPHILS NFR BLD AUTO: 88.7 % — HIGH (ref 43–77)
NRBC # BLD: 0 /100 WBCS — SIGNIFICANT CHANGE UP (ref 0–0)
NRBC # FLD: 0 K/UL — SIGNIFICANT CHANGE UP (ref 0–0)
OVALOCYTES BLD QL SMEAR: SLIGHT — SIGNIFICANT CHANGE UP
PLAT MORPH BLD: NORMAL — SIGNIFICANT CHANGE UP
PLATELET # BLD AUTO: 343 K/UL — SIGNIFICANT CHANGE UP (ref 150–400)
PLATELET COUNT - ESTIMATE: NORMAL — SIGNIFICANT CHANGE UP
POIKILOCYTOSIS BLD QL AUTO: SLIGHT — SIGNIFICANT CHANGE UP
POLYCHROMASIA BLD QL SMEAR: SLIGHT — SIGNIFICANT CHANGE UP
PROT SERPL-MCNC: 4.9 G/DL — LOW (ref 6–8.3)
PROTHROM AB SERPL-ACNC: 14 SEC — HIGH (ref 10.5–13.4)
RBC # BLD: 3.09 M/UL — LOW (ref 3.8–5.2)
RBC # FLD: 17.2 % — HIGH (ref 10.3–14.5)
RBC BLD AUTO: ABNORMAL
RSV RNA NPH QL NAA+NON-PROBE: SIGNIFICANT CHANGE UP
SARS-COV-2 RNA SPEC QL NAA+PROBE: SIGNIFICANT CHANGE UP
WBC # BLD: 17.35 K/UL — HIGH (ref 3.8–10.5)
WBC # FLD AUTO: 17.35 K/UL — HIGH (ref 3.8–10.5)

## 2023-02-07 PROCEDURE — 99223 1ST HOSP IP/OBS HIGH 75: CPT

## 2023-02-07 PROCEDURE — 86078 PHYS BLOOD BANK SERV REACTJ: CPT

## 2023-02-07 RX ORDER — ASCORBIC ACID 60 MG
500 TABLET,CHEWABLE ORAL DAILY
Refills: 0 | Status: DISCONTINUED | OUTPATIENT
Start: 2023-02-07 | End: 2023-02-09

## 2023-02-07 RX ORDER — SERTRALINE 25 MG/1
100 TABLET, FILM COATED ORAL DAILY
Refills: 0 | Status: DISCONTINUED | OUTPATIENT
Start: 2023-02-07 | End: 2023-02-09

## 2023-02-07 RX ORDER — ACETAMINOPHEN 500 MG
650 TABLET ORAL EVERY 6 HOURS
Refills: 0 | Status: DISCONTINUED | OUTPATIENT
Start: 2023-02-07 | End: 2023-02-09

## 2023-02-07 RX ORDER — POLYETHYLENE GLYCOL 3350 17 G/17G
17 POWDER, FOR SOLUTION ORAL DAILY
Refills: 0 | Status: DISCONTINUED | OUTPATIENT
Start: 2023-02-07 | End: 2023-02-09

## 2023-02-07 RX ORDER — RISPERIDONE 4 MG/1
0.5 TABLET ORAL
Refills: 0 | Status: DISCONTINUED | OUTPATIENT
Start: 2023-02-07 | End: 2023-02-09

## 2023-02-07 RX ORDER — DIPHENHYDRAMINE HCL 50 MG
12.5 CAPSULE ORAL ONCE
Refills: 0 | Status: DISCONTINUED | OUTPATIENT
Start: 2023-02-07 | End: 2023-02-07

## 2023-02-07 RX ORDER — ATORVASTATIN CALCIUM 80 MG/1
40 TABLET, FILM COATED ORAL AT BEDTIME
Refills: 0 | Status: DISCONTINUED | OUTPATIENT
Start: 2023-02-07 | End: 2023-02-09

## 2023-02-07 RX ORDER — TRAMADOL HYDROCHLORIDE 50 MG/1
50 TABLET ORAL EVERY 12 HOURS
Refills: 0 | Status: DISCONTINUED | OUTPATIENT
Start: 2023-02-07 | End: 2023-02-09

## 2023-02-07 RX ORDER — FOLIC ACID 0.8 MG
1 TABLET ORAL DAILY
Refills: 0 | Status: DISCONTINUED | OUTPATIENT
Start: 2023-02-07 | End: 2023-02-09

## 2023-02-07 RX ORDER — LANOLIN ALCOHOL/MO/W.PET/CERES
3 CREAM (GRAM) TOPICAL AT BEDTIME
Refills: 0 | Status: DISCONTINUED | OUTPATIENT
Start: 2023-02-07 | End: 2023-02-09

## 2023-02-07 RX ORDER — FERROUS SULFATE 325(65) MG
325 TABLET ORAL DAILY
Refills: 0 | Status: DISCONTINUED | OUTPATIENT
Start: 2023-02-07 | End: 2023-02-09

## 2023-02-07 RX ORDER — DIPHENHYDRAMINE HCL 50 MG
25 CAPSULE ORAL ONCE
Refills: 0 | Status: COMPLETED | OUTPATIENT
Start: 2023-02-07 | End: 2023-02-07

## 2023-02-07 RX ORDER — SENNA PLUS 8.6 MG/1
2 TABLET ORAL AT BEDTIME
Refills: 0 | Status: DISCONTINUED | OUTPATIENT
Start: 2023-02-07 | End: 2023-02-09

## 2023-02-07 RX ADMIN — ATORVASTATIN CALCIUM 40 MILLIGRAM(S): 80 TABLET, FILM COATED ORAL at 23:01

## 2023-02-07 RX ADMIN — Medication 325 MILLIGRAM(S): at 12:01

## 2023-02-07 RX ADMIN — Medication 1 TABLET(S): at 12:02

## 2023-02-07 RX ADMIN — Medication 500 MILLIGRAM(S): at 12:01

## 2023-02-07 RX ADMIN — RISPERIDONE 0.5 MILLIGRAM(S): 4 TABLET ORAL at 18:49

## 2023-02-07 RX ADMIN — Medication 650 MILLIGRAM(S): at 23:01

## 2023-02-07 RX ADMIN — Medication 1 MILLIGRAM(S): at 12:01

## 2023-02-07 RX ADMIN — SERTRALINE 100 MILLIGRAM(S): 25 TABLET, FILM COATED ORAL at 12:01

## 2023-02-07 RX ADMIN — Medication 25 MILLIGRAM(S): at 23:02

## 2023-02-07 NOTE — PATIENT PROFILE ADULT - NSPRESCRUSEDDRG_GEN_A_NUR
Office Visit    Assessment AND Plan     anxiety depression, fairly controlled, continue current regimen pain  Gastroparesis , overall stable, she continues to have nausea in the mornings, I changed the Zofran tablet to disintegrating p.r.n. T.i.d.  Diabetes, well controlled, A1c at 6.2, continue current regimen.  Dyslipidemia,/mixed hyperlipidemia, lipids at goal except for slightly low HDL. Continue current regimen and follow low-fat diet.  Hypertension, BP controlled, BMP CBC within normal range, continue current regimen.   Return in  6-8 weeks    CHIEF COMPLAINT    Follow-up        History of present illness    She is here today for a follow-up visit.  She was accompanied by her .  Has anxiety depression, symptoms continued to be fairly controlled.  Has gastroparesis , overall symptoms are fairly controlled, does have significant nausea earlier in the morning.  Has diabetes, diet controlled, no polyuria.  Has hyperlipidemia, on Lipitor, having no side effects.  Has hypertension, no chest pain.      I have reviewed the past medical, family and social history sections including the medications and allergies listed in the above medical record as well as the nursing notes.     Review of systems    No orthopnea or PND  No vomiting    Physical Exam    Vital Signs:  Blood pressure 110/76, pulse 73, resp. rate 16, height 5' 3\" (1.6 m), weight 51.6 kg, SpO2 99 %.  Constitutional:  No acute distress.  Integument:  Warm.  Dry.  No erythema.  No rash.    HENT:  Normocephalic.  Atraumatic.  Bilateral external ears normal.  Oropharynx moist.  No oral exudates.  Nose normal.   Neck:  Normal range of motion.  No tenderness.  Supple.  No stridor.    Eyes:  PERRL (Pupils equal, round, reactive to light), EOMI (extraocular movements intact).  Conjunctivae normal.  No discharge.    Cardiovascular:  Normal heart rate.  Normal rhythm.  No murmurs.  No rubs.  No gallops.    Respiratory:  Normal breath sounds.  No respiratory  distress.  No wheezing.  No chest tenderness.    Gastrointestinal:  Bowel sounds normal.  Soft.  No tenderness.  No masses.  No pulsatile masses.    Neurologic:  Alert and oriented x3.  Normal gait.  No focal deficits noted.    Lymphatic:  No cervical or supraclavicular lymphadenopathy.  Extremities:  No edema.  Palpable pedal pulses bilaterally.    The patient indicates understanding of these issues and agrees with the plan.        No

## 2023-02-07 NOTE — H&P ADULT - ASSESSMENT
88 y/o F with PMH of skin cancer (squamous cell carcinoma) w/ mets s/p recent hospitalization for anemia presenting from [facility] for decreased Hgb.

## 2023-02-07 NOTE — H&P ADULT - PROBLEM SELECTOR PLAN 3
B/L UE edema noted on exam R>L appears to be subcutaneous edema  -Previous RUE VA duplex w/o DVT but w/ multiple thyroid nodules- recommended dedicated thyroid US [should be obtained outpatient]  -Upper ext elevation  -Repeat doppler if w/ worsening swelling or pt develops pain

## 2023-02-07 NOTE — CONSULT NOTE ADULT - TIME BILLING
- Review of records, telemetry, vital signs and daily labs.   - General and cardiovascular physical examination.  - Generation of cardiovascular treatment plan.  - Coordination of care.      Patient was seen and examined by me on 02/07/2023,interim events noted,labs and radiology studies reviewed.  Cayetano Landin MD,FACC.  [address] [place], [place] [id number].  [phone]

## 2023-02-07 NOTE — ED ADULT NURSE REASSESSMENT NOTE - NS ED NURSE REASSESS COMMENT FT1
Break Coverage RN: Pt A&Ox4, respirations equal and unlabored. Pt resting in stretcher comfortably at this time with no complaints. IV patent. Pending type and screen result to transfuse patient with PRBC. Safety maintained, bed in lowest position, side rails raised, call bell in reach. Will continue to monitor.

## 2023-02-07 NOTE — ED PROVIDER NOTE - PHYSICAL EXAMINATION
PHYSICAL EXAM:  GENERAL: Sitting comfortable in bed, in no acute distress  HENMT: Atraumatic, moist mucous membranes, no oropharyngeal exudates or vesicles, uvula is midline EYES: Clear bilaterally, PERRL, EOMs intact b/l  HEART: RRR, S1/S2, no murmur  RESPIRATORY: Clear to auscultation bilaterally, no wheezes/rhonchi/rales  ABDOMEN: Soft, nontender, nondistended  EXTREMITIES: No lower extremity edema, +2 radial and pt pulses b/l  NEURO: A&Ox4  SKIN: Chest is bandaged and pt does not want us to undress it PHYSICAL EXAM:  GENERAL: Sitting comfortable in bed, in no acute distress  HENMT: Atraumatic, moist mucous membranes, no oropharyngeal exudates or vesicles, uvula is midline EYES: Clear bilaterally, PERRL, EOMs intact b/l  HEART: RRR, S1/S2, no murmur  RESPIRATORY: Clear to auscultation bilaterally, no wheezes/rhonchi/rales  ABDOMEN: Soft, nontender, nondistended  EXTREMITIES: No lower extremity edema, +2 radial and pt pulses b/l  NEURO: A&Ox4  SKIN: Chest is bandaged and pt does not want us to undress since the dressing was just changed today

## 2023-02-07 NOTE — H&P ADULT - PROBLEM SELECTOR PLAN 1
Likely 2/2 R chest wound. No overt signs of GIB at this time. Denies hematuria  -Hgb 4.7 on admission  -s/p 2 units PRBC  -Obtain post trans CBC  -Wound care eval  -Maintain active type and screen  -Monitor CBCs Likely 2/2 R chest wound. No overt signs of GIB at this time. Denies hematuria  -Hgb 4.7 on admission  -s/p 2 units PRBC  -Obtain post trans CBC  -Wound care eval  -Maintain active type and screen  -Monitor CBCs, transfuse Hgb < 7

## 2023-02-07 NOTE — H&P ADULT - NSHPPHYSICALEXAM_GEN_ALL_CORE
GENERAL: NAD  HEAD:  Atraumatic, Normocephalic  EYES: EOMI, conjunctiva and sclera clear  NECK: Supple  CHEST/LUNG: Clear to auscultation bilaterally; No wheeze, rhonchi, crackles or rales  HEART: Regular rate and rhythm; No murmurs, rubs, or gallops  ABDOMEN: Soft, Nontender, Nondistended; Bowel sounds present  EXTREMITIES:  2+ Peripheral Pulses, +upper extremity edema B/L   PSYCH: AAOx3  NEUROLOGY: non-focal  SKIN: Warm and dry. Refusing exam for R chest wound. States dressing was recently changed.

## 2023-02-07 NOTE — H&P ADULT - NSHPREVIEWOFSYSTEMS_GEN_ALL_CORE
CONSTITUTIONAL:  No weight loss, fever, chills, weakness or fatigue.  HEENT:   No visual loss, blurred vision, No hearing loss, sneezing, congestion, runny nose or sore throat.  SKIN:  No rash or itching.  CARDIOVASCULAR:  No chest pain, chest pressure or chest discomfort. No palpitations.  RESPIRATORY:  No shortness of breath, cough or sputum.  GASTROINTESTINAL:  No , nausea, vomiting or diarrhea. No abdominal pain or blood.  GENITOURINARY:  Denies hematuria, dysuria.   NEUROLOGICAL:  No headache, dizziness, numbness or tingling in the extremities. No change in bowel or bladder control.  MUSCULOSKELETAL:  No muscle, back pain, joint pain or stiffness.  HEMATOLOGIC:  +anemia, +bleeding   ENDOCRINOLOGIC:  No reports of sweating, cold or heat intolerance. No polyuria or polydipsia.  ALLERGIES:  No history of asthma, hives, eczema or rhinitis.

## 2023-02-07 NOTE — H&P ADULT - NSHPLABSRESULTS_GEN_ALL_CORE
4.7    13.53 )-----------( 341      ( 06 Feb 2023 23:00 )             16.3       02-06    132<L>  |  100  |  31<H>  ----------------------------<  128<H>  4.3   |  24  |  0.56    Ca    9.5      06 Feb 2023 23:00    TPro  4.9<L>  /  Alb  2.5<L>  /  TBili  <0.2  /  DBili  x   /  AST  14  /  ALT  10  /  AlkPhos  93  02-06                  PT/INR - ( 07 Feb 2023 00:00 )   PT: 14.0 sec;   INR: 1.20 ratio         PTT - ( 07 Feb 2023 00:00 )  PTT:22.9 sec          CAPILLARY BLOOD GLUCOSE                  RADIOLOGY & ADDITIONAL TESTS:    Imaging personally reviewed.    Consultant(s) notes reviewed.    Care discussed with consultants and other providers.

## 2023-02-07 NOTE — H&P ADULT - PROBLEM SELECTOR PLAN 2
-Follows w/ Dr. Alexis, [facility].  -States recent therapy per heme/onc team has not demonstrated sig benefit.   -Currently being evaluated for new treatment  -Outpatient follow up

## 2023-02-07 NOTE — ED PROVIDER NOTE - ATTENDING CONTRIBUTION TO CARE
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  Patient had blood work showing hemoglobin of 5.1.  Patient denies fatigue, lightheadedness, weakness, chest pain, shortness of breath, palpitations, syncope, bloody or dark stools.  The patient is not on anticoagulation.    Vitals:   Reviewed    Exam:    GEN:  Non-toxic appearing, non-distressed, speaking full sentences, non-diaphoretic, AAOx3  HEENT:  NCAT, neck supple, EOMI, PERRLA, sclera anicteric, no conjunctival pallor or injection, no stridor, normal voice, no tonsillar exudate, uvula midline  CV:  regular rhythm and rate, s1/s2 audible, no murmurs, rubs or gallops, peripheral pulses 2+ and symmetric  PULM:  chest wall in dressing, clean; non-labored respirations, lungs clear to auscultation bilaterally, no wheezes, crackles or rales  ABD:  non distended, non-tender, no rebound, no guarding, negative Narayanan's sign, bowel sounds normal, no cvat  MSK:  no gross deformity, non-tender extremities and joints, range of motion grossly normal appearing, no extremity edema, extremities warm and well perfused   NEURO:  AAOx3, CN II-XII intact, motor 5/5 in upper and lower extremities bilaterally, sensation grossly intact in extremities and trunk, finger to nose testing wnl, no nystagmus, negative Romberg, no pronator drift, no gait deficit  SKIN:  warm, dry, no rash or vesicles     A/P:   87-year-old female past medical history of 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  Vital signs stable afebrile.  Patient is refusing examination of chest wall wound as she states that it was just dressed prior to ED transfer.  Rest of limited exam explained to patient including inability to treat ongoing bleeding and patient demonstrated an understanding of risks.  Impression was that she had decision-making capacity and was able to defer exam.  Suspect anemia secondary to known chest wall cancer given previous hospitalizations.  Will check blood work, likely packed red blood cell transfusion.  Anticipate admission.

## 2023-02-07 NOTE — ED PROVIDER NOTE - PROGRESS NOTE DETAILS
Alena Recio M.D. (Resident Physician): Hemoglobin 4.7. Will give 2 units pRBCs and admit to [facility]

## 2023-02-07 NOTE — ED PROVIDER NOTE - OBJECTIVE STATEMENT
87F PMH chest wall squamous cell carcinoma, anemia, TIA, HLD p/w low hemoglobin of 5.1. Pt at [facility] for wound care for her scc chest wall wound. Anemia is likely 2/2 the wound. Denies lightheadedness, weakness, chest pain, palpitations, SOB, abdominal pain. Was admitted here a couple weeks ago for anemia.

## 2023-02-07 NOTE — PATIENT PROFILE ADULT - FALL HARM RISK - HARM RISK INTERVENTIONS
Assistance with ambulation/Assistance OOB with selected safe patient handling equipment/Communicate Risk of Fall with Harm to all staff/Discuss with provider need for PT consult/Monitor gait and stability/Provide patient with walking aids - walker, cane, crutches/Reinforce activity limits and safety measures with patient and family/Tailored Fall Risk Interventions/Use of alarms - bed, chair and/or voice tab/Visual Cue: Yellow wristband and red socks/Bed in lowest position, wheels locked, appropriate side rails in place/Call bell, personal items and telephone in reach/Instruct patient to call for assistance before getting out of bed or chair/Non-slip footwear when patient is out of bed/[place] to call system/Physically safe environment - no spills, clutter or unnecessary equipment/Purposeful Proactive Rounding/Room/bathroom lighting operational, light cord in reach

## 2023-02-07 NOTE — H&P ADULT - HISTORY OF PRESENT ILLNESS
86 y/o F with pmhx of skin cancer (squamous cell carcinoma) w/ mets s/p recent hospitalization for anemia presenting from [facility] for decreased Hgb. Patient at this time w/o any symptoms. Reports chronic R chest wound where cancer is located that bleeds intermittently. Denies blood in stool or blood in urine. Denies fevers, chills, nausea, vomiting, chest pain, SOB. No dizziness or light headedness. No weakness.

## 2023-02-07 NOTE — ED PROVIDER NOTE - CLINICAL SUMMARY MEDICAL DECISION MAKING FREE TEXT BOX
87F PMH chest wall squamous cell carcinoma, anemia, TIA, HLD p/w low hemoglobin. As per chart review, anemia likely 2/2 chronic chest wound. Plan: blood work and likely transfusion. Will re-assess.

## 2023-02-08 LAB
ANION GAP SERPL CALC-SCNC: 10 MMOL/L — SIGNIFICANT CHANGE UP (ref 7–14)
BUN SERPL-MCNC: 20 MG/DL — SIGNIFICANT CHANGE UP (ref 7–23)
CALCIUM SERPL-MCNC: 9.4 MG/DL — SIGNIFICANT CHANGE UP (ref 8.4–10.5)
CHLORIDE SERPL-SCNC: 102 MMOL/L — SIGNIFICANT CHANGE UP (ref 98–107)
CO2 SERPL-SCNC: 22 MMOL/L — SIGNIFICANT CHANGE UP (ref 22–31)
CREAT SERPL-MCNC: 0.41 MG/DL — LOW (ref 0.5–1.3)
EGFR: 96 ML/MIN/1.73M2 — SIGNIFICANT CHANGE UP
GLUCOSE SERPL-MCNC: 80 MG/DL — SIGNIFICANT CHANGE UP (ref 70–99)
HCT VFR BLD CALC: 23 % — LOW (ref 34.5–45)
HCT VFR BLD CALC: 25.8 % — LOW (ref 34.5–45)
HGB BLD-MCNC: 7.3 G/DL — LOW (ref 11.5–15.5)
HGB BLD-MCNC: 7.8 G/DL — LOW (ref 11.5–15.5)
MAGNESIUM SERPL-MCNC: 2 MG/DL — SIGNIFICANT CHANGE UP (ref 1.6–2.6)
MCHC RBC-ENTMCNC: 25.3 PG — LOW (ref 27–34)
MCHC RBC-ENTMCNC: 25.7 PG — LOW (ref 27–34)
MCHC RBC-ENTMCNC: 30.2 GM/DL — LOW (ref 32–36)
MCHC RBC-ENTMCNC: 31.7 GM/DL — LOW (ref 32–36)
MCV RBC AUTO: 81 FL — SIGNIFICANT CHANGE UP (ref 80–100)
MCV RBC AUTO: 83.8 FL — SIGNIFICANT CHANGE UP (ref 80–100)
NRBC # BLD: 0 /100 WBCS — SIGNIFICANT CHANGE UP (ref 0–0)
NRBC # BLD: 0 /100 WBCS — SIGNIFICANT CHANGE UP (ref 0–0)
NRBC # FLD: 0 K/UL — SIGNIFICANT CHANGE UP (ref 0–0)
NRBC # FLD: 0 K/UL — SIGNIFICANT CHANGE UP (ref 0–0)
PHOSPHATE SERPL-MCNC: 3 MG/DL — SIGNIFICANT CHANGE UP (ref 2.5–4.5)
PLATELET # BLD AUTO: 297 K/UL — SIGNIFICANT CHANGE UP (ref 150–400)
PLATELET # BLD AUTO: 321 K/UL — SIGNIFICANT CHANGE UP (ref 150–400)
POTASSIUM SERPL-MCNC: 3.9 MMOL/L — SIGNIFICANT CHANGE UP (ref 3.5–5.3)
POTASSIUM SERPL-SCNC: 3.9 MMOL/L — SIGNIFICANT CHANGE UP (ref 3.5–5.3)
RBC # BLD: 2.84 M/UL — LOW (ref 3.8–5.2)
RBC # BLD: 3.08 M/UL — LOW (ref 3.8–5.2)
RBC # FLD: 18 % — HIGH (ref 10.3–14.5)
RBC # FLD: 18.1 % — HIGH (ref 10.3–14.5)
SODIUM SERPL-SCNC: 134 MMOL/L — LOW (ref 135–145)
T4 AB SER-ACNC: 3.72 UG/DL — LOW (ref 5.1–13)
TSH SERPL-MCNC: 1.17 UIU/ML — SIGNIFICANT CHANGE UP (ref 0.27–4.2)
WBC # BLD: 13.78 K/UL — HIGH (ref 3.8–10.5)
WBC # BLD: 14.68 K/UL — HIGH (ref 3.8–10.5)
WBC # FLD AUTO: 13.78 K/UL — HIGH (ref 3.8–10.5)
WBC # FLD AUTO: 14.68 K/UL — HIGH (ref 3.8–10.5)

## 2023-02-08 RX ADMIN — Medication 325 MILLIGRAM(S): at 12:37

## 2023-02-08 RX ADMIN — Medication 1 TABLET(S): at 12:37

## 2023-02-08 RX ADMIN — Medication 650 MILLIGRAM(S): at 21:22

## 2023-02-08 RX ADMIN — RISPERIDONE 0.5 MILLIGRAM(S): 4 TABLET ORAL at 17:16

## 2023-02-08 RX ADMIN — Medication 650 MILLIGRAM(S): at 21:52

## 2023-02-08 RX ADMIN — RISPERIDONE 0.5 MILLIGRAM(S): 4 TABLET ORAL at 06:11

## 2023-02-08 RX ADMIN — SERTRALINE 100 MILLIGRAM(S): 25 TABLET, FILM COATED ORAL at 13:08

## 2023-02-08 RX ADMIN — ATORVASTATIN CALCIUM 40 MILLIGRAM(S): 80 TABLET, FILM COATED ORAL at 21:21

## 2023-02-08 RX ADMIN — Medication 650 MILLIGRAM(S): at 00:01

## 2023-02-08 RX ADMIN — Medication 500 MILLIGRAM(S): at 12:37

## 2023-02-08 RX ADMIN — Medication 1 MILLIGRAM(S): at 12:37

## 2023-02-08 NOTE — PROGRESS NOTE ADULT - TIME BILLING
- Review of records, telemetry, vital signs and daily labs.   - General and cardiovascular physical examination.  - Generation of cardiovascular treatment plan.  - Coordination of care.      Patient was seen and examined by me on 02/08/2023,interim events noted,labs and radiology studies reviewed.  Cayetano Landin MD,FACC.  [address] [place], [place] [id number].  [phone]

## 2023-02-09 ENCOUNTER — TRANSCRIPTION ENCOUNTER (OUTPATIENT)
Age: 88
End: 2023-02-09

## 2023-02-09 VITALS — DIASTOLIC BLOOD PRESSURE: 34 MMHG | SYSTOLIC BLOOD PRESSURE: 102 MMHG

## 2023-02-09 LAB
ANION GAP SERPL CALC-SCNC: 10 MMOL/L — SIGNIFICANT CHANGE UP (ref 7–14)
BASOPHILS # BLD AUTO: 0.04 K/UL — SIGNIFICANT CHANGE UP (ref 0–0.2)
BASOPHILS NFR BLD AUTO: 0.3 % — SIGNIFICANT CHANGE UP (ref 0–2)
BUN SERPL-MCNC: 17 MG/DL — SIGNIFICANT CHANGE UP (ref 7–23)
CALCIUM SERPL-MCNC: 9.5 MG/DL — SIGNIFICANT CHANGE UP (ref 8.4–10.5)
CHLORIDE SERPL-SCNC: 103 MMOL/L — SIGNIFICANT CHANGE UP (ref 98–107)
CO2 SERPL-SCNC: 22 MMOL/L — SIGNIFICANT CHANGE UP (ref 22–31)
CREAT SERPL-MCNC: 0.47 MG/DL — LOW (ref 0.5–1.3)
EGFR: 93 ML/MIN/1.73M2 — SIGNIFICANT CHANGE UP
EOSINOPHIL # BLD AUTO: 0.3 K/UL — SIGNIFICANT CHANGE UP (ref 0–0.5)
EOSINOPHIL NFR BLD AUTO: 2.1 % — SIGNIFICANT CHANGE UP (ref 0–6)
GLUCOSE SERPL-MCNC: 87 MG/DL — SIGNIFICANT CHANGE UP (ref 70–99)
HCT VFR BLD CALC: 25.3 % — LOW (ref 34.5–45)
HGB BLD-MCNC: 7.6 G/DL — LOW (ref 11.5–15.5)
IANC: 11.88 K/UL — HIGH (ref 1.8–7.4)
IMM GRANULOCYTES NFR BLD AUTO: 0.5 % — SIGNIFICANT CHANGE UP (ref 0–0.9)
LYMPHOCYTES # BLD AUTO: 1.7 K/UL — SIGNIFICANT CHANGE UP (ref 1–3.3)
LYMPHOCYTES # BLD AUTO: 11.6 % — LOW (ref 13–44)
MAGNESIUM SERPL-MCNC: 2 MG/DL — SIGNIFICANT CHANGE UP (ref 1.6–2.6)
MCHC RBC-ENTMCNC: 25.4 PG — LOW (ref 27–34)
MCHC RBC-ENTMCNC: 30 GM/DL — LOW (ref 32–36)
MCV RBC AUTO: 84.6 FL — SIGNIFICANT CHANGE UP (ref 80–100)
MONOCYTES # BLD AUTO: 0.64 K/UL — SIGNIFICANT CHANGE UP (ref 0–0.9)
MONOCYTES NFR BLD AUTO: 4.4 % — SIGNIFICANT CHANGE UP (ref 2–14)
NEUTROPHILS # BLD AUTO: 11.88 K/UL — HIGH (ref 1.8–7.4)
NEUTROPHILS NFR BLD AUTO: 81.1 % — HIGH (ref 43–77)
NRBC # BLD: 0 /100 WBCS — SIGNIFICANT CHANGE UP (ref 0–0)
NRBC # FLD: 0 K/UL — SIGNIFICANT CHANGE UP (ref 0–0)
PHOSPHATE SERPL-MCNC: 3.2 MG/DL — SIGNIFICANT CHANGE UP (ref 2.5–4.5)
PLATELET # BLD AUTO: 313 K/UL — SIGNIFICANT CHANGE UP (ref 150–400)
POTASSIUM SERPL-MCNC: 4.3 MMOL/L — SIGNIFICANT CHANGE UP (ref 3.5–5.3)
POTASSIUM SERPL-SCNC: 4.3 MMOL/L — SIGNIFICANT CHANGE UP (ref 3.5–5.3)
RBC # BLD: 2.99 M/UL — LOW (ref 3.8–5.2)
RBC # FLD: 17.9 % — HIGH (ref 10.3–14.5)
SODIUM SERPL-SCNC: 135 MMOL/L — SIGNIFICANT CHANGE UP (ref 135–145)
WBC # BLD: 14.63 K/UL — HIGH (ref 3.8–10.5)
WBC # FLD AUTO: 14.63 K/UL — HIGH (ref 3.8–10.5)

## 2023-02-09 PROCEDURE — 99232 SBSQ HOSP IP/OBS MODERATE 35: CPT | Mod: FS

## 2023-02-09 RX ORDER — FOLIC ACID 0.8 MG
1 TABLET ORAL
Qty: 0 | Refills: 0 | DISCHARGE

## 2023-02-09 RX ORDER — RISPERIDONE 4 MG/1
1 TABLET ORAL
Qty: 0 | Refills: 0 | DISCHARGE

## 2023-02-09 RX ORDER — SERTRALINE 25 MG/1
1 TABLET, FILM COATED ORAL
Qty: 0 | Refills: 0 | DISCHARGE

## 2023-02-09 RX ORDER — ACETAMINOPHEN 500 MG
2 TABLET ORAL
Qty: 0 | Refills: 0 | DISCHARGE
Start: 2023-02-09

## 2023-02-09 RX ORDER — FERROUS SULFATE 325(65) MG
1 TABLET ORAL
Qty: 0 | Refills: 0 | DISCHARGE

## 2023-02-09 RX ORDER — FERROUS SULFATE 325(65) MG
1 TABLET ORAL
Qty: 0 | Refills: 0 | DISCHARGE
Start: 2023-02-09

## 2023-02-09 RX ORDER — POLYETHYLENE GLYCOL 3350 17 G/17G
17 POWDER, FOR SOLUTION ORAL
Qty: 0 | Refills: 0 | DISCHARGE
Start: 2023-02-09

## 2023-02-09 RX ORDER — ASCORBIC ACID 60 MG
1 TABLET,CHEWABLE ORAL
Qty: 0 | Refills: 0 | DISCHARGE
Start: 2023-02-09

## 2023-02-09 RX ORDER — RISPERIDONE 4 MG/1
1 TABLET ORAL
Qty: 0 | Refills: 0 | DISCHARGE
Start: 2023-02-09

## 2023-02-09 RX ORDER — SENNA PLUS 8.6 MG/1
2 TABLET ORAL
Qty: 0 | Refills: 0 | DISCHARGE
Start: 2023-02-09

## 2023-02-09 RX ORDER — FOLIC ACID 0.8 MG
1 TABLET ORAL
Qty: 0 | Refills: 0 | DISCHARGE
Start: 2023-02-09

## 2023-02-09 RX ORDER — LANOLIN ALCOHOL/MO/W.PET/CERES
1 CREAM (GRAM) TOPICAL
Qty: 0 | Refills: 0 | DISCHARGE
Start: 2023-02-09

## 2023-02-09 RX ORDER — SERTRALINE 25 MG/1
1 TABLET, FILM COATED ORAL
Qty: 0 | Refills: 0 | DISCHARGE
Start: 2023-02-09

## 2023-02-09 RX ORDER — ASCORBIC ACID 60 MG
1 TABLET,CHEWABLE ORAL
Qty: 0 | Refills: 0 | DISCHARGE

## 2023-02-09 RX ORDER — TRAMADOL HYDROCHLORIDE 50 MG/1
1 TABLET ORAL
Qty: 0 | Refills: 0 | DISCHARGE

## 2023-02-09 RX ORDER — ATORVASTATIN CALCIUM 80 MG/1
1 TABLET, FILM COATED ORAL
Qty: 0 | Refills: 0 | DISCHARGE
Start: 2023-02-09

## 2023-02-09 RX ORDER — POLYETHYLENE GLYCOL 3350 17 G/17G
17 POWDER, FOR SOLUTION ORAL
Qty: 0 | Refills: 0 | DISCHARGE

## 2023-02-09 RX ORDER — TRAMADOL HYDROCHLORIDE 50 MG/1
1 TABLET ORAL
Qty: 0 | Refills: 0 | DISCHARGE
Start: 2023-02-09

## 2023-02-09 RX ADMIN — Medication 325 MILLIGRAM(S): at 12:49

## 2023-02-09 RX ADMIN — SERTRALINE 100 MILLIGRAM(S): 25 TABLET, FILM COATED ORAL at 12:49

## 2023-02-09 RX ADMIN — Medication 1 TABLET(S): at 12:49

## 2023-02-09 RX ADMIN — Medication 500 MILLIGRAM(S): at 12:49

## 2023-02-09 RX ADMIN — RISPERIDONE 0.5 MILLIGRAM(S): 4 TABLET ORAL at 18:42

## 2023-02-09 RX ADMIN — Medication 1 MILLIGRAM(S): at 12:49

## 2023-02-09 RX ADMIN — RISPERIDONE 0.5 MILLIGRAM(S): 4 TABLET ORAL at 05:59

## 2023-02-09 NOTE — PHYSICAL THERAPY INITIAL EVALUATION ADULT - ACTIVE RANGE OF MOTION EXAMINATION, REHAB EVAL
right shoulder flexion ~40 degrees, right elbow/hand/wrist WFL/Left UE Active ROM was WFL (within functional limits)/bilateral  lower extremity Active ROM was WFL (within functional limits)

## 2023-02-09 NOTE — DISCHARGE NOTE NURSING/CASE MANAGEMENT/SOCIAL WORK - NSDCPEFALRISK_GEN_ALL_CORE
For information on Fall & Injury Prevention, visit: [email or web address] OR  [email or web address] OR  https://www.cdc.gov/steadi/patient.html

## 2023-02-09 NOTE — PHYSICAL THERAPY INITIAL EVALUATION ADULT - GENERAL OBSERVATIONS, REHAB EVAL
Pt encountered in semisupine position, no distress, with +IV, and abdominal dressing dry/intact. Pt agreeable to participate in PT evaluation.

## 2023-02-09 NOTE — PHYSICAL THERAPY INITIAL EVALUATION ADULT - GROSSLY INTACT, SENSORY
Patient arrived via [facility]. Transport report from [place], Paramedic. 
Patient had an uneventful transport. Was administered 0.5mg Ativan IV at [facility]. 
Patient with D5 1/2 normal saline at 75ml/hr running. Patient with BiPap in place FiO2 75%. 
PAtient transferred to hospital bed from [facility] with slide board. Patient placed on BiPap 
unit by RT. Vital signs assessed. Chest X-Ray ordered to confirm placement of PICC to right 
upper arm. Right upper arm Single lumen extended. IV is an extended confirmed by x-ray to 
right arm. Grossly Intact

## 2023-02-09 NOTE — PHYSICAL THERAPY INITIAL EVALUATION ADULT - PATIENT PROFILE REVIEW, REHAB EVAL
No Formal Activity Order in the computer; spoke with JENNIFER Dai prior to PT evaluation--> pt  OK for PT consult/OOB activity./yes

## 2023-02-09 NOTE — PHYSICAL THERAPY INITIAL EVALUATION ADULT - PASSIVE RANGE OF MOTION EXAMINATION, REHAB EVAL
right shoulder flexion ~65 degrees, right elbow/hand/wrist WFL/Left UE Passive ROM was WFL (within functional limits)/bilateral lower extremity Passive ROM was WFL (within functional limits)

## 2023-02-09 NOTE — PHYSICAL THERAPY INITIAL EVALUATION ADULT - PERTINENT HX OF CURRENT PROBLEM, REHAB EVAL
88 y/o F with PMHx of skin cancer (squamous cell carcinoma) w/ mets s/p recent hospitalization for anemia presenting from [facility] for decreased Hgb. Patient at this time w/o any symptoms. Reports chronic Right chest wound where cancer is located that bleeds intermittently. Denies blood in stool or blood in urine. Denies fevers, chills, nausea, vomiting, chest pain, SOB. No dizziness or lightheadedness. No weakness.

## 2023-02-09 NOTE — DISCHARGE NOTE PROVIDER - HOSPITAL COURSE
86 y/o F with PMH of skin cancer (squamous cell carcinoma) w/ mets s/p recent hospitalization for anemia presenting from [facility] for decreased Hgb.     Problem/Plan - 1:  ·  Problem: Acute on chronic anemia.   ·  Plan: Likely 2/2 R chest wound. No overt signs of GIB at this time. Denies hematuria  -Hgb 4.7 on admission  -s/p 2 units PRBC  -Post trans CBC Hgb 7.8, 7.6 on discharge. Stable.  -Wound care evaluated, see below.  -Maintained active type and screen  -Monitored CBCs, transfuse Hgb < 7.     Problem/Plan - 2:  ·  Problem: Squamous cell skin cancer.   ·  Plan: -Follows w/ Dr. Alexis, [facility].  -States recent therapy per heme/onc team has not demonstrated sig benefit.   -Currently being evaluated for new treatment  -Outpatient follow up.     Problem/Plan - 3:  ·  Problem: Edema of extremity.   ·  Plan: B/L UE edema noted on exam R>L appears to be subcutaneous edema  -Previous RUE VA duplex w/o DVT but w/ multiple thyroid nodules- recommended dedicated thyroid US [should be obtained outpatient]  -Upper ext elevation chronic swelling 2/2 lymphedema from malignancy     Problem/Plan - 4:  ·  Problem: Mood disorder.   ·  Plan: -c/w sertraline  -c/w risperidone.    DIET: DASH/TLC  DISPO: Likely back to [place].    BLEEDING WOUNDS OF B/L CHEST WALL extending to mid axillary   - On today's assessment bleeding controlled with surgicel, manual pressure. One hemostasis was achieved, dressed remaining wound with adaptic touch, aquacel, abdominal pads, kerlix and ace bandage.  - No history per records of right breast mastectomy; right breast has been consumed by fungating wound. Left breast with viable areola. Wound base remains extremely friable.  - No superimposed infection noted; no odor, no associated cellulitis.  - Recommend daily dressing changes. Use liberal saline to loosen previously placed surgicel to prevent abrasive removal and decrease risks of bleeding. Gently clean wound with saline and gauze. Re-apply Surgicel, manual pressure as needed, apply Silicone non adherent layer (adapatic touch) to exposed surfaces that are not bleeding; aquacel hydrofiber, cover with abdominal pads, kerlix wrap, and compressive wrap around chest with ACE. Any strikethrough of blood should prompt takedown of dressings for examination.     On 2/9/23, discussed with Dr. Landin, patient is medically cleared and optimized for discharge today back to [facility].

## 2023-02-09 NOTE — DISCHARGE NOTE NURSING/CASE MANAGEMENT/SOCIAL WORK - NSDCVIVACCINE_GEN_ALL_CORE_FT
influenza, injectable, quadrivalent, preservative free; 18-Sep-2018 16:23; Linh LindoRN); [employer]; [id number] (Exp. Date: 30-Jun-2019); IntraMuscular; Deltoid Right.; 0.5 milliLiter(s); VIS (VIS Published: 07-Aug-2015, VIS Presented: 18-Sep-2018);

## 2023-02-09 NOTE — DISCHARGE NOTE NURSING/CASE MANAGEMENT/SOCIAL WORK - PATIENT PORTAL LINK FT
You can access the FollowMyHealth Patient Portal offered by [facility] by registering at the following website: [email or web address]. By joining [facility]’s FollowMyHealth portal, you will also be able to view your health information using other applications (apps) compatible with our system.

## 2023-02-09 NOTE — CONSULT NOTE ADULT - ASSESSMENT
86 y/o F with PMH of skin cancer (squamous cell carcinoma) w/ mets s/p recent hospitalization for anemia presenting from [facility] for decreased Hgb.      Problem/Plan - 1:  ·  Problem: Acute on chronic anemia.   ·  Plan: Likely 2/2 R chest wound. No overt signs of GIB at this time. Denies hematuria  -Hgb 4.7 on admission  -s/p 2 units PRBC  -Obtain post trans CBC  -Wound care eval  -Maintain active type and screen  -Monitor CBCs, transfuse Hgb < 7.    Problem/Plan - 2:  ·  Problem: Squamous cell skin cancer.   ·  Plan: -Follows w/ Dr. Alexis, [facility].  -States recent therapy per heme/onc team has not demonstrated sig benefit.   -Currently being evaluated for new treatment  -Outpatient follow up.    Problem/Plan - 3:  ·  Problem: Edema of extremity.   ·  Plan: B/L UE edema noted on exam R>L appears to be subcutaneous edema  -Previous RUE VA duplex w/o DVT but w/ multiple thyroid nodules- recommended dedicated thyroid US [should be obtained outpatient]  -Upper ext elevation  -Repeat doppler if w/ worsening swelling or pt develops pain.    Problem/Plan - 4:  ·  Problem: Mood disorder.   ·  Plan: -c/w sertraline  -c/w risperidone.    Problem/Plan - 5:  ·  Problem: Prophylactic measure.   ·  Plan: DVT: SCDs for now  DIET: DASH/TLC  DISPO: Likely back to [place].        
This is an 87 year old female who presents with severe anemia secondary to uncontrolled bleeding from malignant chest wall lesion.    1. Squamous cell carcinoma skin   -- Had progression of disease on cemiplimab  -- Plan to begin palliative chemotherapy with weekly carbo/taxol, first dose scheduled for 02/10   -- No systemic treatment while admitted  -- Follow up with Dr. Alexis after discharge     2. Anemia   -- Severe anemia secondary to uncontrolled bleeding from malignant lesion on chest wall   -- Reportedly had RT to chest at [facility] but unable to get records  -- Consider rad/onc eval though likely will not radiate w/o prior records   -- Recommend surgery eval to assess for any intervention that may help control bleeding   -- Transfuse to maintain hg >7     Will continue to follow.    Lucy Pastrana PA-C  Hematology/Oncology  [facility]  [phone] (office)  [phone] (alt office)  Evenings and weekends please call MD on call or office  
Assessment/Plan: 87 year old female skin cancer (squamous cell carcinoma) w/ mets s/p recent hospitalizations for anemia presenting from [facility] for decreased Hgb. S/p PRBCC x 2 units.    Wound Consult requested to assist w/ management of fungating chest wall wound. Patient known to wound care service line from previous admissions for this wound. Patient was previously seen by general surgical team for uncontrolled bleeding, No surgical intervention warranted. Nu-knit Surgicel was previously recommended for hemostasis dressing.     Squamous Cell Carcinoma with mets   -Management as per primary team   -Heme/Onc following patient as per notes    BLEEDING WOUNDS OF B/L CHEST WALL extending to mid axillary   - On today's assessment bleeding controlled with surgicel, manual pressure. One hemostasis was achieved, dressed remaining wound with adaptic touch, aquacel, abdominal pads, kerlix and ace bandage.  - No history per records of right breast mastectomy; right breast has been consumed by fungating wound. Left breast with viable areola. Wound base remains extremely friable.  - No superimposed infection noted; no odor, no associated cellulitis.  - Recommend daily dressing changes. Use liberal saline to loosen previously placed surgicel to prevent abrasive removal and decrease risks of bleeding. Gently clean wound with saline and gauze. Re-apply Surgicel, manual pressure as needed, apply Silicone non adherent layer (adapatic touch) to exposed surfaces that are not bleeding; aquacel hydrofiber, cover with abdominal pads, kerlix wrap, and compressive wrap around chest with ACE. Any strikethrough of blood should prompt takedown of dressings for examination.     Anemia  -Management as per primary team   -s/p blood transfusion    Additional recommendations   Elevate Right limb with pillows   Consider use of Arm Sling for support   Continue w/ low air loss fluidized bed surface   Continue turning and positioning w/ offloading assistive devices as per protocol  Continue w/ Pericare as per protocol  Waffle Cushion to chair when oob to chair  Moisturize intact skin w/ SWEEN cream daily to bilateral feet. Avoid between toes.   Bilateral buttocks and sacrum IAD/MAD: Clean with skin cleanser. Pat dry. Apply a thin layer of Mena barrier cream. Apply twice a day.     Patient seen with Dr. Johnson. Findings and plan discussed with patient and primary team. All questions and concerns addressed to meet patient's satisfaction.    Upon discharge f/u as outpatient at [facility] [address] [place] [address] [phone].  Remainder of care per primary team.    Thank you for this consult  LUCIAN Hobson, CWLYNETTEN (pager #[phone])    If after 4PM or before 7:30AM on Mon-Friday or weekend/holiday please contact general surgery for urgent matters.   Team A- [id number]   Team B- [id number]  For non-urgent matters e-mail [email or web address]    We spent 70 minutes face to face with this patient of which more than 50% of the time was spent counseling & coordinating care of this pt

## 2023-02-09 NOTE — CONSULT NOTE ADULT - NS ATTEND AMEND GEN_ALL_CORE FT
pt with PRBC transfusion. bleeding and oozing from chest wound. would consult, surgery consult. follow up cbc
86 yo female with worsening squamous cell ca of skin of chest  Right breast no longer identifiable - has been replaced by a large , friable open wound  Advise F/u at wound center  Info provided  Agree with H&P and recommendations

## 2023-02-09 NOTE — CHART NOTE - NSCHARTNOTEFT_GEN_A_CORE
Attempted to see patient this morning after wound care referral received. Patient known to Wound Surgery Service line for friable fungating chest wounds. Patient sitting up eating breakfast; dressing intact no visible strike through, requesting that we come back at later time. Patient's wishes respected will reattempt.  Lisa Hodge [facility]  Wound Surgery - NP  #[phone]
Contacted by RN due to patient having low grade fever of 100.1 upon arriving to the floor. Patient received  2 U PRBC earlier in the ED. Writer went to bedside to evaluate patient. Denies SOB, pruritus, chills, abdominal pain, headache or any other syptoms of concern. Lungs clear to auscultation, and skin free of any hives or rashes. RN advised to administer 25 mg PO Benadryl and well as PO Tylenol. Transfusion reaction form completed, ABO and blood cultures ordered as instructed by blood bank resident, Dr. Holley. RN to walk forms and blood down to blood bank. Patient will be closely monitored. Night events to be conveyed to day provider to ensure continuity of care.
ON chart review Oncology service recommending Surgery eval for bleeding for chest wound to eval for intervention to control bleeding. Patient previously refused evaluation of wound. Discussed with RN, patient dressing with minimal dry blood, does not appear to have any fresh blood. H/H Improved from 4.7 to 8.0 after 2 u PRBC. Repeat CBC @ 0000. Discussed with Dr. Landin, will hold off on surgery eval, if blood counts remain stable will continue monitor and patient can have outpatient follow up, if repeat is < 7 then transfuse for goal > 7.  Will follow.
Discussed with Dr. Landin about plans moving forward for this patient. Wound care will follow up on 2/9 AM, they are waiting for materials to be delivered, and to transfer patient back to [facility]. Per Dr. Landin, patient is medically ready to be transferred back to [place], patient agrees with plan. This was discussed with ALAN [facility] [id number]
RN notified provider patient blood pressure 103/39 and tachycardic to 103. Chart reviwed patient assessed at bedside NAD offers no complaints denies chest pain palpitations lightheadedness dizzyness. On chart reviewed current blood pressure and heartrate are baseline for this patient.     Physical Exam  Gen: NAD, AOx4  Heart: S1 S2 no MRC  Lungs: CTABL no wheezing rales or rhonchi  Abdomen: soft, nontender, nondistended, normal active bowel sounds     -Patient clear for discharge given ASx and low bp and tachycardia at baseline    Zach Gomez PA-C  Department of Internal Medicine  In-House beeper number [phone]

## 2023-02-09 NOTE — CHART NOTE - NSCHARTNOTESELECT_GEN_ALL_CORE
Event Note
Hypotension and HR/Event Note
Event Note
Transfusion Reaction Workup/Event Note
Wound Surgery Attempted Visit/Event Note

## 2023-02-09 NOTE — PROGRESS NOTE ADULT - ASSESSMENT
This is an 87 year old female who presents with severe anemia secondary to uncontrolled bleeding from malignant chest wall lesion.    1. Squamous cell carcinoma skin   -- Had progression of disease on cemiplimab  -- Plan to begin palliative chemotherapy with weekly carbo/taxol, first dose scheduled for 02/10   -- No systemic treatment while admitted  -- Follow up with Dr. Alexis after discharge     2. Anemia   -- Severe anemia secondary to uncontrolled bleeding from malignant lesion on chest wall   -- Reportedly had RT to chest at [facility] but unable to get records  -- Consider rad/onc eval though likely will not radiate w/o prior records   -- Recommend surgery eval to assess for any intervention that may help control bleeding - noted that patient/primary team will hold off on inpatient eval, to follow up outpatient   -- Transfuse to maintain hg >7   -- s/p 2 units pRBC with appropriate response    Will continue to follow.    Lucy Pastrana PA-C  Hematology/Oncology  [facility]  [phone] (office)  [phone] (alt office)  Evenings and weekends please call MD on call or office  
This is an 87 year old female who presents with severe anemia secondary to uncontrolled bleeding from malignant chest wall lesion.    1. Squamous cell carcinoma skin   -- Had progression of disease on cemiplimab  -- Plan to begin palliative chemotherapy with weekly carbo/taxol, first dose scheduled for 02/10   -- No systemic treatment while admitted  -- Follow up with Dr. Alexis after discharge     2. Anemia   -- Severe anemia secondary to uncontrolled bleeding from malignant lesion on chest wall   -- Reportedly had RT to chest at [facility] but unable to get records  -- Consider rad/onc eval though likely will not radiate w/o prior records   -- Recommend surgery eval to assess for any intervention that may help control bleeding - noted that patient/primary team will hold off on inpatient eval, to follow up outpatient   -- Transfuse to maintain hg >7   -- H/H has been stable post-transfusion     Will continue to follow. Discharge planning back to [facility].     Lucy Pastrana PA-C  Hematology/Oncology  [facility]  [phone] (office)  [phone] (alt office)  Evenings and weekends please call MD on call or office  
86 y/o F with PMH of skin cancer (squamous cell carcinoma) w/ mets s/p recent hospitalization for anemia presenting from [facility] for decreased Hgb.      Problem/Plan - 1:  ·  Problem: Acute on chronic anemia.   ·  Plan: Likely 2/2 R chest wound. No overt signs of GIB at this time. Denies hematuria  -Hgb 4.7 on admission  -s/p 2 units PRBC  -Post trans CBC Hgb 7.8  -Wound care eval  -Maintain active type and screen  -Monitor CBCs, transfuse Hgb < 7.    Problem/Plan - 2:  ·  Problem: Squamous cell skin cancer.   ·  Plan: -Follows w/ Dr. Alexis, [facility].  -States recent therapy per heme/onc team has not demonstrated sig benefit.   -Currently being evaluated for new treatment  -Outpatient follow up.    Problem/Plan - 3:  ·  Problem: Edema of extremity.   ·  Plan: B/L UE edema noted on exam R>L appears to be subcutaneous edema  -Previous RUE VA duplex w/o DVT but w/ multiple thyroid nodules- recommended dedicated thyroid US [should be obtained outpatient]  -Upper ext elevation chronic swelling 2/2 lymphedema from malignancy      Problem/Plan - 4:  ·  Problem: Mood disorder.   ·  Plan: -c/w sertraline  -c/w risperidone.    Problem/Plan - 5:  ·  Problem: Prophylactic measure.   ·  Plan: DVT: SCDs for now  DIET: DASH/TLC  DISPO: Likely back to [place].

## 2023-02-09 NOTE — DISCHARGE NOTE PROVIDER - NSDCCPCAREPLAN_GEN_ALL_CORE_FT
PRINCIPAL DISCHARGE DIAGNOSIS  Diagnosis: Anemia due to chronic blood loss  Assessment and Plan of Treatment: You came into the hospital for anemia, low hemoglobin levels. You were transfused 2 units and your counts are stable at 7.6 on discharge. Per Dr. Landin, please follow up outpatient for further care with Dr. Alexis in regards to the squamous cell cancer.      SECONDARY DISCHARGE DIAGNOSES  Diagnosis: Squamous cell skin cancer  Assessment and Plan of Treatment: Follow up with outpatient specialist per Dr. Landin within 1 week. Wound care evaluated you, recommendations placed in hospital course.

## 2023-02-09 NOTE — PROGRESS NOTE ADULT - SUBJECTIVE AND OBJECTIVE BOX
Patient is a 87y old  Female who presents with a chief complaint of Anemia (08 Feb 2023 13:11)    Patient seen this morning. She feels fine and has no complaints. Awaiting discharge back to [place].     MEDICATIONS  (STANDING):  ascorbic acid 500 milliGRAM(s) Oral daily  atorvastatin 40 milliGRAM(s) Oral at bedtime  ferrous    sulfate 325 milliGRAM(s) Oral daily  folic acid 1 milliGRAM(s) Oral daily  multivitamin 1 Tablet(s) Oral daily  polyethylene glycol 3350 17 Gram(s) Oral daily  risperiDONE   Tablet 0.5 milliGRAM(s) Oral two times a day  senna 2 Tablet(s) Oral at bedtime  sertraline 100 milliGRAM(s) Oral daily    MEDICATIONS  (PRN):  acetaminophen     Tablet .. 650 milliGRAM(s) Oral every 6 hours PRN Mild Pain (1 - 3)  melatonin 3 milliGRAM(s) Oral at bedtime PRN Insomnia  traMADol 50 milliGRAM(s) Oral every 12 hours PRN Moderate Pain (4 - 6)-Severe pain (7-10)        Vital Signs Last 24 Hrs  T(C): 36.8 (09 Feb 2023 06:09), Max: 37.5 (08 Feb 2023 21:20)  T(F): 98.3 (09 Feb 2023 06:09), Max: 99.5 (08 Feb 2023 21:20)  HR: 97 (09 Feb 2023 06:09) (96 - 97)  BP: 133/55 (09 Feb 2023 06:09) (103/37 - 133/55)  BP(mean): --  RR: 17 (09 Feb 2023 06:09) (17 - 17)  SpO2: 100% (09 Feb 2023 06:09) (100% - 100%)    Parameters below as of 09 Feb 2023 06:09  Patient On (Oxygen Delivery Method): room air        PE  NAD  Awake, alert  Anicteric  +RUE edema   No rash grossly  FROM                          7.6    14.63 )-----------( 313      ( 09 Feb 2023 05:19 )             25.3       02-09    135  |  103  |  17  ----------------------------<  87  4.3   |  22  |  0.47<L>    Ca    9.5      09 Feb 2023 05:19  Phos  3.2     02-09  Mg     2.00     02-09        
DATE OF SERVICE: 02-08-23 @ 10:24  Patient was seen and examined on    02-08-23 @ 10:24 .Interim events noted.Consultant notes ,Labs,Telemetry reviewed by me       HOSPITAL COURSE: HPI:  88 y/o F with pmhx of skin cancer (squamous cell carcinoma) w/ mets s/p recent hospitalization for anemia presenting from [facility] for decreased Hgb. Patient at this time w/o any symptoms. Reports chronic R chest wound where cancer is located that bleeds intermittently. Denies blood in stool or blood in urine. Denies fevers, chills, nausea, vomiting, chest pain, SOB. No dizziness or light headedness. No weakness.    (07 Feb 2023 09:50)      INTERIM EVENTS:Patient seen at bedside ,interim events noted.Awake no dyspnea recieved 2 units PRBC      PMH -reviewed admission note, no change since admission  HEART FAILURE: Acute[ ]Chronic[ ] Systolic[ ] Diastolic[ ] Combined Systolic and Diastolic[ ]  CAD[ ] CABG[ ] PCI[ ]  DEVICES[ ] PPM[ ] ICD[ ] ILR[ ]  ATRIAL FIBRILLATION[ ] Paroxysmal[ ] Permanent[ ] CHADS2-[  ]  KAMRAN[ ] CKD1[ ] CKD2[ ] CKD3[ ] CKD4[ ] ESRD[ ]  COPD[ ] HTN[ ]   DM[ ] Type1[ ] Type 2[ ]   CVA[ ] Paresis[ ]    AMBULATION: Assisted[ ] Cane/walker[ ] Independent[x ]    MEDICATIONS  (STANDING):  ascorbic acid 500 milliGRAM(s) Oral daily  atorvastatin 40 milliGRAM(s) Oral at bedtime  ferrous    sulfate 325 milliGRAM(s) Oral daily  folic acid 1 milliGRAM(s) Oral daily  multivitamin 1 Tablet(s) Oral daily  polyethylene glycol 3350 17 Gram(s) Oral daily  risperiDONE   Tablet 0.5 milliGRAM(s) Oral two times a day  senna 2 Tablet(s) Oral at bedtime  sertraline 100 milliGRAM(s) Oral daily    MEDICATIONS  (PRN):  acetaminophen     Tablet .. 650 milliGRAM(s) Oral every 6 hours PRN Mild Pain (1 - 3)  melatonin 3 milliGRAM(s) Oral at bedtime PRN Insomnia  traMADol 50 milliGRAM(s) Oral every 12 hours PRN Moderate Pain (4 - 6)-Severe pain (7-10)            REVIEW OF SYSTEMS:  Constitutional: [ ] fever, [ ]weight loss,  [ ]fatigue [ ]weight gain  Eyes: [ ] visual changes  Respiratory: [ ]shortness of breath;  [ ] cough, [ ]wheezing, [ ]chills, [ ]hemoptysis  Cardiovascular: [ ] chest pain, [ ]palpitations, [ ]dizziness,  [ ]leg swelling[ ]orthopnea[ ]PND  Gastrointestinal: [ ] abdominal pain, [ ]nausea, [ ]vomiting,  [ ]diarrhea [ ]Constipation [ ]Melena  Genitourinary: [ ] dysuria, [ ] hematuria [ ][place]ell  Neurologic: [ ] headaches [ ] tremors[ ]weakness [ ]Paralysis Right[ ] Left[ ]  Skin: [ ] itching, [ ]burning, [ ] rashes  Endocrine: [ ] heat or cold intolerance  Musculoskeletal: [ ] joint pain or swelling; [ ] muscle, back, or extremity pain  Psychiatric: [ ] depression, [ ]anxiety, [ ]mood swings, or [ ]difficulty sleeping  Hematologic: [ ] easy bruising, [ ] bleeding gums    [ ] All remaining systems negative except as per above.   [ ]Unable to obtain.  [x] No change in ROS since admission      Vital Signs Last 24 Hrs  T(C): 37.1 (08 Feb 2023 10:00), Max: 37.8 (07 Feb 2023 21:45)  T(F): 98.7 (08 Feb 2023 10:00), Max: 100.1 (07 Feb 2023 21:45)  HR: 99 (08 Feb 2023 10:00) (85 - 103)  BP: 112/42 (08 Feb 2023 10:00) (106/44 - 114/58)  BP(mean): --  RR: 18 (08 Feb 2023 10:00) (17 - 18)  SpO2: 98% (08 Feb 2023 10:00) (95% - 100%)      PHYSICAL EXAM:  General: No acute distress BMI-24  HEENT: EOMI, PERRL  Neck: Supple, [ ] JVD  Lungs: Equal air entry bilaterally; [ ] rales [ ] wheezing [ ] rhonchi  Heart: Regular rate and rhythm; [x ] murmur   2/6 [ x] systolic [ ] diastolic [ ] radiation[ ] rubs [ ]  gallops  Abdomen: Nontender, bowel sounds present  Extremities: No clubbing, cyanosis, [ ] edema [ ]Pulses  equal and intact  Nervous system:  Alert & Oriented X3, no focal deficits  Psychiatric: Normal affect  Skin: No rashes or lesions    LABS:  02-08    134<L>  |  102  |  20  ----------------------------<  80  3.9   |  22  |  0.41<L>    Ca    9.4      08 Feb 2023 07:00  Phos  3.0     02-08  Mg     2.00     02-08      Creatinine Trend: 0.41<--, 0.56<--, 0.49<--, 0.49<--                        7.8    13.78 )-----------( 321      ( 08 Feb 2023 07:00 )             25.8               
Patient is a 87y old  Female who presents with a chief complaint of Anemia (07 Feb 2023 13:52)    Patient seen this morning. She reports feeling well, has no complaints.     MEDICATIONS  (STANDING):  ascorbic acid 500 milliGRAM(s) Oral daily  atorvastatin 40 milliGRAM(s) Oral at bedtime  ferrous    sulfate 325 milliGRAM(s) Oral daily  folic acid 1 milliGRAM(s) Oral daily  multivitamin 1 Tablet(s) Oral daily  polyethylene glycol 3350 17 Gram(s) Oral daily  risperiDONE   Tablet 0.5 milliGRAM(s) Oral two times a day  senna 2 Tablet(s) Oral at bedtime  sertraline 100 milliGRAM(s) Oral daily    MEDICATIONS  (PRN):  acetaminophen     Tablet .. 650 milliGRAM(s) Oral every 6 hours PRN Mild Pain (1 - 3)  melatonin 3 milliGRAM(s) Oral at bedtime PRN Insomnia  traMADol 50 milliGRAM(s) Oral every 12 hours PRN Moderate Pain (4 - 6)-Severe pain (7-10)        Vital Signs Last 24 Hrs  T(C): 37.1 (08 Feb 2023 10:00), Max: 37.8 (07 Feb 2023 21:45)  T(F): 98.7 (08 Feb 2023 10:00), Max: 100.1 (07 Feb 2023 21:45)  HR: 99 (08 Feb 2023 10:00) (85 - 103)  BP: 112/42 (08 Feb 2023 10:00) (106/44 - 114/58)  BP(mean): --  RR: 18 (08 Feb 2023 10:00) (17 - 18)  SpO2: 98% (08 Feb 2023 10:00) (95% - 100%)    Parameters below as of 08 Feb 2023 10:00  Patient On (Oxygen Delivery Method): room air        PE  NAD  Awake, alert  Anicteric, MMM  +RUE edema   No rash grossly  FROM                          7.8    13.78 )-----------( 321      ( 08 Feb 2023 07:00 )             25.8       02-08    134<L>  |  102  |  20  ----------------------------<  80  3.9   |  22  |  0.41<L>    Ca    9.4      08 Feb 2023 07:00  Phos  3.0     02-08  Mg     2.00     02-08    TPro  4.9<L>  /  Alb  2.5<L>  /  TBili  <0.2  /  DBili  x   /  AST  14  /  ALT  10  /  AlkPhos  93  02-06

## 2023-02-09 NOTE — CONSULT NOTE ADULT - SUBJECTIVE AND OBJECTIVE BOX
Reason for consult: anemia, SCC skin     HPI:  88 y/o F with pmhx of skin cancer (squamous cell carcinoma) w/ mets s/p recent hospitalization for anemia presenting from [facility] for decreased Hgb. Patient at this time w/o any symptoms. Reports chronic R chest wound where cancer is located that bleeds intermittently. Denies blood in stool or blood in urine. Denies fevers, chills, nausea, vomiting, chest pain, SOB. No dizziness or light headedness. No weakness.    (07 Feb 2023 09:50)    This is an 87 year old female with squamous cell carcinoma of the skin who presents with anemia, likely secondary to chronic bleeding from her malignant skin lesion on chest. She has had recurrent admissions for the same issue, as it has been difficult to control the bleeding.   She follows with Dr. Peyman Alexis of [facility] for management of her malignancy. She was on first-line immunotherapy with cemiplimab, however PET/CT from 01/28/2023 showed "markedly increased prominence in size and activity of extensive hypermetabolic lesions  in bilateral anterior chest wall indicating progression of neoplastic activity." She is planning to begin weekly carbo/taxol, 3 weeks on/1 week off on 02/10. Her most recent outpatient visit on 02/02 showed hemoglobin on 7.3, and she presented here with hg 4.7.   Patient seen this afternoon. She reports feeling well, and has no complaints. She is grossly asymptomatic despite severe anemia. States that she bleeds every time her dressing is changed.     PAST MEDICAL & SURGICAL HISTORY:  HLD (hyperlipidemia)      TIA (transient ischemic attack)      Carotid stenosis      Primary squamous cell carcinoma of skin      Thyroid mass          FAMILY HISTORY:  Family history of lung cancer (Father)        Alochol: Denied  Smoking: Nonsmoker  Drug Use: Denied  Marital Status:         Allergies    No Known Allergies    Intolerances        MEDICATIONS  (STANDING):  ascorbic acid 500 milliGRAM(s) Oral daily  atorvastatin 40 milliGRAM(s) Oral at bedtime  ferrous    sulfate 325 milliGRAM(s) Oral daily  folic acid 1 milliGRAM(s) Oral daily  multivitamin 1 Tablet(s) Oral daily  polyethylene glycol 3350 17 Gram(s) Oral daily  risperiDONE   Tablet 0.5 milliGRAM(s) Oral two times a day  senna 2 Tablet(s) Oral at bedtime  sertraline 100 milliGRAM(s) Oral daily    MEDICATIONS  (PRN):  acetaminophen     Tablet .. 650 milliGRAM(s) Oral every 6 hours PRN Mild Pain (1 - 3)  melatonin 3 milliGRAM(s) Oral at bedtime PRN Insomnia  traMADol 50 milliGRAM(s) Oral every 12 hours PRN Moderate Pain (4 - 6)-Severe pain (7-10)      ROS  No fever, sweats, chills  No epistaxis, HA, sore throat  No CP, SOB, cough, sputum  No n/v/d, abd pain, melena, hematochezia  No edema  No rash  No anxiety  No back pain, joint pain  +bleeding from chest lesions   No dysuria, hematuria    T(C): 37.1 (02-07-23 @ 11:04), Max: 37.5 (02-07-23 @ 00:59)  HR: 94 (02-07-23 @ 11:04) (63 - 116)  BP: 106/44 (02-07-23 @ 11:04) (106/44 - 131/52)  RR: 18 (02-07-23 @ 11:04) (15 - 18)  SpO2: 100% (02-07-23 @ 11:04) (99% - 100%)  Wt(kg): --    PE  NAD  Awake, alert, pleasant   Anicteric, MMM  Chest wrapped tightly in ace bandage   Abd soft, NT, ND  +chronic RUE edema   No rash grossly  FROM                          4.7    13.53 )-----------( 341      ( 06 Feb 2023 23:00 )             16.3       02-06    132<L>  |  100  |  31<H>  ----------------------------<  128<H>  4.3   |  24  |  0.56    Ca    9.5      06 Feb 2023 23:00    TPro  4.9<L>  /  Alb  2.5<L>  /  TBili  <0.2  /  DBili  x   /  AST  14  /  ALT  10  /  AlkPhos  93  02-06  
Wound SURGERY CONSULT NOTE    HPI:  88 y/o F with pmhx of skin cancer (squamous cell carcinoma) w/ mets s/p recent hospitalization for anemia presenting from [facility] for decreased Hgb. Patient at this time w/o any symptoms. Reports chronic R chest wound where cancer is located that bleeds intermittently. Denies blood in stool or blood in urine. Denies fevers, chills, nausea, vomiting, chest pain, SOB. No dizziness or light headedness. No weakness.    (07 Feb 2023 09:50)    Wound consult requested to assist w/ management of fungating chest wound. Patient known to Wound surgery service line from previous admission. Patient reports that the chest wound bleed intermittently; unprovoked. Per patient her dressing is changed daily at [place]. She reports feeling dizzy this morning, required assistance with breakfast for the first time due to feeling weak. Usually is independent in care. Reports receiving PRBC x 2 units since admission. Patient reports that she is unsure of plan for palliative chemotherapy. Currently denies SOB, CP, fever, chills diarrhea. Reports feeling "better than this morning, I've been getting dizzy when I am turned from side to side in the bed." Denies pain and or tenderness to chest wounds. Reports ambulating with walker at [place], reports urinary and fecal continence. Patient denies ever having had Right breast mastectomy; reports only surgical procedure at biopsy.    Current Diet: Diet, Regular:   DASH/TLC Sodium & Cholesterol Restricted (DASH) (02-07-23 @ 09:39)    PAST MEDICAL & SURGICAL HISTORY:  HLD (hyperlipidemia)    TIA (transient ischemic attack)    Carotid stenosis    Primary squamous cell carcinoma of skin    Thyroid mass      REVIEW OF SYSTEMS: General, skin see above  All other systems negative.     MEDICATIONS  (STANDING):  ascorbic acid 500 milliGRAM(s) Oral daily  atorvastatin 40 milliGRAM(s) Oral at bedtime  ferrous    sulfate 325 milliGRAM(s) Oral daily  folic acid 1 milliGRAM(s) Oral daily  multivitamin 1 Tablet(s) Oral daily  polyethylene glycol 3350 17 Gram(s) Oral daily  risperiDONE   Tablet 0.5 milliGRAM(s) Oral two times a day  senna 2 Tablet(s) Oral at bedtime  sertraline 100 milliGRAM(s) Oral daily    MEDICATIONS  (PRN):  acetaminophen     Tablet .. 650 milliGRAM(s) Oral every 6 hours PRN Mild Pain (1 - 3)  melatonin 3 milliGRAM(s) Oral at bedtime PRN Insomnia  traMADol 50 milliGRAM(s) Oral every 12 hours PRN Moderate Pain (4 - 6)-Severe pain (7-10)      Allergies    No Known Allergies    Intolerances        SOCIAL HISTORY:  [place] resident. Ambulates with walker; increasing sedentary. Single, never .  No smoking, etoh, illicit drug use.      FAMILY HISTORY:  Family history of lung cancer (Father)        PHYSICAL EXAM:  Vital Signs Last 24 Hrs  T(C): 36.8 (09 Feb 2023 06:09), Max: 37.5 (08 Feb 2023 21:20)  T(F): 98.3 (09 Feb 2023 06:09), Max: 99.5 (08 Feb 2023 21:20)  HR: 97 (09 Feb 2023 06:09) (96 - 97)  BP: 133/55 (09 Feb 2023 06:09) (103/37 - 133/55)  BP(mean): --  RR: 17 (09 Feb 2023 06:09) (17 - 17)  SpO2: 100% (09 Feb 2023 06:09) (100% - 100%)    Parameters below as of 09 Feb 2023 06:09  Patient On (Oxygen Delivery Method): room air        Constitutional: NAD. A&Ox4, Frail. Pale. Thin.  + low airloss support surface, offloaded with pillows, single breathable incontinence pad in place.  HEENT: NC/AT, eyeglasses, mucosa moist  Cardiovascular: rate regular   Respiratory: (+) RENE, on room air nonlabored at rest.  Gastrointestinal: soft NT/ND. Not bleeding per rectum.   : Functional incontinence at times   Musculoskeletal: Cachexia, (+) muscle wasting, requires one person assist for turning and positioning, generalized weakness.  Vascular:   - RUE: Marked lymphedema. Warm, good capillary refill. Radial pulse 2+  - lower extremities: Mildly cool to touch,  good capillary refill. No overt ischemia. Weak palpable pulses.   Skin: Removed ace wrap with no strike through; kerlix with minimal strike through.  Large cutaneous malignant wound secondary to SCC involving Bilateral BREAST/Chest wall extending to right mid axillary line and right lateral lower quadrant- measure in cluster- [id number] (stable as per prior measurement). Right breast consumed by fungating wound; Left breast with viable areola. Tissue type friable, oozing sanguinous drainage with gentle removal of prior dressing. No odor. No suprainfection or associated cellulitis. Able to achieve hemostasis with surgicel and minimal pressure.  Right lateral lower quadrant measure separately measuring- 9.5cmx9.5cmx0.3cm (stable as per prior measurement). Same clinical description as mentioned above. Mild sanguinous drainage. No odor. No suprainfection or associated cellulitis.  Able to achieve hemostasis with surgicel and minimal pressure.  Covered with nonfriable areas with silicone contact layer, aquacel hydrofiber, abdominal pads, kerlix  wrap and ace bandage. Patient tolerated well.   Incontinence/Moisture associated dermatitis in Sacrum and buttocks as evident by erythema. No open ulcers.       LABS/ CULTURES/ RADIOLOGY:                        7.6    14.63 )-----------( 313      ( 09 Feb 2023 05:19 )             25.3       135  |  103  |  17  ----------------------------<  87      [02-09-23 @ 05:19]  4.3   |  22  |  0.47        Ca     9.5     [02-09-23 @ 05:19]      Mg     2.00     [02-09-23 @ 05:19]      Phos  3.2     [02-09-23 @ 05:19]        Culture - Blood (collected 02-08-23 @ 00:06)  Source: .Blood Blood-Venous  Preliminary Report (02-09-23 @ 04:02):    No growth to date.    Culture - Blood (collected 02-07-23 @ 23:50)  Source: .Blood Blood-Venous  Preliminary Report (02-09-23 @ 04:02):    No growth to date.        < from: CT Chest No Cont (12.29.22 @ 15:08) >  ACC: [id number] EXAM:  CT CHEST                          PROCEDURE DATE:  12/29/2022          INTERPRETATION:  CLINICAL INFORMATION: History of anemia, metastatic   squamous cell carcinoma of the skin. Bilateral breast malignant skin   lesion bleeding. Evaluate malignancy.    COMPARISON: CT chest 7/18/2022.    CONTRAST/COMPLICATIONS:  IV Contrast: None  Oral Contrast: None  Complications: None    PROCEDURE:  CT scan of the chest was obtained without intravenous contrast.    FINDINGS:    MEDIASTINUM/LYMPH NODES: Enlarging left axillary/chest wall   lymphadenopathy with largest lymph node measuring 1.3 cm (2-74.   Multinodular right thyroid lobe. Left thyroidectomy.    HEART/VASCULATURE: The heart is normal in size. Calcifications coronary   artery calcification and stenting Calcifications of the aortic valve and   mitral annulus. The great vessels are normal in size. Small pericardial   effusion. Hypodensity of the blood pool in relation to left ventricular   myocardium suggests underlyinganemia.    AIRWAYS/LUNGS/PLEURA: Central airways are clear.  New and enlarging bilateral pulmonary metastases since CT 7/18/2022.    For reference:  Left lower lobe nodule measures 1.8 cm (2-205) compared to prior which   measured 1.3 cm.  New right lower lobe nodule measuring 0.8 cm (2-109).    Small right pleural effusion with subadjacent atelectatic lung. Trace   left pleural effusion with subadjacent atelectatic lung demonstrating   calcifications. Subpleural reticulation in the right middle lobe may be   secondary to reported right breast/chest wall radiotherapy. There is   septal thickening at the bases; lymphangitic carcinomatosis is a   possibility.    UPPER ABDOMEN: Splenomegaly with spleen measuring 15.6 cm. Cholelithiasis.    BONES/SOFT TISSUES: Extensive irregular and fungating cutaneous lesions   diffusely involving the anterior and lateral chest wall, new and   worsening since July 2022. For reference along the left lateral chest   wall, maximal skin thickness measures approximately 1.5 cm in depth   compared to 0.6 cm previously. Open wounds suggested, with discontinuity   of the skin surface and locules of air. The fungating right breast mass   measures approximately 4 cm in depth, invades the right axillary space   and adjacent right fourth and fifth ribs, overall extent not fully   evaluated in the absence of IV contrast.      IMPRESSION:  Overall worsening metastatic burden since July 2022, with diffuse   irregular and fungating cutaneous lesions throughout the chest wall with   open lesions and rib invasion, new/enlarging left axillary/chest wall   lymphadenopathy and pulmonary metastases.        --- End of Report ---          KAMLESH ALLEN MD; Resident Radiologist  This document has been electronically signed.  STEVEN SANCHEZ M.D., Attending Radiologist  This document has been electronically signed. Dec 29 2022  5:14PM    < end of copied text >        
PATIENT SEEN AND EXAMINED ON :-02/07/2023  DATE OF SERVICE:  02/07/2023           Interim events noted,Labs ,Radiological studies and Cardiology tests reviewed .      History of Present Illness:  Reason for Admission: Anemia  History of Present Illness:   88 y/o F with pmhx of skin cancer (squamous cell carcinoma) w/ mets s/p recent hospitalization for anemia presenting from [facility] for decreased Hgb. Patient at this time w/o any symptoms. Reports chronic R chest wound where cancer is located that bleeds intermittently. Denies blood in stool or blood in urine. Denies fevers, chills, nausea, vomiting, chest pain, SOB. No dizziness or light headedness. No weakness.       Review of Systems:  Review of Systems: CONSTITUTIONAL:  No weight loss, fever, chills, weakness or fatigue.  HEENT:   No visual loss, blurred vision, No hearing loss, sneezing, congestion, runny nose or sore throat.  SKIN:  No rash or itching.  CARDIOVASCULAR:  No chest pain, chest pressure or chest discomfort. No palpitations.  RESPIRATORY:  No shortness of breath, cough or sputum.  GASTROINTESTINAL:  No , nausea, vomiting or diarrhea. No abdominal pain or blood.  GENITOURINARY:  Denies hematuria, dysuria.   NEUROLOGICAL:  No headache, dizziness, numbness or tingling in the extremities. No change in bowel or bladder control.  MUSCULOSKELETAL:  No muscle, back pain, joint pain or stiffness.  HEMATOLOGIC:  +anemia, +bleeding   ENDOCRINOLOGIC:  No reports of sweating, cold or heat intolerance. No polyuria or polydipsia.  ALLERGIES:  No history of asthma, hives, eczema or rhinitis.  Other Review of Systems: All other review of systems negative, except as noted in HPI      Allergies and Intolerances:        Allergies:  	No Known Allergies:     Home Medications:   * Patient Currently Takes Medications as of 07-Feb-2023 09:28 documented in Structured Notes  · 	senna leaf extract oral tablet: Last Dose Taken:  , 2 tab(s) orally once a day (at bedtime)  · 	melatonin 3 mg oral tablet: Last Dose Taken:  , 1 tab(s) orally once a day (at bedtime), As needed, Insomnia  · 	atorvastatin 40 mg oral tablet: Last Dose Taken:  , 1 tab(s) orally once a day (at bedtime)  · 	acetaminophen 325 mg oral tablet: Last Dose Taken:  , 2 tab(s) orally every 6 hours, As needed, Temp greater or equal to 38C (100.4F), Mild Pain (1 - 3)  · 	sertraline 100 mg oral tablet: Last Dose Taken:  , 1 tab(s) orally once a day  · 	MiraLax oral powder for reconstitution: Last Dose Taken:  , 17 gram(s) orally once a day  · 	ascorbic acid 500 mg oral tablet: Last Dose Taken:  , 1 tab(s) orally once a day  · 	ferrous sulfate 325 mg (65 mg elemental iron) oral tablet: Last Dose Taken:  , 1 tab(s) orally once a day  · 	folic acid 1 mg oral tablet: Last Dose Taken:  , 1 tab(s) orally once a day  · 	Multiple Vitamins oral tablet: Last Dose Taken:  , 1 tab(s) orally once a day  · 	risperiDONE 0.5 mg oral tablet: Last Dose Taken:  , 1 tab(s) orally 2 times a day  · 	Ultram 50 mg oral tablet: Last Dose Taken:  , 1 tab(s) orally every 12 hours, As Needed  · 	bisacodyl 10 mg rectal suppository: Last Dose Taken:  , 1 suppository(ies) rectal once a day  	IF NO BM  · 	Fleet Enema 19 g-7 g rectal enema: Last Dose Taken:  , 133 milliliter(s) rectal once a day  	if no BM    Patient History:   Past Medical, Past Surgical, and Family History:  PAST MEDICAL HISTORY:  Carotid stenosis     HLD (hyperlipidemia)     Primary squamous cell carcinoma of skin     TIA (transient ischemic attack).     PAST SURGICAL HISTORY:  Thyroid mass.         Physical Exam:   Physical Exam: GENERAL: NAD  HEAD:  Atraumatic, Normocephalic  EYES: EOMI, conjunctiva and sclera clear  NECK: Supple  CHEST/LUNG: Clear to auscultation bilaterally; No wheeze, rhonchi, crackles or rales  HEART: Regular rate and rhythm; No murmurs, rubs, or gallops  ABDOMEN: Soft, Nontender, Nondistended; Bowel sounds present  EXTREMITIES:  2+ Peripheral Pulses, +upper extremity edema B/L   PSYCH: AAOx3  NEUROLOGY: non-focal  SKIN: Warm and dry. Refusing exam for R chest wound. States dressing was recently changed.      VITALS:  T(C): 37.1 (02-07-23 @ 11:04), Max: 37.5 (02-07-23 @ 00:59)  HR: 94 (02-07-23 @ 11:04) (63 - 116)  BP: 106/44 (02-07-23 @ 11:04) (106/44 - 131/52)  RR: 18 (02-07-23 @ 11:04) (15 - 18)  SpO2: 100% (02-07-23 @ 11:04) (99% - 100%)         4.7    13.53 )-----------( 341      ( 06 Feb 2023 23:00 )             16.3       02-06    132<L>  |  100  |  31<H>  ----------------------------<  128<H>  4.3   |  24  |  0.56    Ca    9.5      06 Feb 2023 23:00    TPro  4.9<L>  /  Alb  2.5<L>  /  TBili  <0.2  /  DBili  x   /  AST  14  /  ALT  10  /  AlkPhos  93  02-06

## 2023-02-09 NOTE — DISCHARGE NOTE PROVIDER - NSDCMRMEDTOKEN_GEN_ALL_CORE_FT
acetaminophen 325 mg oral tablet: 2 tab(s) orally every 6 hours, As needed, Mild Pain (1 - 3)  ascorbic acid 500 mg oral tablet: 1 tab(s) orally once a day  atorvastatin 40 mg oral tablet: 1 tab(s) orally once a day (at bedtime)  ferrous sulfate 325 mg (65 mg elemental iron) oral tablet: 1 tab(s) orally once a day  folic acid 1 mg oral tablet: 1 tab(s) orally once a day  melatonin 3 mg oral tablet: 1 tab(s) orally once a day (at bedtime), As needed, Insomnia  Multiple Vitamins oral tablet: 1 tab(s) orally once a day  polyethylene glycol 3350 oral powder for reconstitution: 17 gram(s) orally once a day  risperiDONE 0.5 mg oral tablet: 1 tab(s) orally 2 times a day  senna leaf extract oral tablet: 2 tab(s) orally once a day (at bedtime)  sertraline 100 mg oral tablet: 1 tab(s) orally once a day  traMADol 50 mg oral tablet: 1 tab(s) orally every 12 hours, As needed, Moderate Pain (4 - 6)-Severe pain (7-10)

## 2023-02-09 NOTE — PHYSICAL THERAPY INITIAL EVALUATION ADULT - ADDITIONAL COMMENTS
Pt reports that she is from [facility] where she has been ambulating with assistance using a rolling; although she has not walked for the past few days. While @ [facility] pt states that she was getting PT daily. Pt denies any recent falls.  Prior to rehab pt reports that she was living alone in an apartment with 4 steps to enter; (+)1 handrail; and ~13 steps to negotiate to bedroom on the 2nd floor; (+)1 handrail.     Pt left comfortable in bed, NAD, all lines intact, all precautions maintained, with call bell in reach, bed alarm on, and RN aware of PT evaluation.

## 2023-02-13 LAB
CULTURE RESULTS: SIGNIFICANT CHANGE UP
CULTURE RESULTS: SIGNIFICANT CHANGE UP
SPECIMEN SOURCE: SIGNIFICANT CHANGE UP
SPECIMEN SOURCE: SIGNIFICANT CHANGE UP

## 2023-03-22 NOTE — DISCHARGE NOTE ADULT - CONDITIONS AT DISCHARGE
Called patient, spoke with: Patient regarding the results of the patients most recent Labs. I advised Patient of Emely Leos recommendations.    Patient did voice understanding stable

## 2023-03-31 NOTE — PATIENT PROFILE ADULT - FALL HARM RISK - PATIENT NEEDS ASSISTANCE
Called pt about referral from pulmonary to onc, no answer and unable to LVM. Upon further review of chart notes, pt requested referral to outside oncologist. No f/u from NN needed at this time.    Standing/Walking/Toileting/Moving from bed to chair

## 2024-01-23 NOTE — PROGRESS NOTE ADULT - PROBLEM SELECTOR PLAN 1
-- s/p RT to right breast at [facility]   -- contcemiplimab, LD 12/22. Hold systemic treatment while admitted   -- Follow up with Dr. Alexis after discharge  --CT chest from 12/29 shows overall worsening metastatic burden since July 2022, with diffuse irregular and fungating cutaneous lesions throughout the chest wall with  open lesions and rib invasion, new/enlarging left axillary/chest wall lymphadenopathy and pulmonary metastases.
Assessment:  - patient presented for worsening of skin wound which is her malignancy  - there is significant bleeding and purulence on the wound site on the R chest  - patient reported getting immunotherapy for the skin cancer  - Started on Cefazolin given apperance of purulence   wound care
Assessment:  - patient presented for worsening of skin wound which is her malignancy  - there is significant bleeding and purulence on the wound site on the R chest  - patient reported getting immunotherapy for the skin cancer  - Started on Cefazolin given apperance of purulence   wound+ squamous cell carcinoma of skin     - c/w Cefazolin    - heme/on f/u   -- s/p RT to right breast   -- Currently receiving cemiplimab, LD 12/22. Hold systemic treatment while admitted
Assessment:  - patient presented for worsening of skin wound which is her malignancy  - there is significant bleeding and purulence on the wound site on the R chest  - patient reported getting immunotherapy for the skin cancer  - Started on Cefazolin given apperance of purulence   wound+ squamous cell carcinoma of skin     - c/w Cefazolin    - heme/on f/u   -- s/p RT to right breast   -- Currently receiving cemiplimab, LD 12/22. Hold systemic treatment while admitted
Assessment:  - patient presented for worsening of skin wound which is her malignancy  - there is significant bleeding and purulence on the wound site on the R chest  - patient reported getting immunotherapy for the skin cancer  - Started on Cefazolin given apperance of purulence   wound care
no
-- s/p RT to right breast at [facility]   -- franmijoshua, CAMERON 12/22. Hold systemic treatment while admitted   -- Follow up with Dr. Alexis after discharge
Assessment:  - patient presented for worsening of skin wound which is her malignancy  - there is significant bleeding and purulence on the wound site on the R chest  - patient reported getting immunotherapy for the skin cancer    Plan:  - wound care consult  - will start cefazolin due to appearance with purulence  - given hx of mets, unlikely to benefit from surgical intervention
-- s/p RT to right breast at [facility]   -- franmijoshua, CAMERON 12/22. Hold systemic treatment while admitted   -- Follow up with Dr. Alexis after discharge
-- s/p RT to right breast at [facility]   -- franmijoshua, CAMERON 12/22. Hold systemic treatment while admitted   -- Follow up with Dr. Alexis after discharge
-- s/p RT to right breast at [facility]   -- contcemiplimab, LD 12/22. Hold systemic treatment while admitted   -- Follow up with Dr. Alexis after discharge  --CT chest from 12/29 shows overall worsening metastatic burden since July 2022, with diffuse irregular and fungating cutaneous lesions throughout the chest wall with  open lesions and rib invasion, new/enlarging left axillary/chest wall lymphadenopathy and pulmonary metastases.
Assessment:  - patient presented for worsening of skin wound which is her malignancy  - there is significant bleeding and purulence on the wound site on the R chest  - patient reported getting immunotherapy for the skin cancer  - Started on Cefazolin given apperance of purulence   woun+ squamous cell carcinoma of skin     - c/w Cefazolin    -
-- s/p RT to right breast at [facility]   -- contcemipltye, LD 12/22. Hold systemic treatment while admitted   -- Follow up with Dr. Alexis after discharge  --CT chest from 12/29 shows overall worsening metastatic burden since July 2022, with diffuse irregular and fungating cutaneous lesions throughout the chest wall with  open lesions and rib invasion, new/enlarging left axillary/chest wall lymphadenopathy and pulmonary metastases.  palliative f/u  - d/c planning
-- s/p RT to right breast at [facility]   -- franmijoshua, CAMERON 12/22. Hold systemic treatment while admitted   -- Follow up with Dr. Alexis after discharge
Assessment:  - patient presented for worsening of skin wound which is her malignancy  - there is significant bleeding and purulence on the wound site on the R chest  - patient reported getting immunotherapy for the skin cancer  - Started on Cefazolin given apperance of purulence   wound+ squamous cell carcinoma of skin     - c/w Cefazolin    - heme/on f/u   -- s/p RT to right breast   -- Currently receiving cemiplimab, LD 12/22. Hold systemic treatment while admitted

## 2024-03-11 NOTE — ED ADULT NURSE NOTE - NSFALLRSKHARMRISK_ED_ALL_ED
Patient was called. Informed of note below to increase Amlodipine to 10 mg daily. Bubble packs scheduled to be updated Wednesday 3/13.    Patient stated she saw her PCP on 3/8 for right leg swelling that had newly started about 8 days prior (unsure if around the time we started Amlodipine 5 mg daily). PCP had noted \"Left - 16 1/2 inches and 13 inches.    Right - 18 1/2 16 1/2 inches.  Difference in the legs, upper and lower part of the right leg is significantly bigger than left leg,  right leg is more swollen then left leg.\"    PCP ordered US vascular lower extremity duplex right to evaluate for DVT. Imaging not yet scheduled. Discussed with patient we need imaging completed to determine if she has a blood clot or not. Informed if imaging does not reveal a blood clot, we will discuss with Dr. Pierre to see if he wants to still increase/continue the Amlodipine. Transferred patient to Central Scheduling to schedule US for PCP.   no

## 2024-03-28 NOTE — PATIENT PROFILE ADULT - WILL THE PATIENT ACCEPT THE PFIZER COVID-19 VACCINE IF ELIGIBLE AND IT IS AVAILABLE?
Post-Care Instructions: I reviewed with the patient in detail post-care instructions. Patient is to wear sunprotection, and avoid picking at any of the treated lesions. Pt may apply Vaseline to crusted or scabbing areas. Show Applicator Variable?: Yes Render Note In Bullet Format When Appropriate: No Detail Level: Detailed Consent: The patient's consent was obtained including but not limited to risks of crusting, scabbing, blistering, scarring, darker or lighter pigmentary change, recurrence, incomplete removal and infection. Duration Of Freeze Thaw-Cycle (Seconds): 5 Number Of Freeze-Thaw Cycles: 3 freeze-thaw cycles No

## 2024-05-09 NOTE — DISCHARGE NOTE ADULT - NS AS DC FOLLOWUP STROKE INST
Euphoria
Stroke (includes: TIA/SAH/ICH/Ischemic Stroke)/Influenza vaccination (VIS Pub Date: August 7, 2015)

## 2024-09-03 NOTE — ED PROVIDER NOTE - NSICDXFAMILYHX_GEN_ALL_CORE_FT
FAMILY HISTORY:  Father  Still living? Unknown  Family history of lung cancer, Age at diagnosis: Age Unknown    
(0) No abnormality

## 2024-09-04 NOTE — PATIENT PROFILE ADULT - HAVE YOU HAD A FIRST COVID-19 BOOSTER?
[General Appearance - Well Developed] : well developed [General Appearance - Well Nourished] : well nourished [Sclera] : the sclera and conjunctiva were normal [Extraocular Movements] : extraocular movements were intact [Hearing Threshold Finger Rub Not Coal] : hearing was normal [Outer Ear] : the ears and nose were normal in appearance [Examination Of The Oral Cavity] : the lips and gums were normal [] : no respiratory distress [Exaggerated Use Of Accessory Muscles For Inspiration] : no accessory muscle use [Abdomen Soft] : soft [Abdomen Tenderness] : non-tender [Normal] : no palpable adenopathy [Range of Motion to Joints] : range of motion to joints [Motor Tone] : muscle strength and tone were normal [No Focal Deficits] : no focal deficits [Oriented To Time, Place, And Person] : oriented to person, place, and time [de-identified] : post-surgical scar of the abdomen Yes

## 2024-10-12 NOTE — PATIENT PROFILE ADULT - ARE SIGNIFICANT INDICATORS COMPLETE.
[facility]  [address] [place] [address]  [phone]    Wednesday, October 16, 2024   Yes